# Patient Record
Sex: FEMALE | Race: WHITE | NOT HISPANIC OR LATINO | Employment: OTHER | URBAN - METROPOLITAN AREA
[De-identification: names, ages, dates, MRNs, and addresses within clinical notes are randomized per-mention and may not be internally consistent; named-entity substitution may affect disease eponyms.]

---

## 2017-01-13 DIAGNOSIS — R73.9 HYPERGLYCEMIA: ICD-10-CM

## 2017-01-13 RX ORDER — BLOOD SUGAR DIAGNOSTIC
STRIP MISCELLANEOUS
Qty: 300 EACH | Refills: 1 | Status: SHIPPED | OUTPATIENT
Start: 2017-01-13 | End: 2017-06-07 | Stop reason: SDUPTHER

## 2017-01-13 RX ORDER — LANCETS
EACH MISCELLANEOUS
Qty: 300 EACH | Refills: 1 | Status: SHIPPED | OUTPATIENT
Start: 2017-01-13 | End: 2017-06-07 | Stop reason: SDUPTHER

## 2017-01-16 ENCOUNTER — TELEPHONE (OUTPATIENT)
Dept: FAMILY MEDICINE CLINIC | Facility: CLINIC | Age: 72
End: 2017-01-16

## 2017-01-16 NOTE — TELEPHONE ENCOUNTER
Please call.  I reviewed her blood sugar readings.  Has she ever been on metformin.  If not, recommend start metformin 500 mg 1 by mouth twice a day #60 plus one refill.  in addition to the 60 units of Lantus.  Follow-up in February as scheduled.  Call in 2-3 weeks with blood sugar readings.

## 2017-01-16 NOTE — TELEPHONE ENCOUNTER
Patient calling with glucose readings. She is currently taking 60 units of Lantus. Per patient no other DM meds.    Dec 30th   7:30am  258    10 pm  341  Dec 31st 9am  280  Jan 1st  7am  220    2:30am 240    10pm  328  Jan 2nd 7am  245    1pm  321    9pm  304  Jan 3rd 11am  399    10pm  310  Jan 4th  6am  284  Jan 5th  3:30am 246    12pm  266  Jan 6th  5:30am 386    1pm  510    9pm  503  Jan 7th  11am  255    6pm  347    8pm  270  Jan 8th  12pm  350  Jan 9th  4am  199  Jan 10th 8am  335    3:30pm 280    9:30pm 394  Jan 11th 10am  240    11:30pm 411  Jan 12th 11am  253  Jan 13th 8am  279    1pm  347  Jan 14th 3:30am 213    8pm  324  Jan 15th 7am  230  Jan 16th 5:30am 258    1pm  379

## 2017-01-17 DIAGNOSIS — R73.9 HYPERGLYCEMIA: ICD-10-CM

## 2017-01-17 DIAGNOSIS — R73.9 HYPERGLYCEMIA: Primary | ICD-10-CM

## 2017-01-17 NOTE — TELEPHONE ENCOUNTER
Spoke with pt and went over msg. She confirms she doesn't believe she has ever taken Metformin. She is willing to try this and aware Rx will be sent. She states she would like to go ahead and schedule f/u appt for Feb and will just bring readings in with her then. Rx sent

## 2017-01-23 ENCOUNTER — TELEPHONE (OUTPATIENT)
Dept: FAMILY MEDICINE CLINIC | Facility: CLINIC | Age: 72
End: 2017-01-23

## 2017-01-23 NOTE — TELEPHONE ENCOUNTER
----- Message from Deborah Harman sent at 1/23/2017  3:26 PM EST -----  Contact: Lemuel  Patient is calling to let Dr. Hdez know that she cannot take Metformin 500mg. Please call her back at [phone].

## 2017-01-23 NOTE — TELEPHONE ENCOUNTER
Metformin was recently added. Pt cannot tolerate this. She has upcoming appt 2/24/17. Please advise.

## 2017-01-25 NOTE — TELEPHONE ENCOUNTER
SPOKE WITH PT AND SHE STATES THAT SHE WAS HAVING TROUBLE BREATHING AND WAS GASPING FOR AIR AND SHE WAS VERY WEAK AND HAD DIARRHEA. PT STATES DIARRHEA WAS OK AND KIND OF SUBSIDED BUT AFTER SHE WAS HAVING TROUBLE BREATHING SHE READ THE PAPERWORK AND REALIZED THE MEDICATION WAS CAUSING THESE ISSUES SO SHE STOP. IS THERE ANOTHER MED THAT CAN BE PRESCRIBED.

## 2017-01-26 NOTE — TELEPHONE ENCOUNTER
SPOKE WITH PT AND INFORMED HER OF MESSAGE AND PT VERBALIZED UNDERSTANDING AND WILL CALL OR BRING IN READING.

## 2017-01-26 NOTE — TELEPHONE ENCOUNTER
Please call, have her continue to check her sugars and call in 2 weeks with readings. May just need to increase insulin. There are some other meds but may have side effects as well. I would like to see  how sugars are doing now. May need to discuss further at OV in Feb if sugars are stable. bds

## 2017-01-30 RX ORDER — PRAVASTATIN SODIUM 40 MG
40 TABLET ORAL DAILY
Qty: 90 TABLET | Refills: 3 | Status: SHIPPED | OUTPATIENT
Start: 2017-01-30 | End: 2018-01-27 | Stop reason: SDUPTHER

## 2017-02-17 ENCOUNTER — OFFICE VISIT (OUTPATIENT)
Dept: FAMILY MEDICINE CLINIC | Facility: CLINIC | Age: 72
End: 2017-02-17

## 2017-02-17 VITALS
TEMPERATURE: 98.5 F | BODY MASS INDEX: 40.78 KG/M2 | OXYGEN SATURATION: 93 % | HEART RATE: 72 BPM | WEIGHT: 215.8 LBS | DIASTOLIC BLOOD PRESSURE: 52 MMHG | RESPIRATION RATE: 26 BRPM | SYSTOLIC BLOOD PRESSURE: 142 MMHG

## 2017-02-17 DIAGNOSIS — J43.9 PULMONARY EMPHYSEMA, UNSPECIFIED EMPHYSEMA TYPE (HCC): ICD-10-CM

## 2017-02-17 DIAGNOSIS — G89.29 CHRONIC RIGHT SHOULDER PAIN: ICD-10-CM

## 2017-02-17 DIAGNOSIS — M25.511 CHRONIC RIGHT SHOULDER PAIN: ICD-10-CM

## 2017-02-17 DIAGNOSIS — E03.9 ACQUIRED HYPOTHYROIDISM: ICD-10-CM

## 2017-02-17 DIAGNOSIS — M79.604 LOWER EXTREMITY PAIN, RIGHT: Primary | ICD-10-CM

## 2017-02-17 DIAGNOSIS — I10 BENIGN ESSENTIAL HYPERTENSION: ICD-10-CM

## 2017-02-17 DIAGNOSIS — E78.2 MIXED HYPERLIPIDEMIA: Primary | ICD-10-CM

## 2017-02-17 DIAGNOSIS — Z79.4 UNCONTROLLED TYPE 2 DIABETES MELLITUS WITH HYPERGLYCEMIA, WITH LONG-TERM CURRENT USE OF INSULIN (HCC): ICD-10-CM

## 2017-02-17 DIAGNOSIS — E11.65 UNCONTROLLED TYPE 2 DIABETES MELLITUS WITH HYPERGLYCEMIA, WITH LONG-TERM CURRENT USE OF INSULIN (HCC): ICD-10-CM

## 2017-02-17 LAB
ALBUMIN SERPL-MCNC: 4 G/DL (ref 3.2–4.8)
ALBUMIN/GLOB SERPL: 1.7 G/DL (ref 1.5–2.5)
ALP SERPL-CCNC: 50 U/L (ref 25–100)
ALT SERPL-CCNC: 22 U/L (ref 7–40)
AST SERPL-CCNC: 18 U/L (ref 0–33)
BASOPHILS # BLD AUTO: 0.02 10*3/MM3 (ref 0–0.2)
BASOPHILS NFR BLD AUTO: 0.3 % (ref 0–1)
BILIRUB SERPL-MCNC: 0.9 MG/DL (ref 0.3–1.2)
BUN SERPL-MCNC: 20 MG/DL (ref 9–23)
BUN/CREAT SERPL: 22.2 (ref 7–25)
CALCIUM SERPL-MCNC: 10 MG/DL (ref 8.7–10.4)
CHLORIDE SERPL-SCNC: 92 MMOL/L (ref 99–109)
CHOLEST SERPL-MCNC: 154 MG/DL (ref 0–200)
CHOLEST/HDLC SERPL: 2.44 {RATIO}
CO2 SERPL-SCNC: 34 MMOL/L (ref 20–31)
CREAT SERPL-MCNC: 0.9 MG/DL (ref 0.6–1.3)
EOSINOPHIL # BLD AUTO: 0.19 10*3/MM3 (ref 0.1–0.3)
EOSINOPHIL NFR BLD AUTO: 2.5 % (ref 0–3)
ERYTHROCYTE [DISTWIDTH] IN BLOOD BY AUTOMATED COUNT: 12.9 % (ref 11.3–14.5)
GLOBULIN SER CALC-MCNC: 2.4 GM/DL
GLUCOSE SERPL-MCNC: 266 MG/DL (ref 70–100)
HBA1C MFR BLD: 12 % (ref 4.8–5.6)
HCT VFR BLD AUTO: 42.9 % (ref 34.5–44)
HDLC SERPL-MCNC: 63 MG/DL (ref 40–60)
HGB BLD-MCNC: 13.4 G/DL (ref 11.5–15.5)
IMM GRANULOCYTES # BLD: 0.02 10*3/MM3 (ref 0–0.03)
IMM GRANULOCYTES NFR BLD: 0.3 % (ref 0–0.6)
LDLC SERPL CALC-MCNC: 68 MG/DL (ref 0–100)
LYMPHOCYTES # BLD AUTO: 1.7 10*3/MM3 (ref 0.6–4.8)
LYMPHOCYTES NFR BLD AUTO: 22.2 % (ref 24–44)
MCH RBC QN AUTO: 31.2 PG (ref 27–31)
MCHC RBC AUTO-ENTMCNC: 31.2 G/DL (ref 32–36)
MCV RBC AUTO: 99.8 FL (ref 80–99)
MONOCYTES # BLD AUTO: 0.56 10*3/MM3 (ref 0–1)
MONOCYTES NFR BLD AUTO: 7.3 % (ref 0–12)
NEUTROPHILS # BLD AUTO: 5.17 10*3/MM3 (ref 1.5–8.3)
NEUTROPHILS NFR BLD AUTO: 67.4 % (ref 41–71)
PLATELET # BLD AUTO: 201 10*3/MM3 (ref 150–450)
POTASSIUM SERPL-SCNC: 4.5 MMOL/L (ref 3.5–5.5)
PROT SERPL-MCNC: 6.4 G/DL (ref 5.7–8.2)
RBC # BLD AUTO: 4.3 10*6/MM3 (ref 3.89–5.14)
SODIUM SERPL-SCNC: 134 MMOL/L (ref 132–146)
TRIGL SERPL-MCNC: 117 MG/DL (ref 0–150)
TSH SERPL DL<=0.005 MIU/L-ACNC: 12.45 MIU/ML (ref 0.35–5.35)
VLDLC SERPL CALC-MCNC: 23.4 MG/DL
WBC # BLD AUTO: 7.66 10*3/MM3 (ref 3.5–10.8)

## 2017-02-17 PROCEDURE — 99213 OFFICE O/P EST LOW 20 MIN: CPT | Performed by: FAMILY MEDICINE

## 2017-02-17 RX ORDER — HYDROCODONE BITARTRATE AND ACETAMINOPHEN 5; 325 MG/1; MG/1
1 TABLET ORAL EVERY 6 HOURS PRN
COMMUNITY
End: 2017-02-17

## 2017-02-17 RX ORDER — TRAMADOL HYDROCHLORIDE 50 MG/1
50 TABLET ORAL EVERY 6 HOURS PRN
Qty: 30 TABLET | Refills: 0 | Status: SHIPPED | OUTPATIENT
Start: 2017-02-17 | End: 2017-02-24 | Stop reason: SDUPTHER

## 2017-02-17 NOTE — PROGRESS NOTES
"Subjective   Janet Pisano is a 71 y.o. female.     History of Present Illness   Here for ER follow up   She was seen at [facility] ER 2/14/17 for right lower extremity, present in the thigh region that radiates up to her hip  Right hip was xray was completed  Norco 5mg prescribed by ER, states that it barely relieves the pain.  Pain in her right leg has been present x 1 week, getting progressively worse  Walking for long periods makes leg pain worse.  The right leg does have edema, ultrasound was completed in the ER- no DVT  She was referred to Dr. Morro Walls orthopedic for further evaluation of shoulder and leg pain.  She states that she is already seeing a hand surgeon, she would like to see a different orthopedic within the same group.    Right shoulder pain is chronic  States that \"it catches\" and the pain is \"horrible\"  Years ago, she fell down some steps and caught herself with her arms, that injured her shoulder.     She does have a history of rheumatic fever and thinks that her joint pain is related to that     States that aleve, ibuprofen doesn't improve any pain symptoms.  She is requesting something different for pain than the Norco that was given to her per the ER.    The following portions of the patient's history were reviewed and updated as appropriate: allergies, current medications, past family history, past medical history, past social history, past surgical history and problem list.    Review of Systems   Constitutional: Negative for chills and fever.   Respiratory: Negative.    Cardiovascular: Negative.    Musculoskeletal: Positive for arthralgias. Gait problem: ambulates with a walker.   Neurological: Negative for dizziness, weakness and numbness.   Hematological: Negative for adenopathy. Does not bruise/bleed easily.       Objective   Physical Exam   Constitutional: She is oriented to person, place, and time. She appears well-developed and well-nourished.   HENT:   Head: Normocephalic and " atraumatic.   Nose: Nose normal.   Eyes: Conjunctivae and EOM are normal.   Cardiovascular: Normal rate, regular rhythm and normal heart sounds.    Pulmonary/Chest: Effort normal and breath sounds normal. No respiratory distress. She has no wheezes.   Musculoskeletal: She exhibits no deformity.        Right shoulder: She exhibits crepitus. She exhibits normal range of motion.        Right hip: She exhibits tenderness.        Right knee: She exhibits normal range of motion.   Ambulates with walker   Neurological: She is alert and oriented to person, place, and time. No cranial nerve deficit.   Skin: Skin is warm and dry.   Psychiatric: She has a normal mood and affect. Her behavior is normal.   Nursing note and vitals reviewed.      Assessment/Plan   Janet was seen today for leg pain.    Diagnoses and all orders for this visit:    Lower extremity pain, right  -     traMADol (ULTRAM) 50 MG tablet; Take 1 tablet by mouth Every 6 (Six) Hours As Needed for moderate pain (4-6).  -     diclofenac (VOLTAREN) 50 MG EC tablet; Take 1 tablet by mouth 2 (Two) Times a Day.  -     Ambulatory Referral to Orthopedic Surgery    Chronic right shoulder pain  -     traMADol (ULTRAM) 50 MG tablet; Take 1 tablet by mouth Every 6 (Six) Hours As Needed for moderate pain (4-6).  -     diclofenac (VOLTAREN) 50 MG EC tablet; Take 1 tablet by mouth 2 (Two) Times a Day.  -     Ambulatory Referral to Orthopedic Surgery     referred to orthopedic per her request  Pain medication changed to tramadol, stopped Norco.  Do not combine these 2 medications. KENDALL query complete. Treatment plan to include limited course of prescribed controlled substance. Risks including addiction, benefits, and alternatives presented to patient.   Started daily diclofenac for NSAID therapy.  She was advised to stop this medication if stomach pain occurs.    Mala Alvarez DO

## 2017-02-17 NOTE — PATIENT INSTRUCTIONS
Go to the nearest ER or return to clinic if symptoms worsen, fever/chill develop      Knee Pain  Knee pain is a common problem. It can have many causes. The pain often goes away by following your doctor's home care instructions. Treatment for ongoing pain will depend on the cause of your pain. If your knee pain continues, more tests may be needed to diagnose your condition. Tests may include X-rays or other imaging studies of your knee.  HOME CARE  · Take medicines only as told by your doctor.  · Rest your knee and keep it raised (elevated) while you are resting.  · Do not do things that cause pain or make your pain worse.  · Avoid activities where both feet leave the ground at the same time, such as running, jumping rope, or doing jumping jacks.  · Apply ice to the knee area:    Put ice in a plastic bag.    Place a towel between your skin and the bag.    Leave the ice on for 20 minutes, 2-3 times a day.  · Ask your doctor if you should wear an elastic knee support.  · Sleep with a pillow under your knee.  · Lose weight if you are overweight. Being overweight can make your knee hurt more.  · Do not use any tobacco products, including cigarettes, chewing tobacco, or electronic cigarettes. If you need help quitting, ask your doctor. Smoking may slow the healing of any bone and joint problems that you may have.  GET HELP IF:  · Your knee pain does not stop, it changes, or it gets worse.  · You have a fever along with knee pain.  · Your knee gives out or locks up.  · Your knee becomes more swollen.  GET HELP RIGHT AWAY IF:   · Your knee feels hot to the touch.  · You have chest pain or trouble breathing.     This information is not intended to replace advice given to you by your health care provider. Make sure you discuss any questions you have with your health care provider.     Document Released: 03/16/2010 Document Revised: 01/08/2016 Document Reviewed: 02/18/2015  Elsevier Interactive Patient Education ©2016  [employer]

## 2017-02-20 RX ORDER — INSULIN GLARGINE 100 [IU]/ML
INJECTION, SOLUTION SUBCUTANEOUS
Qty: 20 ML | Refills: 0 | Status: SHIPPED | OUTPATIENT
Start: 2017-02-20 | End: 2017-02-24 | Stop reason: DRUGHIGH

## 2017-02-20 RX ORDER — LEVOTHYROXINE SODIUM 137 MCG
137 TABLET ORAL DAILY
Qty: 90 TABLET | Refills: 1 | Status: SHIPPED | OUTPATIENT
Start: 2017-02-20 | End: 2017-04-18

## 2017-02-24 ENCOUNTER — TELEPHONE (OUTPATIENT)
Dept: FAMILY MEDICINE CLINIC | Facility: CLINIC | Age: 72
End: 2017-02-24

## 2017-02-24 ENCOUNTER — OFFICE VISIT (OUTPATIENT)
Dept: FAMILY MEDICINE CLINIC | Facility: CLINIC | Age: 72
End: 2017-02-24

## 2017-02-24 VITALS
SYSTOLIC BLOOD PRESSURE: 116 MMHG | HEIGHT: 61 IN | BODY MASS INDEX: 40.59 KG/M2 | OXYGEN SATURATION: 94 % | HEART RATE: 54 BPM | WEIGHT: 215 LBS | RESPIRATION RATE: 24 BRPM | TEMPERATURE: 98 F | DIASTOLIC BLOOD PRESSURE: 50 MMHG

## 2017-02-24 DIAGNOSIS — I25.10 CORONARY ARTERY DISEASE INVOLVING NATIVE CORONARY ARTERY OF NATIVE HEART WITHOUT ANGINA PECTORIS: ICD-10-CM

## 2017-02-24 DIAGNOSIS — E78.2 MIXED HYPERLIPIDEMIA: ICD-10-CM

## 2017-02-24 DIAGNOSIS — G56.03 BILATERAL CARPAL TUNNEL SYNDROME: ICD-10-CM

## 2017-02-24 DIAGNOSIS — M25.511 CHRONIC RIGHT SHOULDER PAIN: ICD-10-CM

## 2017-02-24 DIAGNOSIS — M79.604 LOWER EXTREMITY PAIN, RIGHT: ICD-10-CM

## 2017-02-24 DIAGNOSIS — G89.29 CHRONIC RIGHT SHOULDER PAIN: ICD-10-CM

## 2017-02-24 DIAGNOSIS — Z79.4 UNCONTROLLED TYPE 2 DIABETES MELLITUS WITH HYPERGLYCEMIA, WITH LONG-TERM CURRENT USE OF INSULIN (HCC): Primary | ICD-10-CM

## 2017-02-24 DIAGNOSIS — E11.65 UNCONTROLLED TYPE 2 DIABETES MELLITUS WITH HYPERGLYCEMIA, WITH LONG-TERM CURRENT USE OF INSULIN (HCC): Primary | ICD-10-CM

## 2017-02-24 DIAGNOSIS — F41.9 ANXIETY: ICD-10-CM

## 2017-02-24 DIAGNOSIS — I10 BENIGN ESSENTIAL HYPERTENSION: ICD-10-CM

## 2017-02-24 PROCEDURE — 99214 OFFICE O/P EST MOD 30 MIN: CPT | Performed by: FAMILY MEDICINE

## 2017-02-24 RX ORDER — TRAMADOL HYDROCHLORIDE 50 MG/1
50 TABLET ORAL EVERY 6 HOURS PRN
Qty: 30 TABLET | Refills: 1 | Status: SHIPPED | OUTPATIENT
Start: 2017-02-24 | End: 2017-04-17

## 2017-02-24 RX ORDER — HYDROXYZINE PAMOATE 25 MG/1
25 CAPSULE ORAL 3 TIMES DAILY PRN
Qty: 30 CAPSULE | Refills: 3 | Status: SHIPPED | OUTPATIENT
Start: 2017-02-24 | End: 2017-04-17

## 2017-02-24 NOTE — PROGRESS NOTES
Chief Complaint   Patient presents with   • Hypertension   • Hyperlipidemia   • Diabetes   • Hypothyroidism   • Depression   • Med Refill       Subjective     Hypertension   This is a chronic problem. The current episode started more than 1 year ago. The problem is unchanged. The problem is controlled. Associated symptoms include anxiety, malaise/fatigue and shortness of breath (stable off and on). Pertinent negatives include no chest pain or peripheral edema (better this week). There are no associated agents to hypertension. Risk factors for coronary artery disease include diabetes mellitus. Past treatments include beta blockers. The current treatment provides moderate improvement. Hypertensive end-organ damage includes CAD/MI. There is no history of angina or kidney disease. There is no history of chronic renal disease.   Hyperlipidemia   This is a chronic problem. The current episode started more than 1 year ago. The problem is controlled. Recent lipid tests were reviewed and are normal. Exacerbating diseases include hypothyroidism. She has no history of chronic renal disease. Associated symptoms include shortness of breath (stable off and on). Pertinent negatives include no chest pain.   Diabetes   Hypoglycemia symptoms include nervousness/anxiousness. Pertinent negatives for diabetes include no chest pain.   Hypothyroidism   Associated symptoms include arthralgias. Pertinent negatives include no chest pain.   Depression Patient presents with the following symptoms: nervousness/anxiety and shortness of breath (stable off and on).        Anxiety  Eats when gets anxious and has been eating more  Decreased sleep  When sleeping better, then sugars get better. Had been >300 and now 200 or less    Had leg pan and took Tramadol and diclofenac and has helped  To see orthopedic  Has had rheumatic fever in the past and thinks this is the same pain and comes and goes    still some pain in the lower leg and upper leg.  Right leg.       Past Medical History,Medications, Allergies, and social history was reviewed.    Review of Systems   Constitutional: Positive for malaise/fatigue.   HENT: Negative.    Respiratory: Positive for shortness of breath (stable off and on).    Cardiovascular: Negative.  Negative for chest pain.   Gastrointestinal: Negative.    Genitourinary: Negative.    Musculoskeletal: Positive for arthralgias, back pain and gait problem.   Psychiatric/Behavioral: Positive for sleep disturbance. The patient is nervous/anxious.        Objective     Physical Exam   Constitutional: She is oriented to person, place, and time. She appears well-developed and well-nourished.   Obese   HENT:   Head: Normocephalic and atraumatic.   Right Ear: Hearing, tympanic membrane, external ear and ear canal normal.   Left Ear: Hearing, tympanic membrane, external ear and ear canal normal.   Mouth/Throat: Oropharynx is clear and moist.   Eyes: Conjunctivae and EOM are normal. Pupils are equal, round, and reactive to light.   Neck: Normal range of motion. Neck supple. No thyromegaly present.   Cardiovascular: Normal rate, regular rhythm and normal heart sounds.  Exam reveals no gallop and no friction rub.    No murmur heard.  Pulmonary/Chest: Effort normal. No respiratory distress. She has decreased breath sounds. She has no wheezes. She has no rales.   Abdominal: Soft. Bowel sounds are normal.    Janet had a diabetic foot exam performed today.   During the foot exam she had a monofilament test performed (decreased sensation bilateral great toes).    Vascular Status -  Her exam exhibits right foot vasculature normal. Her exam exhibits no right foot edema. Her exam exhibits left foot vasculature normal. Her exam exhibits no left foot edema.   Skin Integrity  -  Her right foot skin is intact.     Janet 's left foot skin is intact. .  Neurological: She is alert and oriented to person, place, and time.   Skin: Skin is warm and dry.    Psychiatric: She has a normal mood and affect. Her behavior is normal.   Nursing note and vitals reviewed.   braces on both hands      Assessment/Plan     Problem List Items Addressed This Visit        Cardiovascular and Mediastinum    Benign essential hypertension    Hyperlipidemia    Coronary artery disease       Endocrine    Uncontrolled type 2 diabetes mellitus - Primary       Nervous and Auditory    Bilateral carpal tunnel syndrome       Other    Anxiety    Relevant Medications    hydrOXYzine (VISTARIL) 25 MG capsule      Other Visit Diagnoses     Lower extremity pain, right        Relevant Medications    traMADol (ULTRAM) 50 MG tablet    Chronic right shoulder pain        Relevant Medications    traMADol (ULTRAM) 50 MG tablet          Elie dated on 2/17/2017  was reviewed and appropriate.      DISCUSSION  Review blood work.  Diabetes test is quite elevated.  A1c is 12.0.  Sounds like she is not eating right and overeating at night.  Blood sugars improved once she goes to bed earlier and does not eat late at night.  Encouraged her to continue to do this.  Recommend increasing Lantus to 62 units to 65 units daily.  We will need to follow-up and repeat blood work in 3 months.  Recommend she call in 2-3 weeks with blood sugar readings.    Anxiety.  We will try hydroxyzine.  Side effects explained.    Bilateral carpal tunnel syndrome.  Apparently the surgery that she was to have was canceled by insurance.  We will call insurance to see why this was canceled.  She states that her physician who was supposed to do the surgery retired and then transferred her to another one that the surgery was denied.    Chronic pain in the extremity and shoulder.  Okay for tramadol as needed for pain.  She has an appointment with orthopedics coming up to check her leg pain.    Hypertension is stable.  Coronary artery disease is stable.  Continue current medications.  Recommend weight loss and improved  nutrition.        MEDICATIONS PRESCRIBED  Requested Prescriptions     Signed Prescriptions Disp Refills   • hydrOXYzine (VISTARIL) 25 MG capsule 30 capsule 3     Sig: Take 1 capsule by mouth 3 (Three) Times a Day As Needed for anxiety.   • traMADol (ULTRAM) 50 MG tablet 30 tablet 1     Sig: Take 1 tablet by mouth Every 6 (Six) Hours As Needed for moderate pain (4-6).          Jhoan Hdez MD

## 2017-03-01 NOTE — TELEPHONE ENCOUNTER
SPOKE WITH INSURANCE AND THEY STATE A LETTER WAS SENT TO DR YANCEY TO START APPEAL PROCESS AND THEY HAD TO 02/22/2017 TO RESPOND. PT INSURANCE DOESN'T COVER THIS TYPE OF INSURANCE AND PT NEEDS TO BE TREATED WITH SPLINTS PHYSICAL THERAPY AND STEROID INJECTIONS. THEY COULD COVER IF SX DON'T GET BETTER WITH TREATMENT. WE CAN APPLY FOR APPEAL AND [facility] PHONE NUMBER IS [phone] FAX [phone]

## 2017-03-01 NOTE — TELEPHONE ENCOUNTER
Please call patient and see if had PT for carpal tunnel or shots? She has been wearing splints. Had them on at the spot.

## 2017-03-01 NOTE — TELEPHONE ENCOUNTER
Spoke with pt and she states that she had the shots and wears the splints. Shots never helped. Pt rec a letter today from insurance that something was approved but doesn't state what and the date is 02/22/2017. I inform pt to call Dr Matos office and see if surgery was approved BC that was when their office had to make appeal on surgery. No one and has called pt though. Pt is going to call me and let me know what she finds out.

## 2017-03-02 NOTE — TELEPHONE ENCOUNTER
LOWELL    SPOKE WITH PT AND SHE STATES THAT DR YANCEY OFFICE HAS SENT APPEAL LETTER AND IS WAITING TO HEAR BACK FROM THEM. LOWELL

## 2017-03-16 RX ORDER — LEVOTHYROXINE SODIUM 125 MCG
TABLET ORAL
Qty: 90 TABLET | Refills: 0 | OUTPATIENT
Start: 2017-03-16

## 2017-03-16 NOTE — TELEPHONE ENCOUNTER
Please call pharmacy.  A 90 day prescription for brand name Synthroid 137 µg was sent in on 2/20/2017.  Was is an automatic refill request by the pharmacy or did the patient request.

## 2017-03-27 RX ORDER — INSULIN GLARGINE 100 [IU]/ML
INJECTION, SOLUTION SUBCUTANEOUS
Qty: 20 ML | Refills: 0 | Status: SHIPPED | OUTPATIENT
Start: 2017-03-27 | End: 2017-04-30 | Stop reason: SDUPTHER

## 2017-04-12 ENCOUNTER — TELEPHONE (OUTPATIENT)
Dept: FAMILY MEDICINE CLINIC | Facility: CLINIC | Age: 72
End: 2017-04-12

## 2017-04-12 NOTE — TELEPHONE ENCOUNTER
----- Message from Anastasia Ryder sent at 4/12/2017  9:10 AM EDT -----  Contact: DR DAI PATIENT CALL BACK  PATIENT WANTS YOU TO CALL HER BACK ABOUT HER HAND SURGERY [phone]

## 2017-04-12 NOTE — TELEPHONE ENCOUNTER
SPOKE WITH PT AND SHE INFORMED ME THAT SHE GOES IN FOR HER HAND SURGERY ON Friday AT 1 PM THIS IS JUST LOWELL

## 2017-04-17 ENCOUNTER — OFFICE VISIT (OUTPATIENT)
Dept: FAMILY MEDICINE CLINIC | Facility: CLINIC | Age: 72
End: 2017-04-17

## 2017-04-17 VITALS
HEIGHT: 61 IN | RESPIRATION RATE: 18 BRPM | HEART RATE: 64 BPM | DIASTOLIC BLOOD PRESSURE: 58 MMHG | SYSTOLIC BLOOD PRESSURE: 124 MMHG | WEIGHT: 210.5 LBS | OXYGEN SATURATION: 99 % | TEMPERATURE: 97 F | BODY MASS INDEX: 39.74 KG/M2

## 2017-04-17 DIAGNOSIS — L23.9 ALLERGIC CONTACT DERMATITIS, UNSPECIFIED TRIGGER: ICD-10-CM

## 2017-04-17 DIAGNOSIS — F41.9 ANXIETY: ICD-10-CM

## 2017-04-17 DIAGNOSIS — G56.03 BILATERAL CARPAL TUNNEL SYNDROME: Primary | ICD-10-CM

## 2017-04-17 DIAGNOSIS — E03.9 ACQUIRED HYPOTHYROIDISM: ICD-10-CM

## 2017-04-17 LAB — TSH SERPL DL<=0.005 MIU/L-ACNC: 10.51 MIU/ML (ref 0.35–5.35)

## 2017-04-17 PROCEDURE — 99214 OFFICE O/P EST MOD 30 MIN: CPT | Performed by: FAMILY MEDICINE

## 2017-04-17 RX ORDER — BUSPIRONE HYDROCHLORIDE 10 MG/1
TABLET ORAL
Qty: 60 TABLET | Refills: 1 | Status: SHIPPED | OUTPATIENT
Start: 2017-04-17 | End: 2017-06-08 | Stop reason: DRUGHIGH

## 2017-04-17 RX ORDER — HYDROCODONE BITARTRATE AND ACETAMINOPHEN 5; 325 MG/1; MG/1
1 TABLET ORAL EVERY 6 HOURS PRN
Qty: 30 TABLET | Refills: 0 | Status: SHIPPED | OUTPATIENT
Start: 2017-04-17 | End: 2017-12-07

## 2017-04-17 RX ORDER — TRIAMCINOLONE ACETONIDE 1 MG/G
CREAM TOPICAL 2 TIMES DAILY
Qty: 45 G | Refills: 0 | Status: SHIPPED | OUTPATIENT
Start: 2017-04-17 | End: 2017-08-28

## 2017-04-17 NOTE — PROGRESS NOTES
Chief Complaint   Patient presents with   • Rash     from out dated pain patch.    • Pain     tramadol and hydroxyzine isn't working for her and she is needing something else and would like to discuss   • Med Refill       Subjective     Rash   This is a new problem. The current episode started in the past 7 days. The problem has been gradually improving since onset. The affected locations include the left arm (left arm). The rash is characterized by blistering. She was exposed to a new medication (pain patch). Associated symptoms include fatigue and shortness of breath (chronic). Pertinent negatives include no cough. Past treatments include nothing. The treatment provided no relief.   Pain   This is a chronic problem. The current episode started more than 1 month ago. The problem occurs daily. Associated symptoms include fatigue, neck pain and a rash. Pertinent negatives include no coughing. Associated symptoms comments: pain in the arms and shoulder and neck. has CTS. Severe. Both arms hurt. Tramadol is not helping. . Exacerbated by: using and moving the arms. Treatments tried: tramadol. The treatment provided no relief.   Anxiety   Presents for follow-up visit. Symptoms include irritability, nervous/anxious behavior (increased anxiety) and shortness of breath (chronic). Patient reports no depressed mood. Symptoms occur most days. The severity of symptoms is moderate. The quality of sleep is fair.         Hypothyroidism  She is now on brand name Synthroid 137 g daily.  This is a dosage change.  Was done 8 weeks ago.  Due for recheck TSH.  Still complains of fatigue.  No significant change reported.      Had pulled a  muscle in the left shoulder and pain to the elbow. Used and old pain patch (lidoderm). Started itching and developed and rash and blisters.   Getting better. Rash is some better. Used alcohol, ice and corn starch    Took 3 tylenol and mild help.   No other pain meds now        Past Medical  History,Medications, Allergies, and social history was reviewed.    Review of Systems   Constitutional: Positive for fatigue and irritability.   HENT: Negative.    Respiratory: Positive for shortness of breath (chronic) and wheezing. Negative for cough.    Cardiovascular: Negative.    Gastrointestinal: Negative.    Musculoskeletal: Positive for neck pain.   Skin: Positive for rash.   Psychiatric/Behavioral: The patient is nervous/anxious (increased anxiety).        Objective     Physical Exam   Constitutional: She is oriented to person, place, and time. She appears well-developed and well-nourished.   HENT:   Head: Normocephalic and atraumatic.   Right Ear: Hearing and external ear normal.   Left Ear: Hearing and external ear normal.   Mouth/Throat: Oropharynx is clear and moist.   Eyes: Conjunctivae and EOM are normal. Pupils are equal, round, and reactive to light.   Cardiovascular: Exam reveals no friction rub.    No murmur heard.  Pulmonary/Chest: Effort normal.   Musculoskeletal: She exhibits no edema.   Tenderness of both wrists bilaterally.  Braces for both wrists for chronic carpal tunnel syndrome.  No definite wasting of the muscles at this time.   strength is intact.   Neurological: She is alert and oriented to person, place, and time.   Skin: Skin is warm.   Psychiatric: She has a normal mood and affect. Her behavior is normal.   Erythematous vesicular rash in several locations of the left arm consistent with reported history of placement of Lidoderm patch.  No evidence of secondary infection.  No active drainage or discharge.   Nursing note and vitals reviewed.        Assessment/Plan     Problem List Items Addressed This Visit        Endocrine    Hypothyroidism    Relevant Orders    TSH       Nervous and Auditory    Bilateral carpal tunnel syndrome - Primary    Relevant Medications    Elastic Bandages & Supports (WRIST SPLINT) misc    HYDROcodone-acetaminophen (NORCO) 5-325 MG per tablet       Other     Anxiety    Relevant Medications    busPIRone (BUSPAR) 10 MG tablet      Other Visit Diagnoses     Allergic contact dermatitis, unspecified trigger        Relevant Medications    triamcinolone (KENALOG) 0.1 % cream          Elie dated on 2017  was reviewed and appropriate.      DISCUSSION  Triamcinolone cream to rash.  If not improving, she is to call.    I have given her short supply of hydrocodone to use for pain because of the significant increased pain of the carpal tunnel syndrome.  They are to call orthopedics to inquire about the rescheduling of her surgery.  Side effects and addiction potential previously explained with a medication.    Anxiety.  We will try BuSpar one half twice a 1 week then one twice a day.    She is to follow-up as scheduled at the end of May for repeat blood work.    She is going to have a repeat TSH done today since a dosage change was made it weeks ago.    Follow-up as scheduled in May.        MEDICATIONS PRESCRIBED  Requested Prescriptions     Signed Prescriptions Disp Refills   • Elastic Bandages & Supports (WRIST SPLINT) misc 2 each 0     Sig: Bilateral wrist splints for CARPAL TUNNEL Syn LEFT AND RIGHT   • HYDROcodone-acetaminophen (NORCO) 5-325 MG per tablet 30 tablet 0     Sig: Take 1 tablet by mouth Every 6 (Six) Hours As Needed for Moderate Pain (4-6).   • busPIRone (BUSPAR) 10 MG tablet 60 tablet 1     Si/2 po bid for 1 week then one po bid   • triamcinolone (KENALOG) 0.1 % cream 45 g 0     Sig: Apply  topically 2 (Two) Times a Day.          Jhoan Hdez MD

## 2017-04-18 DIAGNOSIS — E03.9 ACQUIRED HYPOTHYROIDISM: Primary | ICD-10-CM

## 2017-04-18 RX ORDER — LEVOTHYROXINE SODIUM 150 MCG
150 TABLET ORAL DAILY
Qty: 30 TABLET | Refills: 2 | Status: SHIPPED | OUTPATIENT
Start: 2017-04-18 | End: 2017-07-13 | Stop reason: SDUPTHER

## 2017-05-01 RX ORDER — INSULIN GLARGINE 100 [IU]/ML
INJECTION, SOLUTION SUBCUTANEOUS
Qty: 20 ML | Refills: 0 | Status: SHIPPED | OUTPATIENT
Start: 2017-05-01 | End: 2017-06-07 | Stop reason: DRUGHIGH

## 2017-05-17 ENCOUNTER — OFFICE VISIT (OUTPATIENT)
Dept: FAMILY MEDICINE CLINIC | Facility: CLINIC | Age: 72
End: 2017-05-17

## 2017-05-17 VITALS
HEIGHT: 61 IN | SYSTOLIC BLOOD PRESSURE: 166 MMHG | TEMPERATURE: 98.8 F | OXYGEN SATURATION: 78 % | BODY MASS INDEX: 38.93 KG/M2 | WEIGHT: 206.2 LBS | RESPIRATION RATE: 32 BRPM | DIASTOLIC BLOOD PRESSURE: 80 MMHG | HEART RATE: 72 BPM

## 2017-05-17 DIAGNOSIS — R09.02 HYPOXEMIA: ICD-10-CM

## 2017-05-17 DIAGNOSIS — R06.02 SHORTNESS OF BREATH: Primary | ICD-10-CM

## 2017-05-17 PROCEDURE — 99213 OFFICE O/P EST LOW 20 MIN: CPT | Performed by: FAMILY MEDICINE

## 2017-05-17 RX ORDER — PREDNISONE 20 MG/1
40 TABLET ORAL DAILY
Qty: 10 TABLET | Refills: 0 | Status: SHIPPED | OUTPATIENT
Start: 2017-05-17 | End: 2017-08-28

## 2017-05-24 ENCOUNTER — TELEPHONE (OUTPATIENT)
Dept: FAMILY MEDICINE CLINIC | Facility: CLINIC | Age: 72
End: 2017-05-24

## 2017-05-24 DIAGNOSIS — Z79.4 UNCONTROLLED TYPE 2 DIABETES MELLITUS WITH HYPERGLYCEMIA, WITH LONG-TERM CURRENT USE OF INSULIN (HCC): Primary | ICD-10-CM

## 2017-05-24 DIAGNOSIS — E11.65 UNCONTROLLED TYPE 2 DIABETES MELLITUS WITH HYPERGLYCEMIA, WITH LONG-TERM CURRENT USE OF INSULIN (HCC): Primary | ICD-10-CM

## 2017-05-26 ENCOUNTER — OFFICE VISIT (OUTPATIENT)
Dept: FAMILY MEDICINE CLINIC | Facility: CLINIC | Age: 72
End: 2017-05-26

## 2017-05-26 VITALS
TEMPERATURE: 97.9 F | DIASTOLIC BLOOD PRESSURE: 62 MMHG | SYSTOLIC BLOOD PRESSURE: 118 MMHG | OXYGEN SATURATION: 91 % | WEIGHT: 208 LBS | HEART RATE: 66 BPM | BODY MASS INDEX: 38.28 KG/M2 | HEIGHT: 62 IN | RESPIRATION RATE: 16 BRPM

## 2017-05-26 DIAGNOSIS — Z79.4 UNCONTROLLED TYPE 2 DIABETES MELLITUS WITH HYPERGLYCEMIA, WITH LONG-TERM CURRENT USE OF INSULIN (HCC): Primary | ICD-10-CM

## 2017-05-26 DIAGNOSIS — E78.2 MIXED HYPERLIPIDEMIA: ICD-10-CM

## 2017-05-26 DIAGNOSIS — J43.9 PULMONARY EMPHYSEMA, UNSPECIFIED EMPHYSEMA TYPE (HCC): ICD-10-CM

## 2017-05-26 DIAGNOSIS — E03.9 ACQUIRED HYPOTHYROIDISM: ICD-10-CM

## 2017-05-26 DIAGNOSIS — I10 BENIGN ESSENTIAL HYPERTENSION: ICD-10-CM

## 2017-05-26 DIAGNOSIS — E11.65 UNCONTROLLED TYPE 2 DIABETES MELLITUS WITH HYPERGLYCEMIA, WITH LONG-TERM CURRENT USE OF INSULIN (HCC): Primary | ICD-10-CM

## 2017-05-26 PROCEDURE — 99214 OFFICE O/P EST MOD 30 MIN: CPT | Performed by: FAMILY MEDICINE

## 2017-05-26 RX ORDER — SULFAMETHOXAZOLE AND TRIMETHOPRIM 800; 160 MG/1; MG/1
TABLET ORAL
Refills: 1 | COMMUNITY
Start: 2017-05-17 | End: 2017-08-28

## 2017-05-27 LAB
ALBUMIN SERPL-MCNC: 3.8 G/DL (ref 3.2–4.8)
ALBUMIN/GLOB SERPL: 1.5 G/DL (ref 1.5–2.5)
ALP SERPL-CCNC: 66 U/L (ref 25–100)
ALT SERPL-CCNC: 25 U/L (ref 7–40)
AST SERPL-CCNC: 23 U/L (ref 0–33)
BASOPHILS # BLD AUTO: 0.02 10*3/MM3 (ref 0–0.2)
BASOPHILS NFR BLD AUTO: 0.2 % (ref 0–1)
BILIRUB SERPL-MCNC: 1 MG/DL (ref 0.3–1.2)
BUN SERPL-MCNC: 16 MG/DL (ref 9–23)
BUN/CREAT SERPL: 17.8 (ref 7–25)
CALCIUM SERPL-MCNC: 9.5 MG/DL (ref 8.7–10.4)
CHLORIDE SERPL-SCNC: 97 MMOL/L (ref 99–109)
CHOLEST SERPL-MCNC: 171 MG/DL (ref 0–200)
CHOLEST/HDLC SERPL: 2.55 {RATIO}
CO2 SERPL-SCNC: 34 MMOL/L (ref 20–31)
CREAT SERPL-MCNC: 0.9 MG/DL (ref 0.6–1.3)
DIFFERENTIAL COMMENT: NORMAL
EOSINOPHIL # BLD AUTO: 0.28 10*3/MM3 (ref 0.1–0.3)
EOSINOPHIL NFR BLD AUTO: 3 % (ref 0–3)
ERYTHROCYTE [DISTWIDTH] IN BLOOD BY AUTOMATED COUNT: 14.5 % (ref 11.3–14.5)
GLOBULIN SER CALC-MCNC: 2.6 GM/DL
GLUCOSE SERPL-MCNC: 216 MG/DL (ref 70–100)
HBA1C MFR BLD: 11.4 % (ref 4.8–5.6)
HCT VFR BLD AUTO: 42 % (ref 34.5–44)
HDLC SERPL-MCNC: 67 MG/DL (ref 40–60)
HGB BLD-MCNC: 12.4 G/DL (ref 11.5–15.5)
IMM GRANULOCYTES # BLD: 0.05 10*3/MM3 (ref 0–0.03)
IMM GRANULOCYTES NFR BLD: 0.5 % (ref 0–0.6)
LDLC SERPL CALC-MCNC: 78 MG/DL (ref 0–100)
LYMPHOCYTES # BLD AUTO: 1.02 10*3/MM3 (ref 0.6–4.8)
LYMPHOCYTES NFR BLD AUTO: 10.9 % (ref 24–44)
MCH RBC QN AUTO: 29.2 PG (ref 27–31)
MCHC RBC AUTO-ENTMCNC: 29.5 G/DL (ref 32–36)
MCV RBC AUTO: 98.8 FL (ref 80–99)
MONOCYTES # BLD AUTO: 0.75 10*3/MM3 (ref 0–1)
MONOCYTES NFR BLD AUTO: 8 % (ref 0–12)
NEUTROPHILS # BLD AUTO: 7.25 10*3/MM3 (ref 1.5–8.3)
NEUTROPHILS NFR BLD AUTO: 77.4 % (ref 41–71)
PLATELET # BLD AUTO: 278 10*3/MM3 (ref 150–450)
PLATELET BLD QL SMEAR: NORMAL
POTASSIUM SERPL-SCNC: 4.9 MMOL/L (ref 3.5–5.5)
PROT SERPL-MCNC: 6.4 G/DL (ref 5.7–8.2)
RBC # BLD AUTO: 4.25 10*6/MM3 (ref 3.89–5.14)
RBC MORPH BLD: NORMAL
SODIUM SERPL-SCNC: 137 MMOL/L (ref 132–146)
TRIGL SERPL-MCNC: 131 MG/DL (ref 0–150)
TSH SERPL DL<=0.005 MIU/L-ACNC: 3.11 MIU/ML (ref 0.35–5.35)
VLDLC SERPL CALC-MCNC: 26.2 MG/DL
WBC # BLD AUTO: 9.37 10*3/MM3 (ref 3.5–10.8)

## 2017-06-06 ENCOUNTER — TELEPHONE (OUTPATIENT)
Dept: FAMILY MEDICINE CLINIC | Facility: CLINIC | Age: 72
End: 2017-06-06

## 2017-06-06 NOTE — TELEPHONE ENCOUNTER
----- Message from Liv Almeida sent at 6/5/2017 10:09 AM EDT -----  Contact: DR. DAI; MED QUESTION   PT WOULD LIKE FOR JOSE TO GIVE HER A CALL BACK SHE HAS QUESTIONS ABOUT SOME OF HER MEDICATIONS.       CALL BACK   [phone]

## 2017-06-06 NOTE — TELEPHONE ENCOUNTER
Spoke with pt and went over labs and pt verbalized understanding. Pt is asking for a refill of her lantus and update the dosage as well. Pt is also asking if she can increase her buspar as it isn't helping one bit. Pain in her hands and all through her body is very bad and pt is asking if she can get a low dose of some pain med to help her sleep, numbness is going away Dr Matos says it will take up to 6 months before she sees a large improvement. Pt has seen some but needs something to help her until then. Pt is also in need of lancets, and needles refilled also.

## 2017-06-07 ENCOUNTER — TELEPHONE (OUTPATIENT)
Dept: FAMILY MEDICINE CLINIC | Facility: CLINIC | Age: 72
End: 2017-06-07

## 2017-06-07 DIAGNOSIS — R73.9 HYPERGLYCEMIA: ICD-10-CM

## 2017-06-07 RX ORDER — LANCETS
300 EACH MISCELLANEOUS 3 TIMES DAILY
Qty: 300 EACH | Refills: 1 | Status: SHIPPED | OUTPATIENT
Start: 2017-06-07 | End: 2018-02-24 | Stop reason: SDUPTHER

## 2017-06-07 RX ORDER — INSULIN GLARGINE 100 [IU]/ML
65 INJECTION, SOLUTION SUBCUTANEOUS DAILY
Qty: 20 ML | Refills: 2 | Status: SHIPPED | OUTPATIENT
Start: 2017-06-07 | End: 2017-08-03 | Stop reason: SDUPTHER

## 2017-06-07 RX ORDER — CALCIUM CARB/VITAMIN D3/VIT K1 500-100-40
TABLET,CHEWABLE ORAL
Qty: 100 EACH | Refills: 1 | Status: ON HOLD | OUTPATIENT
Start: 2017-06-07 | End: 2019-09-08

## 2017-06-07 NOTE — TELEPHONE ENCOUNTER
RX SENT IN AND Pt is also asking if she can increase her buspar as it isn't helping one bit. Pain in her hands and all through her body is very bad and pt is asking if she can get a low dose of some pain med to help her sleep, numbness is going away Dr Matos says it will take up to 6 months before she sees a large improvement. Pt has seen some but needs something to help her until then. ALL RX SENT IN EXCEPT THE PAIN AND ANXIETY MEDS PLEASE ADVISEL

## 2017-06-08 RX ORDER — BUSPIRONE HYDROCHLORIDE 10 MG/1
TABLET ORAL
Qty: 90 TABLET | Refills: 1 | Status: SHIPPED | OUTPATIENT
Start: 2017-06-08 | End: 2017-11-21 | Stop reason: SDUPTHER

## 2017-06-08 RX ORDER — BUSPIRONE HYDROCHLORIDE 10 MG/1
TABLET ORAL
Qty: 45 TABLET | Refills: 0 | OUTPATIENT
Start: 2017-06-08 | End: 2017-06-08 | Stop reason: SDUPTHER

## 2017-06-08 NOTE — TELEPHONE ENCOUNTER
Please call.  Have her increase the BuSpar to 10 mg 1-1/2 tablet twice a day.  No history of seizure, crit call in some tramadol 50 mg 1 every 8 hours as needed for pain #30.  That would be the only pain medication we would be able to call in.  In April, we gave her hydrocodone.  Did not help?  If did not help, then try the tramadol as noted.    Please run Elie.

## 2017-07-03 ENCOUNTER — TELEPHONE (OUTPATIENT)
Dept: FAMILY MEDICINE CLINIC | Facility: CLINIC | Age: 72
End: 2017-07-03

## 2017-07-03 NOTE — TELEPHONE ENCOUNTER
Please call and confirm if she is currently doing 65 units of insulin daily.  If so, increase to 70 units daily and continue to monitor blood sugars and call in 2 weeks with readings.

## 2017-07-03 NOTE — TELEPHONE ENCOUNTER
----- Message from Marjorie Serrato sent at 7/3/2017  8:45 AM EDT -----  Contact: MALAIKA SOLORZANO  PATIENT CALLED TO ADVISE OF THE FOLLOWING SUGAR READINGS  6 19 17 A 299 P 243  6 20 17 A 347 P 352  6 21 17A 338    P  405  6 23 17 A 219 P 385  6 24  17 A 197    P 314 P 377  6 25 17 A 286 P 294  6 26 17 A 224  P 340  6 28 17 A 335  P 349  6 29 17 A 197 A 195 P  6 30  17 A 220  P 345 P 283  7 1 17   A 298 P 286 P 375  7 2 17  A 380 P 338 P 416  7 3 17 A 372

## 2017-07-05 NOTE — TELEPHONE ENCOUNTER
SPOKE WITH PT AND INFORMED HER OF THIS AND PT VERBALIZED UNDERSTANDING AND ASKING ABOUT ANXIETY MEDS AND INFORMED HER THAT IT WAS SENT IN ON 06/08/2017 WITH NEW DOSE. PT STATES PHARM SAYS THEY HAVE NOTHING FOR HER SO I CALLED [facility] AND THEY HAD ON HOLD AND THEY ARE NOW GETTING IT READY AND SPOKE WITH PT AND INFORMED HER OF THIS.

## 2017-07-13 DIAGNOSIS — E03.9 ACQUIRED HYPOTHYROIDISM: ICD-10-CM

## 2017-07-13 RX ORDER — LEVOTHYROXINE SODIUM 150 MCG
TABLET ORAL
Qty: 30 TABLET | Refills: 0 | Status: SHIPPED | OUTPATIENT
Start: 2017-07-13 | End: 2017-08-13 | Stop reason: SDUPTHER

## 2017-08-02 ENCOUNTER — TELEPHONE (OUTPATIENT)
Dept: FAMILY MEDICINE CLINIC | Facility: CLINIC | Age: 72
End: 2017-08-02

## 2017-08-02 NOTE — TELEPHONE ENCOUNTER
----- Message from Deborah Harman sent at 8/2/2017  2:43 PM EDT -----  Contact: Smith  Patient's sugar levels.    7/13- 182 at 12:30pm  7/14- 236 at 3:30am  7/15- 290 at 9:30am and 303 at 7:30pm  7/16- 260 at 7:30am and 224 at 10:00pm  7/17- 334 at 6:00am   7/18- 259 at 6:30am and 303 at 12:00pm and 267 at 11:30pm  7/19- 282 at 7:00am and 271 at 1:00pm  7/20- 341 at 8:30am and 256 at 6:30pm  7/21- 396 at 8:30am and 262 at 8:30pm  7/22- 230 at 7:30am and 328 at 11:30pm  7/23- 392 at 8:00pm  7/24- 215 at 7:00am and 364 at 11:00pm  7/25- 268 at 11:00am  7/26 277 at 8:30am  7/27- 181 at 6:00am and 352 at 8:30pm  7/28- 190 at 2:30am and 205 at 2:00pm and 253 at 9:00pm  7/29- 141 at 7:30pm  7/30- Didn't take  7/31- 231 at 11:00am and 357 at 7:00pm  8/1- 241 at 4:00pm and 278 at 8:00pm  8/2- 208 at 12:30pm

## 2017-08-03 RX ORDER — INSULIN GLARGINE 100 [IU]/ML
75 INJECTION, SOLUTION SUBCUTANEOUS DAILY
Qty: 20 ML | Refills: 3 | Status: SHIPPED | OUTPATIENT
Start: 2017-08-03 | End: 2017-08-30 | Stop reason: ALTCHOICE

## 2017-08-03 NOTE — TELEPHONE ENCOUNTER
Please call, sugars some better but still too high. Rec increase Lantus to 75 units daily and call in 2 weeks with readings. bds

## 2017-08-03 NOTE — TELEPHONE ENCOUNTER
Spoke with pt. She verbalized understanding and stated she would speak with you at her appt in August. Pt then requested more Lantus because she was on her last vial. I have sent the Rx electronically to the pharmacy. She thanked me and we ended the call.

## 2017-08-13 DIAGNOSIS — E03.9 ACQUIRED HYPOTHYROIDISM: ICD-10-CM

## 2017-08-14 RX ORDER — LEVOTHYROXINE SODIUM 150 MCG
TABLET ORAL
Qty: 30 TABLET | Refills: 0 | Status: SHIPPED | OUTPATIENT
Start: 2017-08-14 | End: 2017-08-30 | Stop reason: DRUGHIGH

## 2017-08-17 RX ORDER — ALBUTEROL SULFATE 90 UG/1
AEROSOL, METERED RESPIRATORY (INHALATION)
Qty: 18 G | Refills: 0 | Status: SHIPPED | OUTPATIENT
Start: 2017-08-17 | End: 2017-11-21 | Stop reason: SDUPTHER

## 2017-08-28 ENCOUNTER — OFFICE VISIT (OUTPATIENT)
Dept: FAMILY MEDICINE CLINIC | Facility: CLINIC | Age: 72
End: 2017-08-28

## 2017-08-28 VITALS
HEART RATE: 60 BPM | SYSTOLIC BLOOD PRESSURE: 130 MMHG | TEMPERATURE: 97.2 F | HEIGHT: 62 IN | OXYGEN SATURATION: 92 % | DIASTOLIC BLOOD PRESSURE: 60 MMHG | BODY MASS INDEX: 38.28 KG/M2 | RESPIRATION RATE: 18 BRPM | WEIGHT: 208 LBS

## 2017-08-28 DIAGNOSIS — E03.9 ACQUIRED HYPOTHYROIDISM: ICD-10-CM

## 2017-08-28 DIAGNOSIS — E11.65 UNCONTROLLED TYPE 2 DIABETES MELLITUS WITH HYPERGLYCEMIA, WITH LONG-TERM CURRENT USE OF INSULIN (HCC): Primary | ICD-10-CM

## 2017-08-28 DIAGNOSIS — Z79.4 UNCONTROLLED TYPE 2 DIABETES MELLITUS WITH HYPERGLYCEMIA, WITH LONG-TERM CURRENT USE OF INSULIN (HCC): Primary | ICD-10-CM

## 2017-08-28 DIAGNOSIS — J43.8 OTHER EMPHYSEMA (HCC): ICD-10-CM

## 2017-08-28 DIAGNOSIS — E78.2 MIXED HYPERLIPIDEMIA: ICD-10-CM

## 2017-08-28 PROCEDURE — 99214 OFFICE O/P EST MOD 30 MIN: CPT | Performed by: FAMILY MEDICINE

## 2017-08-28 RX ORDER — TRAMADOL HYDROCHLORIDE 50 MG/1
TABLET ORAL
Refills: 0 | COMMUNITY
Start: 2017-06-08 | End: 2017-09-19 | Stop reason: SDUPTHER

## 2017-08-28 NOTE — PROGRESS NOTES
Chief Complaint   Patient presents with   • Hypertension     3 month follow up   • Hyperlipidemia   • Diabetes   • Hypothyroidism   • Med Refill   • Labs Only       Subjective     Hypertension   This is a chronic problem. The current episode started more than 1 year ago. The problem is unchanged. Associated symptoms include chest pain (off and on. May be at rest. sharp pain. Not with walking. pain in both arms. Left arm more than right. ) and shortness of breath. There are no associated agents to hypertension. The current treatment provides moderate improvement. There are no compliance problems.  Hypertensive end-organ damage includes CAD/MI. There is no history of angina or kidney disease. There is no history of chronic renal disease.   Hyperlipidemia   This is a chronic problem. The current episode started more than 1 year ago. The problem is uncontrolled. Recent lipid tests were reviewed and are variable. Exacerbating diseases include hypothyroidism. She has no history of chronic renal disease. Associated symptoms include chest pain (off and on. May be at rest. sharp pain. Not with walking. pain in both arms. Left arm more than right. ) and shortness of breath. Pertinent negatives include no myalgias. Current antihyperlipidemic treatment includes statins. The current treatment provides mild improvement of lipids. There are no compliance problems.  Risk factors for coronary artery disease include diabetes mellitus, hypertension, dyslipidemia and obesity.   Diabetes   She presents for her follow-up diabetic visit. She has type 2 diabetes mellitus. Her disease course has been fluctuating. Hypoglycemia symptoms include nervousness/anxiousness. Associated symptoms include chest pain (off and on. May be at rest. sharp pain. Not with walking. pain in both arms. Left arm more than right. ) and fatigue. Pertinent negatives for diabetes include no weight loss. There are no hypoglycemic complications. Symptoms are stable.  Diabetic complications include heart disease. Risk factors for coronary artery disease include diabetes mellitus, dyslipidemia, hypertension and obesity. Current diabetic treatment includes insulin injections. She is compliant with treatment all of the time. Her weight is stable. She is following a generally unhealthy diet. She never participates in exercise. Home blood sugar record trend: 170 to 300s. An ACE inhibitor/angiotensin II receptor blocker is contraindicated. Eye exam is not current.   Hypothyroidism   This is a chronic problem. The current episode started more than 1 year ago. The problem occurs constantly. The problem has been unchanged. Associated symptoms include arthralgias, chest pain (off and on. May be at rest. sharp pain. Not with walking. pain in both arms. Left arm more than right. ), coughing (chronic) and fatigue. Pertinent negatives include no congestion, myalgias, nausea or vomiting. Nothing aggravates the symptoms. Treatments tried: levothyroxine 150(Synthroid)     COPD  worse at times  has to increase to 3 liters at times  + cough all the time.   worse with talking  no tobacco      Hungry all the time/   Sugars increased    Tired. Exhausted    still with pain int he wrists.   Numbness is better  both CTS surg          Past Medical History,Medications, Allergies, and social history was reviewed.    Review of Systems   Constitutional: Positive for fatigue. Negative for weight loss.   HENT: Negative.  Negative for congestion.    Respiratory: Positive for cough (chronic) and shortness of breath.    Cardiovascular: Positive for chest pain (off and on. May be at rest. sharp pain. Not with walking. pain in both arms. Left arm more than right. ).   Gastrointestinal: Negative.  Negative for nausea and vomiting.   Genitourinary: Negative.    Musculoskeletal: Positive for arthralgias, back pain and gait problem. Negative for myalgias.   Psychiatric/Behavioral: Positive for dysphoric mood. Negative  "for suicidal ideas. The patient is nervous/anxious.        Objective     Vitals:    08/28/17 1102   BP: 130/60   Pulse: 60   Resp: 18   Temp: 97.2 °F (36.2 °C)   TempSrc: Temporal Artery    SpO2: 92%   Weight: 208 lb (94.3 kg)   Height: 61.5\" (156.2 cm)        Physical Exam   Constitutional: She is oriented to person, place, and time. She appears well-developed and well-nourished.   Obese   HENT:   Head: Normocephalic and atraumatic.   Right Ear: Hearing and external ear normal.   Left Ear: Hearing and external ear normal.   Mouth/Throat: Oropharynx is clear and moist.   Eyes: Conjunctivae and EOM are normal. Pupils are equal, round, and reactive to light.   Cardiovascular: Normal rate, regular rhythm and normal heart sounds.  Exam reveals no friction rub.    No murmur heard.  Pulmonary/Chest: Effort normal and breath sounds normal. No respiratory distress. She has no wheezes. She has no rales.   Abdominal: Soft. Bowel sounds are normal. She exhibits no distension. There is no tenderness.   Musculoskeletal: She exhibits edema (trace).   Neurological: She is alert and oriented to person, place, and time.   Skin: Skin is warm.   Psychiatric: She has a normal mood and affect. Her behavior is normal.   Nursing note and vitals reviewed.        Assessment/Plan     Problem List Items Addressed This Visit        Cardiovascular and Mediastinum    Hyperlipidemia    Relevant Orders    Comprehensive Metabolic Panel    Lipid Panel With / Chol / HDL Ratio       Respiratory    Chronic obstructive pulmonary disease    Relevant Orders    CBC & Differential       Endocrine    Uncontrolled type 2 diabetes mellitus - Primary    Relevant Orders    Comprehensive Metabolic Panel    CBC & Differential    Hemoglobin A1c    Lipid Panel With / Chol / HDL Ratio    Hypothyroidism    Relevant Orders    TSH           Follow up: Return in about 3 months (around 11/28/2017), or if symptoms worsen or fail to improve, for follow up depends on review " of labs and testing.         DISCUSSION    Change to Toujeo Pending blood work results.  Would be a smaller volume of medication and hopefully work little bit better.  Depends on A1c level.    Hypothyroidism.  Check TSH.    COPD.  Stable.  Continue to stay off tobacco.    Hyperlipidemia.  Check CMP and lipid panel.    The chest discomfort that she described does not seem to be cardiac.  Not happen with exertion and usually is just sitting there when she is eating.  May have some reflux component.  She sees cardiology next month.  Call sooner if worsens.    MEDICATIONS PRESCRIBED  Requested Prescriptions      No prescriptions requested or ordered in this encounter          Jhoan Hdez MD

## 2017-08-29 LAB
ALBUMIN SERPL-MCNC: 4.2 G/DL (ref 3.2–4.8)
ALBUMIN/GLOB SERPL: 1.6 G/DL (ref 1.5–2.5)
ALP SERPL-CCNC: 66 U/L (ref 25–100)
ALT SERPL-CCNC: 25 U/L (ref 7–40)
AST SERPL-CCNC: 20 U/L (ref 0–33)
BASOPHILS # BLD AUTO: 0.03 10*3/MM3 (ref 0–0.2)
BASOPHILS NFR BLD AUTO: 0.3 % (ref 0–1)
BILIRUB SERPL-MCNC: 0.8 MG/DL (ref 0.3–1.2)
BUN SERPL-MCNC: 21 MG/DL (ref 9–23)
BUN/CREAT SERPL: 21 (ref 7–25)
CALCIUM SERPL-MCNC: 10.2 MG/DL (ref 8.7–10.4)
CHLORIDE SERPL-SCNC: 96 MMOL/L (ref 99–109)
CHOLEST SERPL-MCNC: 153 MG/DL (ref 0–200)
CHOLEST/HDLC SERPL: 3 {RATIO}
CO2 SERPL-SCNC: 34 MMOL/L (ref 20–31)
CREAT SERPL-MCNC: 1 MG/DL (ref 0.6–1.3)
EOSINOPHIL # BLD AUTO: 0.17 10*3/MM3 (ref 0–0.3)
EOSINOPHIL NFR BLD AUTO: 2 % (ref 0–3)
ERYTHROCYTE [DISTWIDTH] IN BLOOD BY AUTOMATED COUNT: 13.3 % (ref 11.3–14.5)
GLOBULIN SER CALC-MCNC: 2.6 GM/DL
GLUCOSE SERPL-MCNC: 239 MG/DL (ref 70–100)
HBA1C MFR BLD: 12.6 % (ref 4.8–5.6)
HCT VFR BLD AUTO: 46.6 % (ref 34.5–44)
HDLC SERPL-MCNC: 51 MG/DL (ref 40–60)
HGB BLD-MCNC: 14.6 G/DL (ref 11.5–15.5)
IMM GRANULOCYTES # BLD: 0.02 10*3/MM3 (ref 0–0.03)
IMM GRANULOCYTES NFR BLD: 0.2 % (ref 0–0.6)
LDLC SERPL CALC-MCNC: 78 MG/DL (ref 0–100)
LYMPHOCYTES # BLD AUTO: 1.61 10*3/MM3 (ref 0.6–4.8)
LYMPHOCYTES NFR BLD AUTO: 18.6 % (ref 24–44)
MCH RBC QN AUTO: 29.9 PG (ref 27–31)
MCHC RBC AUTO-ENTMCNC: 31.3 G/DL (ref 32–36)
MCV RBC AUTO: 95.3 FL (ref 80–99)
MONOCYTES # BLD AUTO: 0.82 10*3/MM3 (ref 0–1)
MONOCYTES NFR BLD AUTO: 9.5 % (ref 0–12)
NEUTROPHILS # BLD AUTO: 5.99 10*3/MM3 (ref 1.5–8.3)
NEUTROPHILS NFR BLD AUTO: 69.4 % (ref 41–71)
PLATELET # BLD AUTO: 232 10*3/MM3 (ref 150–450)
POTASSIUM SERPL-SCNC: 4.9 MMOL/L (ref 3.5–5.5)
PROT SERPL-MCNC: 6.8 G/DL (ref 5.7–8.2)
RBC # BLD AUTO: 4.89 10*6/MM3 (ref 3.89–5.14)
SODIUM SERPL-SCNC: 137 MMOL/L (ref 132–146)
TRIGL SERPL-MCNC: 121 MG/DL (ref 0–150)
TSH SERPL DL<=0.005 MIU/L-ACNC: 6.25 MIU/ML (ref 0.35–5.35)
VLDLC SERPL CALC-MCNC: 24.2 MG/DL
WBC # BLD AUTO: 8.64 10*3/MM3 (ref 3.5–10.8)

## 2017-08-30 DIAGNOSIS — R73.9 HYPERGLYCEMIA: ICD-10-CM

## 2017-08-30 DIAGNOSIS — E03.9 HYPOTHYROIDISM, UNSPECIFIED TYPE: Primary | ICD-10-CM

## 2017-08-30 RX ORDER — LEVOTHYROXINE SODIUM 175 MCG
175 TABLET ORAL DAILY
Qty: 30 TABLET | Refills: 3 | Status: SHIPPED | OUTPATIENT
Start: 2017-08-30 | End: 2017-12-07 | Stop reason: SDUPTHER

## 2017-09-19 ENCOUNTER — TELEPHONE (OUTPATIENT)
Dept: FAMILY MEDICINE CLINIC | Facility: CLINIC | Age: 72
End: 2017-09-19

## 2017-09-19 RX ORDER — TRAMADOL HYDROCHLORIDE 50 MG/1
50 TABLET ORAL EVERY 8 HOURS PRN
Qty: 30 TABLET | Refills: 2 | OUTPATIENT
Start: 2017-09-19 | End: 2018-04-20

## 2017-09-19 NOTE — TELEPHONE ENCOUNTER
9/4 214 AT 6 PM   9/6 184 6:30  10 :30 PM  9/7 396 3  11P  9/8 205 5:30  1PM  9/9 184 3:30PM 236 10:30 PM  9/10 315 10PM  9/11 194 4:30  1PM 254 8:30 PM  9/12 281 7AM 273 9PM  9/13 134 2  8PM   9/15 185 9:30  9PM 9/16 253 9  12PM 232 9:30 PM  9/17 169 7  11 PM   9/18 210 1  9PM   9/19 214 5  10 AM    PT STATES THAT ON THE RX IT SAYS 65 UNITS AND INCREASE TO 75 PER PCP AND PT DOESN'T UNDERSTAND THIS PLEASE ADVISE.

## 2017-09-19 NOTE — TELEPHONE ENCOUNTER
----- Message from Maria Victoria Romero sent at 9/19/2017 10:25 AM EDT -----  Contact: Patient  Patient called to get readings of her sugar levels. A good call back number is [phone]. Thank you.

## 2017-09-19 NOTE — TELEPHONE ENCOUNTER
Spoke with pt and informed her of message and pt verbalized understanding. Pt is asking for a refill of her tramadol. Please advise.

## 2017-09-19 NOTE — TELEPHONE ENCOUNTER
Okay to call in tramadol 50 mg 1 every 8 hours as needed for pain #30+2 refills.  Please run Elie.

## 2017-09-20 ENCOUNTER — OFFICE VISIT (OUTPATIENT)
Dept: CARDIOLOGY | Facility: CLINIC | Age: 72
End: 2017-09-20

## 2017-09-20 VITALS
SYSTOLIC BLOOD PRESSURE: 128 MMHG | WEIGHT: 209.1 LBS | DIASTOLIC BLOOD PRESSURE: 68 MMHG | HEART RATE: 58 BPM | BODY MASS INDEX: 39.48 KG/M2 | HEIGHT: 61 IN

## 2017-09-20 DIAGNOSIS — E78.5 DYSLIPIDEMIA: ICD-10-CM

## 2017-09-20 DIAGNOSIS — I10 BENIGN ESSENTIAL HYPERTENSION: ICD-10-CM

## 2017-09-20 DIAGNOSIS — I25.118 CORONARY ARTERY DISEASE INVOLVING NATIVE HEART WITH OTHER FORM OF ANGINA PECTORIS: Primary | ICD-10-CM

## 2017-09-20 PROCEDURE — 99214 OFFICE O/P EST MOD 30 MIN: CPT | Performed by: NURSE PRACTITIONER

## 2017-09-20 NOTE — PROGRESS NOTES
Subjective:     Encounter Date:09/20/2017      Patient ID: Janet Pisano is a 72 y.o. female.    Chief Complaint:Benign essential hypertension; Hyperlipidemia; and Slow Heart Rate    PROBLEM LIST:  1. Questionable history of myocardial infarction:  a. Remote cardiac catheterization at [facility] in [place], incomplete database.   b. Reported stress test 2-3 years ago, negative per patient report, incomplete database.  2. Hypertension.   3. Dyslipidemia.   4. Type 2 diabetes.   5. COPD, on chronic oxygen  6. Remote esophageal ulcers, status post surgical intervention.  7. Ventral hernia.   8. Hypothyroidism.   9. Arthritis.   10. Depression.   11. History of Rheumatic fever.   12. Tubal ligation in 1982.          Allergies   Allergen Reactions   • Aliskiren    • Amlodipine    • Crestor [Rosuvastatin Calcium]    • Lisinopril    • Metformin And Related    • Penicillins    • Sitagliptin    • Statins Confusion   • Valsartan          Current Outpatient Prescriptions:   •  ACCU-CHEK SOFTCLIX LANCETS lancets, 300 each by Other route 3 (Three) Times a Day. Use as instructed, Disp: 300 each, Rfl: 1  •  aspirin 325 MG tablet, Take  by mouth daily., Disp: , Rfl:   •  bisoprolol (ZEBeta) 5 MG tablet, TAKE 1/2 TABLET BY MOUTH EVERY DAY, Disp: 45 tablet, Rfl: 3  •  busPIRone (BUSPAR) 10 MG tablet, TAKE 1-1/2 TABLETS TWICE A DAY, Disp: 90 tablet, Rfl: 1  •  cetirizine (ZyrTEC) 10 MG tablet, Take 10 mg by mouth daily., Disp: , Rfl:   •  Cholecalciferol (VITAMIN D-3) 1000 UNITS capsule, Take 2,000 Units by mouth daily., Disp: , Rfl:   •  diclofenac (VOLTAREN) 50 MG EC tablet, Take 1 tablet by mouth 2 (Two) Times a Day., Disp: 60 tablet, Rfl: 1  •  Elastic Bandages & Supports (WRIST SPLINT) misc, Bilateral wrist splints for CARPAL TUNNEL Syn LEFT AND RIGHT, Disp: 2 each, Rfl: 0  •  Garlic 1000 MG capsule, Take 2,000 Units by mouth 2 (two) times a day., Disp: , Rfl:   •  glucose blood (ACCU-CHEK TITUS PLUS) test  "strip, 300 each by Other route 3 (Three) Times a Day. Use as instructed, Disp: 300 each, Rfl: 1  •  HYDROcodone-acetaminophen (NORCO) 5-325 MG per tablet, Take 1 tablet by mouth Every 6 (Six) Hours As Needed for Moderate Pain (4-6)., Disp: 30 tablet, Rfl: 0  •  Insulin Glargine (TOUJEO SOLOSTAR) 300 UNIT/ML solution pen-injector, Inject 65 Units under the skin Daily. AND INCREASE UP TO 75 UNITS QD AS DIRECTED BY PHYSICIAN. D/C LANTUS (Patient taking differently: Inject 70 Units under the skin Daily. AND INCREASE UP TO 75 UNITS QD AS DIRECTED BY PHYSICIAN. D/C LANTUS), Disp: 5 pen, Rfl: 2  •  Insulin Pen Needle 31G X 4 MM misc, 1 pen Daily. WITH TOUJEO DOSE, Disp: 50 each, Rfl: 2  •  Insulin Syringe 31G X 5/16\" 1 ML misc, Use daily to inject insulin, Disp: 100 each, Rfl: 1  •  pravastatin (PRAVACHOL) 40 MG tablet, Take 1 tablet by mouth Daily., Disp: 90 tablet, Rfl: 3  •  SYNTHROID 175 MCG tablet, Take 1 tablet by mouth Daily., Disp: 30 tablet, Rfl: 3  •  traMADol (ULTRAM) 50 MG tablet, Take 1 tablet by mouth Every 8 (Eight) Hours As Needed for Moderate Pain ., Disp: 30 tablet, Rfl: 2  •  VENTOLIN  (90 Base) MCG/ACT inhaler, INHALE 1 TO 2 PUFFS BY MOUTH EVERY 4 TO 6 HOURS AS NEEDED FOR WHEEZING OR COUGH, Disp: 18 g, Rfl: 0        History of Present Illness    Patient returns today for annual follow-up of coronary disease, hypertension and dyslipidemia.  Since last being seen she does note some increasing shortness of breath with activity.  Notes that now whenever she takes shower she will have to increase her oxygen therapy as she becomes more dyspneic.  Does feel that this is changed since her last visit.  Denies any chest pain, pressure, tightness.  Denies any syncope, near-syncope, or edema.  She is limited in physical activity and walks with the assistance of a walker.  Notes that she is currently unable to even vacuuming her home without becoming very short of breath.  Her diabetes has been noted to be " "uncontrolled.  Most recent A1c was noted to be greater than 12.  Patient notes that since she quit smoking she \"has to have\" sweets.  She continues to follow with her primary care for this.    The following portions of the patient's history were reviewed and updated as appropriate: allergies, current medications, past family history, past medical history, past social history, past surgical history and problem list.      Social History   Substance Use Topics   • Smoking status: Former Smoker     Quit date: 12/20/2015   • Smokeless tobacco: Never Used   • Alcohol use No         Review of Systems   Constitution: Positive for weakness and malaise/fatigue.   Cardiovascular: Positive for dyspnea on exertion. Negative for chest pain.   Respiratory: Negative.    Hematologic/Lymphatic: Negative for bleeding problem. Does not bruise/bleed easily.   Skin: Negative for rash.   Musculoskeletal: Negative for muscle weakness and myalgias.   Gastrointestinal: Negative for heartburn, nausea and vomiting.          Objective:    height is 61\" (154.9 cm) and weight is 209 lb 1.6 oz (94.8 kg). Her blood pressure is 128/68 and her pulse is 58.         Physical Exam   Constitutional: She is oriented to person, place, and time. She appears well-developed and well-nourished.   Walks with the assistance of a walker.  On chronic oxygen.   Neck: No JVD present. No tracheal deviation present.   No bruit auscultated bilaterally   Cardiovascular: Normal rate, regular rhythm and normal heart sounds.  Exam reveals no friction rub.    No murmur heard.  Pulmonary/Chest: Effort normal and breath sounds normal. No respiratory distress.   Abdominal: Soft. Bowel sounds are normal. There is no tenderness.   Musculoskeletal: She exhibits no edema or deformity.   Neurological: She is alert and oriented to person, place, and time.   Skin: Skin is warm and dry.       Procedures          Assessment:   Assessment/Plan      Janet was seen today for benign " "essential hypertension, hyperlipidemia and slow heart rate.    Diagnoses and all orders for this visit:    Coronary artery disease involving native heart with other form of angina pectoris    Benign essential hypertension, controlled.    Dyslipidemia, on statin.  Most recent LDL of 78.      Plan:    Discussed with the patient further options for investigating her symptoms.  Discussed stress testing versus cardiac catheterization to assess for cardiac component.  At this time patient wishes to defer both.  We'll give her a trial of Ranexa therapy.  She has been on Imdur in the past which gave her a \"splitting headache\".  Discussed with the patient that if her symptoms are not improved with the Ranexa therapy in one month she may discontinue this medication.  We'll see her back in 6 months time or sooner if symptoms worsen.  Discussed with patient that if her symptoms do worsen and she reconsiders further ischemic evaluation to contact our office.  She verbalized understanding.       Hayley SIMONS     Dictated utilizing Dragon dictation  "

## 2017-10-04 ENCOUNTER — TELEPHONE (OUTPATIENT)
Dept: FAMILY MEDICINE CLINIC | Facility: CLINIC | Age: 72
End: 2017-10-04

## 2017-10-04 NOTE — TELEPHONE ENCOUNTER
----- Message from Deborah Harman sent at 10/4/2017  2:38 PM EDT -----  Contact: Hdez  Patient is calling in sugar readings:    9/21- 228 at 11:30am  9/22- 255 at 3:30am and 317 at 11:00am  9/23- 221 at 7:00am and 197 at 8:00pm  9/24- 179 at 8:00am and 192 at 4:00pm  9/25- 165 at 1:30pm and 241 at 8:30pm  9/26- 276 at 8:30am  9/27- 225 at 8:00am and 336 at 8:30pm  9/28- 273 at 6:00am and 312 at 8:00pm  9/29- 241 at 11:30am and 297 at 6:30pm  9/30- 239 at 8:30am and 286 at 8:00pm  10/1- 279 at 8:30am and 288 at 8:30pm  10/2- 285 at 5:30am and 281 at 11:00am  10/3- 308 at 10:00am  10/4- 245 at 11:30am and 294 at 2:30pm

## 2017-10-05 NOTE — TELEPHONE ENCOUNTER
Please call and confirm that she is doing 70 units of Toujeo and if so, increase to 75 units daily.  If doing 65 units, then increase to 70 units.  Call in 2 weeks with readings.

## 2017-10-19 ENCOUNTER — TELEPHONE (OUTPATIENT)
Dept: FAMILY MEDICINE CLINIC | Facility: CLINIC | Age: 72
End: 2017-10-19

## 2017-10-19 DIAGNOSIS — R73.9 HYPERGLYCEMIA: ICD-10-CM

## 2017-10-23 NOTE — TELEPHONE ENCOUNTER
SPOKE WITH PT AND INFORMED HER OF THIS AND PT VERBALIZED UNDERSTANDING. PT STATES SHE ONLY HAS ONE PEN LEFT AND WILL NEED REFILL WITH CURRENT DOSE OF 80 UNITS ON IT. PLEASE SEND IN.

## 2017-10-26 ENCOUNTER — TELEPHONE (OUTPATIENT)
Dept: CARDIOLOGY | Facility: CLINIC | Age: 72
End: 2017-10-26

## 2017-10-26 RX ORDER — RANOLAZINE 500 MG/1
500 TABLET, EXTENDED RELEASE ORAL 2 TIMES DAILY
Qty: 90 TABLET | Refills: 3 | Status: SHIPPED | OUTPATIENT
Start: 2017-10-26 | End: 2018-04-20

## 2017-10-26 NOTE — TELEPHONE ENCOUNTER
Patient called needing RX for Ranexa, had been given trial/samples.  Will send in to her pharmacy, patient notified.

## 2017-11-10 ENCOUNTER — TELEPHONE (OUTPATIENT)
Dept: FAMILY MEDICINE CLINIC | Facility: CLINIC | Age: 72
End: 2017-11-10

## 2017-11-10 NOTE — TELEPHONE ENCOUNTER
Please call.  Looks like her insulin is starting to work a little bit better.  Staying below 300 now.  Increase to 85 units of Tuojeo daily.  Call in 2 weeks with readings.

## 2017-11-10 NOTE — TELEPHONE ENCOUNTER
----- Message from Zamzam Chaney sent at 11/10/2017  9:25 AM EST -----  Contact: MALAIKA  PATIENT CALLING IN READING FOR GLUCOSE LEVELS    10/28     PM  296  10/29     PM  229  7PM 209  10/30     PM  323  10/31       11/1       PM  295  11/2       PM  285  11/3       PM  254  11/4       PM  220  11/5       PM  297      11/6       PM  11/7         11/8         11/9         204  11/10

## 2017-11-14 RX ORDER — BISOPROLOL FUMARATE 5 MG/1
TABLET, FILM COATED ORAL
Qty: 45 TABLET | Refills: 6 | Status: SHIPPED | OUTPATIENT
Start: 2017-11-14 | End: 2018-08-22 | Stop reason: HOSPADM

## 2017-11-21 RX ORDER — BUSPIRONE HYDROCHLORIDE 10 MG/1
TABLET ORAL
Qty: 90 TABLET | Refills: 0 | Status: SHIPPED | OUTPATIENT
Start: 2017-11-21 | End: 2018-04-20

## 2017-11-21 RX ORDER — ALBUTEROL SULFATE 90 UG/1
AEROSOL, METERED RESPIRATORY (INHALATION)
Qty: 18 G | Refills: 0 | Status: SHIPPED | OUTPATIENT
Start: 2017-11-21 | End: 2018-03-28 | Stop reason: SDUPTHER

## 2017-11-28 ENCOUNTER — TELEPHONE (OUTPATIENT)
Dept: FAMILY MEDICINE CLINIC | Facility: CLINIC | Age: 72
End: 2017-11-28

## 2017-11-28 NOTE — TELEPHONE ENCOUNTER
----- Message from Zamzam Chaney sent at 11/28/2017  9:55 AM EST -----  Contact: MALAIKA SAHNI    11-15-17  10:15 PM   113   11-16-17   1 AM   135  258 @ 7:30AM  286 @ 7 PM  11-17-17  234  7:30 AM,  298 @ 2:30 PM  11-18-17  148 @ 9:30 AM  256 @ 5:30 PM  11-19-17 245 @ 8:30 AM  291 @ 8:30 PM  11-20-17  134 @ 10:30AM  207 # 7 PM  11-21-17 247 @ 7:30 PM  11-22-17  127 @ 8 AM  267 @ 2:30PM  376 @ 5:30 PM  11-23-17 176 @ 9:30 AM  298 @ 7:30 PM  11-24-17  206 @ 8 AM  202 @ 5:30 PM  11-25-17  232 @ 8:30 PM  11-26-17  118 @ 11:30 AM  262 @ 6 PM  11-27-17  288 @ 5:30 AM  239 @ 10 PM  11-28-17  279 @ 8AM

## 2017-11-28 NOTE — TELEPHONE ENCOUNTER
I reviewed her blood sugars.  Some better.  Some in the 100 range now.  Stay on the 85 units daily.  She is due for office visit first week of december for recheck and blood work.

## 2017-12-07 ENCOUNTER — OFFICE VISIT (OUTPATIENT)
Dept: FAMILY MEDICINE CLINIC | Facility: CLINIC | Age: 72
End: 2017-12-07

## 2017-12-07 VITALS
SYSTOLIC BLOOD PRESSURE: 138 MMHG | WEIGHT: 208.5 LBS | RESPIRATION RATE: 16 BRPM | DIASTOLIC BLOOD PRESSURE: 70 MMHG | HEIGHT: 61 IN | HEART RATE: 64 BPM | TEMPERATURE: 97.9 F | BODY MASS INDEX: 39.36 KG/M2 | OXYGEN SATURATION: 90 %

## 2017-12-07 DIAGNOSIS — E03.9 HYPOTHYROIDISM, UNSPECIFIED TYPE: ICD-10-CM

## 2017-12-07 DIAGNOSIS — G56.03 BILATERAL CARPAL TUNNEL SYNDROME: ICD-10-CM

## 2017-12-07 DIAGNOSIS — E78.5 DYSLIPIDEMIA: ICD-10-CM

## 2017-12-07 DIAGNOSIS — E11.65 UNCONTROLLED TYPE 2 DIABETES MELLITUS WITH HYPERGLYCEMIA, WITH LONG-TERM CURRENT USE OF INSULIN (HCC): Primary | ICD-10-CM

## 2017-12-07 DIAGNOSIS — Z79.4 UNCONTROLLED TYPE 2 DIABETES MELLITUS WITH HYPERGLYCEMIA, WITH LONG-TERM CURRENT USE OF INSULIN (HCC): Primary | ICD-10-CM

## 2017-12-07 DIAGNOSIS — I10 BENIGN ESSENTIAL HYPERTENSION: ICD-10-CM

## 2017-12-07 LAB
ALBUMIN SERPL-MCNC: 4.2 G/DL (ref 3.2–4.8)
ALBUMIN/GLOB SERPL: 1.6 G/DL (ref 1.5–2.5)
ALP SERPL-CCNC: 62 U/L (ref 25–100)
ALT SERPL-CCNC: 23 U/L (ref 7–40)
AST SERPL-CCNC: 20 U/L (ref 0–33)
BILIRUB SERPL-MCNC: 0.9 MG/DL (ref 0.3–1.2)
BUN SERPL-MCNC: 19 MG/DL (ref 9–23)
BUN/CREAT SERPL: 21.1 (ref 7–25)
CALCIUM SERPL-MCNC: 9.2 MG/DL (ref 8.7–10.4)
CHLORIDE SERPL-SCNC: 99 MMOL/L (ref 99–109)
CHOLEST SERPL-MCNC: 150 MG/DL (ref 0–200)
CHOLEST/HDLC SERPL: 2.83 {RATIO}
CO2 SERPL-SCNC: 32 MMOL/L (ref 20–31)
CREAT SERPL-MCNC: 0.9 MG/DL (ref 0.6–1.3)
GFR SERPLBLD CREATININE-BSD FMLA CKD-EPI: 61 ML/MIN/1.73
GFR SERPLBLD CREATININE-BSD FMLA CKD-EPI: 74 ML/MIN/1.73
GLOBULIN SER CALC-MCNC: 2.6 GM/DL
GLUCOSE SERPL-MCNC: 182 MG/DL (ref 70–100)
HBA1C MFR BLD: 12.1 % (ref 4.8–5.6)
HDLC SERPL-MCNC: 53 MG/DL (ref 40–60)
LDLC SERPL CALC-MCNC: 78 MG/DL (ref 0–100)
POTASSIUM SERPL-SCNC: 4.1 MMOL/L (ref 3.5–5.5)
PROT SERPL-MCNC: 6.8 G/DL (ref 5.7–8.2)
SODIUM SERPL-SCNC: 136 MMOL/L (ref 132–146)
TRIGL SERPL-MCNC: 94 MG/DL (ref 0–150)
TSH SERPL DL<=0.005 MIU/L-ACNC: 3.15 MIU/ML (ref 0.35–5.35)
VLDLC SERPL CALC-MCNC: 18.8 MG/DL

## 2017-12-07 PROCEDURE — 99214 OFFICE O/P EST MOD 30 MIN: CPT | Performed by: FAMILY MEDICINE

## 2017-12-07 RX ORDER — HYDROCODONE BITARTRATE AND ACETAMINOPHEN 5; 325 MG/1; MG/1
1 TABLET ORAL EVERY 4 HOURS PRN
Qty: 18 TABLET | Refills: 0 | Status: SHIPPED | OUTPATIENT
Start: 2017-12-07 | End: 2018-01-18 | Stop reason: SDUPTHER

## 2017-12-07 RX ORDER — LEVOTHYROXINE SODIUM 175 MCG
175 TABLET ORAL DAILY
Qty: 90 TABLET | Refills: 1 | Status: SHIPPED | OUTPATIENT
Start: 2017-12-07 | End: 2018-04-20 | Stop reason: SDUPTHER

## 2017-12-07 RX ORDER — PEN NEEDLE, DIABETIC 31 GX5/16"
NEEDLE, DISPOSABLE MISCELLANEOUS
Refills: 2 | Status: ON HOLD | COMMUNITY
Start: 2017-11-21 | End: 2018-08-19 | Stop reason: SDUPTHER

## 2017-12-07 NOTE — PROGRESS NOTES
Assessment/Plan     Problem List Items Addressed This Visit        Cardiovascular and Mediastinum    Benign essential hypertension    Relevant Orders    Comprehensive Metabolic Panel    Lipid Panel With / Chol / HDL Ratio       Endocrine    Uncontrolled type 2 diabetes mellitus - Primary    Relevant Orders    Comprehensive Metabolic Panel    Lipid Panel With / Chol / HDL Ratio    Hemoglobin A1c    Hypothyroidism    Relevant Medications    SYNTHROID 175 MCG tablet    Other Relevant Orders    TSH       Nervous and Auditory    Bilateral carpal tunnel syndrome    Relevant Medications    HYDROcodone-acetaminophen (NORCO) 5-325 MG per tablet       Other    Dyslipidemia    Relevant Orders    Comprehensive Metabolic Panel    Lipid Panel With / Chol / HDL Ratio           Follow up: Return in about 3 months (around 3/7/2018) for follow up depends on review of labs and testing.     DISCUSSION  Diabetes mellitus type 2.  Blood sugars are slowly improving with increasing Tuojeo.  Check levels as noted and will make further plan after review of blood work.    Hypertension.  Stable.  Continue medication.    Hyperlipidemia.  Check CMP and lipid panel.    Hypothyroidism.  Continue brand name Synthroid.  Check TSH.    Bilateral carpal tunnel syndrome.  Increased pain at times and decreased sleep.  I given her a small amount of hydrocodone to use if severe pain.  Side effects and addiction potential explained.  She last received this in April for a #30 count.    Further plan once we have labs back.      MEDICATIONS PRESCRIBED  Requested Prescriptions     Signed Prescriptions Disp Refills   • HYDROcodone-acetaminophen (NORCO) 5-325 MG per tablet 18 tablet 0     Sig: Take 1 tablet by mouth Every 4 (Four) Hours As Needed for Moderate Pain .   • SYNTHROID 175 MCG tablet 90 tablet 1     Sig: Take 1 tablet by mouth Daily.            Elie dated on 9/21/2017.  was reviewed and appropriate.      Patient had received hydrocodone from  another physician but this was after surgery.  -------------------------------------------    Subjective     Chief Complaint   Patient presents with   • Diabetes   • Med Refill   • Labs Only       Diabetes   She presents for her follow-up diabetic visit. She has type 2 diabetes mellitus. Her disease course has been fluctuating. Hypoglycemia symptoms include nervousness/anxiousness. Associated symptoms include fatigue. Pertinent negatives for diabetes include no chest pain and no weight loss. There are no hypoglycemic complications. Symptoms are stable. Diabetic complications include heart disease. Risk factors for coronary artery disease include diabetes mellitus, dyslipidemia, hypertension and obesity. Current diabetic treatment includes insulin injections. She is compliant with treatment all of the time. Her weight is stable. She is following a generally healthy (try to eat better) diet. She never participates in exercise. Home blood sugar record trend: 174 and 202. An ACE inhibitor/angiotensin II receptor blocker is contraindicated. Eye exam is not current.   Hypertension   This is a chronic problem. The current episode started more than 1 year ago. The problem is unchanged. Associated symptoms include peripheral edema (legs and feet) and shortness of breath. Pertinent negatives include no chest pain. There are no associated agents to hypertension. The current treatment provides moderate improvement. There are no compliance problems.  Hypertensive end-organ damage includes CAD/MI. There is no history of angina or kidney disease. There is no history of chronic renal disease.   Hyperlipidemia   This is a chronic problem. The current episode started more than 1 year ago. The problem is uncontrolled. Recent lipid tests were reviewed and are variable. Exacerbating diseases include hypothyroidism. She has no history of chronic renal disease. Associated symptoms include shortness of breath. Pertinent negatives include  "no chest pain or myalgias. Current antihyperlipidemic treatment includes statins. The current treatment provides mild improvement of lipids. There are no compliance problems.  Risk factors for coronary artery disease include diabetes mellitus, hypertension, dyslipidemia and obesity.   Hypothyroidism   This is a chronic problem. The current episode started more than 1 year ago. The problem occurs constantly. The problem has been unchanged. Associated symptoms include arthralgias, coughing (chronic) and fatigue. Pertinent negatives include no chest pain, congestion, myalgias, nausea or vomiting. Nothing aggravates the symptoms.       Feet pain   Nerve pain    CTs acting up again    Occ pain meds, Tramadol not always helpful        Past Medical History,Medications, Allergies, and social history was reviewed.    Review of Systems   Constitutional: Positive for fatigue. Negative for weight loss.   HENT: Negative for congestion.    Respiratory: Positive for cough (chronic) and shortness of breath.    Cardiovascular: Negative for chest pain.   Gastrointestinal: Negative for nausea and vomiting.   Musculoskeletal: Positive for arthralgias. Negative for myalgias.   Psychiatric/Behavioral: The patient is nervous/anxious.        Objective     Vitals:    12/07/17 1604   BP: 138/70   Pulse: 64   Resp: 16   Temp: 97.9 °F (36.6 °C)   TempSrc: Temporal Artery    SpO2: 90%   Weight: 94.6 kg (208 lb 8 oz)   Height: 154.9 cm (60.98\")        Physical Exam   Constitutional: She is oriented to person, place, and time. She appears well-developed and well-nourished.   Obese   HENT:   Head: Normocephalic and atraumatic.   Right Ear: Hearing and external ear normal.   Left Ear: Hearing and external ear normal.   Mouth/Throat: Oropharynx is clear and moist.   Eyes: Conjunctivae and EOM are normal. Pupils are equal, round, and reactive to light.   Cardiovascular: Normal rate, regular rhythm and normal heart sounds.  Exam reveals no friction " rub.    No murmur heard.  Pulmonary/Chest: Effort normal and breath sounds normal. No respiratory distress. She has no wheezes. She has no rales.   Abdominal: Soft. Bowel sounds are normal. She exhibits no distension. There is no tenderness.   Musculoskeletal: She exhibits edema (trace).   Neurological: She is alert and oriented to person, place, and time.   Skin: Skin is warm.   Psychiatric: She has a normal mood and affect. Her behavior is normal.   Nursing note and vitals reviewed.              Jhoan Hdez MD

## 2017-12-26 ENCOUNTER — TELEPHONE (OUTPATIENT)
Dept: FAMILY MEDICINE CLINIC | Facility: CLINIC | Age: 72
End: 2017-12-26

## 2017-12-26 RX ORDER — CLOTRIMAZOLE 1 %
CREAM (GRAM) TOPICAL
Qty: 28 G | Refills: 1 | Status: SHIPPED | OUTPATIENT
Start: 2017-12-26 | End: 2018-04-20

## 2017-12-26 NOTE — TELEPHONE ENCOUNTER
Please call.  Does not sound like a UTI but more likely a yeast infection especially since she is diabetic.  I will send in a cream that she can use that is medicated for if getting worse, would recommend office visit to check.

## 2017-12-26 NOTE — TELEPHONE ENCOUNTER
SPOKE WITH PT AND SHE IS ONLY ITCHING ON THE OUTSIDE AND BURNS BUT ONLY WHEN ITCHES. PT RAW AND BURNS WHEN ITCHING AND SHE WASHES REALLY WELL AND THEN SHE IS BETTER FOR AWHILE. PT USES VASELINE AND IT HELPS PT IS WONDERING IF ITS A UTI ASKING FOR A CREAM AND NO OTHER SX

## 2017-12-26 NOTE — TELEPHONE ENCOUNTER
----- Message from Andrew Mtz sent at 12/26/2017 11:35 AM EST -----  Contact: DR DAI / PT CALL  PT HAS UTI AND WOULD LIKE RX CALLED IN    [facility]    PT CALL [phone]

## 2017-12-27 DIAGNOSIS — E03.9 HYPOTHYROIDISM, UNSPECIFIED TYPE: ICD-10-CM

## 2017-12-27 DIAGNOSIS — R73.9 HYPERGLYCEMIA: ICD-10-CM

## 2017-12-27 RX ORDER — LEVOTHYROXINE SODIUM 175 MCG
TABLET ORAL
Qty: 30 TABLET | Refills: 0 | Status: SHIPPED | OUTPATIENT
Start: 2017-12-27 | End: 2018-06-24 | Stop reason: SDUPTHER

## 2017-12-27 NOTE — TELEPHONE ENCOUNTER
----- Message from Andrew Mtz sent at 12/27/2017 10:54 AM EST -----  Contact: DR SMITH / PT CALL  PT CALLED AND IS REQUESTING REFILL ON Insulin Glargine (TOUJEO SOLOSTAR).  PT STATED THAT DR DAI INCREASED HER TO 90 UNITS.    YANICK    PT CALL BACK [phone]

## 2018-01-18 ENCOUNTER — TELEPHONE (OUTPATIENT)
Dept: FAMILY MEDICINE CLINIC | Facility: CLINIC | Age: 73
End: 2018-01-18

## 2018-01-18 DIAGNOSIS — G56.03 BILATERAL CARPAL TUNNEL SYNDROME: ICD-10-CM

## 2018-01-18 RX ORDER — HYDROCODONE BITARTRATE AND ACETAMINOPHEN 5; 325 MG/1; MG/1
1 TABLET ORAL EVERY 4 HOURS PRN
Qty: 18 TABLET | Refills: 0 | Status: SHIPPED | OUTPATIENT
Start: 2018-01-18 | End: 2018-02-13 | Stop reason: SDUPTHER

## 2018-01-18 NOTE — TELEPHONE ENCOUNTER
Please call.  1.  Confirm if she is still doing 90 units of insulin if yes, I have reviewed her blood sugar levels and recommend increasing to 95 units of insulin daily.  Continue to check sugars and call in 2-3 weeks with readings.    2.  Okay to  prescription of hydrocodone.  3.  Please run Elie.    jessicas

## 2018-01-18 NOTE — TELEPHONE ENCOUNTER
SPOKE WITH PT AND INFORMED HER OF RX AND OFFICE HRS GIVEN. PT IS DOING 90 UNITS DAILY AND WILL INCREASE. PT VERBALIZED UNDERSTANDING.

## 2018-01-18 NOTE — TELEPHONE ENCOUNTER
----- Message from Zamzam Chaney sent at 1/18/2018 10:02 AM EST -----  Contact: MALAIKA  PATIENT REQUESTING A REFILL:    HYDROCODONE  5/325MG

## 2018-01-18 NOTE — TELEPHONE ENCOUNTER
----- Message from Zamzam Chaney sent at 1/18/2018  9:28 AM EST -----  Contact: DAI  READING FOR HER SUGAR    ASHER. 3   278 9 AM   JAN 4TH 195 AM 12 PM  242 9:30 PM  JAN 5  206 6 AM  JAN 7 182 9 AM  JAN 8 192  3:30 AM   322 10 AM  272 2:30 PM   JAN 9   311 8 AM  213 3:30 PM  ASHER 10 251 7:30 PM  JAN 11  219 10:30 PM  314 3:30 PM  JAN 12 277 2:30 AM  JAN 13  224 1:30 AM  271 3 PM  JAN 14  224 3 AM  245 1 PM  298 5 PM  JAN 16  291 3 AM  296 8 PM  JAN 17 291 5 PM  JAN 18 191 8 AM

## 2018-01-29 RX ORDER — PRAVASTATIN SODIUM 40 MG
TABLET ORAL
Qty: 90 TABLET | Refills: 3 | Status: SHIPPED | OUTPATIENT
Start: 2018-01-29

## 2018-02-05 ENCOUNTER — TELEPHONE (OUTPATIENT)
Dept: FAMILY MEDICINE CLINIC | Facility: CLINIC | Age: 73
End: 2018-02-05

## 2018-02-05 NOTE — TELEPHONE ENCOUNTER
Please call, sugars still too high. Rec increase to 100 units of Toujeo daily. Call in 2 weeks with readings.

## 2018-02-05 NOTE — TELEPHONE ENCOUNTER
----- Message from Liv Almeida sent at 2/5/2018 10:04 AM EST -----  Contact: MALAIKA; SUGAR READINGS   01/21-   196 @1:00 PM   -----     271 @8:00PM  01/22-   300@7:00PM  01/23-   279@ 2:00 AM ---- 339 @ 9:00AM   --- 308 @5:00 PM  01/24-     330 @ 2:30 PM   01/25 -    279 @ 10:00 AM   01/27  -   217 @ 7:30 AM   01/28   - 183 @ 10:30 AM   01/30 -   207 @4:30 AM   01/31-   279 @ 11:00 AM  -- 276 @ 11:00 PM   02/01 -  369 @ 2:30 PM   02/02-  227 @ 4:00 AM  --279 @12:30 PM ---242 @730PM  02/03-   282 @ 1:30 AM   02/04-   274 @ 7:30AM ---- 258 @ 4:30PM---  349 @ 10:00PM  02/05-    285 @ 8:00 AM

## 2018-02-13 ENCOUNTER — TELEPHONE (OUTPATIENT)
Dept: FAMILY MEDICINE CLINIC | Facility: CLINIC | Age: 73
End: 2018-02-13

## 2018-02-13 ENCOUNTER — OFFICE VISIT (OUTPATIENT)
Dept: FAMILY MEDICINE CLINIC | Facility: CLINIC | Age: 73
End: 2018-02-13

## 2018-02-13 VITALS
OXYGEN SATURATION: 87 % | HEIGHT: 61 IN | DIASTOLIC BLOOD PRESSURE: 58 MMHG | SYSTOLIC BLOOD PRESSURE: 130 MMHG | HEART RATE: 63 BPM

## 2018-02-13 DIAGNOSIS — R60.0 LOCALIZED EDEMA: ICD-10-CM

## 2018-02-13 DIAGNOSIS — J44.1 COPD EXACERBATION (HCC): Primary | ICD-10-CM

## 2018-02-13 DIAGNOSIS — G56.03 BILATERAL CARPAL TUNNEL SYNDROME: ICD-10-CM

## 2018-02-13 PROCEDURE — 99214 OFFICE O/P EST MOD 30 MIN: CPT | Performed by: FAMILY MEDICINE

## 2018-02-13 RX ORDER — PREDNISONE 20 MG/1
40 TABLET ORAL DAILY
Qty: 10 TABLET | Refills: 0 | Status: SHIPPED | OUTPATIENT
Start: 2018-02-13 | End: 2018-02-18

## 2018-02-13 RX ORDER — FUROSEMIDE 20 MG/1
20 TABLET ORAL DAILY
Qty: 10 TABLET | Refills: 0 | Status: SHIPPED | OUTPATIENT
Start: 2018-02-13 | End: 2018-02-13 | Stop reason: SDUPTHER

## 2018-02-13 RX ORDER — AZITHROMYCIN 250 MG/1
TABLET, FILM COATED ORAL
Qty: 6 TABLET | Refills: 0 | Status: SHIPPED | OUTPATIENT
Start: 2018-02-13 | End: 2018-04-20

## 2018-02-13 RX ORDER — HYDROCODONE BITARTRATE AND ACETAMINOPHEN 5; 325 MG/1; MG/1
1 TABLET ORAL EVERY 4 HOURS PRN
Qty: 18 TABLET | Refills: 0 | Status: ON HOLD | OUTPATIENT
Start: 2018-02-13 | End: 2019-09-07

## 2018-02-13 NOTE — PROGRESS NOTES
Assessment/Plan     Problem List Items Addressed This Visit        Nervous and Auditory    Bilateral carpal tunnel syndrome    Relevant Medications    HYDROcodone-acetaminophen (NORCO) 5-325 MG per tablet      Other Visit Diagnoses     COPD exacerbation    -  Primary    Relevant Medications    predniSONE (DELTASONE) 20 MG tablet    azithromycin (ZITHROMAX) 250 MG tablet    Localized edema        Relevant Medications    furosemide (LASIX) 20 MG tablet           Follow up: Return if symptoms worsen or fail to improve.     DISCUSSION  COPD exacerbation.  Oxygen level stayed around 91-92% on 3 L oxygen.  Start prednisone for 5 days.  Tk.  Explained that her sugars would increase with the prednisone.  Call in 2 days with update but if worsening, return to emergency room.  We will check ER records as well.    Edema.  Try Lasix for 2-3 days and see how that works for the swelling and breathing.    Bilateral carpal tunnel syndrome.  Refilled hydrocodone.  Denies misuse and no evidence of diversion.      MEDICATIONS PRESCRIBED  Requested Prescriptions     Signed Prescriptions Disp Refills   • HYDROcodone-acetaminophen (NORCO) 5-325 MG per tablet 18 tablet 0     Sig: Take 1 tablet by mouth Every 4 (Four) Hours As Needed for Moderate Pain .   • predniSONE (DELTASONE) 20 MG tablet 10 tablet 0     Sig: Take 2 tablets by mouth Daily for 5 days.   • azithromycin (ZITHROMAX) 250 MG tablet 6 tablet 0     Sig: Take 2 tablets the first day, then 1 tablet daily for 4 days.   • furosemide (LASIX) 20 MG tablet 10 tablet 0     Sig: Take 1 tablet by mouth Daily. As needed for swelling            Elie dated on 1/18/2018  was reviewed and appropriate.        -------------------------------------------    Subjective     Chief Complaint   Patient presents with   • Shortness of Breath     2+ weeks.    • Edema     BL Leg and feet    • Chest Pain     7+ day has been feels pressure and pain.  Comes and goes.        Shortness of Breath  "  This is a new (increased shortness of breath with activity, ER on Sunday. Slept 9 pm sunday and woke 2 am Tuesday am) problem. The current episode started 1 to 4 weeks ago (x 2 weeks). Associated symptoms include chest pain (center of chest), a fever (chills and gets hot) and leg swelling (feet). Pertinent negatives include no syncope or vomiting ( + nausea). The symptoms are aggravated by any activity. She has tried beta agonist inhalers (unable to use powder inhaler) for the symptoms. Her past medical history is significant for COPD.   Chest Pain    Associated symptoms include back pain, a fever (chills and gets hot) and shortness of breath. Pertinent negatives include no syncope or vomiting ( + nausea).       ER + CXR ER. CT scan with dye. And ok per son, + nodule. Neg flu as well    Hard to breath when laying down    Rec diuretic but did not give her any        Past Medical History,Medications, Allergies, and social history was reviewed.    Review of Systems   Constitutional: Positive for fatigue and fever (chills and gets hot).   HENT: Positive for congestion.    Respiratory: Positive for shortness of breath.    Cardiovascular: Positive for chest pain (center of chest) and leg swelling (feet). Negative for syncope.   Gastrointestinal: Negative.  Negative for vomiting ( + nausea).   Musculoskeletal: Positive for arthralgias and back pain.        Chronic carpal tunnel syndrome pain.  Hydrocodone does help.   Psychiatric/Behavioral: Negative.        Objective     Vitals:    02/13/18 1524   BP: 130/58   BP Location: Left arm   Patient Position: Sitting   Cuff Size: Adult   Pulse: 63   SpO2: (!) 87%   Height: 154.9 cm (60.98\")        Physical Exam   Constitutional: She is oriented to person, place, and time. She appears well-developed and well-nourished.   HENT:   Head: Normocephalic and atraumatic.   Right Ear: Hearing, tympanic membrane, external ear and ear canal normal.   Left Ear: Hearing, tympanic membrane, " external ear and ear canal normal.   Mouth/Throat: Oropharynx is clear and moist.   Eyes: Conjunctivae and EOM are normal. Pupils are equal, round, and reactive to light.   Neck: Normal range of motion. Neck supple. No thyromegaly present.   Cardiovascular: Normal rate, regular rhythm and normal heart sounds.  Exam reveals no gallop and no friction rub.    No murmur heard.  Pulmonary/Chest: Effort normal. No respiratory distress. She has decreased breath sounds. She has wheezes (expiratory). She has rhonchi. She has no rales.   Musculoskeletal: She exhibits edema (trace to 1 + ).   Neurological: She is alert and oriented to person, place, and time.   Skin: Skin is warm and dry.   Psychiatric: She has a normal mood and affect. Her behavior is normal.   Nursing note and vitals reviewed.              Jhoan Hdez MD

## 2018-02-13 NOTE — TELEPHONE ENCOUNTER
FAXED REQUEST OVER TO Located within [facility] PLEASE LET ME KNOW IF YOU DON'T REC.

## 2018-02-14 RX ORDER — FUROSEMIDE 20 MG/1
TABLET ORAL
Qty: 90 TABLET | Refills: 0 | Status: SHIPPED | OUTPATIENT
Start: 2018-02-14 | End: 2018-04-20

## 2018-02-15 ENCOUNTER — TELEPHONE (OUTPATIENT)
Dept: FAMILY MEDICINE CLINIC | Facility: CLINIC | Age: 73
End: 2018-02-15

## 2018-02-15 DIAGNOSIS — R53.81 DEBILITY: ICD-10-CM

## 2018-02-15 DIAGNOSIS — J43.8 OTHER EMPHYSEMA (HCC): Primary | ICD-10-CM

## 2018-02-15 NOTE — TELEPHONE ENCOUNTER
----- Message from Zamzam Sandoval sent at 2/15/2018  9:01 AM EST -----  Contact: DAI  FAMILY REQUESTING HOME HEALTH TO COME IN AND HELP PATIENT WITH  SHOWERING AND PERSONAL HEALTH    LE (SON) [phone]

## 2018-02-15 NOTE — TELEPHONE ENCOUNTER
Please call.  I am glad that she is feeling better.    In terms of the question about home health, is she getting any home health now?  Just need to know if she is using a company but if not, then I will place the new order.  Let me know.

## 2018-02-15 NOTE — TELEPHONE ENCOUNTER
----- Message from Deborah Harman sent at 2/15/2018  9:13 AM EST -----  Contact: Hdez  Patient states she is feeling much better. She said that Dr. Hdez wanted to call and let him know how she was feeling.

## 2018-02-19 ENCOUNTER — TELEPHONE (OUTPATIENT)
Dept: FAMILY MEDICINE CLINIC | Facility: CLINIC | Age: 73
End: 2018-02-19

## 2018-02-19 NOTE — TELEPHONE ENCOUNTER
----- Message from Andrew Mtz sent at 2/19/2018  3:38 PM EST -----  Contact: MALAIKA / JOYCE H  CRYSTAL WITH [facility]  CALLED TO ADVISE DR DAI THAT THEY CALLED TO SET HOME HEALTH AND PATIENT IS REFUSING HOME HEALTH        JOYCE [phone]

## 2018-02-19 NOTE — TELEPHONE ENCOUNTER
SPOKE WITH PT AND SHE ISN'T USING A HH AGENCY RIGHT NOW AT ALL. PT STATES SHE IS DOING OK A LITTLE BETTER SHE WANTED YOU TO KNOW THAT SHE USED WATER PILL FOR 5 DAYS AND STOPPED AND SHE HAS 5 LEFT. PT STATES SHE WAS ABLE TO GET A LOT OF FLUID OFF.

## 2018-02-26 RX ORDER — LANCETS
EACH MISCELLANEOUS
Qty: 300 EACH | Refills: 0 | Status: ON HOLD | OUTPATIENT
Start: 2018-02-26 | End: 2019-09-08

## 2018-03-06 DIAGNOSIS — R91.1 NODULE OF RIGHT LUNG: Primary | ICD-10-CM

## 2018-03-06 DIAGNOSIS — R91.8 ABNORMAL CT SCAN OF LUNG: ICD-10-CM

## 2018-03-27 ENCOUNTER — TELEPHONE (OUTPATIENT)
Dept: FAMILY MEDICINE CLINIC | Facility: CLINIC | Age: 73
End: 2018-03-27

## 2018-03-27 NOTE — TELEPHONE ENCOUNTER
----- Message from Zamzam Chaney sent at 3/27/2018 10:12 AM EDT -----  Contact: SMITH  BLOOD READINGS    03/14  161  7AM   194  9 PM  03/15   285 830 PM  03/16  180  930AM    286   8PM  03/18  225  6AM  03/19  137   4PM  03/20  276  4AM  03/21  265   8AM   219  7 PM  03/22  170   2PM  03/23   230   4AM  3/24  149   7AM   295  11 AM  03/25  193  6 AM  03/26  121  1130AM  157   530PM  03/27  256   6AM

## 2018-03-27 NOTE — TELEPHONE ENCOUNTER
Left detailed message for pt. Advised of provider comment to continue same dose and call if sugars start increasing. Also noted to pt that she is due for OV in 1 months time and to please make that appt. Provided office number for any questions/concerns.

## 2018-03-27 NOTE — TELEPHONE ENCOUNTER
----- Message from Mei Guerrier sent at 3/21/2018  4:05 PM EDT -----  Contact: PERLA;PT CALLED  PT REQUESTING TO HAVE TO EXTENDED DAYS OFF FROM WORK FOR  THREE OR FOUR EPISODES-HER PAPERWORK FOR MO NEEDS TO  BE ADJUSTED FOR QUESTION 6 AND 7    [phone]   Please call, reviewed sugars and some better. Continue same dose and call in 2-3 weeks if sugars increasing. Due for office visit and labs in 1 month. bds

## 2018-03-28 DIAGNOSIS — R73.9 HYPERGLYCEMIA: ICD-10-CM

## 2018-03-29 RX ORDER — ALBUTEROL SULFATE 90 UG/1
AEROSOL, METERED RESPIRATORY (INHALATION)
Qty: 18 G | Refills: 0 | Status: SHIPPED | OUTPATIENT
Start: 2018-03-29

## 2018-03-29 RX ORDER — INSULIN GLARGINE 300 U/ML
INJECTION, SOLUTION SUBCUTANEOUS
Qty: 9 PEN | Refills: 5 | Status: SHIPPED | OUTPATIENT
Start: 2018-03-29 | End: 2018-06-14 | Stop reason: SDUPTHER

## 2018-04-11 ENCOUNTER — TELEPHONE (OUTPATIENT)
Dept: FAMILY MEDICINE CLINIC | Facility: CLINIC | Age: 73
End: 2018-04-11

## 2018-04-11 NOTE — TELEPHONE ENCOUNTER
----- Message from Andrew Mtz sent at 4/11/2018  3:48 PM EDT -----  Contact: MALAIKA CARL WITH [facility]  MESERET WITH [facility] CALLED TO LET DR DAI KNOW THAT PT WAS ADMITTED ON 4/9 FOR COPD AND IS STILL IN HOUSE.    MESERET [facility] [phone]

## 2018-04-16 ENCOUNTER — TRANSITIONAL CARE MANAGEMENT TELEPHONE ENCOUNTER (OUTPATIENT)
Dept: FAMILY MEDICINE CLINIC | Facility: CLINIC | Age: 73
End: 2018-04-16

## 2018-04-20 ENCOUNTER — OFFICE VISIT (OUTPATIENT)
Dept: FAMILY MEDICINE CLINIC | Facility: CLINIC | Age: 73
End: 2018-04-20

## 2018-04-20 VITALS
BODY MASS INDEX: 40.22 KG/M2 | RESPIRATION RATE: 24 BRPM | HEIGHT: 61 IN | DIASTOLIC BLOOD PRESSURE: 80 MMHG | TEMPERATURE: 97.5 F | HEART RATE: 62 BPM | OXYGEN SATURATION: 96 % | WEIGHT: 213 LBS | SYSTOLIC BLOOD PRESSURE: 150 MMHG

## 2018-04-20 DIAGNOSIS — Z79.4 UNCONTROLLED TYPE 2 DIABETES MELLITUS WITH HYPERGLYCEMIA, WITH LONG-TERM CURRENT USE OF INSULIN (HCC): ICD-10-CM

## 2018-04-20 DIAGNOSIS — E11.65 UNCONTROLLED TYPE 2 DIABETES MELLITUS WITH HYPERGLYCEMIA, WITH LONG-TERM CURRENT USE OF INSULIN (HCC): ICD-10-CM

## 2018-04-20 DIAGNOSIS — I50.9 CHRONIC CONGESTIVE HEART FAILURE, UNSPECIFIED CONGESTIVE HEART FAILURE TYPE: ICD-10-CM

## 2018-04-20 DIAGNOSIS — I27.20 PULMONARY HTN (HCC): ICD-10-CM

## 2018-04-20 DIAGNOSIS — J43.8 OTHER EMPHYSEMA (HCC): Primary | ICD-10-CM

## 2018-04-20 DIAGNOSIS — G62.9 NEUROPATHY: ICD-10-CM

## 2018-04-20 PROCEDURE — 99495 TRANSJ CARE MGMT MOD F2F 14D: CPT | Performed by: NURSE PRACTITIONER

## 2018-04-20 RX ORDER — GABAPENTIN 100 MG/1
100 CAPSULE ORAL 3 TIMES DAILY
Qty: 90 CAPSULE | Refills: 0 | Status: SHIPPED | OUTPATIENT
Start: 2018-04-20 | End: 2018-08-09

## 2018-04-20 NOTE — PROGRESS NOTES
Subjective   Janet Pisano is a 72 y.o. female.     History of Present Illness   Hospital follow up  Admitted to [facility] 4/9/18-4/12/18  Moderate LVH and new pulmonary HTN on ECHO  COPD exacerbation with hypoxia  Currently breathing better on 2 liters of oxygen  Had a reaction to Breo inhaler, made her mouth full of sores, has stopped it for the past 2 days and her mouth is healing  Has an appt with Pulmonary this Thursday  CHF w/ LVH lower extremity edema, SOA  DM type 2 uncontrolled on insulin  Having neuropathy pains in hands and feet having trouble sleeping  Son is here to accompany his mother    The following portions of the patient's history were reviewed and updated as appropriate: allergies, current medications, past family history, past medical history, past social history, past surgical history and problem list.    Review of Systems   Constitutional: Positive for fatigue and unexpected weight gain.   HENT: Negative.    Eyes: Negative.    Respiratory: Negative for cough, chest tightness, shortness of breath and wheezing.    Cardiovascular: Positive for leg swelling. Negative for chest pain and palpitations.   Gastrointestinal: Negative.    Endocrine: Negative.    Genitourinary: Negative.    Musculoskeletal: Negative.    Skin: Negative.    Neurological: Positive for numbness.   Hematological: Negative.    Psychiatric/Behavioral: Positive for sleep disturbance and depressed mood.       Objective   Physical Exam   Constitutional: She is oriented to person, place, and time. She appears well-developed and well-nourished.   HENT:   Head: Normocephalic.   Nose: Nose normal.   Neck: Normal range of motion. Neck supple.   Cardiovascular: Normal rate, regular rhythm and normal heart sounds.    Pulmonary/Chest: Effort normal and breath sounds normal.   Abdominal: Soft.   Musculoskeletal: She exhibits edema (bilateral ankle edema).   seated in wheelchair   Neurological: She is alert and oriented to person, place, and time.    Skin: Skin is warm and dry. Capillary refill takes 2 to 3 seconds.   Psychiatric: She has a normal mood and affect. Her behavior is normal. Judgment and thought content normal.   Nursing note and vitals reviewed.        Assessment/Plan   Janet was seen today for bessy / fu from [facility] 4-12-18.    Diagnoses and all orders for this visit:    Other emphysema    Uncontrolled type 2 diabetes mellitus with hyperglycemia, with long-term current use of insulin  -     gabapentin (NEURONTIN) 100 MG capsule; Take 1 capsule by mouth 3 (Three) Times a Day.    Chronic congestive heart failure, unspecified congestive heart failure type  -     Ambulatory Referral to Cardiology    Pulmonary HTN  -     Ambulatory Referral to Cardiology    Neuropathy  -     gabapentin (NEURONTIN) 100 MG capsule; Take 1 capsule by mouth 3 (Three) Times a Day.      Will make appt with Cardiology for follow up from hospital sees Dr Sterling in the past  Will start pt on Gabapentin one TID for pain  Pt advised to keep hospital follow up appt with pulmonary specialist  Discussed pt need to make some dietary changes to help with better blood glucose readings. Eating too much fruit, breads, potatoes, rice, juice which is causing her BS to be too high. Pt is reluctant to make changes but discussed need to prevent worsening health issues.   Counseling was given to patient for the following topics: risk factor reductions and importance of treatment compliance . Total time of the encounter was 25 minutes and 8 minutes was spend counseling.

## 2018-05-04 DIAGNOSIS — R73.9 HYPERGLYCEMIA: ICD-10-CM

## 2018-05-04 RX ORDER — PEN NEEDLE, DIABETIC 31 GX5/16"
NEEDLE, DISPOSABLE MISCELLANEOUS
Qty: 100 EACH | Refills: 0 | Status: SHIPPED | OUTPATIENT
Start: 2018-05-04 | End: 2018-08-11 | Stop reason: SDUPTHER

## 2018-05-04 RX ORDER — BLOOD SUGAR DIAGNOSTIC
STRIP MISCELLANEOUS
Qty: 300 EACH | Refills: 0 | Status: ON HOLD | OUTPATIENT
Start: 2018-05-04 | End: 2019-09-08

## 2018-06-14 ENCOUNTER — TELEPHONE (OUTPATIENT)
Dept: FAMILY MEDICINE CLINIC | Facility: CLINIC | Age: 73
End: 2018-06-14

## 2018-06-14 DIAGNOSIS — R73.9 HYPERGLYCEMIA: ICD-10-CM

## 2018-06-14 NOTE — TELEPHONE ENCOUNTER
----- Message from Zamzam Chaney sent at 6/14/2018  3:47 PM EDT -----  Contact: JOSE  SON JANE RODRIGES CANNOT  PATIENT RX FOR HER INSULIN BECAUSE PHARMACY STILL HAS IN THEIR COMPUTER THAT SHE IS ON 90 UNITS AND SHE SHOULD BE  UNITS. [facility] [place] IN [place]  PLEASE CALL AND HAVE THIS CORRECTED .  SHE NEEDS THIS TODAY.     PLEASE CALL JANE WHEN FIXED SO HE CAN  HER RX  [phone]  THEY ARE GOING OUT OF TOWN

## 2018-06-24 DIAGNOSIS — E03.9 HYPOTHYROIDISM, UNSPECIFIED TYPE: ICD-10-CM

## 2018-06-25 RX ORDER — LEVOTHYROXINE SODIUM 175 MCG
TABLET ORAL
Qty: 90 TABLET | Refills: 0 | Status: ON HOLD | OUTPATIENT
Start: 2018-06-25 | End: 2019-09-07

## 2018-07-31 ENCOUNTER — TELEPHONE (OUTPATIENT)
Dept: FAMILY MEDICINE CLINIC | Facility: CLINIC | Age: 73
End: 2018-07-31

## 2018-07-31 NOTE — TELEPHONE ENCOUNTER
----- Message from Zamzam Chaney sent at 7/31/2018  1:53 PM EDT -----  Contact: MALAIKA CARL FROM [facility] ADMITTED ON July 29, 2018 FOR COPD.     [phone]

## 2018-08-03 ENCOUNTER — TRANSITIONAL CARE MANAGEMENT TELEPHONE ENCOUNTER (OUTPATIENT)
Dept: FAMILY MEDICINE CLINIC | Facility: CLINIC | Age: 73
End: 2018-08-03

## 2018-08-03 NOTE — OUTREACH NOTE
DEBRA call completed.  Please refer to TCM call flowsheet for call documentation.  Patient reports she is tired.  She denies n/v/d/c.  She would like a medication sent in for yeat infection to [facility].  She also reports that she is supposed to see Dr. Hdez on 8/9 sonya Grissom.  Please contact patient about appointment and medication.  Thank you.

## 2018-08-07 ENCOUNTER — TELEPHONE (OUTPATIENT)
Dept: FAMILY MEDICINE CLINIC | Facility: CLINIC | Age: 73
End: 2018-08-07

## 2018-08-07 NOTE — TELEPHONE ENCOUNTER
----- Message from Zamzam Sandoval sent at 8/7/2018 12:46 PM EDT -----  Contact: ZEKE PAYNE [facility]  NEEDS TO GET ORDERS FOR PT TWICE A WEEK FOR 2 WEEKS FOR  STRENGTH AND GAIT TRIANING    [phone]

## 2018-08-07 NOTE — TELEPHONE ENCOUNTER
----- Message from Zamzam Sandoval sent at 8/7/2018  9:58 AM EDT -----  Contact: ZEKE NICHOLS FROM [facility]  PT HAS BEEN ADMITTED FOR HOME HEALTH FOR NURSING  AND PT AND OT    [phone]

## 2018-08-07 NOTE — TELEPHONE ENCOUNTER
Please call.  Okay for requests including physical therapy twice a week for 2 weeks for strength and gait training.

## 2018-08-09 ENCOUNTER — OFFICE VISIT (OUTPATIENT)
Dept: FAMILY MEDICINE CLINIC | Facility: CLINIC | Age: 73
End: 2018-08-09

## 2018-08-09 VITALS
SYSTOLIC BLOOD PRESSURE: 128 MMHG | OXYGEN SATURATION: 94 % | TEMPERATURE: 98.1 F | HEIGHT: 61 IN | WEIGHT: 218 LBS | BODY MASS INDEX: 41.16 KG/M2 | DIASTOLIC BLOOD PRESSURE: 86 MMHG | RESPIRATION RATE: 24 BRPM

## 2018-08-09 DIAGNOSIS — J44.1 COPD EXACERBATION (HCC): Primary | ICD-10-CM

## 2018-08-09 DIAGNOSIS — Z79.4 UNCONTROLLED TYPE 2 DIABETES MELLITUS WITH HYPERGLYCEMIA, WITH LONG-TERM CURRENT USE OF INSULIN (HCC): ICD-10-CM

## 2018-08-09 DIAGNOSIS — D50.9 IRON DEFICIENCY ANEMIA, UNSPECIFIED IRON DEFICIENCY ANEMIA TYPE: ICD-10-CM

## 2018-08-09 DIAGNOSIS — E11.65 UNCONTROLLED TYPE 2 DIABETES MELLITUS WITH HYPERGLYCEMIA, WITH LONG-TERM CURRENT USE OF INSULIN (HCC): ICD-10-CM

## 2018-08-09 DIAGNOSIS — J18.9: ICD-10-CM

## 2018-08-09 PROCEDURE — 99214 OFFICE O/P EST MOD 30 MIN: CPT | Performed by: NURSE PRACTITIONER

## 2018-08-09 RX ORDER — LEVOFLOXACIN 750 MG/1
TABLET ORAL DAILY
Refills: 0 | COMMUNITY
Start: 2018-08-02 | End: 2018-08-09

## 2018-08-09 RX ORDER — IRON POLYSACCHARIDE COMPLEX 150 MG
CAPSULE ORAL
Refills: 0 | Status: ON HOLD | COMMUNITY
Start: 2018-08-02 | End: 2019-09-07

## 2018-08-09 RX ORDER — GABAPENTIN 300 MG/1
300 CAPSULE ORAL 3 TIMES DAILY
Qty: 90 CAPSULE | Refills: 3 | Status: ON HOLD | OUTPATIENT
Start: 2018-08-09 | End: 2022-01-01

## 2018-08-09 NOTE — PROGRESS NOTES
Subjective   Janet Pisano is a 73 y.o. female.     History of Present Illness   Hospital follow up  [facility] admitted 7/29/18 discharged 8/2/18    COPD exacerbation on 3 liters O2   Right basalar pneumonia- completed Levaquin  Mild Hyponatremia-resolved on discharge  Poor DMT2 control with A1c =10% on Teujeo 110 u daily  Anemia- Hgb 10.5 discharged on iron and folic acid  Has appt with Pulmonary 8/26/18 at [facility]; needs a follow up CXR in 2 weeks.   Has Home health and in home PT   Accompanied by her son  Would like an increase in Gabapentin for nerve pain.     The following portions of the patient's history were reviewed and updated as appropriate: allergies, current medications, past family history, past medical history, past social history, past surgical history and problem list.    Review of Systems   Constitutional: Positive for activity change. Negative for chills and fever.   Respiratory: Positive for cough and shortness of breath. Negative for chest tightness and wheezing.    Cardiovascular: Negative.    Gastrointestinal: Positive for constipation.   Genitourinary: Negative.    Musculoskeletal: Positive for arthralgias.   Skin: Positive for pallor.   Neurological: Negative for dizziness and light-headedness.   Hematological: Negative.    Psychiatric/Behavioral: Negative.        Objective   Physical Exam   Constitutional: She is oriented to person, place, and time. She appears well-developed and well-nourished. No distress.   HENT:   Head: Normocephalic.   Nose: Nose normal.   Neck: Neck supple. No JVD present. No thyromegaly present.   Cardiovascular: Normal rate, regular rhythm and normal heart sounds.    Pulmonary/Chest: Effort normal and breath sounds normal. No respiratory distress. She has no wheezes. She has no rales.   Musculoskeletal: Normal range of motion. She exhibits no edema.   Neurological: She is alert and oriented to person, place, and time.   Skin: Skin is warm and dry. Capillary refill takes 2 to  3 seconds. There is pallor.   Psychiatric: She has a normal mood and affect. Her behavior is normal. Judgment and thought content normal.   Nursing note and vitals reviewed.        Assessment/Plan   Janet was seen today for transitional care management.    Diagnoses and all orders for this visit:    COPD exacerbation (CMS/[facility])    Basal pneumonia    Uncontrolled type 2 diabetes mellitus with hyperglycemia, with long-term current use of insulin (CMS/[facility])  -     gabapentin (NEURONTIN) 300 MG capsule; Take 1 capsule by mouth 3 (Three) Times a Day.  -     insulin aspart (NOVOLOG) 100 UNIT/ML injection; Check BS before each meal if BS  take 0 u; 131-180 2 u, 181-240 4u, 241-300 6u, 301-350 8u, 351-400 10u, >401 12 u.    Iron deficiency anemia, unspecified iron deficiency anemia type    BMI 40.0-44.9, adult (CMS/[facility])    Will start with low dose sliding scale Novolog insulin before each meal since A1c=10.      Continue oxygen and nebulizer. Keep appt with Pulmonary on 8/26.  Continue iron supplement with folic acid  Will start meal time short acting insulin tx, needs to check BS before meals and bedtime due to poor DM control. Will see pt back in 6 weeks or sooner if needed.

## 2018-08-10 ENCOUNTER — TELEPHONE (OUTPATIENT)
Dept: FAMILY MEDICINE CLINIC | Facility: CLINIC | Age: 73
End: 2018-08-10

## 2018-08-10 NOTE — TELEPHONE ENCOUNTER
Spoke w/ Deann and she said, the patient is normally on 3 L/pm and at night has been turning it up to 3.5 L/pm and today when she was up and about turned it up to 4.  Then when back to resting she goes back to 3.  Is this okay to continue?

## 2018-08-10 NOTE — TELEPHONE ENCOUNTER
----- Message from Liv Almeida sent at 8/10/2018 10:38 AM EDT -----  Contact: MALAIKA; HOME HEALTH [facility] CALLED STATING THAT PT TURNED HER OXYGEN UP ONE LITER BUT IT STILL GOES DONE IN THE 80'S RAGE. THE NURSE WOULD LIEK A CALL BACK TO SEE IF THIS NEEDS TO BE INCREASE? WHILE RESTING SHE IS AT 92.       CALL BACK   [phone].  STEPHANIE

## 2018-08-10 NOTE — TELEPHONE ENCOUNTER
Please call, as long as Oxygen is in the upper 80s to low 90s, continue current oxygen rate. What did she turn this up to?   Increasing the oxygen level too high may make her worse if she  retains CO2.

## 2018-08-11 DIAGNOSIS — R73.9 HYPERGLYCEMIA: ICD-10-CM

## 2018-08-13 RX ORDER — PEN NEEDLE, DIABETIC 31 GX5/16"
NEEDLE, DISPOSABLE MISCELLANEOUS
Qty: 100 EACH | Refills: 5 | Status: ON HOLD | OUTPATIENT
Start: 2018-08-13 | End: 2019-09-08

## 2018-08-16 ENCOUNTER — HOSPITAL ENCOUNTER (INPATIENT)
Facility: HOSPITAL | Age: 73
LOS: 6 days | Discharge: HOME | End: 2018-08-22
Attending: EMERGENCY MEDICINE | Admitting: INTERNAL MEDICINE

## 2018-08-16 ENCOUNTER — APPOINTMENT (OUTPATIENT)
Dept: CT IMAGING | Facility: HOSPITAL | Age: 73
End: 2018-08-16

## 2018-08-16 ENCOUNTER — APPOINTMENT (OUTPATIENT)
Dept: GENERAL RADIOLOGY | Facility: HOSPITAL | Age: 73
End: 2018-08-16

## 2018-08-16 ENCOUNTER — TELEPHONE (OUTPATIENT)
Dept: FAMILY MEDICINE CLINIC | Facility: CLINIC | Age: 73
End: 2018-08-16

## 2018-08-16 DIAGNOSIS — Z74.09 IMPAIRED FUNCTIONAL MOBILITY, BALANCE, GAIT, AND ENDURANCE: ICD-10-CM

## 2018-08-16 DIAGNOSIS — R79.89 POSITIVE D DIMER: ICD-10-CM

## 2018-08-16 DIAGNOSIS — E11.8 UNCONTROLLED TYPE 2 DIABETES MELLITUS WITH COMPLICATION, UNSPECIFIED LONG TERM INSULIN USE STATUS: ICD-10-CM

## 2018-08-16 DIAGNOSIS — J44.1 COPD WITH ACUTE EXACERBATION (HCC): ICD-10-CM

## 2018-08-16 DIAGNOSIS — J90 PLEURAL EFFUSION: ICD-10-CM

## 2018-08-16 DIAGNOSIS — Z74.09 IMPAIRED MOBILITY AND ADLS: ICD-10-CM

## 2018-08-16 DIAGNOSIS — I50.9 ACUTE ON CHRONIC CONGESTIVE HEART FAILURE, UNSPECIFIED CONGESTIVE HEART FAILURE TYPE: ICD-10-CM

## 2018-08-16 DIAGNOSIS — I27.20 PULMONARY HYPERTENSION (HCC): ICD-10-CM

## 2018-08-16 DIAGNOSIS — J96.22 ACUTE ON CHRONIC RESPIRATORY FAILURE WITH HYPOXIA AND HYPERCAPNIA (HCC): Primary | ICD-10-CM

## 2018-08-16 DIAGNOSIS — E11.65 UNCONTROLLED TYPE 2 DIABETES MELLITUS WITH COMPLICATION, UNSPECIFIED LONG TERM INSULIN USE STATUS: ICD-10-CM

## 2018-08-16 DIAGNOSIS — J96.21 ACUTE ON CHRONIC RESPIRATORY FAILURE WITH HYPOXIA AND HYPERCAPNIA (HCC): Primary | ICD-10-CM

## 2018-08-16 DIAGNOSIS — Z78.9 IMPAIRED MOBILITY AND ADLS: ICD-10-CM

## 2018-08-16 PROBLEM — I50.33 ACUTE ON CHRONIC DIASTOLIC CONGESTIVE HEART FAILURE: Status: ACTIVE | Noted: 2018-08-16

## 2018-08-16 PROBLEM — E66.01 MORBID OBESITY WITH BMI OF 40.0-44.9, ADULT (HCC): Status: ACTIVE | Noted: 2018-08-09

## 2018-08-16 PROBLEM — G56.03 BILATERAL CARPAL TUNNEL SYNDROME: Status: RESOLVED | Noted: 2017-02-24 | Resolved: 2018-08-16

## 2018-08-16 PROBLEM — J18.9 BASAL PNEUMONIA: Status: RESOLVED | Noted: 2018-08-09 | Resolved: 2018-08-16

## 2018-08-16 LAB
ALBUMIN SERPL-MCNC: 4 G/DL (ref 3.2–4.8)
ALBUMIN/GLOB SERPL: 1.5 G/DL (ref 1.5–2.5)
ALP SERPL-CCNC: 63 U/L (ref 25–100)
ALT SERPL W P-5'-P-CCNC: 29 U/L (ref 7–40)
ANION GAP SERPL CALCULATED.3IONS-SCNC: 4 MMOL/L (ref 3–11)
ARTERIAL PATENCY WRIST A: POSITIVE
AST SERPL-CCNC: 26 U/L (ref 0–33)
ATMOSPHERIC PRESS: ABNORMAL MMHG
BASE EXCESS BLDA CALC-SCNC: 7.9 MMOL/L (ref 0–2)
BASOPHILS # BLD AUTO: 0.02 10*3/MM3 (ref 0–0.2)
BASOPHILS NFR BLD AUTO: 0.3 % (ref 0–1)
BDY SITE: ABNORMAL
BILIRUB SERPL-MCNC: 0.8 MG/DL (ref 0.3–1.2)
BNP SERPL-MCNC: 649 PG/ML (ref 0–100)
BUN BLD-MCNC: 21 MG/DL (ref 9–23)
BUN/CREAT SERPL: 23.3 (ref 7–25)
CALCIUM SPEC-SCNC: 8.7 MG/DL (ref 8.7–10.4)
CHLORIDE SERPL-SCNC: 97 MMOL/L (ref 99–109)
CO2 BLDA-SCNC: 38.4 MMOL/L (ref 22–33)
CO2 SERPL-SCNC: 35 MMOL/L (ref 20–31)
COHGB MFR BLD: 1.9 % (ref 0–2)
CREAT BLD-MCNC: 0.9 MG/DL (ref 0.6–1.3)
D DIMER PPP FEU-MCNC: 1.67 MG/L (FEU) (ref 0–0.5)
D-LACTATE SERPL-SCNC: 1.2 MMOL/L (ref 0.5–2)
DEPRECATED RDW RBC AUTO: 54.4 FL (ref 37–54)
EOSINOPHIL # BLD AUTO: 0.12 10*3/MM3 (ref 0–0.3)
EOSINOPHIL NFR BLD AUTO: 1.5 % (ref 0–3)
ERYTHROCYTE [DISTWIDTH] IN BLOOD BY AUTOMATED COUNT: 14.8 % (ref 11.3–14.5)
GFR SERPL CREATININE-BSD FRML MDRD: 61 ML/MIN/1.73
GLOBULIN UR ELPH-MCNC: 2.6 GM/DL
GLUCOSE BLD-MCNC: 222 MG/DL (ref 70–100)
GLUCOSE BLDC GLUCOMTR-MCNC: 172 MG/DL (ref 70–130)
GLUCOSE BLDC GLUCOMTR-MCNC: 221 MG/DL (ref 70–130)
HCO3 BLDA-SCNC: 36.2 MMOL/L (ref 20–26)
HCT VFR BLD AUTO: 40 % (ref 34.5–44)
HCT VFR BLD CALC: 32.3 %
HGB BLD-MCNC: 11.5 G/DL (ref 11.5–15.5)
HGB BLDA-MCNC: 10.5 G/DL (ref 14–18)
HOLD SPECIMEN: NORMAL
HOLD SPECIMEN: NORMAL
HOROWITZ INDEX BLD+IHG-RTO: 80 %
HYPOCHROMIA BLD QL: NORMAL
IMM GRANULOCYTES # BLD: 0.06 10*3/MM3 (ref 0–0.03)
IMM GRANULOCYTES NFR BLD: 0.8 % (ref 0–0.6)
LYMPHOCYTES # BLD AUTO: 1.02 10*3/MM3 (ref 0.6–4.8)
LYMPHOCYTES NFR BLD AUTO: 13.1 % (ref 24–44)
MCH RBC QN AUTO: 29.2 PG (ref 27–31)
MCHC RBC AUTO-ENTMCNC: 28.8 G/DL (ref 32–36)
MCV RBC AUTO: 101.5 FL (ref 80–99)
METHGB BLD QL: 1.3 % (ref 0–1.5)
MODALITY: ABNORMAL
MONOCYTES # BLD AUTO: 0.45 10*3/MM3 (ref 0–1)
MONOCYTES NFR BLD AUTO: 5.8 % (ref 0–12)
NEUTROPHILS # BLD AUTO: 6.16 10*3/MM3 (ref 1.5–8.3)
NEUTROPHILS NFR BLD AUTO: 79.3 % (ref 41–71)
OXYHGB MFR BLDV: 95.5 % (ref 94–99)
PCO2 BLDA: 72.6 MM HG (ref 35–45)
PH BLDA: 7.31 PH UNITS (ref 7.35–7.45)
PLAT MORPH BLD: NORMAL
PLATELET # BLD AUTO: 218 10*3/MM3 (ref 150–450)
PMV BLD AUTO: 11.4 FL (ref 6–12)
PO2 BLDA: 122 MM HG (ref 83–108)
POTASSIUM BLD-SCNC: 4.5 MMOL/L (ref 3.5–5.5)
PROCALCITONIN SERPL-MCNC: <0.05 NG/ML
PROT SERPL-MCNC: 6.6 G/DL (ref 5.7–8.2)
RBC # BLD AUTO: 3.94 10*6/MM3 (ref 3.89–5.14)
SODIUM BLD-SCNC: 136 MMOL/L (ref 132–146)
STOMATOCYTES BLD QL SMEAR: NORMAL
TROPONIN I SERPL-MCNC: 0 NG/ML (ref 0–0.07)
TROPONIN I SERPL-MCNC: 0.01 NG/ML (ref 0–0.07)
WBC MORPH BLD: NORMAL
WBC NRBC COR # BLD: 7.77 10*3/MM3 (ref 3.5–10.8)
WHOLE BLOOD HOLD SPECIMEN: NORMAL
WHOLE BLOOD HOLD SPECIMEN: NORMAL

## 2018-08-16 PROCEDURE — 0 IOPAMIDOL PER 1 ML: Performed by: INTERNAL MEDICINE

## 2018-08-16 PROCEDURE — 71045 X-RAY EXAM CHEST 1 VIEW: CPT

## 2018-08-16 PROCEDURE — 94640 AIRWAY INHALATION TREATMENT: CPT

## 2018-08-16 PROCEDURE — 99291 CRITICAL CARE FIRST HOUR: CPT | Performed by: INTERNAL MEDICINE

## 2018-08-16 PROCEDURE — 94799 UNLISTED PULMONARY SVC/PX: CPT

## 2018-08-16 PROCEDURE — 94660 CPAP INITIATION&MGMT: CPT

## 2018-08-16 PROCEDURE — 85379 FIBRIN DEGRADATION QUANT: CPT | Performed by: EMERGENCY MEDICINE

## 2018-08-16 PROCEDURE — 71275 CT ANGIOGRAPHY CHEST: CPT

## 2018-08-16 PROCEDURE — 84145 PROCALCITONIN (PCT): CPT | Performed by: EMERGENCY MEDICINE

## 2018-08-16 PROCEDURE — 63710000001 INSULIN DETEMIR PER 5 UNITS: Performed by: INTERNAL MEDICINE

## 2018-08-16 PROCEDURE — 25010000002 METHYLPREDNISOLONE PER 40 MG: Performed by: EMERGENCY MEDICINE

## 2018-08-16 PROCEDURE — 25010000002 ENOXAPARIN PER 10 MG: Performed by: EMERGENCY MEDICINE

## 2018-08-16 PROCEDURE — 25010000002 LORAZEPAM PER 2 MG: Performed by: EMERGENCY MEDICINE

## 2018-08-16 PROCEDURE — 25010000002 FUROSEMIDE PER 20 MG: Performed by: EMERGENCY MEDICINE

## 2018-08-16 PROCEDURE — 82962 GLUCOSE BLOOD TEST: CPT

## 2018-08-16 PROCEDURE — 5A09357 ASSISTANCE WITH RESPIRATORY VENTILATION, LESS THAN 24 CONSECUTIVE HOURS, CONTINUOUS POSITIVE AIRWAY PRESSURE: ICD-10-PCS | Performed by: INTERNAL MEDICINE

## 2018-08-16 PROCEDURE — 36600 WITHDRAWAL OF ARTERIAL BLOOD: CPT | Performed by: EMERGENCY MEDICINE

## 2018-08-16 PROCEDURE — 63710000001 INSULIN REGULAR HUMAN PER 5 UNITS: Performed by: INTERNAL MEDICINE

## 2018-08-16 PROCEDURE — 25810000003 SODIUM CHLORIDE 0.9 % WITH KCL 20 MEQ 20-0.9 MEQ/L-% SOLUTION: Performed by: INTERNAL MEDICINE

## 2018-08-16 PROCEDURE — 25010000002 CEFTRIAXONE PER 250 MG: Performed by: INTERNAL MEDICINE

## 2018-08-16 PROCEDURE — 80053 COMPREHEN METABOLIC PANEL: CPT | Performed by: EMERGENCY MEDICINE

## 2018-08-16 PROCEDURE — 84484 ASSAY OF TROPONIN QUANT: CPT

## 2018-08-16 PROCEDURE — 25010000002 AZITHROMYCIN: Performed by: EMERGENCY MEDICINE

## 2018-08-16 PROCEDURE — 83605 ASSAY OF LACTIC ACID: CPT | Performed by: EMERGENCY MEDICINE

## 2018-08-16 PROCEDURE — 83880 ASSAY OF NATRIURETIC PEPTIDE: CPT | Performed by: EMERGENCY MEDICINE

## 2018-08-16 PROCEDURE — 85007 BL SMEAR W/DIFF WBC COUNT: CPT | Performed by: EMERGENCY MEDICINE

## 2018-08-16 PROCEDURE — 82805 BLOOD GASES W/O2 SATURATION: CPT | Performed by: EMERGENCY MEDICINE

## 2018-08-16 PROCEDURE — 93005 ELECTROCARDIOGRAM TRACING: CPT

## 2018-08-16 PROCEDURE — 85025 COMPLETE CBC W/AUTO DIFF WBC: CPT | Performed by: EMERGENCY MEDICINE

## 2018-08-16 PROCEDURE — 93005 ELECTROCARDIOGRAM TRACING: CPT | Performed by: EMERGENCY MEDICINE

## 2018-08-16 PROCEDURE — 99285 EMERGENCY DEPT VISIT HI MDM: CPT

## 2018-08-16 RX ORDER — IPRATROPIUM BROMIDE AND ALBUTEROL SULFATE 2.5; .5 MG/3ML; MG/3ML
3 SOLUTION RESPIRATORY (INHALATION)
Status: DISCONTINUED | OUTPATIENT
Start: 2018-08-17 | End: 2018-08-16 | Stop reason: SDUPTHER

## 2018-08-16 RX ORDER — PANTOPRAZOLE SODIUM 40 MG/1
40 TABLET, DELAYED RELEASE ORAL
Status: DISCONTINUED | OUTPATIENT
Start: 2018-08-17 | End: 2018-08-17

## 2018-08-16 RX ORDER — FUROSEMIDE 10 MG/ML
40 INJECTION INTRAMUSCULAR; INTRAVENOUS ONCE
Status: COMPLETED | OUTPATIENT
Start: 2018-08-16 | End: 2018-08-16

## 2018-08-16 RX ORDER — CEFTRIAXONE SODIUM 1 G/50ML
1 INJECTION, SOLUTION INTRAVENOUS
Status: DISCONTINUED | OUTPATIENT
Start: 2018-08-16 | End: 2018-08-22 | Stop reason: HOSPADM

## 2018-08-16 RX ORDER — SODIUM CHLORIDE AND POTASSIUM CHLORIDE 150; 900 MG/100ML; MG/100ML
10 INJECTION, SOLUTION INTRAVENOUS CONTINUOUS
Status: DISCONTINUED | OUTPATIENT
Start: 2018-08-16 | End: 2018-08-22 | Stop reason: HOSPADM

## 2018-08-16 RX ORDER — MAGNESIUM SULFATE HEPTAHYDRATE 40 MG/ML
4 INJECTION, SOLUTION INTRAVENOUS AS NEEDED
Status: DISCONTINUED | OUTPATIENT
Start: 2018-08-16 | End: 2018-08-22 | Stop reason: HOSPADM

## 2018-08-16 RX ORDER — DEXTROSE MONOHYDRATE 25 G/50ML
25 INJECTION, SOLUTION INTRAVENOUS
Status: DISCONTINUED | OUTPATIENT
Start: 2018-08-16 | End: 2018-08-22 | Stop reason: HOSPADM

## 2018-08-16 RX ORDER — FUROSEMIDE 10 MG/ML
40 INJECTION INTRAMUSCULAR; INTRAVENOUS EVERY 12 HOURS
Status: COMPLETED | OUTPATIENT
Start: 2018-08-17 | End: 2018-08-19

## 2018-08-16 RX ORDER — NICOTINE POLACRILEX 4 MG
15 LOZENGE BUCCAL
Status: DISCONTINUED | OUTPATIENT
Start: 2018-08-16 | End: 2018-08-22 | Stop reason: HOSPADM

## 2018-08-16 RX ORDER — HYDRALAZINE HYDROCHLORIDE 20 MG/ML
10 INJECTION INTRAMUSCULAR; INTRAVENOUS EVERY 6 HOURS PRN
Status: DISCONTINUED | OUTPATIENT
Start: 2018-08-16 | End: 2018-08-22 | Stop reason: HOSPADM

## 2018-08-16 RX ORDER — METHYLPREDNISOLONE SODIUM SUCCINATE 40 MG/ML
80 INJECTION, POWDER, LYOPHILIZED, FOR SOLUTION INTRAMUSCULAR; INTRAVENOUS ONCE
Status: COMPLETED | OUTPATIENT
Start: 2018-08-16 | End: 2018-08-16

## 2018-08-16 RX ORDER — IPRATROPIUM BROMIDE AND ALBUTEROL SULFATE 2.5; .5 MG/3ML; MG/3ML
3 SOLUTION RESPIRATORY (INHALATION)
Status: DISCONTINUED | OUTPATIENT
Start: 2018-08-16 | End: 2018-08-22 | Stop reason: HOSPADM

## 2018-08-16 RX ORDER — ACETAMINOPHEN 325 MG/1
650 TABLET ORAL EVERY 4 HOURS PRN
Status: DISCONTINUED | OUTPATIENT
Start: 2018-08-16 | End: 2018-08-17

## 2018-08-16 RX ORDER — POTASSIUM CHLORIDE 750 MG/1
40 CAPSULE, EXTENDED RELEASE ORAL AS NEEDED
Status: DISCONTINUED | OUTPATIENT
Start: 2018-08-16 | End: 2018-08-22 | Stop reason: HOSPADM

## 2018-08-16 RX ORDER — PREDNISONE 20 MG/1
40 TABLET ORAL
Status: DISCONTINUED | OUTPATIENT
Start: 2018-08-17 | End: 2018-08-18

## 2018-08-16 RX ORDER — SODIUM CHLORIDE 0.9 % (FLUSH) 0.9 %
10 SYRINGE (ML) INJECTION AS NEEDED
Status: DISCONTINUED | OUTPATIENT
Start: 2018-08-16 | End: 2018-08-22 | Stop reason: HOSPADM

## 2018-08-16 RX ORDER — LORAZEPAM 2 MG/ML
1 INJECTION INTRAMUSCULAR ONCE
Status: COMPLETED | OUTPATIENT
Start: 2018-08-16 | End: 2018-08-16

## 2018-08-16 RX ORDER — ONDANSETRON 2 MG/ML
4 INJECTION INTRAMUSCULAR; INTRAVENOUS EVERY 6 HOURS PRN
Status: DISCONTINUED | OUTPATIENT
Start: 2018-08-16 | End: 2018-08-22 | Stop reason: HOSPADM

## 2018-08-16 RX ORDER — ACETAMINOPHEN 650 MG/1
650 SUPPOSITORY RECTAL EVERY 4 HOURS PRN
Status: DISCONTINUED | OUTPATIENT
Start: 2018-08-16 | End: 2018-08-22 | Stop reason: HOSPADM

## 2018-08-16 RX ORDER — ACETAMINOPHEN 325 MG/1
650 TABLET ORAL EVERY 4 HOURS PRN
Status: DISCONTINUED | OUTPATIENT
Start: 2018-08-16 | End: 2018-08-22 | Stop reason: HOSPADM

## 2018-08-16 RX ORDER — MAGNESIUM SULFATE HEPTAHYDRATE 40 MG/ML
2 INJECTION, SOLUTION INTRAVENOUS AS NEEDED
Status: DISCONTINUED | OUTPATIENT
Start: 2018-08-16 | End: 2018-08-22 | Stop reason: HOSPADM

## 2018-08-16 RX ORDER — SENNA AND DOCUSATE SODIUM 50; 8.6 MG/1; MG/1
2 TABLET, FILM COATED ORAL NIGHTLY
Status: DISCONTINUED | OUTPATIENT
Start: 2018-08-16 | End: 2018-08-22 | Stop reason: HOSPADM

## 2018-08-16 RX ORDER — SODIUM CHLORIDE 0.9 % (FLUSH) 0.9 %
1-10 SYRINGE (ML) INJECTION AS NEEDED
Status: DISCONTINUED | OUTPATIENT
Start: 2018-08-16 | End: 2018-08-22 | Stop reason: HOSPADM

## 2018-08-16 RX ORDER — POTASSIUM CHLORIDE 1.5 G/1.77G
40 POWDER, FOR SOLUTION ORAL AS NEEDED
Status: DISCONTINUED | OUTPATIENT
Start: 2018-08-16 | End: 2018-08-17

## 2018-08-16 RX ADMIN — CEFTRIAXONE SODIUM 1 G: 1 INJECTION, SOLUTION INTRAVENOUS at 20:35

## 2018-08-16 RX ADMIN — IPRATROPIUM BROMIDE AND ALBUTEROL SULFATE 3 ML: 2.5; .5 SOLUTION RESPIRATORY (INHALATION) at 21:32

## 2018-08-16 RX ADMIN — AZITHROMYCIN MONOHYDRATE 500 MG: 500 INJECTION, POWDER, LYOPHILIZED, FOR SOLUTION INTRAVENOUS at 18:26

## 2018-08-16 RX ADMIN — ENOXAPARIN SODIUM 100 MG: 100 INJECTION SUBCUTANEOUS at 18:24

## 2018-08-16 RX ADMIN — POTASSIUM CHLORIDE AND SODIUM CHLORIDE 100 ML/HR: 900; 150 INJECTION, SOLUTION INTRAVENOUS at 20:08

## 2018-08-16 RX ADMIN — IPRATROPIUM BROMIDE AND ALBUTEROL SULFATE 3 ML: 2.5; .5 SOLUTION RESPIRATORY (INHALATION) at 16:09

## 2018-08-16 RX ADMIN — INSULIN DETEMIR 15 UNITS: 100 INJECTION, SOLUTION SUBCUTANEOUS at 21:06

## 2018-08-16 RX ADMIN — METHYLPREDNISOLONE SODIUM SUCCINATE 80 MG: 40 INJECTION, POWDER, FOR SOLUTION INTRAMUSCULAR; INTRAVENOUS at 18:22

## 2018-08-16 RX ADMIN — FUROSEMIDE 40 MG: 10 INJECTION, SOLUTION INTRAMUSCULAR; INTRAVENOUS at 17:18

## 2018-08-16 RX ADMIN — LORAZEPAM 1 MG: 2 INJECTION INTRAMUSCULAR; INTRAVENOUS at 18:36

## 2018-08-16 RX ADMIN — INSULIN HUMAN 4 UNITS: 100 INJECTION, SOLUTION PARENTERAL at 23:58

## 2018-08-16 RX ADMIN — IOPAMIDOL 95 ML: 755 INJECTION, SOLUTION INTRAVENOUS at 18:46

## 2018-08-16 RX ADMIN — DORNASE ALFA 2.5 MG: 1 SOLUTION RESPIRATORY (INHALATION) at 21:33

## 2018-08-16 NOTE — TELEPHONE ENCOUNTER
Please call, if she is going that low, rec ER eval to see if needs to go back in hospital. Especially if increased shortness of breath. bds

## 2018-08-16 NOTE — TELEPHONE ENCOUNTER
----- Message from Zamzam Chaney sent at 8/16/2018 11:16 AM EDT -----  Contact: [facility]-GAURAV HER PHYSICAL THERAPIST    CALLING TO INFORM ABOUT HER OXYGEN STATS    TODAY  68 %, SHE TURNED HER UP TO 4 LITERS OF OXYGEN AND NOW IT IS  84 % AND IT DROPS TO THE LOW  SEVENTIES  WHEN SHE TALKS    [phone]

## 2018-08-16 NOTE — ED PROVIDER NOTES
Subjective   Janet Pisano is a 73 y.o.female with a history of CAD and COPD who presents to the emergency department with complaints of shortness of breath. The patient was recently discharged from [facility] on 7/29 for COPD with acute exacerbation. Her son tells us that [place] apparently wanted to admit her for a GI bleed in July but she refused at the time. Today her home health nurse noted that after walking to the bathroom her oxygen saturation had dropped to 69% despite being on 4L of oxygen per nasal cannula. She tried to sit down and rest but the highest saturation recorded prior to arrival was only 82%. Her son tells us that she has been on continuous oxygen since January of 2016 although she initially started at 2L before increasing flow first to 3.5L and then to 4L. She has a walker which she uses to ambulate but this has been a struggle the last few months according to her son. She is now mobile using a wheelchair and requires assistance with transfer. She is still able to lie flat at night and does not have to prop herself up to sleep. The patient complains of rhinorrhea and a frequent cough. There are no other acute complaints at this time.        History provided by:  Patient and relative  Shortness of Breath   Severity:  Severe  Onset quality:  Unable to specify  Duration:  1 day  Timing:  Constant  Progression:  Unchanged  Chronicity:  New  Relieved by:  Nothing  Worsened by:  Nothing  Ineffective treatments:  Oxygen  Associated symptoms: cough        Review of Systems   HENT: Positive for rhinorrhea.    Respiratory: Positive for cough and shortness of breath.    All other systems reviewed and are negative.      Past Medical History:   Diagnosis Date   • Arthritis    • Bilateral carpal tunnel syndrome 2/24/2017   • Bradycardia 8/22/2016   • COPD exacerbation (CMS/[facility])    • Coronary artery disease 9/7/2016   • Depression    • Diabetes mellitus (CMS/[facility])    • Diverticulosis  8/22/2016   • Dyslipidemia 9/7/2016   • H/O esophageal ulcer    • History of myocardial infarction 9/7/2016    Questionable history of myocardial infarction: Remote cardiac catheterization at [facility] in [place], incomplete database.  Reported stress test 2-3 years ago, negative per patient report, incomplete database.    • History of rheumatic fever 9/7/2016   • Hypertension    • Hypothyroidism    • Migraine 8/22/2016   • Rheumatic fever    • Ventral hernia 9/7/2016   • Vitamin D deficiency 6/20/2016       Allergies   Allergen Reactions   • Aliskiren    • Amlodipine    • Crestor [Rosuvastatin Calcium]    • Lisinopril    • Metformin And Related    • Penicillins    • Sitagliptin    • Statins Confusion   • Valsartan        Past Surgical History:   Procedure Laterality Date   • CARDIAC CATHETERIZATION     • CARPAL TUNNEL RELEASE     • CATARACT EXTRACTION, BILATERAL     • COLONOSCOPY     • ESOPHAGUS SURGERY      hole repair   • HERNIA REPAIR      ventral   • TUBAL ABDOMINAL LIGATION  1982       Family History   Problem Relation Age of Onset   • Diabetes Mother    • Liver disease Mother    • Cancer Mother    • Obesity Mother    • Coronary artery disease Father    • Alcohol abuse Father        Social History     Social History   • Marital status:      Social History Main Topics   • Smoking status: Former Smoker     Quit date: 12/20/2015   • Smokeless tobacco: Never Used   • Alcohol use No   • Drug use: No   • Sexual activity: Defer     Other Topics Concern   • Not on file         Objective   Physical Exam   Constitutional: She is oriented to person, place, and time. She appears well-developed and well-nourished. No distress.   Alert and oriented. Moderately increased work of breathing. She was able to speak in full sentences on 100% closed face mask.   HENT:   Head: Normocephalic and atraumatic.   Eyes: Conjunctivae are normal. No scleral icterus.   Neck: Normal range of motion. Neck supple.    Cardiovascular: Normal rate and regular rhythm.  Exam reveals distant heart sounds.    No murmur heard.  Regular but distant. No murmurs, rubs, gallops, or heaves.   Pulmonary/Chest: Accessory muscle usage present. She has decreased breath sounds. She has rales in the right lower field and the left lower field.   Decreased breath sounds. Crackles in the bases.   Abdominal: Soft. Bowel sounds are normal. There is no tenderness. There is no rebound and no guarding.   Abdomen is obese.   Musculoskeletal: She exhibits edema.   Upper extremities are normal. Lower extremities have mild to moderate edema from the toes to the knees bilaterally. Mo venous cords or cellulitis.   Neurological: She is alert and oriented to person, place, and time.   Modest generalized weakness.   Skin: Skin is warm and dry. No erythema.   Psychiatric: She has a normal mood and affect. Her behavior is normal.   Nursing note and vitals reviewed.      Critical Care  Performed by: SEAMUS DURHAM  Authorized by: SEAMUS DURHAM     Critical care provider statement:     Critical care time (minutes):  60    Critical care was necessary to treat or prevent imminent or life-threatening deterioration of the following conditions:  Respiratory failure    Critical care was time spent personally by me on the following activities:  Obtaining history from patient or surrogate, evaluation of patient's response to treatment, discussions with consultants, development of treatment plan with patient or surrogate, ordering and performing treatments and interventions, ordering and review of laboratory studies, ordering and review of radiographic studies, pulse oximetry, re-evaluation of patient's condition and review of old charts             ED Course  ED Course as of Aug 16 2105   Thu Aug 16, 2018   1529 Medical records reviewed by Dr. Durham. She was seen at [facility]. Discharged on Levaquin. BMP was over 1000. She was diagnosed with COPD  exacerbation.   [AS]   1752 Dr. Durham paged on call for ICU admission.   [AS]   1805 Dr. Durham discussed the case in detail with Dr. Barreto who will admit the patient to the ICU.    [AS]      ED Course User Index  [AS] Flora Mccullough     Recent Results (from the past 24 hour(s))   Comprehensive Metabolic Panel    Collection Time: 08/16/18  3:22 PM   Result Value Ref Range    Glucose 222 (H) 70 - 100 mg/dL    BUN 21 9 - 23 mg/dL    Creatinine 0.90 0.60 - 1.30 mg/dL    Sodium 136 132 - 146 mmol/L    Potassium 4.5 3.5 - 5.5 mmol/L    Chloride 97 (L) 99 - 109 mmol/L    CO2 35.0 (H) 20.0 - 31.0 mmol/L    Calcium 8.7 8.7 - 10.4 mg/dL    Total Protein 6.6 5.7 - 8.2 g/dL    Albumin 4.00 3.20 - 4.80 g/dL    ALT (SGPT) 29 7 - 40 U/L    AST (SGOT) 26 0 - 33 U/L    Alkaline Phosphatase 63 25 - 100 U/L    Total Bilirubin 0.8 0.3 - 1.2 mg/dL    eGFR Non African Amer 61 >60 mL/min/1.73    Globulin 2.6 gm/dL    A/G Ratio 1.5 1.5 - 2.5 g/dL    BUN/Creatinine Ratio 23.3 7.0 - 25.0    Anion Gap 4.0 3.0 - 11.0 mmol/L   BNP    Collection Time: 08/16/18  3:22 PM   Result Value Ref Range    .0 (H) 0.0 - 100.0 pg/mL   Light Blue Top    Collection Time: 08/16/18  3:22 PM   Result Value Ref Range    Extra Tube hold for add-on    Green Top (Gel)    Collection Time: 08/16/18  3:22 PM   Result Value Ref Range    Extra Tube Hold for add-ons.    Lavender Top    Collection Time: 08/16/18  3:22 PM   Result Value Ref Range    Extra Tube hold for add-on    Gold Top - SST    Collection Time: 08/16/18  3:22 PM   Result Value Ref Range    Extra Tube Hold for add-ons.    CBC Auto Differential    Collection Time: 08/16/18  3:22 PM   Result Value Ref Range    WBC 7.77 3.50 - 10.80 10*3/mm3    RBC 3.94 3.89 - 5.14 10*6/mm3    Hemoglobin 11.5 11.5 - 15.5 g/dL    Hematocrit 40.0 34.5 - 44.0 %    .5 (H) 80.0 - 99.0 fL    MCH 29.2 27.0 - 31.0 pg    MCHC 28.8 (L) 32.0 - 36.0 g/dL    RDW 14.8 (H) 11.3 - 14.5 %    RDW-SD 54.4 (H) 37.0  - 54.0 fl    MPV 11.4 6.0 - 12.0 fL    Platelets 218 150 - 450 10*3/mm3    Neutrophil % 79.3 (H) 41.0 - 71.0 %    Lymphocyte % 13.1 (L) 24.0 - 44.0 %    Monocyte % 5.8 0.0 - 12.0 %    Eosinophil % 1.5 0.0 - 3.0 %    Basophil % 0.3 0.0 - 1.0 %    Immature Grans % 0.8 (H) 0.0 - 0.6 %    Neutrophils, Absolute 6.16 1.50 - 8.30 10*3/mm3    Lymphocytes, Absolute 1.02 0.60 - 4.80 10*3/mm3    Monocytes, Absolute 0.45 0.00 - 1.00 10*3/mm3    Eosinophils, Absolute 0.12 0.00 - 0.30 10*3/mm3    Basophils, Absolute 0.02 0.00 - 0.20 10*3/mm3    Immature Grans, Absolute 0.06 (H) 0.00 - 0.03 10*3/mm3   Lactic Acid, Plasma    Collection Time: 08/16/18  3:22 PM   Result Value Ref Range    Lactate 1.2 0.5 - 2.0 mmol/L   D-dimer, Quantitative    Collection Time: 08/16/18  3:22 PM   Result Value Ref Range    D-Dimer, Quantitative 1.67 (H) 0.00 - 0.50 mg/L (FEU)   Procalcitonin    Collection Time: 08/16/18  3:22 PM   Result Value Ref Range    Procalcitonin <0.05 <=0.25 ng/mL   Scan Slide    Collection Time: 08/16/18  3:22 PM   Result Value Ref Range    Hypochromia Slight/1+ None Seen    Stomatocytes Slight/1+ None Seen    WBC Morphology Normal Normal    Platelet Morphology Normal Normal   POC Troponin, Rapid    Collection Time: 08/16/18  3:28 PM   Result Value Ref Range    Troponin I 0.01 0.00 - 0.07 ng/mL   Blood Gas, Arterial    Collection Time: 08/16/18  4:03 PM   Result Value Ref Range    Site Arterial: right radial     Marquise's Test Positive     pH, Arterial 7.306 (L) 7.350 - 7.450 pH units    pCO2, Arterial 72.6 (C) 35.0 - 45.0 mm Hg    pO2, Arterial 122.0 (H) 83.0 - 108.0 mm Hg    HCO3, Arterial 36.2 (H) 20.0 - 26.0 mmol/L    Base Excess, Arterial 7.9 (H) 0.0 - 2.0 mmol/L    Hemoglobin, Blood Gas 10.5 (L) 14 - 18 g/dL    Hematocrit, Blood Gas 32.3 %    Oxyhemoglobin 95.5 94 - 99 %    Methemoglobin 1.30 0.00 - 1.50 %    Carboxyhemoglobin 1.9 0 - 2 %    CO2 Content 38.4 (H) 22 - 33    Barometric Pressure for Blood Gas  mmHg     Modality Mask - Nonbreather     FIO2 80 %   POC Troponin, Rapid    Collection Time: 08/16/18  5:26 PM   Result Value Ref Range    Troponin I 0.00 0.00 - 0.07 ng/mL   POC Glucose Once    Collection Time: 08/16/18  8:34 PM   Result Value Ref Range    Glucose 172 (H) 70 - 130 mg/dL     Note: In addition to lab results from this visit, the labs listed above may include labs taken at another facility or during a different encounter within the last 24 hours. Please correlate lab times with ED admission and discharge times for further clarification of the services performed during this visit.    CT Angiogram Chest With Contrast   Final Result   1. No evidence for pulmonary embolism.   2. Small right, trace left pleural effusion with adjacent atelectasis.   Interstitial and alveolar edema.       DICTATED:   8/16/2018   EDITED/ls :   8/16/2018        This report was finalized on 8/16/2018 6:56 PM by Albino Chua.          XR Chest 1 View   Final Result   Pulmonary edema and effusions.       D:  08/16/2018   E:  08/16/2018       This report was finalized on 8/16/2018 5:41 PM by Albino Chua.            Vitals:    08/16/18 1730 08/16/18 1819 08/16/18 1957 08/16/18 2000   BP: 149/68 133/63 140/78 144/81   BP Location:   Left arm Left arm   Patient Position:   Lying Lying   Pulse: 69 64 65 63   Resp: 22 22 20 17   Temp:   97.8 °F (36.6 °C)    TempSrc:   Axillary    SpO2: 95% 97% 97% 98%   Weight:       Height:         Medications   sodium chloride 0.9 % flush 10 mL (not administered)   ipratropium-albuterol (DUO-NEB) nebulizer solution 3 mL (3 mL Nebulization Given 8/16/18 1609)   dextrose (GLUTOSE) oral gel 15 g (not administered)   dextrose (D50W) solution 25 g (not administered)   glucagon (human recombinant) (GLUCAGEN DIAGNOSTIC) injection 1 mg (not administered)   sodium chloride 0.9 % flush 1-10 mL (not administered)   sodium chloride 0.9 % with KCl 20 mEq/L infusion (100 mL/hr Intravenous New Bag 8/16/18 2008)    AZITHROMYCIN 500 MG/250 ML 0.9% NS IVPB (MBP) (not administered)   acetaminophen (TYLENOL) tablet 650 mg (not administered)     Or   acetaminophen (TYLENOL) suppository 650 mg (not administered)   acetaminophen (TYLENOL) tablet 650 mg (not administered)   insulin detemir (LEVEMIR) injection 15 Units (not administered)   insulin regular (humuLIN R,novoLIN R) injection 0-9 Units (not administered)   predniSONE (DELTASONE) tablet 40 mg (not administered)   sennosides-docusate sodium (SENOKOT-S) 8.6-50 MG tablet 2 tablet (not administered)   ondansetron (ZOFRAN) injection 4 mg (not administered)   dornase alpha (PULMOZYME) nebulizer solution 2.5 mg (not administered)   furosemide (LASIX) injection 40 mg (not administered)   pantoprazole (PROTONIX) EC tablet 40 mg (not administered)   cefTRIAXone (ROCEPHIN) IVPB 1 g (1 g Intravenous New Bag 8/16/18 2035)   enoxaparin (LOVENOX) syringe 40 mg (not administered)   potassium chloride (MICRO-K) CR capsule 40 mEq (not administered)   potassium chloride (KLOR-CON) packet 40 mEq (not administered)   Magnesium Sulfate 2 gram Bolus, followed by 8 gram infusion (total Mg dose 10 grams)- Mg less than or equal to 1mg/dL (not administered)     Or   Magnesium Sulfate 2 gram / 50mL Infusion (GIVE X 3 BAGS TO EQUAL 6GM TOTAL DOSE) - Mg 1.1 - 1.5 mg/dl (not administered)     Or   Magnesium Sulfate 4 gram infusion- Mg 1.6-1.9 mg/dL (not administered)   pneumococcal polysaccharide 23-valent (PNEUMOVAX-23) vaccine 0.5 mL (not administered)   furosemide (LASIX) injection 40 mg (40 mg Intravenous Given 8/16/18 1718)   methylPREDNISolone sodium succinate (SOLU-Medrol) injection 80 mg (80 mg Intravenous Given 8/16/18 1822)   AZITHROMYCIN 500 MG/250 ML 0.9% NS IVPB (MBP) (500 mg Intravenous Restarted 8/16/18 1901)   enoxaparin (LOVENOX) syringe 100 mg (100 mg Subcutaneous Given 8/16/18 1824)   LORazepam (ATIVAN) injection 1 mg (1 mg Intravenous Given 8/16/18 1836)   iopamidol (ISOVUE-370) 76 %  injection 100 mL (95 mL Intravenous Given 8/16/18 3459)     ECG/EMG Results (last 24 hours)     Procedure Component Value Units Date/Time    ECG 12 Lead [[id number]] Collected:  08/16/18 1511     Updated:  08/16/18 1542    Narrative:       Test Reason : soa  Blood Pressure : **/** mmHG  Vent. Rate : 062 BPM     Atrial Rate : 062 BPM     P-R Int : 152 ms          QRS Dur : 082 ms      QT Int : 414 ms       P-R-T Axes : -01 035 -71 degrees     QTc Int : 420 ms    Sinus rhythm with premature supraventricular complexes  T wave abnormality, consider inferior ischemia  Abnormal ECG  No previous ECGs available  Confirmed by SEAMUS ROBLES MD ([id number]) on 8/16/2018 3:42:26 PM    Referred By:  florian may           Confirmed By:SEAMUS ROBLES MD                       MDM  Number of Diagnoses or Management Options  Acute on chronic congestive heart failure, unspecified congestive heart failure type (CMS/HCC):   Acute on chronic respiratory failure with hypoxia and hypercapnia (CMS/HCC):   COPD with acute exacerbation (CMS/HCC):   Pleural effusion:   Positive D dimer:   Pulmonary hypertension:   Diagnosis management comments:     I reviewed all available studies the bedside with the patient.  Also her son.  This is a lady who has a complex history with what sounds like COPD as well as pulmonary artery hypertension and obesity.  She presents with respiratory failure that appears acute on chronic in both hypercapnic and hyper hypoxic.    She's been intolerant of CPAP and so we'll try some Ativan to see if that makes her better.  She is currently on her percent closed face mask.    Given an elevated d-dimer elected proceed with CTA.  I've just reviewed those results have the patient admitted she has what looks like pulmonary edema right pleural effusion but no pulmonary embolus.    I suspect her respiratory failure is multifactorial.  At this point I've started patient on IV steroids IV antibiotics dose of Lovenox is pending the final CTA  result and IV Lasix.    Recheck she is breathing a bit easier.  She did have to get Ativan as well for her CTA she has quite a bit of claustrophobia.    I spoke Dr. Reyes, on-call critical care medicine, and she'll admit the patient.    All are agreeable with the plan       Amount and/or Complexity of Data Reviewed  Clinical lab tests: reviewed  Tests in the radiology section of CPT®: reviewed  Tests in the medicine section of CPT®: reviewed  Decide to obtain previous medical records or to obtain history from someone other than the patient: yes        Final diagnoses:   Acute on chronic respiratory failure with hypoxia and hypercapnia (CMS/HCC)   Pleural effusion   Acute on chronic congestive heart failure, unspecified congestive heart failure type (CMS/HCC)   Positive D dimer   COPD with acute exacerbation (CMS/HCC)   Pulmonary hypertension       Documentation assistance provided by star Mccullough.  Information recorded by the scribe was done at my direction and has been verified and validated by me.     Flora Mccullough  08/16/18 [id number]       Foreign Durham MD  08/16/18 3289

## 2018-08-16 NOTE — TELEPHONE ENCOUNTER
SPOKE WITH GAURAV AND INFORMED HER OF THIS AND GAURAV STATES OT IS ON THE WAY OUT TO VISIT PT AND SHE WILL LET THEM KNOW ALSO AND LET PT TO

## 2018-08-16 NOTE — H&P
"    ICU ADMISSION NOTE    Chief complaint acute on chronic respiratory failure, COPD, diastolic heart failure, poorly controlled diabetes    Subjective     Patient is a 73 y.o. female with chronic obstructive airways disease, chronic respiratory failure, hypertension, diabetes mellitus, dyslipidemia, coronary artery disease, iron deficiency anemia hospitalized with worsening hypoxia and dyspnea.  Is normally hospitalized in [place]. She was there in April and again July 29 through August 2 with a COPD exacerbation, right lower lobe pneumonia, hypoxic respiratory failure. Records from [place] indicate her blood gas revealed pH 7.40, CO2 51, O2 58 she was discharged on Levaquin, steroid taper. She wears oxygen 3 L nasal cannula at home. She uses Breo Elliptca 100-25 1 puff daily, Incruse 1 puff daily and albuterol rescue inhaler or nebulizer as needed. She previously smoked 1 pack per day for 57 years quitting in December 2015. Today her home health nurse noted that her oxygen saturation was 69% when she got up to the bathroom. They increased her to 4 L nasal cannula and she remained in the mid 80s and therefore was brought to the emergency room. She has a productive cough of whitish sputum. She denies fever but has had subjective chills. She has noted increasing edema. Approximately 5 or 6 years ago in [place] she had a \"silent heart attack\". Heart catheterization revealed some disease in her right coronary artery but no intervention was done. Records are not available this is all verbal history from her son. He currently denies chest pain but admits that she will occasionally get chest pain. It is not necessarily exertional. In our emergency room blood gas revealed a pH of 7.30, CO2 of 72, O2 of 122 on a nonrebreather mask. BiPAP was initially attempted but she was restless and Pulling off the mask. She received 1 mg of Ativan. White blood cell count was normal but BNP was elevated and she received 1 dose " of furosemide. She also received azithromycin. CTA of the chest was done and negative for pulmonary embolus but does reveal small effusions and alveolar edema.    Review of Systems  Review of Systems   Constitutional: Positive for activity change, chills and fatigue. Negative for fever.   HENT: Negative for congestion and sore throat.    Eyes: Negative for visual disturbance.   Respiratory: Positive for cough, shortness of breath and wheezing.    Cardiovascular: Positive for leg swelling. Negative for chest pain and palpitations.   Gastrointestinal: Negative for abdominal pain.   Endocrine: Negative.    Genitourinary: Negative.    Musculoskeletal: Positive for arthralgias.   Neurological: Positive for numbness.   Hematological: Negative.    Psychiatric/Behavioral: Positive for dysphoric mood. The patient is nervous/anxious.         Home Medications    (Not in a hospital admission)  Breo 100-25, 1 puff daily  Incruse 1 puff daily  Albuterol inhaler, nebulizer as needed  Gabapentin 100 mg 3 times daily  Synthroid 175 µg daily  Pravachol 40 mg daily  trujeo  Vitamin D3 daily  Niferex 150 mg twice daily  Folic acid B6 daily  Bisoprolol 2.5 mg daily    History  Past Medical History:   Diagnosis Date   • Arthritis    • Bilateral carpal tunnel syndrome 2/24/2017   • Bradycardia 8/22/2016   • COPD exacerbation (CMS/HCC)    • Coronary artery disease 9/7/2016   • Depression    • Diabetes mellitus (CMS/HCC)    • Diverticulosis 8/22/2016   • Dyslipidemia 9/7/2016   • H/O esophageal ulcer    • History of myocardial infarction 9/7/2016    Questionable history of myocardial infarction: Remote cardiac catheterization at [facility] in [place], incomplete database.  Reported stress test 2-3 years ago, negative per patient report, incomplete database.    • History of rheumatic fever 9/7/2016   • Hypertension    • Hypothyroidism    • Migraine 8/22/2016   • Rheumatic fever    • Ventral hernia 9/7/2016   • Vitamin D  "deficiency 6/20/2016     Past Surgical History:   Procedure Laterality Date   • CARDIAC CATHETERIZATION     • CARPAL TUNNEL RELEASE     • CATARACT EXTRACTION, BILATERAL     • COLONOSCOPY     • ESOPHAGUS SURGERY      hole repair   • HERNIA REPAIR      ventral   • TUBAL ABDOMINAL LIGATION  1982     Family History   Problem Relation Age of Onset   • Diabetes Mother    • Liver disease Mother    • Cancer Mother    • Obesity Mother    • Coronary artery disease Father    • Alcohol abuse Father      Social History   Substance Use Topics   • Smoking status: Former Smoker     Quit date: 12/20/2015   • Smokeless tobacco: Never Used   • Alcohol use No       (Not in a hospital admission)  Allergies:  Aliskiren; Amlodipine; Crestor [rosuvastatin calcium]; Lisinopril; Metformin and related; Penicillins; Sitagliptin; Statins; and Valsartan      Objective     Vital Signs  Blood pressure 133/63, pulse 64, temperature 97.6 °F (36.4 °C), temperature source Axillary, resp. rate 22, height 154.9 cm (61\"), weight 97.1 kg (214 lb), SpO2 97 %.    Physical Exam:  General Appearance:  Morbidly obese older woman wearing fullface BiPAP, no accessory muscle use   Head:  Normocephalic, atraumatic   Eyes:          No jaundice, conjunctiva pink   Ears:     Throat: Unable to assess   Neck: Trachea midline, upright in bed without JVD or palpable thyroid   Back:      Lungs:   Breath sounds are generally decreased with prolonged expiration, no wheeze anteriorly, diminished at the right base slightly    Heart:  Distant heart sounds, regular, no appreciable murmur   Abdomen:   Protuberant, bowel sounds present, nontender   Rectal:     Deferred   Extremities:    Nailbeds pink, no clubbing, no calf tenderness, wearing bilateral wrist splints   Pulses:   Radial pulses present   Skin: Warm and dry   Lymph nodes:    Neurologic:   Drowsy post Ativan, will arouse and answer questions then drifts back to sleep       Results Review:   Lab Results (last 24 hours)  "    Procedure Component Value Units Date/Time    POC Troponin, Rapid [[id number]]  (Normal) Collected:  08/16/18 1726    Specimen:  Blood Updated:  08/16/18 1745     Troponin I 0.00 ng/mL      Comment: Serial Number: [id number]:  [id number]       Procalcitonin [[id number]]  (Normal) Collected:  08/16/18 1522    Specimen:  Blood Updated:  08/16/18 1656     Procalcitonin <0.05 ng/mL     Narrative:       As a Marker for Sepsis (Non-Neonates):   1. <0.5 ng/mL represents a low risk of severe sepsis and/or septic shock.  2. >2 ng/mL represents a high risk of severe sepsis and/or septic shock.    As a Marker for Lower Respiratory Tract Infections that require antibiotic therapy:    PCT on Admission     Antibiotic Therapy       6-12 Hrs later  > 0.5                Strongly Recommended             >0.25 - <0.5         Recommended  0.1 - 0.25           Discouraged              Remeasure/reassess PCT  <0.1                 Strongly Discouraged     Remeasure/reassess PCT                     PCT values of < 0.5 ng/mL do not exclude an infection, because localized infections (without systemic signs) may be associated with such low concentrations, or a systemic infection in its initial stages (< 6 hours). Furthermore, increased PCT can occur without infection. PCT concentrations between 0.5 and 2.0 ng/mL should be interpreted taking into account the patient's history. It is recommended to retest PCT within 6-24 hours if any concentrations < 2 ng/mL are obtained.    [facility] Draw [[id number]] Collected:  08/16/18 1522    Specimen:  Blood Updated:  08/16/18 1630    Narrative:       The following orders were created for panel order [facility] Draw.  Procedure                               Abnormality         Status                     ---------                               -----------         ------                     Light Blue Top[[id number]]                                   Final result               Green Top (Gel)[[id number]]                                   Final result               Lavender Top[[id number]]                                     Final result               Gold Top - SST[[id number]]                                   Final result               Green Top (No Gel)[[id number]]                                                            Please view results for these tests on the individual orders.    Light Blue Top [[id number]] Collected:  08/16/18 1522    Specimen:  Blood Updated:  08/16/18 1630     Extra Tube hold for add-on     Comment: Auto resulted       Green Top (Gel) [[id number]] Collected:  08/16/18 1522    Specimen:  Blood Updated:  08/16/18 1630     Extra Tube Hold for add-ons.     Comment: Auto resulted.       Lavender Top [[id number]] Collected:  08/16/18 1522    Specimen:  Blood Updated:  08/16/18 1630     Extra Tube hold for add-on     Comment: Auto resulted       Gold Top - SST [[id number]] Collected:  08/16/18 1522    Specimen:  Blood Updated:  08/16/18 1630     Extra Tube Hold for add-ons.     Comment: Auto resulted.       CBC & Differential [[id number]] Collected:  08/16/18 1522    Specimen:  Blood Updated:  08/16/18 1613    Narrative:       The following orders were created for panel order CBC & Differential.  Procedure                               Abnormality         Status                     ---------                               -----------         ------                     Scan Slide[[id number]]                                       Final result               CBC Auto Differential[[id number]]        Abnormal            Final result                 Please view results for these tests on the individual orders.    CBC Auto Differential [[id number]]  (Abnormal) Collected:  08/16/18 1522    Specimen:  Blood Updated:  08/16/18 1613     WBC 7.77 10*3/mm3      RBC 3.94 10*6/mm3      Hemoglobin 11.5 g/dL      Hematocrit 40.0 %      .5 (H) fL      MCH 29.2 pg      MCHC 28.8 (L) g/dL      RDW 14.8 (H) %      RDW-SD 54.4 (H)  fl      MPV 11.4 fL      Platelets 218 10*3/mm3      Neutrophil % 79.3 (H) %      Lymphocyte % 13.1 (L) %      Monocyte % 5.8 %      Eosinophil % 1.5 %      Basophil % 0.3 %      Immature Grans % 0.8 (H) %      Neutrophils, Absolute 6.16 10*3/mm3      Lymphocytes, Absolute 1.02 10*3/mm3      Monocytes, Absolute 0.45 10*3/mm3      Eosinophils, Absolute 0.12 10*3/mm3      Basophils, Absolute 0.02 10*3/mm3      Immature Grans, Absolute 0.06 (H) 10*3/mm3     Scan Slide [[id number]] Collected:  08/16/18 1522    Specimen:  Blood Updated:  08/16/18 1613     Hypochromia Slight/1+     Stomatocytes Slight/1+     WBC Morphology Normal     Platelet Morphology Normal    Blood Gas, Arterial [[id number]]  (Abnormal) Collected:  08/16/18 1603    Specimen:  Arterial Blood Updated:  08/16/18 1607     Site Arterial: right radial     Marquise's Test Positive     pH, Arterial 7.306 (L) pH units      pCO2, Arterial 72.6 (C) mm Hg      pO2, Arterial 122.0 (H) mm Hg      HCO3, Arterial 36.2 (H) mmol/L      Base Excess, Arterial 7.9 (H) mmol/L      Hemoglobin, Blood Gas 10.5 (L) g/dL      Hematocrit, Blood Gas 32.3 %      Oxyhemoglobin 95.5 %      Methemoglobin 1.30 %      Carboxyhemoglobin 1.9 %      CO2 Content 38.4 (H)     Barometric Pressure for Blood Gas -- mmHg      Comment: N/A        Modality Mask - Nonbreather     FIO2 80 %     BNP [[id number]]  (Abnormal) Collected:  08/16/18 1522    Specimen:  Blood Updated:  08/16/18 1605     .0 (H) pg/mL      Comment: Results may be falsely decreased if patient taking Biotin.       D-dimer, Quantitative [[id number]]  (Abnormal) Collected:  08/16/18 1522    Specimen:  Blood Updated:  08/16/18 1604     D-Dimer, Quantitative 1.67 (H) mg/L (FEU)     Narrative:       Negative predictive value for exclusion of venous thromboembolism: < or = 0.5 mg/L (FEU)    Comprehensive Metabolic Panel [[id number]]  (Abnormal) Collected:  08/16/18 1522    Specimen:  Blood Updated:  08/16/18 1554     Glucose 222 (H)  mg/dL      BUN 21 mg/dL      Creatinine 0.90 mg/dL      Sodium 136 mmol/L      Potassium 4.5 mmol/L      Chloride 97 (L) mmol/L      CO2 35.0 (H) mmol/L      Calcium 8.7 mg/dL      Total Protein 6.6 g/dL      Albumin 4.00 g/dL      ALT (SGPT) 29 U/L      AST (SGOT) 26 U/L      Alkaline Phosphatase 63 U/L      Total Bilirubin 0.8 mg/dL      eGFR Non African Amer 61 mL/min/1.73      Globulin 2.6 gm/dL      A/G Ratio 1.5 g/dL      BUN/Creatinine Ratio 23.3     Anion Gap 4.0 mmol/L     Narrative:       [employer] Guidelines    Stage     Description        GFR  1         Normal or High     90+  2         Mild decrease      60-89  3         Moderate decrease  30-59  4         Severe decrease    15-29  5         Kidney failure     <15    Lactic Acid, Plasma [[id number]]  (Normal) Collected:  08/16/18 1522    Specimen:  Blood Updated:  08/16/18 1547     Lactate 1.2 mmol/L      Comment: Falsely depressed results may occur on samples drawn from patients receiving N-Acetylcysteine (NAC) or Metamizole.       POC Troponin, Rapid [[id number]]  (Normal) Collected:  08/16/18 1528    Specimen:  Blood Updated:  08/16/18 1545     Troponin I 0.01 ng/mL      Comment: Serial Number: [id number]:  [id number]           Imaging Results (last 24 hours)     Procedure Component Value Units Date/Time    CT Angiogram Chest With Contrast [[id number]] Collected:  08/16/18 1855     Updated:  08/16/18 1900    Narrative:       EXAMINATION: CT ANGIOGRAM CHEST W/CONTRAST - 8/16/2018      INDICATION: J96.21-Acute and chronic respiratory failure with hypoxia;  J96.22-Acute and chronic respiratory failure with hypercapnia;  L21-Iefohpe effusion, not elsewhere classified; I50.9-Heart failure,  unspecified; R79.89-Other specified abnormal findings of blood  chemistry; J44.1-Chronic obstructive pulmonary disease with (acute)  exacerbation; I27.20-Pulmonary hypertension, unspecified.     TECHNIQUE:  Axial CT data of the chest were obtained  helically per PE  protocol following IV contrast administration.  Multiplanar reformatted  images and 2D reconstructions (MIPs) were generated and reviewed.   Computer-aided detection was utilized in the interpretation. The  radiation dose reduction device was turned on for each scan per the  ALARA (As Low as Reasonably Achievable) protocol.     COMPARISONS:  None.     FINDINGS:  No acute pulmonary embolism is seen. Central airways are  patent without endobronchial lesion or debris.  Heart size is within  normal limits. Coronary artery disease noted. No suspicious lymph node.  Small bilateral pleural effusions and adjacent atelectasis. There is  interstitial septal thickening and groundglass opacity compatible with  edema. No pneumothorax. Chest wall soft tissues are normal.  Incidental  imaging of the upper abdomen is remarkable for a small left adrenal  myelolipoma. No aggressive bone lesion.       Impression:       1. No evidence for pulmonary embolism.  2. Small right, trace left pleural effusion with adjacent atelectasis.  Interstitial and alveolar edema.     DICTATED:   8/16/2018  EDITED/ls :   8/16/2018      This report was finalized on 8/16/2018 6:56 PM by Albino Chua.       XR Chest 1 View [[id number]] Collected:  08/16/18 1525     Updated:  08/16/18 1744    Narrative:       EXAMINATION: XR CHEST 1 VW- 08/16/2018     INDICATION: SOA triage protocol     TECHNIQUE:  Single view frontal chest.     COMPARISONS: 04/12/2016     FINDINGS:  Cardiomegaly, pulmonary edema and bilateral effusions. No  pneumothorax.       Impression:       Pulmonary edema and effusions.     D:  08/16/2018  E:  08/16/2018     This report was finalized on 8/16/2018 5:41 PM by Albino Chua.              PROBLEM LIST  Patient Active Problem List   Diagnosis   • Anxiety   • Benign essential hypertension   • Chronic obstructive pulmonary disease (CMS/HCC)   • Uncontrolled type 2 diabetes mellitus (CMS/HCC)   • Gastroesophageal reflux disease  "  • Hypothyroidism   • Insomnia   • Left carotid artery stenosis   • History of myocardial infarction   • Dyslipidemia   • History of rheumatic fever   • Coronary artery disease   • Iron deficiency anemia   • Morbid obesity with BMI of 40.0-44.9, adult (CMS/[facility])   • Acute on chronic respiratory failure with hypoxia and hypercapnia (CMS/[facility])   • Acute on chronic diastolic congestive heart failure (CMS/[facility])       Assessment/Plan     #1 acute on chronic mixed respiratory failure, she has a 57-pack-year history of tobacco abuse with obstructive lung disease. She sees a pulmonary physician in [place]. His office notes are available but there is no pulmonary function test to review. Is her third hospitalization for respiratory insufficiency this year. She does not have evidence of pneumonia currently. She reports white sputum and her white blood cell count is normal. CTA of the chest is negative for pulmonary embolus but said this is for some congestive heart failure. Echocardiogram done in [place] in April revealed some pulmonary hypertension but no diastolic dysfunction or systolic dysfunction. At that time her PCO2 was 51 and her PO2 58. Now she presents with increasing respiratory distress and a PCO2 of 72 only partially compensated. Clinically, I am not sure that this is not infection. I wonder if she does have some diastolic dysfunction, chronic hypoxemia or sleep apnea with increasing edema resulting in worsening oxygenation. I must say that her lower extremity edema is present but it is not severe. Blood pressure in the emergency room was 130/60. I think she also is restricted from her morbid obesity. Her BMI is around 40. A component of her risk for insufficiency could be obesity hypoventilation syndrome. He has never had a sleep study. She reports a \"silent heart attack\". She had a heart catheterization in [place] 5 or 6 years ago that revealed some disease in her right coronary artery according to " her son. No intervention was done. She carries a diagnosis of GERD and a history of esophageal ulcer according to her chart. However her son does not recall her having problems with reflux and cannot recall her ever having any esophageal surgery. Certainly reflux with microaspiration can cause recurrent respiratory infections and bronchospasm and a former smoker.She did require intubation or mechanical ventilation in 2015    #2  Possible diastolic heart failure. Although her echocardiogram from [place] did not reveal diastolic dysfunction I am concerned that she is developing recurrent heart failure and that this is mimicking her COPD exacerbations. Currently she really does not have purulent sputum or fever and her white blood cell count is normal. She does not have pneumonia on x-ray but does have evidence of pulmonary edema and small effusions. She also has a history of rheumatic fever. I do not appreciate a murmur but her chest wall is thick and her heart sounds are diminished. Her BNP is mildly elevated at 650. Renal function is normal. Her EKG does not show any ST elevation and some nonspecific T-wave changes laterally.    #3  Hypothyroidism on replacement therapy    #4  Diabetes mellitus, poorly controlled, complicated by neuropathy. A1c at her [place] admission July 29 was 10.5.     Rocephin, azithromycin  Oral steroids  Diuresis  Electrolyte replacement as needed  BiPAP  Echocardiogram  Long-acting and sliding scale insulin   nebulized bronchodilators and mucolytic's  Protonix  Consider upper GI    I discussed the patients findings and my recommendations with patient and son    Marianna Barreto MD  08/16/18  7:32 PM    Time: 65min    This note was produced with a voice recognition program and may have uncorrected errors.

## 2018-08-17 ENCOUNTER — APPOINTMENT (OUTPATIENT)
Dept: CARDIOLOGY | Facility: HOSPITAL | Age: 73
End: 2018-08-17
Attending: INTERNAL MEDICINE

## 2018-08-17 PROBLEM — E66.2 OBESITY HYPOVENTILATION SYNDROME (HCC): Status: ACTIVE | Noted: 2018-08-17

## 2018-08-17 LAB
ANION GAP SERPL CALCULATED.3IONS-SCNC: 7 MMOL/L (ref 3–11)
ARTERIAL PATENCY WRIST A: ABNORMAL
ATMOSPHERIC PRESS: ABNORMAL MMHG
BASE EXCESS BLDA CALC-SCNC: 7.6 MMOL/L (ref 0–2)
BDY SITE: ABNORMAL
BH CV ECHO MEAS - AO MAX PG (FULL): 14.8 MMHG
BH CV ECHO MEAS - AO MAX PG: 20 MMHG
BH CV ECHO MEAS - AO MEAN PG (FULL): 7.3 MMHG
BH CV ECHO MEAS - AO MEAN PG: 10.1 MMHG
BH CV ECHO MEAS - AO ROOT AREA (BSA CORRECTED): 1.5
BH CV ECHO MEAS - AO ROOT AREA: 6.8 CM^2
BH CV ECHO MEAS - AO ROOT DIAM: 2.9 CM
BH CV ECHO MEAS - AO V2 MAX: 222.7 CM/SEC
BH CV ECHO MEAS - AO V2 MEAN: 146.8 CM/SEC
BH CV ECHO MEAS - AO V2 VTI: 50.2 CM
BH CV ECHO MEAS - AVA(I,A): 1.3 CM^2
BH CV ECHO MEAS - AVA(I,D): 1.3 CM^2
BH CV ECHO MEAS - AVA(V,A): 1.3 CM^2
BH CV ECHO MEAS - AVA(V,D): 1.3 CM^2
BH CV ECHO MEAS - BSA(HAYCOCK): 2.1 M^2
BH CV ECHO MEAS - BSA: 1.9 M^2
BH CV ECHO MEAS - BZI_BMI: 40.6 KILOGRAMS/M^2
BH CV ECHO MEAS - BZI_METRIC_HEIGHT: 154.9 CM
BH CV ECHO MEAS - BZI_METRIC_WEIGHT: 97.5 KG
BH CV ECHO MEAS - EDV(CUBED): 159.9 ML
BH CV ECHO MEAS - EDV(MOD-SP2): 62 ML
BH CV ECHO MEAS - EDV(MOD-SP4): 80 ML
BH CV ECHO MEAS - EDV(TEICH): 143 ML
BH CV ECHO MEAS - EF(CUBED): 80.1 %
BH CV ECHO MEAS - EF(MOD-SP2): 83.9 %
BH CV ECHO MEAS - EF(MOD-SP4): 81.3 %
BH CV ECHO MEAS - EF(TEICH): 72 %
BH CV ECHO MEAS - ESV(CUBED): 31.9 ML
BH CV ECHO MEAS - ESV(MOD-SP2): 10 ML
BH CV ECHO MEAS - ESV(MOD-SP4): 15 ML
BH CV ECHO MEAS - ESV(TEICH): 40 ML
BH CV ECHO MEAS - FS: 41.6 %
BH CV ECHO MEAS - IVS/LVPW: 0.96
BH CV ECHO MEAS - IVSD: 1 CM
BH CV ECHO MEAS - LA DIMENSION: 4 CM
BH CV ECHO MEAS - LA/AO: 1.4
BH CV ECHO MEAS - LAT PEAK E' VEL: 10.4 CM/SEC
BH CV ECHO MEAS - LV DIASTOLIC VOL/BSA (35-75): 41.1 ML/M^2
BH CV ECHO MEAS - LV MASS(C)D: 221.5 GRAMS
BH CV ECHO MEAS - LV MASS(C)DI: 113.7 GRAMS/M^2
BH CV ECHO MEAS - LV MAX PG: 5.2 MMHG
BH CV ECHO MEAS - LV MEAN PG: 2.8 MMHG
BH CV ECHO MEAS - LV SYSTOLIC VOL/BSA (12-30): 7.7 ML/M^2
BH CV ECHO MEAS - LV V1 MAX: 114 CM/SEC
BH CV ECHO MEAS - LV V1 MEAN: 77.5 CM/SEC
BH CV ECHO MEAS - LV V1 VTI: 26.6 CM
BH CV ECHO MEAS - LVIDD: 5.4 CM
BH CV ECHO MEAS - LVIDS: 3.2 CM
BH CV ECHO MEAS - LVLD AP2: 6.4 CM
BH CV ECHO MEAS - LVLD AP4: 6.5 CM
BH CV ECHO MEAS - LVLS AP2: 5.1 CM
BH CV ECHO MEAS - LVLS AP4: 6.1 CM
BH CV ECHO MEAS - LVOT AREA (M): 2.5 CM^2
BH CV ECHO MEAS - LVOT AREA: 2.5 CM^2
BH CV ECHO MEAS - LVOT DIAM: 1.8 CM
BH CV ECHO MEAS - LVPWD: 1.1 CM
BH CV ECHO MEAS - MED PEAK E' VEL: 3.82 CM/SEC
BH CV ECHO MEAS - MV A MAX VEL: 61.2 CM/SEC
BH CV ECHO MEAS - MV DEC TIME: 0.17 SEC
BH CV ECHO MEAS - MV E MAX VEL: 125.4 CM/SEC
BH CV ECHO MEAS - MV E/A: 2
BH CV ECHO MEAS - PA ACC SLOPE: 787.8 CM/SEC^2
BH CV ECHO MEAS - PA ACC TIME: 0.11 SEC
BH CV ECHO MEAS - PA PR(ACCEL): 30.3 MMHG
BH CV ECHO MEAS - PULM DIAS VEL: 72.3 CM/SEC
BH CV ECHO MEAS - PULM S/D: 0.5
BH CV ECHO MEAS - PULM SYS VEL: 36.3 CM/SEC
BH CV ECHO MEAS - RAP SYSTOLE: 15 MMHG
BH CV ECHO MEAS - RVDD: 3.8 CM
BH CV ECHO MEAS - RVSP: 64 MMHG
BH CV ECHO MEAS - SI(AO): 174 ML/M^2
BH CV ECHO MEAS - SI(CUBED): 65.7 ML/M^2
BH CV ECHO MEAS - SI(LVOT): 34.3 ML/M^2
BH CV ECHO MEAS - SI(MOD-SP2): 26.7 ML/M^2
BH CV ECHO MEAS - SI(MOD-SP4): 33.4 ML/M^2
BH CV ECHO MEAS - SI(TEICH): 52.8 ML/M^2
BH CV ECHO MEAS - SV(AO): 339 ML
BH CV ECHO MEAS - SV(CUBED): 128 ML
BH CV ECHO MEAS - SV(LVOT): 66.8 ML
BH CV ECHO MEAS - SV(MOD-SP2): 52 ML
BH CV ECHO MEAS - SV(MOD-SP4): 65 ML
BH CV ECHO MEAS - SV(TEICH): 102.9 ML
BH CV ECHO MEAS - TAPSE (>1.6): 2 CM2
BH CV ECHO MEAS - TR MAX PG: 49
BH CV ECHO MEAS - TR MAX VEL: 344.5 CM/SEC
BH CV ECHO MEAS - TV MAX PG: 45.7 MMHG
BH CV ECHO MEAS - TV V2 MAX: 338 CM/SEC
BH CV ECHO MEASUREMENTS AVERAGE E/E' RATIO: 17.64
BH CV VAS BP LEFT ARM: NORMAL MMHG
BH CV XLRA - RV BASE: 3.8 CM
BH CV XLRA - RV LENGTH: 7.2 CM
BH CV XLRA - RV MID: 2.9 CM
BH CV XLRA - TDI S': 13.5 CM/SEC
BUN BLD-MCNC: 21 MG/DL (ref 9–23)
BUN/CREAT SERPL: 24.4 (ref 7–25)
CALCIUM SPEC-SCNC: 8.2 MG/DL (ref 8.7–10.4)
CHLORIDE SERPL-SCNC: 98 MMOL/L (ref 99–109)
CK SERPL-CCNC: 48 U/L (ref 26–174)
CO2 BLDA-SCNC: 37.9 MMOL/L (ref 22–33)
CO2 SERPL-SCNC: 35 MMOL/L (ref 20–31)
COHGB MFR BLD: 1.1 % (ref 0–2)
CREAT BLD-MCNC: 0.86 MG/DL (ref 0.6–1.3)
DEPRECATED RDW RBC AUTO: 53.3 FL (ref 37–54)
ERYTHROCYTE [DISTWIDTH] IN BLOOD BY AUTOMATED COUNT: 14.7 % (ref 11.3–14.5)
GFR SERPL CREATININE-BSD FRML MDRD: 65 ML/MIN/1.73
GLUCOSE BLD-MCNC: 223 MG/DL (ref 70–100)
GLUCOSE BLDC GLUCOMTR-MCNC: 207 MG/DL (ref 70–130)
GLUCOSE BLDC GLUCOMTR-MCNC: 259 MG/DL (ref 70–130)
GLUCOSE BLDC GLUCOMTR-MCNC: 340 MG/DL (ref 70–130)
GLUCOSE BLDC GLUCOMTR-MCNC: 343 MG/DL (ref 70–130)
HBA1C MFR BLD: 9.4 % (ref 4.8–5.6)
HCO3 BLDA-SCNC: 35.7 MMOL/L (ref 20–26)
HCT VFR BLD AUTO: 37.9 % (ref 34.5–44)
HCT VFR BLD CALC: 35.2 %
HGB BLD-MCNC: 11.4 G/DL (ref 11.5–15.5)
HGB BLDA-MCNC: 11.5 G/DL (ref 14–18)
HOROWITZ INDEX BLD+IHG-RTO: 35 %
IRON 24H UR-MRATE: 29 MCG/DL (ref 50–175)
IRON SATN MFR SERPL: 8 % (ref 15–50)
LEFT ATRIUM VOLUME INDEX: 29.7 ML/M2
MAGNESIUM SERPL-MCNC: 1.6 MG/DL (ref 1.3–2.7)
MCH RBC QN AUTO: 30.3 PG (ref 27–31)
MCHC RBC AUTO-ENTMCNC: 30.1 G/DL (ref 32–36)
MCV RBC AUTO: 100.8 FL (ref 80–99)
METHGB BLD QL: 1 % (ref 0–1.5)
MODALITY: ABNORMAL
OXYHGB MFR BLDV: 94.8 % (ref 94–99)
PCO2 BLDA: 69.1 MM HG (ref 35–45)
PH BLDA: 7.32 PH UNITS (ref 7.35–7.45)
PLATELET # BLD AUTO: 193 10*3/MM3 (ref 150–450)
PMV BLD AUTO: 11.6 FL (ref 6–12)
PO2 BLDA: 103 MM HG (ref 83–108)
POTASSIUM BLD-SCNC: 4.4 MMOL/L (ref 3.5–5.5)
RBC # BLD AUTO: 3.76 10*6/MM3 (ref 3.89–5.14)
SAO2 % BLDCOA: 94.8 %
SODIUM BLD-SCNC: 140 MMOL/L (ref 132–146)
T4 FREE SERPL-MCNC: 1.35 NG/DL (ref 0.89–1.76)
TIBC SERPL-MCNC: 361 MCG/DL (ref 250–450)
WBC NRBC COR # BLD: 7.3 10*3/MM3 (ref 3.5–10.8)

## 2018-08-17 PROCEDURE — 82550 ASSAY OF CK (CPK): CPT | Performed by: INTERNAL MEDICINE

## 2018-08-17 PROCEDURE — 85027 COMPLETE CBC AUTOMATED: CPT | Performed by: INTERNAL MEDICINE

## 2018-08-17 PROCEDURE — 84439 ASSAY OF FREE THYROXINE: CPT | Performed by: INTERNAL MEDICINE

## 2018-08-17 PROCEDURE — 25010000002 FUROSEMIDE PER 20 MG: Performed by: INTERNAL MEDICINE

## 2018-08-17 PROCEDURE — 94799 UNLISTED PULMONARY SVC/PX: CPT

## 2018-08-17 PROCEDURE — 93306 TTE W/DOPPLER COMPLETE: CPT

## 2018-08-17 PROCEDURE — 25010000002 CEFTRIAXONE PER 250 MG: Performed by: INTERNAL MEDICINE

## 2018-08-17 PROCEDURE — 83735 ASSAY OF MAGNESIUM: CPT | Performed by: INTERNAL MEDICINE

## 2018-08-17 PROCEDURE — 94760 N-INVAS EAR/PLS OXIMETRY 1: CPT

## 2018-08-17 PROCEDURE — 82962 GLUCOSE BLOOD TEST: CPT

## 2018-08-17 PROCEDURE — 83036 HEMOGLOBIN GLYCOSYLATED A1C: CPT | Performed by: INTERNAL MEDICINE

## 2018-08-17 PROCEDURE — 92610 EVALUATE SWALLOWING FUNCTION: CPT

## 2018-08-17 PROCEDURE — 83540 ASSAY OF IRON: CPT | Performed by: INTERNAL MEDICINE

## 2018-08-17 PROCEDURE — 82805 BLOOD GASES W/O2 SATURATION: CPT | Performed by: INTERNAL MEDICINE

## 2018-08-17 PROCEDURE — 25010000002 ENOXAPARIN PER 10 MG: Performed by: INTERNAL MEDICINE

## 2018-08-17 PROCEDURE — 63710000001 PREDNISONE PER 1 MG: Performed by: INTERNAL MEDICINE

## 2018-08-17 PROCEDURE — 25010000002 AZITHROMYCIN: Performed by: INTERNAL MEDICINE

## 2018-08-17 PROCEDURE — 25810000003 SODIUM CHLORIDE 0.9 % WITH KCL 20 MEQ 20-0.9 MEQ/L-% SOLUTION: Performed by: INTERNAL MEDICINE

## 2018-08-17 PROCEDURE — 94640 AIRWAY INHALATION TREATMENT: CPT

## 2018-08-17 PROCEDURE — 36600 WITHDRAWAL OF ARTERIAL BLOOD: CPT | Performed by: INTERNAL MEDICINE

## 2018-08-17 PROCEDURE — 94660 CPAP INITIATION&MGMT: CPT

## 2018-08-17 PROCEDURE — G0108 DIAB MANAGE TRN  PER INDIV: HCPCS | Performed by: REGISTERED NURSE

## 2018-08-17 PROCEDURE — 25010000002 LORAZEPAM PER 2 MG: Performed by: NURSE PRACTITIONER

## 2018-08-17 PROCEDURE — 83550 IRON BINDING TEST: CPT | Performed by: INTERNAL MEDICINE

## 2018-08-17 PROCEDURE — 99233 SBSQ HOSP IP/OBS HIGH 50: CPT | Performed by: INTERNAL MEDICINE

## 2018-08-17 PROCEDURE — 80048 BASIC METABOLIC PNL TOTAL CA: CPT | Performed by: INTERNAL MEDICINE

## 2018-08-17 PROCEDURE — 93306 TTE W/DOPPLER COMPLETE: CPT | Performed by: INTERNAL MEDICINE

## 2018-08-17 PROCEDURE — 63710000001 INSULIN DETEMIR PER 5 UNITS: Performed by: INTERNAL MEDICINE

## 2018-08-17 RX ORDER — PANTOPRAZOLE SODIUM 40 MG/10ML
40 INJECTION, POWDER, LYOPHILIZED, FOR SOLUTION INTRAVENOUS ONCE
Status: COMPLETED | OUTPATIENT
Start: 2018-08-17 | End: 2018-08-17

## 2018-08-17 RX ORDER — LORAZEPAM 2 MG/ML
0.5 INJECTION INTRAMUSCULAR ONCE
Status: COMPLETED | OUTPATIENT
Start: 2018-08-17 | End: 2018-08-17

## 2018-08-17 RX ORDER — PANTOPRAZOLE SODIUM 40 MG/1
40 TABLET, DELAYED RELEASE ORAL
Status: DISCONTINUED | OUTPATIENT
Start: 2018-08-18 | End: 2018-08-22 | Stop reason: HOSPADM

## 2018-08-17 RX ORDER — HYDROXYZINE HYDROCHLORIDE 25 MG/1
25 TABLET, FILM COATED ORAL 3 TIMES DAILY PRN
Status: DISCONTINUED | OUTPATIENT
Start: 2018-08-17 | End: 2018-08-22 | Stop reason: HOSPADM

## 2018-08-17 RX ADMIN — IPRATROPIUM BROMIDE AND ALBUTEROL SULFATE 3 ML: 2.5; .5 SOLUTION RESPIRATORY (INHALATION) at 21:02

## 2018-08-17 RX ADMIN — FUROSEMIDE 40 MG: 10 INJECTION, SOLUTION INTRAMUSCULAR; INTRAVENOUS at 05:58

## 2018-08-17 RX ADMIN — IPRATROPIUM BROMIDE AND ALBUTEROL SULFATE 3 ML: 2.5; .5 SOLUTION RESPIRATORY (INHALATION) at 07:24

## 2018-08-17 RX ADMIN — PANTOPRAZOLE SODIUM 40 MG: 40 INJECTION, POWDER, FOR SOLUTION INTRAVENOUS at 05:58

## 2018-08-17 RX ADMIN — CEFTRIAXONE SODIUM 1 G: 1 INJECTION, SOLUTION INTRAVENOUS at 18:46

## 2018-08-17 RX ADMIN — HYDROXYZINE HYDROCHLORIDE 25 MG: 25 TABLET, FILM COATED ORAL at 21:52

## 2018-08-17 RX ADMIN — INSULIN HUMAN 7 UNITS: 100 INJECTION, SOLUTION PARENTERAL at 12:12

## 2018-08-17 RX ADMIN — HYDROXYZINE HYDROCHLORIDE 25 MG: 25 TABLET, FILM COATED ORAL at 18:53

## 2018-08-17 RX ADMIN — ENOXAPARIN SODIUM 40 MG: 40 INJECTION SUBCUTANEOUS at 18:52

## 2018-08-17 RX ADMIN — MAGNESIUM SULFATE HEPTAHYDRATE 4 G: 40 INJECTION, SOLUTION INTRAVENOUS at 12:16

## 2018-08-17 RX ADMIN — INSULIN HUMAN 6 UNITS: 100 INJECTION, SOLUTION PARENTERAL at 17:27

## 2018-08-17 RX ADMIN — INSULIN DETEMIR 15 UNITS: 100 INJECTION, SOLUTION SUBCUTANEOUS at 20:55

## 2018-08-17 RX ADMIN — HYDROXYZINE HYDROCHLORIDE 25 MG: 25 TABLET, FILM COATED ORAL at 16:12

## 2018-08-17 RX ADMIN — PREDNISONE 40 MG: 20 TABLET ORAL at 08:21

## 2018-08-17 RX ADMIN — AZITHROMYCIN MONOHYDRATE 500 MG: 500 INJECTION, POWDER, LYOPHILIZED, FOR SOLUTION INTRAVENOUS at 12:13

## 2018-08-17 RX ADMIN — POTASSIUM CHLORIDE AND SODIUM CHLORIDE 100 ML/HR: 900; 150 INJECTION, SOLUTION INTRAVENOUS at 06:32

## 2018-08-17 RX ADMIN — DORNASE ALFA 2.5 MG: 1 SOLUTION RESPIRATORY (INHALATION) at 07:24

## 2018-08-17 RX ADMIN — INSULIN HUMAN 4 UNITS: 100 INJECTION, SOLUTION PARENTERAL at 05:59

## 2018-08-17 RX ADMIN — FUROSEMIDE 40 MG: 10 INJECTION, SOLUTION INTRAMUSCULAR; INTRAVENOUS at 18:53

## 2018-08-17 RX ADMIN — IPRATROPIUM BROMIDE AND ALBUTEROL SULFATE 3 ML: 2.5; .5 SOLUTION RESPIRATORY (INHALATION) at 16:16

## 2018-08-17 RX ADMIN — LORAZEPAM 0.5 MG: 2 INJECTION INTRAMUSCULAR; INTRAVENOUS at 02:08

## 2018-08-17 NOTE — PLAN OF CARE
Problem: Patient Care Overview  Goal: Plan of Care Review  Outcome: Ongoing (interventions implemented as appropriate)   08/17/18 0584   Coping/Psychosocial   Plan of Care Reviewed With patient   SLP evaluation completed. Dysphagia Eval: Consulted 2' pt c/o difficulty swallowing. Showing no s/s aspiration. Symptoms - sticking of solids, coughing if takes large sip when trying to clear sticking - more consistent with esophageal dysphagia. Not a new problem for patient, nor a significant issue for pt. Does not appear acute intervention needed. Recommend: Regular diet and thin liquids, reflux precautions. Encouraged pt to f/u with PCP to discuss reflux/esophageal dysphagia work-up/intervention. No further SLP needs. Will sign-off. Please see note for further details and recommendations.

## 2018-08-17 NOTE — PLAN OF CARE
Problem: NPPV/CPAP (Adult)  Goal: Signs and Symptoms of Listed Potential Problems Will be Absent, Minimized or Managed (NPPV/CPAP)  Outcome: Ongoing (interventions implemented as appropriate)  No breakdown seenat mask   08/17/18 0518   Goal/Outcome Evaluation   Problems Assessed (NPPV/CPAP) skin breakdown   Problems Present (NPPV/CPAP) none

## 2018-08-17 NOTE — THERAPY EVALUATION
Acute Care - Speech Language Pathology   Swallow Initial Evaluation [facility]   Clinical Swallow Evaluation     Patient Name: Janet Pisano  : 1945  MRN: [id number]  Today's Date: 2018               Admit Date: 2018    Visit Dx:     ICD-10-CM ICD-9-CM   1. Acute on chronic respiratory failure with hypoxia and hypercapnia (CMS/HCC) J96.21 518.84    J96.22 786.09     799.02   2. Pleural effusion J90 511.9   3. Acute on chronic congestive heart failure, unspecified congestive heart failure type (CMS/HCC) I50.9 428.0   4. Positive D dimer R79.89 790.92   5. COPD with acute exacerbation (CMS/[facility]) J44.1 491.21   6. Pulmonary hypertension I27.20 416.8     Patient Active Problem List   Diagnosis   • Anxiety   • Benign essential hypertension   • Chronic obstructive pulmonary disease (CMS/[facility])   • Uncontrolled type 2 diabetes mellitus (CMS/[facility])   • Gastroesophageal reflux disease   • Hypothyroidism   • Insomnia   • Left carotid artery stenosis   • History of myocardial infarction   • Dyslipidemia   • History of rheumatic fever   • Coronary artery disease   • Iron deficiency anemia   • Morbid obesity with BMI of 40.0-44.9, adult (CMS/[facility])   • Acute on chronic respiratory failure with hypoxia and hypercapnia (CMS/[facility])   • Acute on chronic diastolic congestive heart failure (CMS/[facility])   • Obesity hypoventilation syndrome (CMS/[facility])     Past Medical History:   Diagnosis Date   • Arthritis    • Bilateral carpal tunnel syndrome 2017   • Bradycardia 2016   • COPD exacerbation (CMS/[facility])    • Coronary artery disease 2016   • Depression    • Diabetes mellitus (CMS/[facility])    • Diverticulosis 2016   • Dyslipidemia 2016   • H/O esophageal ulcer    • History of myocardial infarction 2016    Questionable history of myocardial infarction: Remote cardiac catheterization at [facility] in [place], incomplete database.  Reported stress test 2-3 years ago, negative per patient report,  incomplete database.    • History of rheumatic fever 9/7/2016   • Hypertension    • Hypothyroidism    • Migraine 8/22/2016   • Rheumatic fever    • Ventral hernia 9/7/2016   • Vitamin D deficiency 6/20/2016     Past Surgical History:   Procedure Laterality Date   • CARDIAC CATHETERIZATION     • CARPAL TUNNEL RELEASE     • CATARACT EXTRACTION, BILATERAL     • COLONOSCOPY     • ESOPHAGUS SURGERY      hole repair   • HERNIA REPAIR      ventral   • TUBAL ABDOMINAL LIGATION  1982          SWALLOW EVALUATION (last 72 hours)      SLP Adult Swallow Evaluation     Row Name 08/17/18 0845                   Rehab Evaluation    Document Type evaluation  -SM        Subjective Information no complaints  -SM        Patient Observations alert;cooperative  -           General Information    Patient Profile Reviewed yes  -SM        Pertinent History Of Current Problem acute on chronic respiratory failure. Consulted 2' pt c/o difficulty/coughing with liquids.   -SM        Current Method of Nutrition regular textures;thin liquids  -SM        Precautions/Limitations, Vision WFL  -SM        Precautions/Limitations, Hearing WFL  -SM        Prior Level of Function-Communication WFL  -SM        Prior Level of Function-Swallowing safe, efficient swallowing in all situations  -        Plans/Goals Discussed with patient;agreed upon  -        Barriers to Rehab none identified  -        Patient's Goals for Discharge patient did not state  -           Pain Assessment    Additional Documentation Pain Scale: Numbers Pre/Post-Treatment (Group)  -SM           Pain Scale: Numbers Pre/Post-Treatment    Pain Scale: Numbers, Pretreatment 0/10 - no pain  -SM        Pain Scale: Numbers, Post-Treatment 0/10 - no pain  -SM           Oral Musculature and Cranial Nerve Assessment    Oral Motor General Assessment WFL  -SM           Clinical Swallow Eval    Oral Prep Phase WFL  -SM        Pharyngeal Phase no overt signs/symptoms of pharyngeal impairment   -SM        Esophageal Phase suspected esophageal impairment  -           Esophageal Phase Concerns    Esophageal Phase Concerns sensation of material sticking  -        Sensation of Material Sticking regular consistencies  -        Esophageal Phase Concerns, Comment Pt c/o sticking and subsequent coughing if takes sip of liquid to assist in clearing  -SM           Clinical Impression    SLP Swallowing Diagnosis other (see comments);esophageal dysfunction   Swallow WFL  -        Swallow Criteria for Skilled Therapeutic Interventions Met no problems identified which require skilled intervention  -           Recommendations    Therapy Frequency (Swallow) evaluation only  -        SLP Diet Recommendation regular textures;thin liquids  -        Recommended Precautions and Strategies upright posture during/after eating;other (see comments)   reflux precautions  -        SLP Rec. for Method of Medication Administration meds whole;with thin liquids;with pudding or applesauce;as tolerated  -          User Key  (r) = Recorded By, (t) = Taken By, (c) = Cosigned By    Initials Name Effective Dates    Zehra Rondon MS CCC-SLP 06/22/15 -         EDUCATION  The patient has been educated in the following areas:   Dysphagia (Swallowing Impairment) Modified Diet Instruction.    SLP Recommendation and Plan  SLP Swallowing Diagnosis: other (see comments), esophageal dysfunction (Swallow WFL)  SLP Diet Recommendation: regular textures, thin liquids  Recommended Precautions and Strategies: upright posture during/after eating, other (see comments) (reflux precautions)           Swallow Criteria for Skilled Therapeutic Interventions Met: no problems identified which require skilled intervention        Therapy Frequency (Swallow): evaluation only          Plan of Care Reviewed With: patient  Plan of Care Review  Plan of Care Reviewed With: patient             Time Calculation:         Time Calculation- SLP      Row Name 08/17/18 0952             Time Calculation- SLP    SLP Start Time 0845  -      SLP Received On 08/17/18  -        User Key  (r) = Recorded By, (t) = Taken By, (c) = Cosigned By    Initials Name Provider Type    Zehra Rondon MS CCC-SLP Speech and Language Pathologist          Therapy Charges for Today     Code Description Service Date Service Provider Modifiers Qty    [id number] HC ST EVAL ORAL PHARYNG SWALLOW 3 8/17/2018 Zehra Casanova MS CCC-SLP GN 1               Zehra Casanova MS CCC-SLP  8/17/2018

## 2018-08-17 NOTE — PROGRESS NOTES
Discharge Planning Assessment  [facility]     Patient Name: Janet Pisano  MRN: [id number]  Today's Date: 8/17/2018    Admit Date: 8/16/2018          Discharge Needs Assessment     Row Name 08/17/18 1136       Living Environment    Lives With alone    Current Living Arrangements home/apartment/condo   Lives in a 1 level apt .in [place] [facility] alone.      Primary Care Provided by self    Provides Primary Care For no one, unable/limited ability to care for self    Family Caregiver if Needed child(federica), adult    Quality of Family Relationships supportive    Able to Return to Prior Arrangements yes       Resource/Environmental Concerns    Resource/Environmental Concerns none    Transportation Concerns car, none       Transition Planning    Patient/Family Anticipates Transition to home with help/services    Transportation Anticipated family or friend will provide       Discharge Needs Assessment    Readmission Within the Last 30 Days no previous admission in last 30 days    Concerns to be Addressed basic needs;adjustment to diagnosis/illness;discharge planning    Equipment Currently Used at Home bath bench;cane, straight;commode;grab bar;oxygen;wheelchair;walker, rolling;glucometer    Anticipated Changes Related to Illness inability to care for self            Discharge Plan     Row Name 08/17/18 113       Plan    Plan Home c     Patient/Family in Agreement with Plan yes    Plan Comments Talked to Ms. Pisano @ .  She lives in a 1 level apt. in [place] alone.  Her son assists her as needed.  She uses a RW and w/c only prn.  She also has grab bars, shower chair, cane and home oxygen.  She states [employer] provide the O2.  She is current pt. c [facility]  agency for skilled nursing and P.T.  They also send an aide out to asssist her with her bath.  She uses [facility] for her Rx.  Her insurance covers most of cost of her medications.  Her PCP is Dr. Jhoan Hdez.  Her goal is to return to her home c  assist from her son and [employer].  GERARD will follow.      Final Discharge Disposition Code 06 - home with home health care        Destination     No service coordination in this encounter.      Durable Medical Equipment     No service coordination in this encounter.      Dialysis/Infusion     No service coordination in this encounter.      Home Medical Care     No service coordination in this encounter.      Social Care     No service coordination in this encounter.                Demographic Summary    No documentation.           Functional Status     Row Name 08/17/18 1135       Functional Status    Usual Activity Tolerance moderate    Current Activity Tolerance fair       Functional Status, IADL    Medications assistive person    Meal Preparation independent    Housekeeping independent    Laundry assistive person    Shopping assistive person       Mental Status Summary    Recent Changes in Mental Status/Cognitive Functioning no changes       Employment/    Employment Status disabled            Psychosocial    No documentation.           Abuse/Neglect    No documentation.           Legal    No documentation.           Substance Abuse    No documentation.           Patient Forms    No documentation.         Aldo Cruz RN

## 2018-08-17 NOTE — PLAN OF CARE
Problem: Patient Care Overview  Goal: Plan of Care Review  Outcome: Ongoing (interventions implemented as appropriate)   08/17/18 8781   Coping/Psychosocial   Plan of Care Reviewed With patient   Plan of Care Review   Progress no change   OTHER   Outcome Summary Pt arrived in 214 from ED at 1957 for a/c resp failure. Pt placed on BiPAP on arrival to room. Pt was drowsy and is disoriented to place and situation at times. Pt began to get agitated, restless, and anxious at 0200. 0.5 mg ativan given. Pt placed back on BiPAP once calm. HiFlow NC in room for PRN use. Awaiting am labs.       Problem: Fall Risk (Adult)  Goal: Identify Related Risk Factors and Signs and Symptoms  Outcome: Outcome(s) achieved Date Met: 08/17/18    Goal: Absence of Fall  Outcome: Ongoing (interventions implemented as appropriate)      Problem: Breathing Pattern Ineffective (Adult)  Goal: Identify Related Risk Factors and Signs and Symptoms  Outcome: Outcome(s) achieved Date Met: 08/17/18    Goal: Effective Oxygenation/Ventilation  Outcome: Ongoing (interventions implemented as appropriate)    Goal: Anxiety/Fear Reduction  Outcome: Ongoing (interventions implemented as appropriate)      Problem: Skin Injury Risk (Adult)  Goal: Identify Related Risk Factors and Signs and Symptoms  Outcome: Outcome(s) achieved Date Met: 08/17/18    Goal: Skin Health and Integrity  Outcome: Ongoing (interventions implemented as appropriate)

## 2018-08-17 NOTE — CONSULTS
Attempted to see patient for diabetes education, however patient was out of room for a procedure.  Will attempt to see patient at a later time.

## 2018-08-17 NOTE — PROGRESS NOTES
Intensive Care Follow-up      LOS: 1 day     Ms. Janet Pisano, 73 y.o. female is followed for: Acute on chronic respiratory failure with hypoxia and hypercapnia (CMS/HCC)     Subjective - Interval History     Awake and alert  Up in a chair  Currently on high flow nasal cannula 60%  Complaining of anxiety    The patient's relevant past medical, surgical and social history were reviewed and updated in Epic as appropriate.     Objective     Infusions:    sodium chloride 0.9 % with KCl 20 mEq 50 mL/hr Last Rate: 100 mL/hr (08/17/18 0632)     Medications:    azithromycin 500 mg Intravenous Q24H   ceftriaxone 1 g Intravenous Q24H   enoxaparin 40 mg Subcutaneous Q24H   furosemide 40 mg Intravenous Q12H   insulin detemir 15 Units Subcutaneous Nightly   insulin regular 0-9 Units Subcutaneous Q6H   ipratropium-albuterol 3 mL Nebulization 4x Daily - RT   [START ON 8/18/2018] pantoprazole 40 mg Oral Q AM   predniSONE 40 mg Oral Daily With Breakfast   sennosides-docusate sodium 2 tablet Oral Nightly     Intake/Output       08/16/18 0700 - 08/17/18 0659    Intake (ml) 1053.5    Output (ml) 650    Net (ml) 403.5    Last Weight  97.6 kg (215 lb 2.7 oz)        Vital Sign Min/Max for last 24 hours  Temp  Min: 97.5 °F (36.4 °C)  Max: 97.8 °F (36.6 °C)   BP  Min: 115/47  Max: 185/71   Pulse  Min: 54  Max: 71   Resp  Min: 14  Max: 24   SpO2  Min: 69 %  Max: 98 %   Flow (L/min)  Min: 15  Max: 45        Physical Exam:   GENERAL: Awake, no distress   HEENT: No adenopathy or thyromegaly   LUNGS: Decreased breath sounds bilaterally with a few basilar crackles   HEART: Regular rate and rhythm with distant heart sounds   ABDOMEN: Obese, soft, nontender   EXTREMITIES: Chronic venous stasis changes and trace pitting lower extremity edema   NEURO/PSYCH: Awake and alert.  Follows commands.  No overt deficit      Results from last 7 days  Lab Units 08/17/18  0422 08/16/18  1522   WBC 10*3/mm3 7.30 7.77   HEMOGLOBIN g/dL 11.4* 11.5   PLATELETS  10*3/mm3 193 218       Results from last 7 days  Lab Units 08/17/18  0422 08/16/18  1522   SODIUM mmol/L 140 136   POTASSIUM mmol/L 4.4 4.5   CO2 mmol/L 35.0* 35.0*   BUN mg/dL 21 21   CREATININE mg/dL 0.86 0.90   GLUCOSE mg/dL 223* 222*     Estimated Creatinine Clearance: 61.3 mL/min (by C-G formula based on SCr of 0.86 mg/dL).      Results from last 7 days  Lab Units 08/17/18  0451   HEMOGLOBIN A1C % 9.40*         Results from last 7 days  Lab Units 08/17/18  0431   PH, ARTERIAL pH units 7.322*   PCO2, ARTERIAL mm Hg 69.1*   PO2 ART mm Hg 103.0     Lab Results   Component Value Date    LACTATE 1.2 08/16/2018          Images: Admission chest x-ray reveals cardiomegaly and changes suggestive of pulmonary edema.  CT scan of the chest revealed no pulmonary emboli or consolidation    I reviewed the patient's results and images.     Impression      Hospital Problem List     * (Principal)Acute on chronic respiratory failure with hypoxia and hypercapnia (CMS/HCC)    Chronic obstructive pulmonary disease (CMS/HCC)    Morbid obesity with BMI of 40.0-44.9, adult (CMS/HCC)    Obesity hypoventilation syndrome (CMS/HCC)    Acute on chronic diastolic congestive heart failure (CMS/HCC)    Benign essential hypertension    Uncontrolled type 2 diabetes mellitus (CMS/HCC)    Gastroesophageal reflux disease    Hypothyroidism    Iron deficiency anemia               Plan        Continue high flow nasal cannula  Continue diuresis  Continue inhaled bronchodilators and empiric antibiotics  At risk for respiratory failure requiring intubation and vent support      Plan of care and goals reviewed with mulitdisciplinary team at daily rounds   I discussed the patient's findings and my recommendations with patient and nursing staff       TANNER Acosta MD  Pulmonary and Critical Care Medicine

## 2018-08-17 NOTE — PROGRESS NOTES
Clinical Nutrition   Reason For Visit: Identified at risk by screening criteria, MST score 2+, Need for education    Patient Name: Janet Pisano  YOB: 1945  MRN: [id number]  Date of Encounter: 08/17/18 10:03 AM  Admission date: 8/16/2018      Nutrition Assessment     Hospital Problem List  Principal Problem:    Acute on chronic respiratory failure with hypoxia and hypercapnia (CMS/HCC)  Active Problems:    Benign essential hypertension    Chronic obstructive pulmonary disease (CMS/HCC)    Uncontrolled type 2 diabetes mellitus (CMS/HCC)    Gastroesophageal reflux disease    Hypothyroidism    Iron deficiency anemia    Morbid obesity with BMI of 40.0-44.9, adult (CMS/HCC)    Acute on chronic diastolic congestive heart failure (CMS/HCC)    Obesity hypoventilation syndrome (CMS/HCC)      PMH: She  has a past medical history of Arthritis; Bilateral carpal tunnel syndrome (2/24/2017); Bradycardia (8/22/2016); COPD exacerbation (CMS/[facility]); Coronary artery disease (9/7/2016); Depression; Diabetes mellitus (CMS/[facility]); Diverticulosis (8/22/2016); Dyslipidemia (9/7/2016); H/O esophageal ulcer; History of myocardial infarction (9/7/2016); History of rheumatic fever (9/7/2016); Hypertension; Hypothyroidism; Migraine (8/22/2016); Rheumatic fever; Ventral hernia (9/7/2016); and Vitamin D deficiency (6/20/2016).   PSxH: She  has a past surgical history that includes Hernia repair; Esophagus surgery; Colonoscopy; Tubal ligation (1982); Cardiac catheterization; Cataract extraction, bilateral; and Carpal tunnel release.       Applicable medical tests/procedures since admission:  SLP bedside (8/17)- rec regular texture with thin liquids       Reported/Observed/Food/Nutrition Related History     Pt reports good appetite, RN reports that pt ate all of breakfast this AM. Pt reports that she is familiar with carbohydrate counting and DM diet. Pt also with questions in regards to carbohydrate counting.       Anthropometrics    Height: 61 in  Weight: 215 lb per bed scale (8/17)  BMI: 40.7  BMI classification: Obese Class III extreme obesity: > or equal to 40kg/m2     Weight change: Pt reports 60-70 lb wt gain over the past 1 year. Pt reports that she is not sure why she has gained wt, however also states that she has been eating more. Noted pt also with increasing edema per MD notes. EMR wt hx reviewed. ? accuracy of wt gain.     Date Weight (kg) Weight (lbs) Weight Method   8/17/2018 97.6 kg 215 lb 2.7 oz Bed scale   8/16/2018 97.07 kg 214 lb -   8/9/2018 98.884 kg 218 lb -   4/20/2018 96.616 kg 213 lb -   12/7/2017 94.575 kg 208 lb 8 oz -   9/20/2017 94.847 kg 209 lb 1.6 oz -   8/28/2017 94.348 kg 208 lb -   5/26/2017 94.348 kg 208 lb -   5/17/2017 93.532 kg 206 lb 3.2 oz -   4/17/2017 95.482 kg 210 lb 8 oz -   2/24/2017 97.523 kg 215 lb -   2/17/2017 97.886 kg 215 lb 12.8 oz -   11/21/2016 95.029 kg 209 lb 8 oz -   9/14/2016 95.845 kg 211 lb 4.8 oz -        Labs reviewed   Labs reviewed: Yes    Lab Results  Lab Value Date/Time   HGBA1C 9.40 (H) 08/17/2018 0451   HGBA1C 12.10 (H) 12/07/2017 1637   HGBA1C 12.60 (H) 08/28/2017 0000     Medications reviewed   Medications reviewed: Yes    Current Nutrition Prescription   PO: Diet Regular; Consistent Carbohydrate    Evaluation of Received Nutrient/Fluid Intake:  100%/1 meal    Nutrition Diagnosis     Problem Altered nutrition related laboratory values   Etiology Lifestyle/diet    Signs/Symptoms Per pt report of eating more over the past year; Hgb A1C= 9.4%; noted A1C was 12.6% last year       Intervention   Intervention: Follow treatment progress, Care plan reviewed, Education provided   -DM nutrition edu provided to pt, [employer] edu materials provided to pt.       Goal:   General: Nutrition support treatment  PO: Establish PO    Monitoring/Evaluation:       Monitoring/Evaluation: Per protocol, Pertinent labs, Weight, Symptoms  Will Continue to follow per  protocol  Jael Martinez MS RD/LD CNSC  Time Spent: 30 minutes

## 2018-08-17 NOTE — PROGRESS NOTES
Continued Stay Note  [facility]     Patient Name: Janet Pisano  MRN: [id number]  Today's Date: 8/17/2018    Admit Date: 8/16/2018          Discharge Plan     Row Name 08/17/18 1137       Plan    Plan Home c     Patient/Family in Agreement with Plan yes    Plan Comments Talked to Ms. Pisano @ .  She lives in a 1 level apt. in [facility]. alone.  Her son assists her as needed.  She uses a RW and w/c only prn.  She also has grab bars, shower chair, cane and home oxygen.  She states [employer] provide the O2.  She is current pt. c [facility] agency for skilled nursing and P.T. and O.T.  Diagnosis for  is DM, COPD and HTN.    They also send an aide out to asssist her with her bath.  She uses [facility] for her Rx.  Her insurance covers most of cost of her medications.  Her PCP is Dr. Jhoan Hdez.  Her goal is to return to her home c assist from her son and [facility].  CM will follow.      Final Discharge Disposition Code 06 - home with home health care              Discharge Codes    No documentation.           Aldo Cruz RN

## 2018-08-17 NOTE — CONSULTS
"Diabetes Education  Assessment/Teaching    Patient Name:  Janet Pisano  YOB: 1945  MRN: [id number]  Admit Date:  8/16/2018      Assessment Date:  8/17/2018    Most Recent Value   General Information    Referral From:  A1c, Blood glucose, MD order [9.4%]   Height  162.6 cm (64\")   Weight  97.5 kg (215 lb)   Weight Method  Bed scale   Pregnancy Assessment   Diabetes History   What type of diabetes do you have?  Type 2   Length of Diabetes Diagnosis  10 + years   Current DM knowledge  poor   Have you had diabetes education/teaching in the past?  no [stated her PCP offered but she does not have transportation. The education is offered at PCPs office ([facility]) twice a month]   Do you test your blood sugar at home?  yes   Frequency of checks  used to once a day but recently 3 x day since Novolog added to insulin regimen a few days ago she stated   Who performs the test?  self   Have you had low blood sugar? (<70mg/dl)  no   Have you had high blood sugar? (>140mg/dl)  yes   How often do you have high blood sugar?  -- [had to reveiw ADA recommended glucose goals before she knew what was too high. Discussed everyone has the same goals]   Education Preferences   Nutrition Information   When was the last time you saw a dietician?  never   Are you currently following a special meal plan?  never   Assessment Topics   Healthy Eating - Assessment  Needs education   Being Active - Assessment  Needs education   Taking Medication - Assessment  Needs education   Problem Solving - Assessment  Needs education   Reducing Risk - Assessment  Needs education   Healthy Coping - Assessment  Needs education   Monitoring - Assessment  Needs education   DM Goals   Problem Solving - Goal  0-30 days from discharge [f/u with PCP]            Most Recent Value   DM Education Needs   Meter  Has own   Frequency of Testing  3 times a day   Blood Glucose Target  -- [handout provided and discussed ADA recommended glucose " and A1C goals]   Blood Glucose Target Range  -- [reviewed how her correction orders work with Novolog. Fast and long acting insulins and difference is new to her]   Medication  Insulin, Actions, Administration [she stated she has been taking Tuojeo 110 units (55 units in 2 syringes) at the same time in the evening. Has darin had Novolog long enough to do once she said]   Problem Solving  Hypoglycemia, Hyperglycemia, Sick days, Signs, Symptoms, Treatment   Reducing Risks  A1C testing   Healthy Eating  RD consult   Healthy Coping  Other (comment) [very uncomfortable and SOB]   Discharge Plan  Home   Motivation  Moderate   Teaching Method  Explanation, Discussion, Handouts, Teach back   Patient Response  Needs reinforcement            Other Comments: Janet Pisano was seen for review of type 2 diabetes home management. Discussed and taught patient about type 2 diabetes self-management, risk factors, and importance of blood glucose control to reduce complications. Target blood glucose readings and A1c goals per ADA were reviewed. Reviewed with patient current A1c and discussed its significance. Signs, symptoms and treatment of hyperglycemia and hypoglycemia were discussed. Lifestyle changes such as physical activity with MD approval and healthy eating were encouraged. She said she recently had Novolog insulin ordered with a scale and she has only done it once.  She has been taking Toujeo (glargine 300 units/1 ml) for a long while and takes 110 units in the evening in 2 syringes, 55 units each. Urged her to talk to PCP about doing 1 in the am and 1 in the pm for better results. Explained to patient about absorption. . Patient was encouraged to keep record of blood glucose readings to take to follow up appointment with PCP.  OP education was also encouraged for additional education once discharged. Her PCP is at the [facility] in [place] and she said he has asked her but she depends on son for transportation and  doesn't want to bother him to get there she stated.  Note RD consult ordered.          Electronically signed by:  Yamile Delcid RN  08/17/18 2:35 PM

## 2018-08-18 ENCOUNTER — APPOINTMENT (OUTPATIENT)
Dept: GENERAL RADIOLOGY | Facility: HOSPITAL | Age: 73
End: 2018-08-18

## 2018-08-18 PROBLEM — I27.20 PULMONARY HYPERTENSION (HCC): Status: ACTIVE | Noted: 2018-08-18

## 2018-08-18 LAB
ALBUMIN SERPL-MCNC: 3.79 G/DL (ref 3.2–4.8)
ALBUMIN/GLOB SERPL: 1.5 G/DL (ref 1.5–2.5)
ALP SERPL-CCNC: 55 U/L (ref 25–100)
ALT SERPL W P-5'-P-CCNC: 23 U/L (ref 7–40)
ANION GAP SERPL CALCULATED.3IONS-SCNC: 8 MMOL/L (ref 3–11)
ARTERIAL PATENCY WRIST A: POSITIVE
AST SERPL-CCNC: 16 U/L (ref 0–33)
ATMOSPHERIC PRESS: ABNORMAL MMHG
BASE EXCESS BLDA CALC-SCNC: 16.3 MMOL/L (ref 0–2)
BDY SITE: ABNORMAL
BILIRUB SERPL-MCNC: 0.6 MG/DL (ref 0.3–1.2)
BUN BLD-MCNC: 21 MG/DL (ref 9–23)
BUN/CREAT SERPL: 26.3 (ref 7–25)
CA-I SERPL ISE-MCNC: 1.14 MMOL/L (ref 1.12–1.32)
CALCIUM SPEC-SCNC: 8.7 MG/DL (ref 8.7–10.4)
CHLORIDE SERPL-SCNC: 98 MMOL/L (ref 99–109)
CO2 BLDA-SCNC: 45.4 MMOL/L (ref 22–33)
CO2 SERPL-SCNC: 37 MMOL/L (ref 20–31)
COHGB MFR BLD: 1.1 % (ref 0–2)
CREAT BLD-MCNC: 0.8 MG/DL (ref 0.6–1.3)
DEPRECATED RDW RBC AUTO: 54.5 FL (ref 37–54)
ERYTHROCYTE [DISTWIDTH] IN BLOOD BY AUTOMATED COUNT: 15.3 % (ref 11.3–14.5)
GFR SERPL CREATININE-BSD FRML MDRD: 70 ML/MIN/1.73
GLOBULIN UR ELPH-MCNC: 2.5 GM/DL
GLUCOSE BLD-MCNC: 78 MG/DL (ref 70–100)
GLUCOSE BLDC GLUCOMTR-MCNC: 247 MG/DL (ref 70–130)
GLUCOSE BLDC GLUCOMTR-MCNC: 363 MG/DL (ref 70–130)
GLUCOSE BLDC GLUCOMTR-MCNC: 481 MG/DL (ref 70–130)
GLUCOSE BLDC GLUCOMTR-MCNC: 65 MG/DL (ref 70–130)
GLUCOSE BLDC GLUCOMTR-MCNC: 75 MG/DL (ref 70–130)
HCO3 BLDA-SCNC: 43.4 MMOL/L (ref 20–26)
HCT VFR BLD AUTO: 38.5 % (ref 34.5–44)
HCT VFR BLD CALC: 36 %
HGB BLD-MCNC: 11.4 G/DL (ref 11.5–15.5)
HGB BLDA-MCNC: 11.7 G/DL (ref 14–18)
HOROWITZ INDEX BLD+IHG-RTO: 45 %
MAGNESIUM SERPL-MCNC: 2.2 MG/DL (ref 1.3–2.7)
MCH RBC QN AUTO: 29.4 PG (ref 27–31)
MCHC RBC AUTO-ENTMCNC: 29.6 G/DL (ref 32–36)
MCV RBC AUTO: 99.2 FL (ref 80–99)
METHGB BLD QL: 0.5 % (ref 0–1.5)
MODALITY: ABNORMAL
OXYHGB MFR BLDV: 88.8 % (ref 94–99)
PCO2 BLDA: 64.7 MM HG (ref 35–45)
PH BLDA: 7.43 PH UNITS (ref 7.35–7.45)
PHOSPHATE SERPL-MCNC: 4.2 MG/DL (ref 2.4–5.1)
PLATELET # BLD AUTO: 240 10*3/MM3 (ref 150–450)
PMV BLD AUTO: 11.2 FL (ref 6–12)
PO2 BLDA: 60.2 MM HG (ref 83–108)
POTASSIUM BLD-SCNC: 3.7 MMOL/L (ref 3.5–5.5)
PROT SERPL-MCNC: 6.3 G/DL (ref 5.7–8.2)
RBC # BLD AUTO: 3.88 10*6/MM3 (ref 3.89–5.14)
SODIUM BLD-SCNC: 143 MMOL/L (ref 132–146)
WBC NRBC COR # BLD: 10.25 10*3/MM3 (ref 3.5–10.8)

## 2018-08-18 PROCEDURE — 63710000001 PREDNISONE PER 1 MG: Performed by: INTERNAL MEDICINE

## 2018-08-18 PROCEDURE — 25010000002 AZITHROMYCIN: Performed by: INTERNAL MEDICINE

## 2018-08-18 PROCEDURE — 25010000002 ENOXAPARIN PER 10 MG: Performed by: INTERNAL MEDICINE

## 2018-08-18 PROCEDURE — 36600 WITHDRAWAL OF ARTERIAL BLOOD: CPT | Performed by: INTERNAL MEDICINE

## 2018-08-18 PROCEDURE — 80053 COMPREHEN METABOLIC PANEL: CPT | Performed by: INTERNAL MEDICINE

## 2018-08-18 PROCEDURE — 82805 BLOOD GASES W/O2 SATURATION: CPT | Performed by: INTERNAL MEDICINE

## 2018-08-18 PROCEDURE — 94760 N-INVAS EAR/PLS OXIMETRY 1: CPT

## 2018-08-18 PROCEDURE — 94640 AIRWAY INHALATION TREATMENT: CPT

## 2018-08-18 PROCEDURE — 83735 ASSAY OF MAGNESIUM: CPT | Performed by: INTERNAL MEDICINE

## 2018-08-18 PROCEDURE — 82330 ASSAY OF CALCIUM: CPT | Performed by: INTERNAL MEDICINE

## 2018-08-18 PROCEDURE — 25010000002 FUROSEMIDE PER 20 MG: Performed by: INTERNAL MEDICINE

## 2018-08-18 PROCEDURE — 94799 UNLISTED PULMONARY SVC/PX: CPT

## 2018-08-18 PROCEDURE — 63710000001 INSULIN REGULAR HUMAN PER 5 UNITS: Performed by: NURSE PRACTITIONER

## 2018-08-18 PROCEDURE — 85027 COMPLETE CBC AUTOMATED: CPT | Performed by: INTERNAL MEDICINE

## 2018-08-18 PROCEDURE — 84100 ASSAY OF PHOSPHORUS: CPT | Performed by: INTERNAL MEDICINE

## 2018-08-18 PROCEDURE — 71045 X-RAY EXAM CHEST 1 VIEW: CPT

## 2018-08-18 PROCEDURE — 25010000002 CEFTRIAXONE PER 250 MG: Performed by: INTERNAL MEDICINE

## 2018-08-18 PROCEDURE — 82962 GLUCOSE BLOOD TEST: CPT

## 2018-08-18 PROCEDURE — 99291 CRITICAL CARE FIRST HOUR: CPT | Performed by: INTERNAL MEDICINE

## 2018-08-18 PROCEDURE — 84132 ASSAY OF SERUM POTASSIUM: CPT | Performed by: NURSE PRACTITIONER

## 2018-08-18 RX ORDER — FUROSEMIDE 40 MG/1
40 TABLET ORAL DAILY
Status: DISCONTINUED | OUTPATIENT
Start: 2018-08-20 | End: 2018-08-22 | Stop reason: HOSPADM

## 2018-08-18 RX ORDER — POTASSIUM CHLORIDE 750 MG/1
30 CAPSULE, EXTENDED RELEASE ORAL ONCE
Status: COMPLETED | OUTPATIENT
Start: 2018-08-19 | End: 2018-08-18

## 2018-08-18 RX ORDER — FUROSEMIDE 40 MG/1
40 TABLET ORAL DAILY
Status: DISCONTINUED | OUTPATIENT
Start: 2018-08-19 | End: 2018-08-18

## 2018-08-18 RX ORDER — PREDNISONE 10 MG/1
10 TABLET ORAL 2 TIMES DAILY WITH MEALS
Status: DISCONTINUED | OUTPATIENT
Start: 2018-08-19 | End: 2018-08-19

## 2018-08-18 RX ORDER — POTASSIUM CHLORIDE 750 MG/1
30 CAPSULE, EXTENDED RELEASE ORAL 2 TIMES DAILY WITH MEALS
Status: DISCONTINUED | OUTPATIENT
Start: 2018-08-19 | End: 2018-08-22 | Stop reason: HOSPADM

## 2018-08-18 RX ORDER — AZITHROMYCIN 250 MG/1
500 TABLET, FILM COATED ORAL
Status: COMPLETED | OUTPATIENT
Start: 2018-08-18 | End: 2018-08-20

## 2018-08-18 RX ADMIN — IPRATROPIUM BROMIDE AND ALBUTEROL SULFATE 3 ML: 2.5; .5 SOLUTION RESPIRATORY (INHALATION) at 09:17

## 2018-08-18 RX ADMIN — IPRATROPIUM BROMIDE AND ALBUTEROL SULFATE 3 ML: 2.5; .5 SOLUTION RESPIRATORY (INHALATION) at 12:55

## 2018-08-18 RX ADMIN — IPRATROPIUM BROMIDE AND ALBUTEROL SULFATE 3 ML: 2.5; .5 SOLUTION RESPIRATORY (INHALATION) at 21:40

## 2018-08-18 RX ADMIN — PREDNISONE 40 MG: 20 TABLET ORAL at 08:15

## 2018-08-18 RX ADMIN — FUROSEMIDE 40 MG: 10 INJECTION, SOLUTION INTRAMUSCULAR; INTRAVENOUS at 06:20

## 2018-08-18 RX ADMIN — AZITHROMYCIN MONOHYDRATE 500 MG: 500 INJECTION, POWDER, LYOPHILIZED, FOR SOLUTION INTRAVENOUS at 11:37

## 2018-08-18 RX ADMIN — ENOXAPARIN SODIUM 40 MG: 40 INJECTION SUBCUTANEOUS at 17:10

## 2018-08-18 RX ADMIN — AZITHROMYCIN 500 MG: 250 TABLET, FILM COATED ORAL at 14:29

## 2018-08-18 RX ADMIN — HYDROXYZINE HYDROCHLORIDE 25 MG: 25 TABLET, FILM COATED ORAL at 10:12

## 2018-08-18 RX ADMIN — INSULIN HUMAN 16 UNITS: 100 INJECTION, SOLUTION PARENTERAL at 21:10

## 2018-08-18 RX ADMIN — POTASSIUM CHLORIDE 30 MEQ: 750 CAPSULE, EXTENDED RELEASE ORAL at 23:53

## 2018-08-18 RX ADMIN — CEFTRIAXONE SODIUM 1 G: 1 INJECTION, SOLUTION INTRAVENOUS at 17:10

## 2018-08-18 RX ADMIN — PANTOPRAZOLE SODIUM 40 MG: 40 TABLET, DELAYED RELEASE ORAL at 05:19

## 2018-08-18 RX ADMIN — HYDROXYZINE HYDROCHLORIDE 25 MG: 25 TABLET, FILM COATED ORAL at 21:57

## 2018-08-18 RX ADMIN — FUROSEMIDE 40 MG: 10 INJECTION, SOLUTION INTRAMUSCULAR; INTRAVENOUS at 17:10

## 2018-08-18 NOTE — PLAN OF CARE
Problem: Patient Care Overview  Goal: Plan of Care Review  Outcome: Ongoing (interventions implemented as appropriate)   08/18/18 0440   Coping/Psychosocial   Plan of Care Reviewed With patient   Plan of Care Review   Progress improving   OTHER   Outcome Summary Pt remained on HiFlow nasal cannula throughout the night. Blood sugars still continuing to rise. PRN atarax given. No complaints of pain. VSS.       Problem: Fall Risk (Adult)  Goal: Absence of Fall  Outcome: Ongoing (interventions implemented as appropriate)      Problem: Breathing Pattern Ineffective (Adult)  Goal: Effective Oxygenation/Ventilation  Outcome: Ongoing (interventions implemented as appropriate)    Goal: Anxiety/Fear Reduction  Outcome: Ongoing (interventions implemented as appropriate)      Problem: Skin Injury Risk (Adult)  Goal: Skin Health and Integrity  Outcome: Ongoing (interventions implemented as appropriate)      Problem: NPPV/CPAP (Adult)  Goal: Signs and Symptoms of Listed Potential Problems Will be Absent, Minimized or Managed (NPPV/CPAP)  Outcome: Ongoing (interventions implemented as appropriate)

## 2018-08-19 ENCOUNTER — APPOINTMENT (OUTPATIENT)
Dept: GENERAL RADIOLOGY | Facility: HOSPITAL | Age: 73
End: 2018-08-19

## 2018-08-19 LAB
ALBUMIN SERPL-MCNC: 3.75 G/DL (ref 3.2–4.8)
ALBUMIN/GLOB SERPL: 1.6 G/DL (ref 1.5–2.5)
ALP SERPL-CCNC: 61 U/L (ref 25–100)
ALT SERPL W P-5'-P-CCNC: 21 U/L (ref 7–40)
ANION GAP SERPL CALCULATED.3IONS-SCNC: 9 MMOL/L (ref 3–11)
AST SERPL-CCNC: 17 U/L (ref 0–33)
BASOPHILS # BLD AUTO: 0.02 10*3/MM3 (ref 0–0.2)
BASOPHILS NFR BLD AUTO: 0.2 % (ref 0–1)
BILIRUB SERPL-MCNC: 0.7 MG/DL (ref 0.3–1.2)
BNP SERPL-MCNC: 239 PG/ML (ref 0–100)
BUN BLD-MCNC: 22 MG/DL (ref 9–23)
BUN/CREAT SERPL: 25 (ref 7–25)
CALCIUM SPEC-SCNC: 8.8 MG/DL (ref 8.7–10.4)
CHLORIDE SERPL-SCNC: 93 MMOL/L (ref 99–109)
CO2 SERPL-SCNC: 39 MMOL/L (ref 20–31)
CREAT BLD-MCNC: 0.88 MG/DL (ref 0.6–1.3)
DEPRECATED RDW RBC AUTO: 53.5 FL (ref 37–54)
EOSINOPHIL # BLD AUTO: 0.13 10*3/MM3 (ref 0–0.3)
EOSINOPHIL NFR BLD AUTO: 1.6 % (ref 0–3)
ERYTHROCYTE [DISTWIDTH] IN BLOOD BY AUTOMATED COUNT: 15.3 % (ref 11.3–14.5)
ERYTHROCYTE [SEDIMENTATION RATE] IN BLOOD: 8 MM/HR (ref 0–30)
GFR SERPL CREATININE-BSD FRML MDRD: 63 ML/MIN/1.73
GLOBULIN UR ELPH-MCNC: 2.4 GM/DL
GLUCOSE BLD-MCNC: 116 MG/DL (ref 70–100)
GLUCOSE BLDC GLUCOMTR-MCNC: 133 MG/DL (ref 70–130)
GLUCOSE BLDC GLUCOMTR-MCNC: 240 MG/DL (ref 70–130)
GLUCOSE BLDC GLUCOMTR-MCNC: 281 MG/DL (ref 70–130)
GLUCOSE BLDC GLUCOMTR-MCNC: 403 MG/DL (ref 70–130)
GLUCOSE BLDC GLUCOMTR-MCNC: 405 MG/DL (ref 70–130)
GLUCOSE BLDC GLUCOMTR-MCNC: 427 MG/DL (ref 70–130)
GLUCOSE BLDC GLUCOMTR-MCNC: 489 MG/DL (ref 70–130)
HCT VFR BLD AUTO: 37.1 % (ref 34.5–44)
HGB BLD-MCNC: 11.8 G/DL (ref 11.5–15.5)
IMM GRANULOCYTES # BLD: 0.02 10*3/MM3 (ref 0–0.03)
IMM GRANULOCYTES NFR BLD: 0.2 % (ref 0–0.6)
LYMPHOCYTES # BLD AUTO: 1.16 10*3/MM3 (ref 0.6–4.8)
LYMPHOCYTES NFR BLD AUTO: 13.9 % (ref 24–44)
MAGNESIUM SERPL-MCNC: 1.9 MG/DL (ref 1.3–2.7)
MCH RBC QN AUTO: 30.8 PG (ref 27–31)
MCHC RBC AUTO-ENTMCNC: 31.8 G/DL (ref 32–36)
MCV RBC AUTO: 96.9 FL (ref 80–99)
MONOCYTES # BLD AUTO: 0.71 10*3/MM3 (ref 0–1)
MONOCYTES NFR BLD AUTO: 8.5 % (ref 0–12)
NEUTROPHILS # BLD AUTO: 6.34 10*3/MM3 (ref 1.5–8.3)
NEUTROPHILS NFR BLD AUTO: 75.8 % (ref 41–71)
PHOSPHATE SERPL-MCNC: 4.4 MG/DL (ref 2.4–5.1)
PLATELET # BLD AUTO: 219 10*3/MM3 (ref 150–450)
PMV BLD AUTO: 11.8 FL (ref 6–12)
POTASSIUM BLD-SCNC: 3.8 MMOL/L (ref 3.5–5.5)
POTASSIUM BLD-SCNC: 4.1 MMOL/L (ref 3.5–5.5)
PROT SERPL-MCNC: 6.1 G/DL (ref 5.7–8.2)
RBC # BLD AUTO: 3.83 10*6/MM3 (ref 3.89–5.14)
SODIUM BLD-SCNC: 141 MMOL/L (ref 132–146)
WBC NRBC COR # BLD: 8.36 10*3/MM3 (ref 3.5–10.8)

## 2018-08-19 PROCEDURE — 83880 ASSAY OF NATRIURETIC PEPTIDE: CPT | Performed by: INTERNAL MEDICINE

## 2018-08-19 PROCEDURE — 94760 N-INVAS EAR/PLS OXIMETRY 1: CPT

## 2018-08-19 PROCEDURE — 63710000001 INSULIN DETEMIR PER 5 UNITS: Performed by: INTERNAL MEDICINE

## 2018-08-19 PROCEDURE — 84100 ASSAY OF PHOSPHORUS: CPT | Performed by: INTERNAL MEDICINE

## 2018-08-19 PROCEDURE — 82962 GLUCOSE BLOOD TEST: CPT

## 2018-08-19 PROCEDURE — 86480 TB TEST CELL IMMUN MEASURE: CPT | Performed by: INTERNAL MEDICINE

## 2018-08-19 PROCEDURE — 97530 THERAPEUTIC ACTIVITIES: CPT

## 2018-08-19 PROCEDURE — 63710000001 PREDNISONE PER 5 MG: Performed by: INTERNAL MEDICINE

## 2018-08-19 PROCEDURE — 63710000001 INSULIN LISPRO (HUMAN) PER 5 UNITS: Performed by: NURSE PRACTITIONER

## 2018-08-19 PROCEDURE — 94799 UNLISTED PULMONARY SVC/PX: CPT

## 2018-08-19 PROCEDURE — 25010000002 FUROSEMIDE PER 20 MG: Performed by: INTERNAL MEDICINE

## 2018-08-19 PROCEDURE — 97165 OT EVAL LOW COMPLEX 30 MIN: CPT

## 2018-08-19 PROCEDURE — 25010000002 ENOXAPARIN PER 10 MG: Performed by: INTERNAL MEDICINE

## 2018-08-19 PROCEDURE — 94640 AIRWAY INHALATION TREATMENT: CPT

## 2018-08-19 PROCEDURE — 80053 COMPREHEN METABOLIC PANEL: CPT | Performed by: INTERNAL MEDICINE

## 2018-08-19 PROCEDURE — 25010000002 CEFTRIAXONE PER 250 MG: Performed by: INTERNAL MEDICINE

## 2018-08-19 PROCEDURE — 85025 COMPLETE CBC W/AUTO DIFF WBC: CPT | Performed by: INTERNAL MEDICINE

## 2018-08-19 PROCEDURE — 99291 CRITICAL CARE FIRST HOUR: CPT | Performed by: INTERNAL MEDICINE

## 2018-08-19 PROCEDURE — 83735 ASSAY OF MAGNESIUM: CPT | Performed by: INTERNAL MEDICINE

## 2018-08-19 PROCEDURE — 63710000001 INSULIN DETEMIR PER 5 UNITS: Performed by: NURSE PRACTITIONER

## 2018-08-19 PROCEDURE — 97162 PT EVAL MOD COMPLEX 30 MIN: CPT

## 2018-08-19 PROCEDURE — 71045 X-RAY EXAM CHEST 1 VIEW: CPT

## 2018-08-19 RX ORDER — PREDNISONE 10 MG/1
10 TABLET ORAL
Status: DISCONTINUED | OUTPATIENT
Start: 2018-08-20 | End: 2018-08-22 | Stop reason: HOSPADM

## 2018-08-19 RX ADMIN — Medication 2 TABLET: at 20:30

## 2018-08-19 RX ADMIN — AZITHROMYCIN 500 MG: 250 TABLET, FILM COATED ORAL at 08:36

## 2018-08-19 RX ADMIN — INSULIN LISPRO 10 UNITS: 100 INJECTION, SOLUTION INTRAVENOUS; SUBCUTANEOUS at 17:04

## 2018-08-19 RX ADMIN — ENOXAPARIN SODIUM 40 MG: 40 INJECTION SUBCUTANEOUS at 17:03

## 2018-08-19 RX ADMIN — IPRATROPIUM BROMIDE AND ALBUTEROL SULFATE 3 ML: 2.5; .5 SOLUTION RESPIRATORY (INHALATION) at 16:16

## 2018-08-19 RX ADMIN — INSULIN LISPRO 8 UNITS: 100 INJECTION, SOLUTION INTRAVENOUS; SUBCUTANEOUS at 20:40

## 2018-08-19 RX ADMIN — POTASSIUM CHLORIDE 30 MEQ: 750 CAPSULE, EXTENDED RELEASE ORAL at 08:36

## 2018-08-19 RX ADMIN — MAGNESIUM SULFATE HEPTAHYDRATE 4 G: 40 INJECTION, SOLUTION INTRAVENOUS at 08:35

## 2018-08-19 RX ADMIN — INSULIN DETEMIR 15 UNITS: 100 INJECTION, SOLUTION SUBCUTANEOUS at 08:36

## 2018-08-19 RX ADMIN — INSULIN LISPRO 5 UNITS: 100 INJECTION, SOLUTION INTRAVENOUS; SUBCUTANEOUS at 11:39

## 2018-08-19 RX ADMIN — INSULIN DETEMIR 20 UNITS: 100 INJECTION, SOLUTION SUBCUTANEOUS at 22:55

## 2018-08-19 RX ADMIN — IPRATROPIUM BROMIDE AND ALBUTEROL SULFATE 3 ML: 2.5; .5 SOLUTION RESPIRATORY (INHALATION) at 13:24

## 2018-08-19 RX ADMIN — PREDNISONE 10 MG: 10 TABLET ORAL at 08:36

## 2018-08-19 RX ADMIN — HYDROXYZINE HYDROCHLORIDE 25 MG: 25 TABLET, FILM COATED ORAL at 20:30

## 2018-08-19 RX ADMIN — IPRATROPIUM BROMIDE AND ALBUTEROL SULFATE 3 ML: 2.5; .5 SOLUTION RESPIRATORY (INHALATION) at 20:02

## 2018-08-19 RX ADMIN — PREDNISONE 10 MG: 10 TABLET ORAL at 17:03

## 2018-08-19 RX ADMIN — CEFTRIAXONE SODIUM 1 G: 1 INJECTION, SOLUTION INTRAVENOUS at 17:04

## 2018-08-19 RX ADMIN — HYDROXYZINE HYDROCHLORIDE 25 MG: 25 TABLET, FILM COATED ORAL at 04:11

## 2018-08-19 RX ADMIN — FUROSEMIDE 40 MG: 10 INJECTION, SOLUTION INTRAMUSCULAR; INTRAVENOUS at 06:29

## 2018-08-19 RX ADMIN — PANTOPRAZOLE SODIUM 40 MG: 40 TABLET, DELAYED RELEASE ORAL at 06:29

## 2018-08-19 RX ADMIN — INSULIN LISPRO 14 UNITS: 100 INJECTION, SOLUTION INTRAVENOUS; SUBCUTANEOUS at 17:03

## 2018-08-19 NOTE — PROGRESS NOTES
Intensivist Note     8/18/2018  Hospital Day: 2  * No surgery found *      Ms. Janet Pisano, 73 y.o. female is followed for:  Principal Problem:    Acute on chronic mixed hypoxemic/hypercapnic respiratory failure (CMS/HCC)  Active Problems:    Chronic obstructive pulmonary disease in former smoker (CMS/HCC)    Morbid obesity with BMI of 40.0-44.9, adult (CMS/HCC)    Obesity hypoventilation syndrome (CMS/HCC)    Pulmonary hypertension. Calculated RVSP 64 mmHg by echocardiogram 8/17/18    Acute on chronic diastolic congestive heart failure (CMS/HCC)    Benign essential hypertension    Uncontrolled type 2 diabetes mellitus. Hemoglobin A1c 9.4 (CMS/HCC)    Gastroesophageal reflux disease    Hypothyroidism    Iron deficiency anemia       SUBJECTIVE     73-year-old really obese white female with a long history of cigarette abuse (57 pack years). She carries a history of chronic mixed hypoxic/hypercarbic respiratory failure which is multifactorial and is followed by pulmonary medicine in [place]. Her chronic respiratory failure is multifactorial and related to morbid obesity and obesity hypoventilation syndrome, obstructive sleep apnea, severe COPD. These problems are severe enough to have caused severe pulmonary hypertension (RVSP by echocardiogram calculated at 64 mmHg this admission). She has been hospitalized on 2 prior occasions this year in [place] once in April, and more recently was discharged 8/2/18 for exacerbations of COPD. She is on chronic home oxygen at 3 L and is quite limited with respect to her activity level. Additional problems include her morbid obesity, poorly controlled diabetes mellitus, coronary artery disease with a history of disease in the right coronary but no records to suggest previous intervention. She also has GERD, hypothyroidism on replacement, and iron deficiency anemia.     On 8/16/18 her home health nurse noted a marked decrease in her oxygen saturations down to 69%. She had had  "no fever or chills, but did have a non-purulent dry cough. She denied however any hemoptysis and was not having exertional left chest pain, or pleuritic pain. She did however have some lower extremity edema. Chest x-ray suggested some congestive changes and groundglass changes and interstitial thickening suggested some degree of volume overload. In addition BNP was elevated at 649 but troponins were negative. EKG just suggested some T-wave inversion inferiorly.     Patient has required fairly high FiO2 via high flow nasal oxygen and today is on a Fio2 of 45% with an excellent saturations of 95%. ABGs show some improvement since admission with PCO2 dropping from 72, to 64. In addition FiO2 has been able to be decreased from 80% to the above-mentioned 45% high flow nasal. Chest x-ray and CT scan as noted suggested volume overload and she has been diuresed successfully with some improvement in her chest x-ray. She has been continued on empiric antibiotics with Rocephin and Zithromax although her there has been no documentation of a consolidative infiltrate, fever, or leukocytosis.    Blood sugars increased dramatically today up to approximately 481 and is suspected this is due to her steroids.       The patient's relevant past medical, surgical and social history were reviewed and updated in Epic as appropriate.    OBJECTIVE     /64   Pulse 74   Temp 98.2 °F (36.8 °C) (Oral)   Resp 16   Ht 162.6 cm (64\")   Wt 97.5 kg (215 lb)   SpO2 93%   BMI 36.90 kg/m²   Oxygen Concentration (%): 44  Flow (L/min): 4    Flowsheet Rows      First Filed Value   Admission Height  154.9 cm (61\") Documented at 08/16/2018 1503   Admission Weight  97.1 kg (214 lb) Documented at 08/16/2018 1503        Intake & Output (last day)       08/18 0701 - 08/19 0700    P.O. 420    I.V. (mL/kg) 30 (0.3)    IV Piggyback 350    Total Intake(mL/kg) 800 (8.2)    Urine (mL/kg/hr) 1475 (1)    Total Output 1475    Net -675         Unmeasured " Urine Occurrence 4 x    Unmeasured Stool Occurrence 4 x          Exam:  General Exam:  Obese white female on high flow nasal oxygen. NAD  HEENT: Pupils equal and reactive. Nose and throat clear.  Neck:                          Supple, no JVD, thyromegaly, or adenopathy  Lungs: Clear anteriorly and laterally. No rales or wheezes.  Cardiovascular: Regular rate and rhythm without murmurs or gallops.  Abdomen: Soft nontender without organomegaly or masses.   and rectal: Deferred.  Extremities: No cyanosis clubbing edema.  Neurologic:                 Symmetric strength. No focal deficits.    Chest X-Ray 8/18/18: Cardiomegaly. Improvement in congestive changes and bibasilar atelectasis. Resolution of small effusions    Echocardiogram: Moderate TR and RVSP 64 mmHg        Results from last 7 days  Lab Units 08/18/18  0359 08/17/18  0422 08/16/18  1522   WBC 10*3/mm3 10.25 7.30 7.77   HEMOGLOBIN g/dL 11.4* 11.4* 11.5   HEMATOCRIT % 38.5 37.9 40.0   PLATELETS 10*3/mm3 240 193 218       Results from last 7 days  Lab Units 08/18/18  0359 08/17/18  0422   SODIUM mmol/L 143 140   POTASSIUM mmol/L 3.7 4.4   CHLORIDE mmol/L 98* 98*   CO2 mmol/L 37.0* 35.0*   BUN mg/dL 21 21   CREATININE mg/dL 0.80 0.86   GLUCOSE mg/dL 78 223*   CALCIUM mg/dL 8.7 8.2*       Results from last 7 days  Lab Units 08/18/18  0359 08/17/18  0422   MAGNESIUM mg/dL 2.2 1.6   PHOSPHORUS mg/dL 4.2  --        Results from last 7 days  Lab Units 08/18/18  0359 08/16/18  1522   ALK PHOS U/L 55 63   BILIRUBIN mg/dL 0.6 0.8   ALT (SGPT) U/L 23 29   AST (SGOT) U/L 16 26       No results found for: SEDRATE  Lab Results   Component Value Date    .0 (H) 08/16/2018     Lab Results   Component Value Date    CKTOTAL 48 08/17/2018    TROPONINI 0.05 12/25/2015     Lab Results   Component Value Date    TSH 3.153 12/07/2017     Lab Results   Component Value Date    LACTATE 1.2 08/16/2018     Lab Results   Component Value Date    CORTISOL 39.3 12/25/2015          Results from last 7 days  Lab Units 08/18/18  0355 08/17/18  0431 08/16/18  1603   PH, ARTERIAL pH units 7.435 7.322* 7.306*   PCO2, ARTERIAL mm Hg 64.7* 69.1* 72.6*   PO2 ART mm Hg 60.2* 103.0 122.0*   HCO3 ART mmol/L 43.4* 35.7* 36.2*   FIO2 % 45 35 80          Settings: Observed:   Mode: standby (Pt does not want to wear tonight) (08/18/18 2142)           I reviewed the patient's results, images and medication.    Assessment/Plan   ASSESSMENT      Principal Problem:    Acute on chronic mixed hypoxemic/hypercapnic respiratory failure (CMS/HCC)  Active Problems:    Chronic obstructive pulmonary disease in former smoker (CMS/HCC)    Morbid obesity with BMI of 40.0-44.9, adult (CMS/HCC)    Obesity hypoventilation syndrome (CMS/HCC)    Pulmonary hypertension. Calculated RVSP 64 mmHg by echocardiogram 8/17/18    Acute on chronic diastolic congestive heart failure (CMS/HCC)    Benign essential hypertension    Uncontrolled type 2 diabetes mellitus. Hemoglobin A1c 9.4 (CMS/HCC)    Gastroesophageal reflux disease    Hypothyroidism    Iron deficiency anemia      DISCUSSION: Her gas exchange has improved with diuresis and I think this was because diastolic congestive heart failure triggered her underlying obstructive lung disease. She is not actively wheezing at the present time so I want to decrease her steroids significantly as it is throwing her diabetes severely out of control (Glucose 481). She obviously has advanced disease but we have no PFTs to go on for her actual physiologic impairment. I suspect her worsening is a combination of her obesity hypoventilation syndrome, obstructive sleep apnea, COPD with worsening due to diastolic congestive heart failure.    PLAN     1. Decrease steroids  2. Adjust insulin  3. Continue on high flow nasal oxygen but converted to standard nasal oxygen if we can get her down to 35 or 40%.  4. Continue empiric antimicrobial therapy  5. I would like to review her CT scan with  radiologist as I see some small cystic changes of unclear significance in the posterior segment of the right upper lobe  6. Continue ulcer and DVT prophylaxis  7. Continue diuretics as long as BUN does not increase significantly  8. Keep potassium greater than or equal to 4.5    Plan of care and goals reviewed with mulitdisciplinary team at daily rounds.    I discussed the patient's findings and my recommendations with patient and nursing staff    Time spent Critical care 35 min (It does not include procedure time).    Walker Ghosh MD  Intensive Care Medicine  08/18/18 10:33 PM

## 2018-08-19 NOTE — PLAN OF CARE
Problem: Patient Care Overview  Goal: Plan of Care Review  Outcome: Ongoing (interventions implemented as appropriate)   08/19/18 4621   Coping/Psychosocial   Plan of Care Reviewed With patient   Plan of Care Review   Progress no change   OTHER   Outcome Summary OT evaluation completed. Pt. demonstrating some decrease in strength, balance and activity tolerance with ADL and IADL task. Pt. appropriate for skilled OT services fo assist with return to PLOF. Pt. may be able to return with PLOF with assist from family and HH, but would benefit from NELIDA, but pt. does not appear open yet at this time. Pending length of stay to progress with therapy may need rehab prior to home.

## 2018-08-19 NOTE — THERAPY EVALUATION
Acute Care - Occupational Therapy Initial Evaluation  [facility]     Patient Name: Janet Pisano  : 1945  MRN: [id number]  Today's Date: 2018  Onset of Illness/Injury or Date of Surgery: 18  Date of Referral to OT: 18  Referring Physician: MD Augie    Admit Date: 2018       ICD-10-CM ICD-9-CM   1. Acute on chronic respiratory failure with hypoxia and hypercapnia (CMS/[facility]) J96.21 518.84    J96.22 786.09     799.02   2. Pleural effusion J90 511.9   3. Acute on chronic congestive heart failure, unspecified congestive heart failure type (CMS/[facility]) I50.9 428.0   4. Positive D dimer R79.89 790.92   5. COPD with acute exacerbation (CMS/[facility]) J44.1 491.21   6. Pulmonary hypertension I27.20 416.8   7. Impaired mobility and ADLs Z74.09 799.89     Patient Active Problem List   Diagnosis   • Anxiety   • Benign essential hypertension   • Chronic obstructive pulmonary disease in former smoker (CMS/[facility])   • Uncontrolled type 2 diabetes mellitus. Hemoglobin A1c 9.4 (CMS/[facility])   • Gastroesophageal reflux disease   • Hypothyroidism   • Insomnia   • Left carotid artery stenosis   • History of myocardial infarction   • Dyslipidemia   • History of rheumatic fever   • Coronary artery disease   • Iron deficiency anemia   • Morbid obesity with BMI of 40.0-44.9, adult (CMS/[facility])   • Acute on chronic mixed hypoxemic/hypercapnic respiratory failure (CMS/[facility])   • Acute on chronic diastolic congestive heart failure (CMS/[facility])   • Obesity hypoventilation syndrome (CMS/[facility])   • Pulmonary hypertension. Calculated RVSP 64 mmHg by echocardiogram 18     Past Medical History:   Diagnosis Date   • Arthritis    • Bilateral carpal tunnel syndrome 2017   • Bradycardia 2016   • COPD exacerbation (CMS/[facility])    • Coronary artery disease 2016   • Depression    • Diabetes mellitus (CMS/[facility])    • Diverticulosis 2016   • Dyslipidemia 2016   • H/O esophageal ulcer    • History of myocardial infarction  9/7/2016    Questionable history of myocardial infarction: Remote cardiac catheterization at [facility] in [place], incomplete database.  Reported stress test 2-3 years ago, negative per patient report, incomplete database.    • History of rheumatic fever 9/7/2016   • Hypertension    • Hypothyroidism    • Migraine 8/22/2016   • Rheumatic fever    • Ventral hernia 9/7/2016   • Vitamin D deficiency 6/20/2016     Past Surgical History:   Procedure Laterality Date   • CARDIAC CATHETERIZATION     • CARPAL TUNNEL RELEASE     • CATARACT EXTRACTION, BILATERAL     • COLONOSCOPY     • ESOPHAGUS SURGERY      hole repair   • HERNIA REPAIR      ventral   • TUBAL ABDOMINAL LIGATION  1982          OT ASSESSMENT FLOWSHEET (last 72 hours)      Occupational Therapy Evaluation     Row Name 08/19/18 1306                   OT Evaluation Time/Intention    Subjective Information complains of;weakness;fatigue  -NATALIIA        Document Type evaluation  -NATALIIA        Patient Effort good  -NATALIIA        Symptoms Noted During/After Treatment shortness of breath   coughing  -NATALIIA        Comment mild  -NATALIIA           General Information    Patient Profile Reviewed? yes  -NATALIIA        Onset of Illness/Injury or Date of Surgery 08/16/18  -NATALIIA        Referring Physician MD Augie  -NATALIIA        Patient Observations alert;cooperative;agree to therapy  -NATALIIA        Patient/Family Observations No family present.  -NATALIIA        General Observations of Patient Pt. up in recliner on 3L of 02 NC, IV, telemetry  -NATALIIA        Prior Level of Function independent:;all household mobility;feeding;grooming;dressing;min assist:;bathing;mod assist:;cleaning;dependent:;driving;shopping   I cooking, son shopped and did heavy cleaning, HH aide bath  -NATALIIA        Equipment Currently Used at Home bath bench;cane, straight;grab bar;oxygen;wheelchair;sock aid;glucometer   standard toilet per pt, but good for her height  -NATALIIA        Pertinent History of Current Functional Problem  Pt. found by  nurse in bathroom with 02 sats 69%.  Pt. to hospital and found with A on C hypoximia/hypercapnia/respitory failure.  -NATALIIA        Existing Precautions/Restrictions oxygen therapy device and L/min  -NATALIIA        Risks Reviewed patient:;LOB;increased discomfort;change in vital signs;lines disloged  -NATALIIA        Benefits Reviewed patient:;improve function;increase independence;increase strength;increase balance;increase knowledge  -NATALIIA        Barriers to Rehab previous functional deficit  -NATALIIA           Relationship/Environment    Lives With alone  -NATALIIA        Family Caregiver if Needed grandchild(federica), adult   son and grandson assist cleaning and shopping  -NATALIIA           Resource/Environmental Concerns    Current Living Arrangements home/apartment/condo  -NATALIIA           Cognitive Assessment/Intervention- PT/OT    Orientation Status (Cognition) oriented x 4  -NATALIIA        Follows Commands (Cognition) WFL  -NATALIIA        Safety Deficit (Cognitive) impulsivity   stood before OT could get belt on despite OT standing to   -NATALIIA           Safety Issues, Functional Mobility    Impairments Affecting Function (Mobility) endurance/activity tolerance;strength  -NATALIIA           Bed Mobility Assessment/Treatment    Comment (Bed Mobility) UIC on arrival  -NATALIIA           Functional Mobility    Functional Mobility- Ind. Level contact guard assist  -NATALIIA        Functional Mobility- Device other (see comments)   gait belt  -NATALIIA        Functional Mobility-Distance (Feet) 4  -NATALIIA        Functional Mobility- Safety Issues step length decreased;supplemental O2  -NATALIIA           Transfer Assessment/Treatment    Transfer Assessment/Treatment sit-stand transfer;stand-sit transfer  -NATALIIA           Sit-Stand Transfer    Sit-Stand [place] (Transfers) supervision  -NATALIIA           Stand-Sit Transfer    Stand-Sit [place] (Transfers) supervision  -NATALIIA           ADL Assessment/Intervention    72814 - OT Self Care/Mgmt Minutes 2  -NATALIIA        BADL  Assessment/Intervention lower body dressing  -NATALIIA           Lower Body Dressing Assessment/Training    Lower Body Dressing [place] Level doff;pants/bottoms;socks;independent  -NATALIIA        Lower Body Dressing Position supported sitting  -NATALIIA        Comment (Lower Body Dressing) 02 to 89%  -NATALIIA           BADL Safety/Performance    Impairments, BADL Safety/Performance endurance/activity tolerance;strength;balance  -NATALIIA           General ROM    GENERAL ROM COMMENTS WFL AROM BUE  -NATALIIA           General Assessment (Manual Muscle Testing)    Comment, General Manual Muscle Testing (MMT) Assessment 4 to 4+/5.  Limited R shoulder  -NATALIIA           Motor Assessment/Interventions    Additional Documentation Balance (Group);Balance Interventions (Group);Therapeutic Exercise (Group);Therapeutic Exercise Interventions (Group)  -NATALIIA           Therapeutic Exercise    22327 - OT Therapeutic Activity Minutes 3  -NATALIIA           Balance    Balance dynamic standing balance  -NATALIIA           Dynamic Standing Balance    Level of [place], Reaches Outside Midline (Standing, Dynamic Balance) contact guard assist   up on toes, mini squat, high march, RUE support  -NATALIIA           Sensory Assessment/Intervention    Sensory General Assessment light touch sensation deficits identified   carpal tunnel and diabetic neuropathy hands  -NATALIIA           Light Touch Sensation Assessment    Left Upper Extremity: Light Touch Sensation Assessment --   per pt. like glove effect hands  -NATALIIA        Right Upper Extremity: Light Touch Sensation Assessment --   per pt. like glove effect hands  -NATALIIA           Positioning and Restraints    Pre-Treatment Position sitting in chair/recliner  -NATALIIA        Post Treatment Position chair  -NATALIIA        In Chair sitting;call light within reach;encouraged to call for assist;with other staff  -NATALIIA           Pain Scale: Numbers Pre/Post-Treatment    Pain Scale: Numbers, Pretreatment 0/10 - no pain  -NATALIIA        Pain Scale: Numbers, Post-Treatment  0/10 - no pain  -NATALIIA           Plan of Care Review    Plan of Care Reviewed With patient  -NATALIIA           Clinical Impression (OT)    Date of Referral to OT 08/18/18  -NATALIIA        OT Diagnosis Impaired ADL and IADL independence due to weakness and limited activity tolerance due to respitory failure.  -NATALIIA        Patient/Family Goals Statement (OT Eval) Pt. wants to return to home with prior assist.  -NATALIIA        Criteria for Skilled Therapeutic Interventions Met (OT Eval) yes;treatment indicated  -NATALIIA        Rehab Potential (OT Eval) good, to achieve stated therapy goals  -NATALIIA        Therapy Frequency (OT Eval) daily  -NATALIIA        Care Plan Review (OT) care plan/treatment goals reviewed;risks/benefits reviewed;evaluation/treatment results reviewed;current/potential barriers reviewed;patient/other agree to care plan  -NATALIIA        Anticipated Discharge Disposition (OT) home with home health;home with assist;skilled nursing facility;assisted living facility (longterm)   pending progress and ability to do IADL task  -NATALIIA           Vital Signs    Pre Systolic BP Rehab 148  -NATALIIA        Pre Treatment Diastolic BP 73  -NATALIIA        Pretreatment Heart Rate (beats/min) 73  -NATALIIA        Posttreatment Heart Rate (beats/min) 77  -NATALIIA        Intra SpO2 (%) 89  -NATALIIA        O2 Delivery Intra Treatment supplemental O2  -NATALIIA        Post SpO2 (%) 90  -NATALIIA        O2 Delivery Post Treatment supplemental O2  -NATALIIA        Pre Patient Position Sitting  -NATALIIA        Intra Patient Position Standing  -NATALIIA        Post Patient Position Sitting  -NATALIIA           Planned OT Interventions    Planned Therapy Interventions (OT Eval) activity tolerance training;BADL retraining;functional balance retraining;strengthening exercise;transfer/mobility retraining;ROM/therapeutic exercise  -NATALIIA           OT Goals    Bed Mobility Goal Selection (OT) bed mobility, OT goal 1  -NATALIIA        Transfer Goal Selection (OT) transfer, OT goal 1  -NATALIIA        Toileting Goal Selection (OT) toileting, OT goal 1   -NATALIIA        Balance Goal Selection (OT) balance, OT goal 1  -NATALIIA        Activity Tolerance Goal Selection (OT) activity tolerance, OT goal 1  -NATALIIA        Functional Mobility Goal Selection (OT) functional mobility, OT goal 1  -NATALIIA        Additional Documentation Activity Tolerance Goal Selection (OT) (Row);Balance Goal Selection (OT) (Row);Functional Mobility Selection (OT) (Row)  -NATALIIA           Bed Mobility Goal 1 (OT)    Activity/Assistive Device (Bed Mobility Goal 1, OT) bed mobility activities, all  -NATALIIA        [place] Level/Cues Needed (Bed Mobility Goal 1, OT) independent  -NATALIIA        Time Frame (Bed Mobility Goal 1, OT) long term goal (LTG);1 week  -NATALIIA        Progress/Outcomes (Bed Mobility Goal 1, OT) goal ongoing  -NATALIIA           Transfer Goal 1 (OT)    Activity/Assistive Device (Transfer Goal 1, OT) toilet  -NATALIIA        [place] Level/Cues Needed (Transfer Goal 1, OT) independent  -NATALIIA        Time Frame (Transfer Goal 1, OT) long term goal (LTG);1 week  -NATALIIA        Progress/Outcome (Transfer Goal 1, OT) goal ongoing  -NATALIIA           Toileting Goal 1 (OT)    Activity/Device (Toileting Goal 1, OT) toileting skills, all;commode  -NATALIIA        [place] Level/Cues Needed (Toileting Goal 1, OT) supervision required  -NATALIIA        Time Frame (Toileting Goal 1, OT) long term goal (LTG);1 week  -NATALIIA        Progress/Outcome (Toileting Goal 1, OT) goal ongoing  -NATALIIA           Balance Goal 1 (OT)    Activity/Assistive Device (Balance Goal 1, OT) standing, dynamic;walker, rolling   item retrieval, ADL task, simple light HM task  -NATALIIA        [place] Level/Cues Needed (Balance Goal 1, OT) supervision required  -NATALIIA        Time Frame (Balance Goal 1, OT) long term goal (LTG);1 week  -NATALIIA        Progress/Outcomes (Balance Goal 1, OT) goal ongoing  -NATALIIA            Activity Tolerance Goal 1 (OT)    Activity Tolerance Goal 1 (OT) --   ADL/simple IADL task using EC tech post education  -NATALIIA        Activity Level (Endurance Goal 1, OT)  15 min activity;O2 sat >/ equal to 88%   1 rest  -NATALIIA        Time Frame (Activity Tolerance Goal 1, OT) long term goal (LTG);1 week  -NATALIIA        Progress/Outcome (Activity Tolerance Goal 1, OT) goal ongoing  -NATALIIA           Functional Mobility Goal 1 (OT)    Activity/Assistive Device (Functional Mobility Goal 1, OT) walker, rolling  -NATALIIA        [place] Level/Cues Needed (Functional Mobility Goal 1, OT) supervision required  -NATALIIA        Distance Goal 1 (Functional Mobility, OT) to bathroom and back to bed or recliner  -NATALIIA        Time Frame (Functional Mobility Goal 1, OT) long term goal (LTG);1 week  -NATALIIA        Progress/Outcome (Functional Mobility Goal 1, OT) goal ongoing  -NATALIIA           Living Environment    Home Accessibility wheelchair accessible;tub/shower is not walk in  -NATALIIA          User Key  (r) = Recorded By, (t) = Taken By, (c) = Cosigned By    Initials Name Effective Dates    Yadira Allred, OT 06/08/18 -            Occupational Therapy Education     Title: PT OT SLP Therapies (Active)     Topic: Occupational Therapy (Active)     Point: ADL training (Done)     Description: Instruct learner(s) on proper safety adaptation and remediation techniques during self care or transfers.   Instruct in proper use of assistive devices.   Learning Progress Summary     Learner Status Readiness Method Response Comment Documented by    Patient Done Acceptance E VU role OT, reason for consult, about NELIDA  08/19/18 1351                      User Key     Initials Effective Dates Name Provider Type Discipline     06/08/18 -  Yadira Lundberg, OT Occupational Therapist OT                  OT Recommendation and Plan  Outcome Summary/Treatment Plan (OT)  Anticipated Discharge Disposition (OT): home with home health, home with assist, skilled nursing facility, assisted living facility (nursing home) (pending progress and ability to do IADL task)  Planned Therapy Interventions (OT Eval): activity tolerance training, BADL retraining,  functional balance retraining, strengthening exercise, transfer/mobility retraining, ROM/therapeutic exercise  Therapy Frequency (OT Eval): daily  Plan of Care Review  Plan of Care Reviewed With: patient  Plan of Care Reviewed With: patient  Outcome Summary: OT evaluation completed.  Pt. demonstrating some decrease in strength, balance and activity tolerance with ADL and IADL task.  Pt. appropriate for skilled OT services fo assist with return to PLOF.  Pt. may be able to return with PLOF with assist from family and HH, but would benefit from long-term, but pt. does not appear open yet at this time.  Pending length of stay to progress with therapy may need rehab prior to home.          Outcome Measures     Row Name 08/19/18 1306             How much help from another is currently needed...    Putting on and taking off regular lower body clothing? 3   mult. rest periods needed with all task  -NATALIIA      Bathing (including washing, rinsing, and drying) 3  -NATALIIA      Toileting (which includes using toilet bed pan or urinal) 3  -NATALIIA      Putting on and taking off regular upper body clothing 3  -NATALIIA      Taking care of personal grooming (such as brushing teeth) 4   sitting  -NATALIIA      Eating meals 4  -NATALIIA      Score 20  -NATALIIA         Functional Assessment    Outcome Measure Options AM-PAC 6 Clicks Daily Activity (OT)  -NATALIIA        User Key  (r) = Recorded By, (t) = Taken By, (c) = Cosigned By    Initials Name Provider Type    Yadira Allred, OT Occupational Therapist          Time Calculation:   OT Start Time: 1306  Therapy Suggested Charges     Code   Minutes Charges    88968 (CPT®) Hc Ot Neuromusc Re Education Ea 15 Min      88970 (CPT®) Hc Ot Ther Proc Ea 15 Min      59996 (CPT®) Hc Ot Therapeutic Act Ea 15 Min 3     55277 (CPT®) Hc Ot Manual Therapy Ea 15 Min      79949 (CPT®) Hc Ot Iontophoresis Ea 15 Min      37811 (CPT®) Hc Ot Elec Stim Ea-Per 15 Min      96864 (CPT®) Hc Ot Ultrasound Ea 15 Min      50040 (CPT®) Hc Ot Self  Care/Mgmt/Train Ea 15 Min 2     Total  5         Therapy Charges for Today     Code Description Service Date Service Provider Modifiers Qty    [id number] HC OT EVAL LOW COMPLEXITY 4 8/19/2018 Yadira Lundberg, OT GO 1               Yadira Lundberg OT  8/19/2018

## 2018-08-19 NOTE — THERAPY EVALUATION
Acute Care - Physical Therapy Initial Evaluation  [facility]     Patient Name: Janet Pisano  : 1945  MRN: [id number]  Today's Date: 2018   Onset of Illness/Injury or Date of Surgery: 18  Date of Referral to PT: 18  Referring Physician: MD Augie      Admit Date: 2018    Visit Dx:     ICD-10-CM ICD-9-CM   1. Acute on chronic respiratory failure with hypoxia and hypercapnia (CMS/[facility]) J96.21 518.84    J96.22 786.09     799.02   2. Pleural effusion J90 511.9   3. Acute on chronic congestive heart failure, unspecified congestive heart failure type (CMS/[facility]) I50.9 428.0   4. Positive D dimer R79.89 790.92   5. COPD with acute exacerbation (CMS/[facility]) J44.1 491.21   6. Pulmonary hypertension I27.20 416.8   7. Impaired mobility and ADLs Z74.09 799.89   8. Impaired functional mobility, balance, gait, and endurance Z74.09 V49.89     Patient Active Problem List   Diagnosis   • Anxiety   • Benign essential hypertension   • Chronic obstructive pulmonary disease in former smoker (CMS/[facility])   • Uncontrolled type 2 diabetes mellitus. Hemoglobin A1c 9.4 (CMS/[facility])   • Gastroesophageal reflux disease   • Hypothyroidism   • Insomnia   • Left carotid artery stenosis   • History of myocardial infarction   • Dyslipidemia   • History of rheumatic fever   • Coronary artery disease   • Iron deficiency anemia   • Morbid obesity with BMI of 40.0-44.9, adult (CMS/[facility])   • Acute on chronic mixed hypoxemic/hypercapnic respiratory failure (CMS/[facility])   • Acute on chronic diastolic congestive heart failure (CMS/[facility])   • Obesity hypoventilation syndrome (CMS/[facility])   • Pulmonary hypertension. Calculated RVSP 64 mmHg by echocardiogram 18     Past Medical History:   Diagnosis Date   • Arthritis    • Bilateral carpal tunnel syndrome 2017   • Bradycardia 2016   • COPD exacerbation (CMS/[facility])    • Coronary artery disease 2016   • Depression    • Diabetes mellitus (CMS/[facility])    • Diverticulosis 2016   •  Dyslipidemia 9/7/2016   • H/O esophageal ulcer    • History of myocardial infarction 9/7/2016    Questionable history of myocardial infarction: Remote cardiac catheterization at [facility] in [place], incomplete database.  Reported stress test 2-3 years ago, negative per patient report, incomplete database.    • History of rheumatic fever 9/7/2016   • Hypertension    • Hypothyroidism    • Migraine 8/22/2016   • Rheumatic fever    • Ventral hernia 9/7/2016   • Vitamin D deficiency 6/20/2016     Past Surgical History:   Procedure Laterality Date   • CARDIAC CATHETERIZATION     • CARPAL TUNNEL RELEASE     • CATARACT EXTRACTION, BILATERAL     • COLONOSCOPY     • ESOPHAGUS SURGERY      hole repair   • HERNIA REPAIR      ventral   • TUBAL ABDOMINAL LIGATION  1982        PT ASSESSMENT (last 12 hours)      Physical Therapy Evaluation     Row Name 08/19/18 6965          PT Evaluation Time/Intention    Subjective Information complains of;fatigue  -LS     Document Type evaluation  -LS     Patient Effort good  -LS     Symptoms Noted During/After Treatment shortness of breath  -LS     Comment Noted O2 desat to 85% on 3L O2 with gait.   -     Row Name 08/19/18 5451          General Information    Patient Profile Reviewed? yes  -LS     Onset of Illness/Injury or Date of Surgery 08/16/18  -     Referring Physician MD Augie  -     Patient Observations alert;cooperative;agree to therapy  -     Prior Level of Function independent:;gait;transfer;ADL's;bathing;dressing;mod assist:;home management  -     Equipment Currently Used at Home rollator;grab bar;oxygen;bath bench;commode, bedside;cane, straight;wheelchair  -     Row Name 08/19/18 3855          Relationship/Environment    Lives With alone  -     Row Name 08/19/18 9924          Resource/Environmental Concerns    Current Living Arrangements home/apartment/condo  -     Resource/Environmental Concerns none  -     Row Name 08/19/18 0022           Cognitive Assessment/Interventions    Additional Documentation Cognitive Assessment/Intervention (Group)  -LS     Row Name 08/19/18 1359          Cognitive Assessment/Intervention- PT/OT    Affect/Mental Status (Cognitive) WFL  -LS     Orientation Status (Cognition) oriented x 4  -LS     Follows Commands (Cognition) WFL  -LS     Safety Deficit (Cognitive) mild deficit;insight into deficits/self awareness  -LS     Row Name 08/19/18 1359          Safety Issues, Functional Mobility    Impairments Affecting Function (Mobility) endurance/activity tolerance;shortness of breath;strength  -LS     Row Name 08/19/18 1359          Bed Mobility Assessment/Treatment    Comment (Bed Mobility) UIC  -     Row Name 08/19/18 1359          Transfer Assessment/Treatment    Transfer Assessment/Treatment sit-stand transfer;stand-sit transfer  -     Sit-Stand [place] (Transfers) supervision;verbal cues  -     Stand-Sit [place] (Transfers) supervision;verbal cues  -     Row Name 08/19/18 1359          Sit-Stand Transfer    Assistive Device (Sit-Stand Transfers) walker, front-wheeled  -LS     Row Name 08/19/18 1359          Stand-Sit Transfer    Assistive Device (Stand-Sit Transfers) walker, front-wheeled  -LS     Row Name 08/19/18 1359          Gait/Stairs Assessment/Training    Gait/Stairs Assessment/Training gait/ambulation assistive device  -     [place] Level (Gait) contact guard  -     Assistive Device (Gait) walker, front-wheeled  -LS     Distance in Feet (Gait) 230  -LS     Deviations/Abnormal Patterns (Gait) stride length decreased;jesus decreased  -LS     Bilateral Gait Deviations forward flexed posture  -LS     Comment (Gait/Stairs) 2 brief standing rest breaks due to noted O2 desat. VC's for PLB and posture.   -LS     Row Name 08/19/18 1359          General ROM    GENERAL ROM COMMENTS WFL BLE  -LS     Row Name 08/19/18 1359          General Assessment (Manual Muscle Testing)    Comment, General  Manual Muscle Testing (MMT) Assessment BLEs grossly 4-/5  -     Row Name 08/19/18 1359          Motor Assessment/Intervention    Additional Documentation Balance (Group);Therapeutic Exercise (Group)  -     Row Name 08/19/18 1359          Therapeutic Exercise    Therapeutic Exercise seated, lower extremities  -     Additional Documentation Therapeutic Exercise (Row)  -     62150 - PT Therapeutic Exercise Minutes 3  -     07881 - PT Therapeutic Activity Minutes 6  -     Row Name 08/19/18 1359          Lower Extremity Seated Therapeutic Exercise    Performed, Seated Lower Extremity (Therapeutic Exercise) hip flexion/extension;LAQ (long arc quad), knee extension;ankle dorsiflexion/plantarflexion  -     Exercise Type, Seated Lower Extremity (Therapeutic Exercise) AROM (active range of motion)  -     Sets/Reps Detail, Seated Lower Extremity (Therapeutic Exercise) 1/10  -[facility] Name 08/19/18 1359          Balance    Balance static sitting balance;static standing balance  -[facility] Name 08/19/18 1359          Static Sitting Balance    Level of [place] (Unsupported Sitting, Static Balance) supervision  -     Sitting Position (Unsupported Sitting, Static Balance) sitting in chair  -[facility] Name 08/19/18 1359          Static Standing Balance    Level of [place] (Supported Standing, Static Balance) supervision  -     Assistive Device Utilized (Supported Standing, Static Balance) rolling walker  -[facility] Name 08/19/18 1359          Sensory Assessment/Intervention    Sensory General Assessment no sensation deficits identified   BLEs  -[facility] Name 08/19/18 1359          Pain Scale: Numbers Pre/Post-Treatment    Pain Scale: Numbers, Pretreatment 0/10 - no pain  -     Row Name 08/19/18 1359          Plan of Care Review    Plan of Care Reviewed With patient;son  -[facility] Name 08/19/18 1359          Physical Therapy Clinical Impression    Date of Referral to PT 08/18/18  -     PT  Diagnosis (PT Clinical Impression) impaired functional mobility, gait  -LS     Patient/Family Goals Statement (PT Clinical Impression) return to PLOF; go home  -LS     Criteria for Skilled Interventions Met (PT Clinical Impression) yes;treatment indicated  -LS     Rehab Potential (PT Clinical Summary) good, to achieve stated therapy goals  -LS     Care Plan Review (PT) evaluation/treatment results reviewed  -LS     Care Plan Review, Other Participant (PT Clinical Impression) son  -     Row Name 08/19/18 1359          Vital Signs    Pre Systolic BP Rehab 157  -LS     Pre Treatment Diastolic BP 61  -LS     Pretreatment Heart Rate (beats/min) 70  -LS     Posttreatment Heart Rate (beats/min) 72  -LS     Pre SpO2 (%) 94  -LS     O2 Delivery Pre Treatment supplemental O2  -LS     Intra SpO2 (%) 85  -LS     O2 Delivery Intra Treatment supplemental O2  -LS     Post SpO2 (%) 90  -LS     O2 Delivery Post Treatment supplemental O2  -LS     Pre Patient Position Sitting  -LS     Intra Patient Position Standing  -LS     Post Patient Position Sitting  -LS     Row Name 08/19/18 3490          Physical Therapy Goals    Bed Mobility Goal Selection (PT) bed mobility, PT goal 1  -LS     Transfer Goal Selection (PT) transfer, PT goal 1  -LS     Gait Training Goal Selection (PT) gait training, PT goal 1  -     Row Name 08/19/18 3646          Bed Mobility Goal 1 (PT)    Activity/Assistive Device (Bed Mobility Goal 1, PT) sit to supine/supine to sit  -LS     [place] Level/Cues Needed (Bed Mobility Goal 1, PT) conditional independence  -LS     Time Frame (Bed Mobility Goal 1, PT) 2 weeks  -LS     Progress/Outcomes (Bed Mobility Goal 1, PT) goal ongoing  -     Row Name 08/19/18 9789          Transfer Goal 1 (PT)    Activity/Assistive Device (Transfer Goal 1, PT) sit-to-stand/stand-to-sit;walker, rolling  -LS     [place] Level/Cues Needed (Transfer Goal 1, PT) conditional independence  -LS     Time Frame (Transfer Goal 1, PT) 2  weeks  -LS     Progress/Outcome (Transfer Goal 1, PT) goal ongoing  -LS     Row Name 08/19/18 1359          Gait Training Goal 1 (PT)    Activity/Assistive Device (Gait Training Goal 1, PT) gait (walking locomotion);walker, rolling  -LS     [place] Level (Gait Training Goal 1, PT) conditional independence  -LS     Distance (Gait Goal 1, PT) 300  -LS     Time Frame (Gait Training Goal 1, PT) 2 weeks  -LS     Progress/Outcome (Gait Training Goal 1, PT) goal ongoing  -LS     Row Name 08/19/18 1352          Positioning and Restraints    Pre-Treatment Position sitting in chair/recliner  -LS     Post Treatment Position chair  -LS     In Chair notified nsg;reclined;call light within reach;encouraged to call for assist;exit alarm on;with family/caregiver;RUE elevated;LUE elevated;waffle cushion;legs elevated;heels elevated  -LS       User Key  (r) = Recorded By, (t) = Taken By, (c) = Cosigned By    Initials Name Provider Type    Ashley Wiley, PT Physical Therapist          Physical Therapy Education     Title: PT OT SLP Therapies (Active)     Topic: Physical Therapy (Active)     Point: Mobility training (Active)    Learning Progress Summary     Learner Status Readiness Method Response Comment Documented by    Patient Active Acceptance E,D NR  LS 08/19/18 1536    Family Active Acceptance E,D NR  LS 08/19/18 1536          Point: Home exercise program (Active)    Learning Progress Summary     Learner Status Readiness Method Response Comment Documented by    Patient Active Acceptance E,D NR  LS 08/19/18 1536    Family Active Acceptance E,D NR  LS 08/19/18 1536          Point: Body mechanics (Active)    Learning Progress Summary     Learner Status Readiness Method Response Comment Documented by    Patient Active Acceptance E,D NR  LS 08/19/18 1536    Family Active Acceptance E,D NR  LS 08/19/18 1536          Point: Precautions (Active)    Learning Progress Summary     Learner Status Readiness Method Response Comment  Documented by    Patient Active Acceptance E,D NR   08/19/18 1536    Family Active Acceptance E,D NR   08/19/18 1536                      User Key     Initials Effective Dates Name Provider Type Discipline     06/19/15 -  Ashley Harrell, PT Physical Therapist PT                PT Recommendation and Plan  Anticipated Discharge Disposition (PT): home with home health, home with assist (vs transition to assisted living per family decisions)  Planned Therapy Interventions (PT Eval): balance training, bed mobility training, gait training, home exercise program, patient/family education, strengthening, transfer training  Therapy Frequency (PT Clinical Impression): daily  Outcome Summary/Treatment Plan (PT)  Anticipated Discharge Disposition (PT): home with home health, home with assist (vs transition to assisted living per family decisions)  Plan of Care Reviewed With: patient, son  Outcome Summary: PT evaluation completed. Pt demonstrates generalized weakness and decreased  indep re: functional mobility, warranting further skilled PT services to promote PLOF and safe d/c. Able to ambulate 230 ft with RWx and CGA today but noted desat to 85%. Recommend d/c home with assist and HHPT; may benefit from assisted living pending social support and family decisions.           Outcome Measures     Row Name 08/19/18 1359 08/19/18 1306          How much help from another person do you currently need...    Turning from your back to your side while in flat bed without using bedrails? 3  -LS  --     Moving from lying on back to sitting on the side of a flat bed without bedrails? 3  -LS  --     Moving to and from a bed to a chair (including a wheelchair)? 3  -LS  --     Standing up from a chair using your arms (e.g., wheelchair, bedside chair)? 3  -LS  --     Climbing 3-5 steps with a railing? 2  -LS  --     To walk in hospital room? 3  -LS  --     AM-PAC 6 Clicks Score 17  -LS  --        How much help from another is currently  needed...    Putting on and taking off regular lower body clothing?  -- 3   mult. rest periods needed with all task  -NATALIIA     Bathing (including washing, rinsing, and drying)  -- 3  -NATALIIA     Toileting (which includes using toilet bed pan or urinal)  -- 3  -NATALIIA     Putting on and taking off regular upper body clothing  -- 3  -NATALIIA     Taking care of personal grooming (such as brushing teeth)  -- 4   sitting  -NATALIIA     Eating meals  -- 4  -NATALIIA     Score  -- 20  -NATALIIA        Functional Assessment    Outcome Measure Options AM-PAC 6 Clicks Basic Mobility (PT)  -LS AM-PAC 6 Clicks Daily Activity (OT)  -NATALIIA       User Key  (r) = Recorded By, (t) = Taken By, (c) = Cosigned By    Initials Name Provider Type    Yadira Allred, OT Occupational Therapist    LS Ashley Harrell, PT Physical Therapist           Time Calculation:         PT Charges     Row Name 08/19/18 1359             Time Calculation    Start Time 1359  -      PT Received On 08/19/18  -      PT Goal Re-Cert Due Date 08/29/18  -         Time Calculation- PT    Total Timed Code Minutes- PT 9 minute(s)  -         Timed Charges    15469 - PT Therapeutic Exercise Minutes 3  -LS      37039 - PT Therapeutic Activity Minutes 6  -LS        User Key  (r) = Recorded By, (t) = Taken By, (c) = Cosigned By    Initials Name Provider Type    Ashley Wiley, PT Physical Therapist        Therapy Suggested Charges     Code   Minutes Charges    15081 (CPT®)  Pt Neuromusc Re Education Ea 15 Min      24503 (CPT®) Hc Pt Ther Proc Ea 15 Min 3     97465 (CPT®) Hc Gait Training Ea 15 Min      20966 (CPT®) Hc Pt Therapeutic Act Ea 15 Min 6 1    67299 (CPT®) Hc Pt Manual Therapy Ea 15 Min      97098 (CPT®) Hc Pt Iontophoresis Ea 15 Min      60328 (CPT®) Hc Pt Elec Stim Ea-Per 15 Min      44100 (CPT®) Hc Pt Ultrasound Ea 15 Min      43088 (CPT®) Hc Pt Self Care/Mgmt/Train Ea 15 Min      08921 (CPT®) Hc Pt Prosthetic (S) Train Initial Encounter, Each 15 Min      85450 (CPT®) Hc Pt  Orthotic(S)/Prosthetic(S) Encounter, Each 15 Min      33687 (CPT®) Hc Orthotic(S) Mgmt/Train Initial Encounter, Each 15min      Total  9 1        Therapy Charges for Today     Code Description Service Date Service Provider Modifiers Qty    91005654565 HC PT EVAL MOD COMPLEXITY 4 8/19/2018 Ashley Hrarell, PT GP 1    14517610558 HC PT THERAPEUTIC ACT EA 15 MIN 8/19/2018 Ashley Harrell, PT GP 1          PT G-Codes  Outcome Measure Options: AM-PAC 6 Clicks Basic Mobility (PT)      Ashley Harrell, PT  8/19/2018

## 2018-08-19 NOTE — PLAN OF CARE
Problem: Patient Care Overview  Goal: Plan of Care Review  Outcome: Ongoing (interventions implemented as appropriate)   08/19/18 0073   Coping/Psychosocial   Plan of Care Reviewed With patient   Plan of Care Review   Progress improving   OTHER   Outcome Summary Ambulated in [place] with PT. Hyperglycemia continues, 14 units plus added 10 units given before dinner d/t FSBG in 400s, but pt had recently been eating saltines. VSS. Mag replaced. Potentially moving to floor soon.        Problem: Fall Risk (Adult)  Goal: Absence of Fall  Outcome: Ongoing (interventions implemented as appropriate)      Problem: Breathing Pattern Ineffective (Adult)  Goal: Effective Oxygenation/Ventilation  Outcome: Ongoing (interventions implemented as appropriate)    Goal: Anxiety/Fear Reduction  Outcome: Ongoing (interventions implemented as appropriate)      Problem: Skin Injury Risk (Adult)  Goal: Skin Health and Integrity  Outcome: Ongoing (interventions implemented as appropriate)      Problem: NPPV/CPAP (Adult)  Goal: Signs and Symptoms of Listed Potential Problems Will be Absent, Minimized or Managed (NPPV/CPAP)  Outcome: Ongoing (interventions implemented as appropriate)

## 2018-08-19 NOTE — PLAN OF CARE
Problem: Patient Care Overview  Goal: Plan of Care Review  Outcome: Ongoing (interventions implemented as appropriate)   08/19/18 0350   Coping/Psychosocial   Plan of Care Reviewed With patient   Plan of Care Review   Progress improving   OTHER   Outcome Summary Weaned to 3L NC, Pt remains very anxious at times. PRN atarax given. Complains of intermittent abdominal and hand cramping. K level 3.8. Pt's SBP occasionally up to 180 when anxious, but improves to 120-150s at rest. Good uop after lasix (1L + 2 XL occurrences unable to measure due to mixed with stool). 3 loose mod-large BMs. Pt's hs FSBS 481. Insulin orders adjusted and decreasing steroids. Pt refused ABG this am.        Problem: Fall Risk (Adult)  Goal: Absence of Fall  Outcome: Ongoing (interventions implemented as appropriate)      Problem: Breathing Pattern Ineffective (Adult)  Goal: Effective Oxygenation/Ventilation  Outcome: Ongoing (interventions implemented as appropriate)    Goal: Anxiety/Fear Reduction  Outcome: Ongoing (interventions implemented as appropriate)      Problem: Skin Injury Risk (Adult)  Goal: Skin Health and Integrity  Outcome: Ongoing (interventions implemented as appropriate)

## 2018-08-19 NOTE — PLAN OF CARE
Problem: Patient Care Overview  Goal: Plan of Care Review  Outcome: Ongoing (interventions implemented as appropriate)   08/19/18 0130   Coping/Psychosocial   Plan of Care Reviewed With patient;son   OTHER   Outcome Summary PT evaluation completed. Pt demonstrates generalized weakness and decreased indep re: functional mobility, warranting further skilled PT services to promote PLOF and safe d/c. Able to ambulate 230 ft with RWx and CGA today but noted desat to 85%. Recommend d/c home with assist and HHPT; may benefit from assisted living pending social support and family decisions.

## 2018-08-19 NOTE — PROGRESS NOTES
Continued Stay Note  [facility]     Patient Name: Janet Pisano  MRN: [id number]  Today's Date: 8/19/2018    Admit Date: 8/16/2018          Discharge Plan     Row Name 08/19/18 0140       Plan    Plan Comments Spoke to pt's son Aníbal via phone. He had questions regarding assisted living and long term care. He is concerned that his mom is having difficulty taking care of herself and may require help. He is interested in a life alert necklace for his mom at d/c and HH with [facility]. Per previous CM note, pt is already current with [facility]. Pt will need resume HH order upon d/c. Will cont to follow.               Discharge Codes    No documentation.       Expected Discharge Date and Time     Expected Discharge Date Expected Discharge Time    Aug 20, 2018             Hailee Savage

## 2018-08-20 ENCOUNTER — APPOINTMENT (OUTPATIENT)
Dept: GENERAL RADIOLOGY | Facility: HOSPITAL | Age: 73
End: 2018-08-20

## 2018-08-20 LAB
ANION GAP SERPL CALCULATED.3IONS-SCNC: 7 MMOL/L (ref 3–11)
BASOPHILS # BLD AUTO: 0.01 10*3/MM3 (ref 0–0.2)
BASOPHILS NFR BLD AUTO: 0.1 % (ref 0–1)
BUN BLD-MCNC: 22 MG/DL (ref 9–23)
BUN/CREAT SERPL: 22.2 (ref 7–25)
CALCIUM SPEC-SCNC: 9.2 MG/DL (ref 8.7–10.4)
CHLORIDE SERPL-SCNC: 92 MMOL/L (ref 99–109)
CO2 SERPL-SCNC: 36 MMOL/L (ref 20–31)
CREAT BLD-MCNC: 0.99 MG/DL (ref 0.6–1.3)
DEPRECATED RDW RBC AUTO: 52.2 FL (ref 37–54)
EOSINOPHIL # BLD AUTO: 0.07 10*3/MM3 (ref 0–0.3)
EOSINOPHIL NFR BLD AUTO: 0.9 % (ref 0–3)
ERYTHROCYTE [DISTWIDTH] IN BLOOD BY AUTOMATED COUNT: 14.8 % (ref 11.3–14.5)
GFR SERPL CREATININE-BSD FRML MDRD: 55 ML/MIN/1.73
GLUCOSE BLD-MCNC: 261 MG/DL (ref 70–100)
GLUCOSE BLDC GLUCOMTR-MCNC: 191 MG/DL (ref 70–130)
GLUCOSE BLDC GLUCOMTR-MCNC: 212 MG/DL (ref 70–130)
GLUCOSE BLDC GLUCOMTR-MCNC: 270 MG/DL (ref 70–130)
GLUCOSE BLDC GLUCOMTR-MCNC: 325 MG/DL (ref 70–130)
HCT VFR BLD AUTO: 42.1 % (ref 34.5–44)
HGB BLD-MCNC: 13 G/DL (ref 11.5–15.5)
IMM GRANULOCYTES # BLD: 0.01 10*3/MM3 (ref 0–0.03)
IMM GRANULOCYTES NFR BLD: 0.1 % (ref 0–0.6)
LYMPHOCYTES # BLD AUTO: 0.86 10*3/MM3 (ref 0.6–4.8)
LYMPHOCYTES NFR BLD AUTO: 10.5 % (ref 24–44)
MAGNESIUM SERPL-MCNC: 2.2 MG/DL (ref 1.3–2.7)
MCH RBC QN AUTO: 29.7 PG (ref 27–31)
MCHC RBC AUTO-ENTMCNC: 30.9 G/DL (ref 32–36)
MCV RBC AUTO: 96.1 FL (ref 80–99)
MONOCYTES # BLD AUTO: 0.57 10*3/MM3 (ref 0–1)
MONOCYTES NFR BLD AUTO: 7 % (ref 0–12)
NEUTROPHILS # BLD AUTO: 6.66 10*3/MM3 (ref 1.5–8.3)
NEUTROPHILS NFR BLD AUTO: 81.5 % (ref 41–71)
PHOSPHATE SERPL-MCNC: 4.9 MG/DL (ref 2.4–5.1)
PLATELET # BLD AUTO: 258 10*3/MM3 (ref 150–450)
PMV BLD AUTO: 11.6 FL (ref 6–12)
POTASSIUM BLD-SCNC: 4.5 MMOL/L (ref 3.5–5.5)
RBC # BLD AUTO: 4.38 10*6/MM3 (ref 3.89–5.14)
SODIUM BLD-SCNC: 135 MMOL/L (ref 132–146)
WBC NRBC COR # BLD: 8.17 10*3/MM3 (ref 3.5–10.8)

## 2018-08-20 PROCEDURE — 82962 GLUCOSE BLOOD TEST: CPT

## 2018-08-20 PROCEDURE — 63710000001 INSULIN DETEMIR PER 5 UNITS: Performed by: INTERNAL MEDICINE

## 2018-08-20 PROCEDURE — 83735 ASSAY OF MAGNESIUM: CPT | Performed by: INTERNAL MEDICINE

## 2018-08-20 PROCEDURE — 71046 X-RAY EXAM CHEST 2 VIEWS: CPT

## 2018-08-20 PROCEDURE — 94760 N-INVAS EAR/PLS OXIMETRY 1: CPT

## 2018-08-20 PROCEDURE — 84100 ASSAY OF PHOSPHORUS: CPT | Performed by: INTERNAL MEDICINE

## 2018-08-20 PROCEDURE — 94799 UNLISTED PULMONARY SVC/PX: CPT

## 2018-08-20 PROCEDURE — 25010000002 ENOXAPARIN PER 10 MG: Performed by: INTERNAL MEDICINE

## 2018-08-20 PROCEDURE — 80048 BASIC METABOLIC PNL TOTAL CA: CPT | Performed by: INTERNAL MEDICINE

## 2018-08-20 PROCEDURE — 99233 SBSQ HOSP IP/OBS HIGH 50: CPT | Performed by: INTERNAL MEDICINE

## 2018-08-20 PROCEDURE — 63710000001 PREDNISONE PER 5 MG: Performed by: INTERNAL MEDICINE

## 2018-08-20 PROCEDURE — 85025 COMPLETE CBC W/AUTO DIFF WBC: CPT | Performed by: INTERNAL MEDICINE

## 2018-08-20 PROCEDURE — 25010000002 CEFTRIAXONE PER 250 MG: Performed by: INTERNAL MEDICINE

## 2018-08-20 RX ORDER — LOSARTAN POTASSIUM 25 MG/1
25 TABLET ORAL
Status: DISCONTINUED | OUTPATIENT
Start: 2018-08-20 | End: 2018-08-22 | Stop reason: HOSPADM

## 2018-08-20 RX ADMIN — CEFTRIAXONE SODIUM 1 G: 1 INJECTION, SOLUTION INTRAVENOUS at 21:19

## 2018-08-20 RX ADMIN — HYDROXYZINE HYDROCHLORIDE 25 MG: 25 TABLET, FILM COATED ORAL at 09:45

## 2018-08-20 RX ADMIN — INSULIN DETEMIR 20 UNITS: 100 INJECTION, SOLUTION SUBCUTANEOUS at 08:00

## 2018-08-20 RX ADMIN — HYDROXYZINE HYDROCHLORIDE 25 MG: 25 TABLET, FILM COATED ORAL at 01:01

## 2018-08-20 RX ADMIN — IPRATROPIUM BROMIDE AND ALBUTEROL SULFATE 3 ML: 2.5; .5 SOLUTION RESPIRATORY (INHALATION) at 16:06

## 2018-08-20 RX ADMIN — LOSARTAN POTASSIUM 25 MG: 25 TABLET, FILM COATED ORAL at 11:42

## 2018-08-20 RX ADMIN — IPRATROPIUM BROMIDE AND ALBUTEROL SULFATE 3 ML: 2.5; .5 SOLUTION RESPIRATORY (INHALATION) at 20:29

## 2018-08-20 RX ADMIN — IPRATROPIUM BROMIDE AND ALBUTEROL SULFATE 3 ML: 2.5; .5 SOLUTION RESPIRATORY (INHALATION) at 07:38

## 2018-08-20 RX ADMIN — INSULIN LISPRO 5 UNITS: 100 INJECTION, SOLUTION INTRAVENOUS; SUBCUTANEOUS at 11:43

## 2018-08-20 RX ADMIN — POTASSIUM CHLORIDE 30 MEQ: 750 CAPSULE, EXTENDED RELEASE ORAL at 07:51

## 2018-08-20 RX ADMIN — FUROSEMIDE 40 MG: 40 TABLET ORAL at 08:00

## 2018-08-20 RX ADMIN — ENOXAPARIN SODIUM 40 MG: 40 INJECTION SUBCUTANEOUS at 17:27

## 2018-08-20 RX ADMIN — INSULIN DETEMIR 20 UNITS: 100 INJECTION, SOLUTION SUBCUTANEOUS at 21:25

## 2018-08-20 RX ADMIN — INSULIN LISPRO 8 UNITS: 100 INJECTION, SOLUTION INTRAVENOUS; SUBCUTANEOUS at 17:24

## 2018-08-20 RX ADMIN — AZITHROMYCIN 500 MG: 250 TABLET, FILM COATED ORAL at 08:00

## 2018-08-20 RX ADMIN — INSULIN LISPRO 3 UNITS: 100 INJECTION, SOLUTION INTRAVENOUS; SUBCUTANEOUS at 07:51

## 2018-08-20 RX ADMIN — PANTOPRAZOLE SODIUM 40 MG: 40 TABLET, DELAYED RELEASE ORAL at 06:13

## 2018-08-20 RX ADMIN — INSULIN LISPRO 10 UNITS: 100 INJECTION, SOLUTION INTRAVENOUS; SUBCUTANEOUS at 21:25

## 2018-08-20 RX ADMIN — PREDNISONE 10 MG: 10 TABLET ORAL at 07:51

## 2018-08-20 NOTE — PROGRESS NOTES
Intensivist Note     8/19/2018  Hospital Day: 3  * No surgery found *      Ms. Janet Pisano, 73 y.o. female is followed for:  Principal Problem:    Acute on chronic mixed hypoxemic/hypercapnic respiratory failure (CMS/HCC)  Active Problems:    Chronic obstructive pulmonary disease in former smoker (CMS/HCC)    Morbid obesity with BMI of 40.0-44.9, adult (CMS/HCC)    Obesity hypoventilation syndrome (CMS/HCC)    Pulmonary hypertension. Calculated RVSP 64 mmHg by echocardiogram 8/17/18    Acute on chronic diastolic congestive heart failure (CMS/HCC)    Benign essential hypertension    Uncontrolled type 2 diabetes mellitus. Hemoglobin A1c 9.4 (CMS/HCC)    Gastroesophageal reflux disease    Hypothyroidism    Iron deficiency anemia       SUBJECTIVE     73-year-old really obese white female with a long history of cigarette abuse (57 pack years). She carries a history of chronic mixed hypoxic/hypercarbic respiratory failure which is multifactorial and is followed by pulmonary medicine in [place]. Her chronic respiratory failure is multifactorial and related to morbid obesity and obesity hypoventilation syndrome, suspected obstructive sleep apnea, and severe COPD. These problems are severe enough to have caused severe pulmonary hypertension (RVSP by echocardiogram calculated at 64 mmHg this admission). She has been hospitalized on 2 prior occasions this year in [place] (once in April, and more recently was discharged 8/2/18 for exacerbations of COPD). She is on chronic home oxygen at 3 L and is quite limited with respect to her activity level. Additional problems include her morbid obesity, poorly controlled diabetes mellitus, coronary artery disease with a history of disease in the right coronary but no records to suggest previous intervention. She also has GERD, hypothyroidism on replacement, iron deficiency anemia, and suspected RLS although she has never had a sleep study.      On 8/16/18 her home health nurse  noted a marked decrease in her oxygen saturations down to 69%. She had had no fever or chills, but did have a non-purulent dry cough. She denied however any hemoptysis and was not having exertional left chest pain, or pleuritic pain. She did however have some lower extremity edema. Chest x-ray suggested some congestive changes and groundglass changes and interstitial thickening suggesting some degree of volume overload. In addition BNP was elevated at 649 but troponins were negative. EKG just suggested some T-wave inversion inferiorly.      Patient initially required high FiO2 via high flow nasal oxygen. This improved and ABGs subsequently revealed some improvement since admission with PCO2 dropping from 72, to 64 as of 8/18/18. Over the last 24 hours gas exchange has continued to improve such that she is now on 3-4 L of standard nasal oxygen. Chest x-ray and CT scan as noted initially suggested volume overload and she continues to diurese nicely (now net negative approximately 4 L. Today (8/19/18) she feels much improved and is sitting up in a chair with much less dyspnea. In addition her chest x-ray continues to clear with almost complete resolution of bibasilar congestive changes and pleural effusions. She continues on empiric antibiotics with Rocephin and Zithromax although her there has been no documentation of a consolidative infiltrate, fever, or leukocytosis.     Blood sugars increased dramatically shortly after admission due to steroids. I decreased this to a minimal amount of Solu-Medrol but she still has blood glucoses in the 400s today. It appears we are dramatically under dosing her insulin.     is here today and mentions that she has a great deal of problems with RLS and has been told that she has an iron deficiency anemia in the past. He also think she has sleep apnea but she is never had a study.       The patient's relevant past medical, surgical and social history were reviewed and updated  "in Epic as appropriate.    OBJECTIVE     /70 (BP Location: Right arm, Patient Position: Lying)   Pulse 71   Temp 97.5 °F (36.4 °C) (Axillary)   Resp 18   Ht 162.6 cm (64\")   Wt 95.5 kg (210 lb 8.6 oz)   SpO2 95%   BMI 36.14 kg/m²   Flow (L/min): 3    Flowsheet Rows      First Filed Value   Admission Height  154.9 cm (61\") Documented at 08/16/2018 1503   Admission Weight  97.1 kg (214 lb) Documented at 08/16/2018 1503        Intake & Output (last day)     Intake: 1220 mL                   Output: 2525 mL      Exam:  General Exam:  Morbidly obese short white female sitting up in a chair in NAD  HEENT: Pupils equal and reactive. Nose and throat clear.  Neck:                          Supple, no JVD, thyromegaly, or adenopathy  Lungs: Clear without wheezes rales or rhonchi. Diminished breath sounds  Cardiovascular: RRR without murmurs or gallops. Diminished S1 and S2  Abdomen: Soft nontender without organomegaly or masses. Morbidly obese   and rectal: Deferred.  Extremities: No cyanosis or clubbing and only trace lower extremity edema.  Neurologic:                 Symmetric strength. No focal deficits.    Chest X-Ray: Dramatic improvement in congestive changes and bibasilar infiltrates/effusions      Results from last 7 days  Lab Units 08/19/18  0521 08/18/18  0359 08/17/18  0422   WBC 10*3/mm3 8.36 10.25 7.30   HEMOGLOBIN g/dL 11.8 11.4* 11.4*   HEMATOCRIT % 37.1 38.5 37.9   PLATELETS 10*3/mm3 219 240 193       Results from last 7 days  Lab Units 08/19/18  0521 08/18/18  2353 08/18/18  0359   SODIUM mmol/L 141  --  143   POTASSIUM mmol/L 4.1 3.8 3.7   CHLORIDE mmol/L 93*  --  98*   CO2 mmol/L 39.0*  --  37.0*   BUN mg/dL 22  --  21   CREATININE mg/dL 0.88  --  0.80   GLUCOSE mg/dL 116*  --  78   CALCIUM mg/dL 8.8  --  8.7       Results from last 7 days  Lab Units 08/19/18  0521 08/18/18  0359 08/17/18  0422   MAGNESIUM mg/dL 1.9 2.2 1.6   PHOSPHORUS mg/dL 4.4 4.2  --        Results from last 7 days  Lab " Units 08/19/18  0521 08/18/18  0359 08/16/18  1522   ALK PHOS U/L 61 55 63   BILIRUBIN mg/dL 0.7 0.6 0.8   ALT (SGPT) U/L 21 23 29   AST (SGOT) U/L 17 16 26       Lab Results   Component Value Date    SEDRATE 8 08/19/2018     Lab Results   Component Value Date    .0 (H) 08/19/2018     Lab Results   Component Value Date    CKTOTAL 48 08/17/2018    TROPONINI 0.05 12/25/2015     Lab Results   Component Value Date    TSH 3.153 12/07/2017     Lab Results   Component Value Date    LACTATE 1.2 08/16/2018     Lab Results   Component Value Date    CORTISOL 39.3 12/25/2015         Results from last 7 days  Lab Units 08/18/18  0355 08/17/18  0431 08/16/18  1603   PH, ARTERIAL pH units 7.435 7.322* 7.306*   PCO2, ARTERIAL mm Hg 64.7* 69.1* 72.6*   PO2 ART mm Hg 60.2* 103.0 122.0*   HCO3 ART mmol/L 43.4* 35.7* 36.2*   FIO2 % 45 35 80         I reviewed the patient's results, images and medication.    Assessment/Plan   ASSESSMENT      Principal Problem:    Acute on chronic mixed hypoxemic/hypercapnic respiratory failure (CMS/HCC)  Active Problems:    Chronic obstructive pulmonary disease in former smoker (CMS/HCC)    Morbid obesity with BMI of 40.0-44.9, adult (CMS/HCC)    Obesity hypoventilation syndrome (CMS/HCC)    Pulmonary hypertension. Calculated RVSP 64 mmHg by echocardiogram 8/17/18    Acute on chronic diastolic congestive heart failure (CMS/HCC)    Benign essential hypertension    Uncontrolled type 2 diabetes mellitus. Hemoglobin A1c 9.4 (CMS/HCC)    Gastroesophageal reflux disease    Hypothyroidism    Iron deficiency anemia      DISCUSSION: In reviewing the entire case the thing that has improved her status the most is a significant diuresis with resolution of what looks like congestive heart failure. Echocardiogram however shows normal LV systolic function and she did however have a BNP of 649 which with diuresis has dropped to 239 so I think were dealing with diastolic congestive heart failure.    PLAN     1.  Tight control of her hypertension and regular diuresis to control her diastolic congestive heart failure  2. This will go a long way in improving her underlying pulmonary status and I think we will gradually see a drop in her PCO2  3. Of course none of this will have any lasting effect if she resumes smoking  4. I think there is also component of morbid obesity causing obesity hypoventilation syndrome.(Will have dietitian see for advice on weight loss)  5. She would be a candidate for a pulmonary rehabilitation program and can ask her about this tomorrow.  6. She needs a sleep study scheduled upon discharge. I think she would benefit from BiPAP for her chronic hypercarbic respiratory failure and even more so if she truly has obstructive sleep apnea.  7. I think she is improved enough to be transferred to telemetry and will last the hospitalist to assume her care      Plan of care and goals reviewed with mulitdisciplinary team at daily rounds.    I discussed the patient's findings and my recommendations with patient, family and nursing staff    Time spent Critical care 35 min (It does not include procedure time).    Walker Ghosh MD  Intensive Care Medicine  08/19/18 9:30 PM

## 2018-08-20 NOTE — PLAN OF CARE
Problem: Patient Care Overview  Goal: Plan of Care Review  Outcome: Ongoing (interventions implemented as appropriate)   08/20/18 2254   Coping/Psychosocial   Plan of Care Reviewed With patient   Plan of Care Review   Progress improving   OTHER   Outcome Summary htn at times, 3lnc, ambulating well with stanby assist up to chair/bsc, eating well, good uop, anxiety @ x's, ordered to tele waiting for bed

## 2018-08-20 NOTE — PROGRESS NOTES
Multidisciplinary Rounds    Time: 20min  Patient Name: Janet Pisano  Date of Encounter: 08/20/18 9:18 AM  MRN: [id number]  Admission date: 8/16/2018      Reason for visit: MDR. RD to continue to follow per protocol.     Additional information obtained during MDR: 95% PO intake of past 5 documented meals. Insulin being adjusted. Consult received for weight loss education. DM education provided on Friday (8/17), which includes weight loss component. Will follow per protocol.     Current diet: Diet Regular; Consistent Carbohydrate      Intervention:  Follow treatment plan  Care plan reviewed    Follow up:   Per protocol      Jael Martinez MS RD/LD CNSC  9:18 AM

## 2018-08-20 NOTE — PROGRESS NOTES
INTENSIVIST   PROGRESS NOTE     Hospital:  LOS: 4 days     Ms. Janet Pisano, 73 y.o. female is followed for a Chief Complaint of: Respiratory Failure, Diastolic Heart Failure, COPD, OHS      Subjective   S   Ms. Pisano is a 73-year-old obese white female with a long history of cigarette abuse (57 pack years). She carries a history of chronic mixed hypoxic/hypercarbic respiratory failure which is multifactorial and is followed by pulmonary medicine in [place]. Her chronic respiratory failure is multifactorial and related to morbid obesity and obesity hypoventilation syndrome, suspected obstructive sleep apnea, and severe COPD. These problems are severe enough to have caused severe pulmonary hypertension (RVSP by echocardiogram calculated at 64 mmHg this admission). She has been hospitalized on 2 prior occasions this year in [place] (once in April, and more recently was discharged 8/2/18 for exacerbations of COPD). She is on chronic home oxygen at 3 L and is quite limited with respect to her activity level.       On 8/16/18 her home health nurse noted a marked decrease in her oxygen saturations down to 69%. She had had no fever or chills, but did have a non-purulent dry cough. She denied however any hemoptysis and was not having exertional left chest pain, or pleuritic pain. She did however have some lower extremity edema. Chest x-ray suggested some congestive changes and groundglass changes and interstitial thickening suggesting some degree of volume overload. In addition BNP was elevated at 649 but troponins were negative. EKG just suggested some T-wave inversion inferiorly.      Patient initially required high FiO2 via high flow nasal oxygen. This improved and ABGs subsequently revealed some improvement since admission with PCO2 dropping from 72, to 64 as of 8/18/18. Over the last 24 hours gas exchange has continued to improve such that she is now on 3-4 L of standard nasal oxygen.      Interval  History:  No events overnight. She did not wear Bipap last night as she did not think that she needed it.        The patient's relevant past medical, surgical and social history were reviewed and updated in Epic as appropriate.      ROS:   Constitutional: Negative for fever.   Respiratory: Negative for dyspnea.   Cardiovascular: Negative for chest pain.   Gastrointestinal: Negative for  nausea, vomiting and diarrhea.     Objective   O     Vitals:  Temp  Min: 97.5 °F (36.4 °C)  Max: 98.4 °F (36.9 °C)  BP  Min: 125/78  Max: 171/78  Pulse  Min: 58  Max: 91  Resp  Min: 16  Max: 22  SpO2  Min: 83 %  Max: 98 % Flow (L/min)  Min: 2  Max: 3    Intake/Ouptut 24 hrs (7:00AM - 6:59 AM)  Intake & Output (last 3 days)       08/17 0701 - 08/18 0700 08/18 0701 - 08/19 0700 08/19 0701 - 08/20 0700 08/20 0701 - 08/21 0700    P.O. 1200 840 840     I.V. (mL/kg) 533 (5.5) 30 (0.3)      IV Piggyback 350 350 50     Total Intake(mL/kg) 2083 (21.4) 1220 (12.8) 890 (9.6)     Urine (mL/kg/hr) 4425 (1.9) 2525 (1.1) 3025 (1.4)     Total Output 4425 2525 3025      Net -2342 -1305 -2135              Unmeasured Urine Occurrence 5 x 6 x 2 x 2 x    Unmeasured Stool Occurrence 2 x 7 x 4 x 2 x          Medications (drips):    sodium chloride 0.9 % with KCl 20 mEq Last Rate: 10 mL/hr (08/17/18 1000)       Physical Examination  Telemetry:  Normal sinus rhythm.    Constitutional:  No acute distress.  Sitting up in a chair.    Cardiovascular: Normal rate, regular and rhythm. Normal heart sounds.  No murmurs, gallop or rub.   Respiratory: No respiratory distress. Normal respiratory effort.  Diminished bilaterally.   No wheezing.     Abdominal:  Soft. No masses. Non-tender. No distension. No HSM.   Extremities: No digital cyanosis. No clubbing.  Trace peripheral edema.   Neurological:   Alert and Oriented to person, place, and time.                Results from last 7 days  Lab Units 08/20/18  0426 08/19/18  0521 08/18/18  0359   WBC 10*3/mm3 8.17 8.36  10.25   HEMOGLOBIN g/dL 13.0 11.8 11.4*   MCV fL 96.1 96.9 99.2*   PLATELETS 10*3/mm3 258 219 240       Results from last 7 days  Lab Units 08/20/18  0505 08/19/18  0521 08/18/18  2353 08/18/18  0359   SODIUM mmol/L 135 141  --  143   POTASSIUM mmol/L 4.5 4.1 3.8 3.7   CO2 mmol/L 36.0* 39.0*  --  37.0*   CREATININE mg/dL 0.99 0.88  --  0.80   GLUCOSE mg/dL 261* 116*  --  78   MAGNESIUM mg/dL 2.2 1.9  --  2.2   PHOSPHORUS mg/dL 4.9 4.4  --  4.2     Estimated Creatinine Clearance: 54.9 mL/min (by C-G formula based on SCr of 0.99 mg/dL).    Results from last 7 days  Lab Units 08/19/18  0521 08/18/18  0359 08/16/18  1522   ALK PHOS U/L 61 55 63   BILIRUBIN mg/dL 0.7 0.6 0.8   ALT (SGPT) U/L 21 23 29   AST (SGOT) U/L 17 16 26         Results from last 7 days  Lab Units 08/18/18  0355 08/17/18  0431 08/16/18  1603   PH, ARTERIAL pH units 7.435 7.322* 7.306*   PCO2, ARTERIAL mm Hg 64.7* 69.1* 72.6*   PO2 ART mm Hg 60.2* 103.0 122.0*   FIO2 % 45 35 80       Images:  Imaging Results (last 24 hours)     Procedure Component Value Units Date/Time    XR Chest PA & Lateral [[id number]] Collected:  08/20/18 0918     Updated:  08/20/18 1004    Narrative:       EXAMINATION: XR CHEST PA AND LATERAL-      INDICATION: J96.21-Acute and chronic respiratory failure with hypoxia;  J96.22-Acute and chronic respiratory failure with hypercapnia;  G95-Waotvxe effusion, not elsewhere classified; I50.9-Heart failure,  unspecified; R79.89-Other specified abnormal findings of blood  chemistry; J44.1-Chronic obstructive pulmonary disease with (acute)  exacerbation.     COMPARISON: 08/19/2018.     FINDINGS: PA and lateral views of the chest reveal heart to be  borderline enlarged. Underlying chronic and emphysematous changes are  seen within the lung fields bilaterally. Prominence of the pulmonary  vascularity. No new focal parenchymal opacification is present.  Degenerative change is seen within the spine.           Impression:       Prominence of  the pulmonary vascularity with mild increased  markings seen at the left lung base with no definite pleural effusion or  pneumothorax. Heart is borderline enlarged.     D:  08/20/2018  E:  08/20/2018     This report was finalized on 8/20/2018 10:02 AM by Dr. Ave Fallon MD.       XR Chest 1 View [[id number]] Collected:  08/19/18 1301     Updated:  08/19/18 2341    Narrative:          EXAMINATION: XR CHEST 1 VW - 8/19/2018     INDICATION: J96.21-Acute and chronic respiratory failure with hypoxia;  J96.22-Acute and chronic respiratory failure with hypercapnia;  V46-Glfshxx effusion, not elsewhere classified; I50.9-Heart failure,  unspecified; R79.89-Other specified abnormal findings of blood  chemistry; J44.1-Chronic obstructive pulmonary disease with (acute)  exacerbation; I27.20-Pulmonary hypertension, unspecified.      COMPARISON: 8/18/2018.     FINDINGS: Heart and vasculature remain borderline enlarged. No overt  pulmonary edema is seen. There is minimal left basilar discoid  atelectasis or scarring. No new pulmonary parenchymal disease, effusion,  or pneumothorax is seen.           Impression:       Cardiomegaly and pulmonary venous hypertension. No new chest  disease seen.     DICTATED:   8/19/2018  EDITED/ls :   8/19/2018      This report was finalized on 8/19/2018 11:38 PM by DR. Jamil Vaughan MD.               Results: Reviewed.  I reviewed the patient's new laboratory and imaging results.  I independently reviewed the patient's new images.    Medications: Reviewed.    Assessment/Plan   A / P     Ms. Pisano is a 74yo F who was admitted with acute on chronic respiratory failure. She likely has undiagnosed sleep apnea with obesity hypoventilation syndrome in the setting of diastolic heart failure and severe COPD. She remains hypertensive. She is not on an ACE or ARB and she is unsure what her allergy was to these medications. She has not been using Bipap at night while here.     Nutrition:   Diet Regular;  Consistent Carbohydrate  Advance Directives:   Code Status and Medical Interventions:   Ordered at: 08/16/18 2150     Limited Support to NOT Include:    Blood Products     Level Of Support Discussed With:    Next of Kin (If No Surrogate)     Code Status:    No CPR     Medical Interventions (Level of Support Prior to Arrest):    Limited     Comments:    Agree with mechanical ventilation for the time being, however would like to discuss prior to intubation if situation allows.  The son has a copy of her living will.       Hospital Problem List     * (Principal)Acute on chronic mixed hypoxemic/hypercapnic respiratory failure (CMS/HCC)    Hypertension and diastolic dysfunction    Chronic obstructive pulmonary disease in former smoker (CMS/[facility])    Uncontrolled type 2 diabetes mellitus. Hemoglobin A1c 9.4 (CMS/[facility])    Gastroesophageal reflux disease    Hypothyroidism    Iron deficiency anemia    Morbid obesity with BMI of 40.0-44.9, adult (CMS/[facility])    Acute on chronic diastolic congestive heart failure (CMS/[facility])    Obesity hypoventilation syndrome (CMS/[facility])    Pulmonary hypertension. Calculated RVSP 64 mmHg by echocardiogram 8/17/18          Assessment / Plan:    1. Continue Bipap at night. I have counseled her that she needs to wear this every night. She needs to be discharged with Bipap for chronic respiratory failure and needs a sleep study as an outpatient.   2. Start a low-dose ARB in the setting of diabetes and hypertension. Monitor for side effects.   3. Continue Lasix 40mg IV daily  4. Adjust insulin  5. Finish Azithromycin today. 2 days left of Rocephin  6. Complete Prednisone x 5 days.   7. AM labs  8. Okay to transfer to telemetry when a bed is available.     Plan of care and goals reviewed during interdisciplinary rounds.  I discussed the patient's findings and my recommendations with patient and nursing staff    Time: was greater than 35 minutes.      Jessica Alvarez, DO    Intensive Care Medicine and Pulmonary  Medicine

## 2018-08-20 NOTE — PLAN OF CARE
Problem: Patient Care Overview  Goal: Plan of Care Review  Outcome: Ongoing (interventions implemented as appropriate)   08/20/18 1613   Coping/Psychosocial   Plan of Care Reviewed With patient   Plan of Care Review   Progress improving   OTHER   Outcome Summary patient remains on 3l nc, fall precaution in place, patients o2 dropping while asleep encouraged patient to use bipap naps and at bed time        Problem: Fall Risk (Adult)  Goal: Identify Related Risk Factors and Signs and Symptoms  Outcome: Ongoing (interventions implemented as appropriate)   08/20/18 1613   Fall Risk (Adult)   Related Risk Factors (Fall Risk) gait/mobility problems;sleep pattern alteration;environment unfamiliar   Signs and Symptoms (Fall Risk) presence of risk factors     Goal: Absence of Fall  Outcome: Ongoing (interventions implemented as appropriate)   08/20/18 1613   Fall Risk (Adult)   Absence of Fall making progress toward outcome       Problem: Skin Injury Risk (Adult)  Goal: Skin Health and Integrity  Outcome: Ongoing (interventions implemented as appropriate)   08/20/18 1613   Skin Injury Risk (Adult)   Skin Health and Integrity making progress toward outcome

## 2018-08-21 LAB
GLUCOSE BLDC GLUCOMTR-MCNC: 173 MG/DL (ref 70–130)
GLUCOSE BLDC GLUCOMTR-MCNC: 219 MG/DL (ref 70–130)
GLUCOSE BLDC GLUCOMTR-MCNC: 338 MG/DL (ref 70–130)
GLUCOSE BLDC GLUCOMTR-MCNC: 362 MG/DL (ref 70–130)

## 2018-08-21 PROCEDURE — 63710000001 INSULIN LISPRO (HUMAN) PER 5 UNITS: Performed by: NURSE PRACTITIONER

## 2018-08-21 PROCEDURE — 94799 UNLISTED PULMONARY SVC/PX: CPT

## 2018-08-21 PROCEDURE — 99233 SBSQ HOSP IP/OBS HIGH 50: CPT | Performed by: INTERNAL MEDICINE

## 2018-08-21 PROCEDURE — 97535 SELF CARE MNGMENT TRAINING: CPT | Performed by: OCCUPATIONAL THERAPIST

## 2018-08-21 PROCEDURE — 63710000001 PREDNISONE PER 5 MG: Performed by: INTERNAL MEDICINE

## 2018-08-21 PROCEDURE — 97530 THERAPEUTIC ACTIVITIES: CPT

## 2018-08-21 PROCEDURE — 63710000001 INSULIN DETEMIR PER 5 UNITS: Performed by: INTERNAL MEDICINE

## 2018-08-21 PROCEDURE — 94640 AIRWAY INHALATION TREATMENT: CPT

## 2018-08-21 PROCEDURE — 25010000002 CEFTRIAXONE PER 250 MG: Performed by: INTERNAL MEDICINE

## 2018-08-21 PROCEDURE — 82962 GLUCOSE BLOOD TEST: CPT

## 2018-08-21 PROCEDURE — 25010000002 ENOXAPARIN PER 10 MG: Performed by: INTERNAL MEDICINE

## 2018-08-21 PROCEDURE — 63710000001 INSULIN LISPRO (HUMAN) PER 5 UNITS: Performed by: INTERNAL MEDICINE

## 2018-08-21 PROCEDURE — 97116 GAIT TRAINING THERAPY: CPT

## 2018-08-21 RX ADMIN — HYDROXYZINE HYDROCHLORIDE 25 MG: 25 TABLET, FILM COATED ORAL at 22:39

## 2018-08-21 RX ADMIN — CEFTRIAXONE SODIUM 1 G: 1 INJECTION, SOLUTION INTRAVENOUS at 17:42

## 2018-08-21 RX ADMIN — LOSARTAN POTASSIUM 25 MG: 25 TABLET, FILM COATED ORAL at 08:06

## 2018-08-21 RX ADMIN — INSULIN DETEMIR 25 UNITS: 100 INJECTION, SOLUTION SUBCUTANEOUS at 22:09

## 2018-08-21 RX ADMIN — PANTOPRAZOLE SODIUM 40 MG: 40 TABLET, DELAYED RELEASE ORAL at 06:29

## 2018-08-21 RX ADMIN — FUROSEMIDE 40 MG: 40 TABLET ORAL at 08:06

## 2018-08-21 RX ADMIN — HYDROXYZINE HYDROCHLORIDE 25 MG: 25 TABLET, FILM COATED ORAL at 01:10

## 2018-08-21 RX ADMIN — ENOXAPARIN SODIUM 40 MG: 40 INJECTION SUBCUTANEOUS at 17:42

## 2018-08-21 RX ADMIN — INSULIN LISPRO 5 UNITS: 100 INJECTION, SOLUTION INTRAVENOUS; SUBCUTANEOUS at 22:09

## 2018-08-21 RX ADMIN — INSULIN LISPRO 3 UNITS: 100 INJECTION, SOLUTION INTRAVENOUS; SUBCUTANEOUS at 08:07

## 2018-08-21 RX ADMIN — IPRATROPIUM BROMIDE AND ALBUTEROL SULFATE 3 ML: 2.5; .5 SOLUTION RESPIRATORY (INHALATION) at 12:50

## 2018-08-21 RX ADMIN — POTASSIUM CHLORIDE 30 MEQ: 750 CAPSULE, EXTENDED RELEASE ORAL at 08:06

## 2018-08-21 RX ADMIN — POTASSIUM CHLORIDE 30 MEQ: 750 CAPSULE, EXTENDED RELEASE ORAL at 17:42

## 2018-08-21 RX ADMIN — INSULIN DETEMIR 20 UNITS: 100 INJECTION, SOLUTION SUBCUTANEOUS at 08:07

## 2018-08-21 RX ADMIN — INSULIN LISPRO 10 UNITS: 100 INJECTION, SOLUTION INTRAVENOUS; SUBCUTANEOUS at 11:30

## 2018-08-21 RX ADMIN — ACETAMINOPHEN 650 MG: 325 TABLET ORAL at 22:39

## 2018-08-21 RX ADMIN — INSULIN LISPRO 10 UNITS: 100 INJECTION, SOLUTION INTRAVENOUS; SUBCUTANEOUS at 16:46

## 2018-08-21 RX ADMIN — INSULIN LISPRO 12 UNITS: 100 INJECTION, SOLUTION INTRAVENOUS; SUBCUTANEOUS at 16:47

## 2018-08-21 RX ADMIN — IPRATROPIUM BROMIDE AND ALBUTEROL SULFATE 3 ML: 2.5; .5 SOLUTION RESPIRATORY (INHALATION) at 07:42

## 2018-08-21 RX ADMIN — IPRATROPIUM BROMIDE AND ALBUTEROL SULFATE 3 ML: 2.5; .5 SOLUTION RESPIRATORY (INHALATION) at 20:16

## 2018-08-21 RX ADMIN — PREDNISONE 10 MG: 10 TABLET ORAL at 08:06

## 2018-08-21 NOTE — THERAPY TREATMENT NOTE
Acute Care - Physical Therapy Treatment Note  [facility]     Patient Name: Janet Pisano  : 1945  MRN: [id number]  Today's Date: 2018  Onset of Illness/Injury or Date of Surgery: 18  Date of Referral to PT: 18  Referring Physician: MD Augie    Admit Date: 2018    Visit Dx:    ICD-10-CM ICD-9-CM   1. Acute on chronic respiratory failure with hypoxia and hypercapnia (CMS/[facility]) J96.21 518.84    J96.22 786.09     799.02   2. Pleural effusion J90 511.9   3. Acute on chronic congestive heart failure, unspecified congestive heart failure type (CMS/[facility]) I50.9 428.0   4. Positive D dimer R79.89 790.92   5. COPD with acute exacerbation (CMS/[facility]) J44.1 491.21   6. Pulmonary hypertension I27.20 416.8   7. Impaired mobility and ADLs Z74.09 799.89   8. Impaired functional mobility, balance, gait, and endurance Z74.09 V49.89     Patient Active Problem List   Diagnosis   • Anxiety   • Hypertension and diastolic dysfunction   • Chronic obstructive pulmonary disease in former smoker (CMS/[facility])   • Uncontrolled type 2 diabetes mellitus. Hemoglobin A1c 9.4 (CMS/[facility])   • Gastroesophageal reflux disease   • Hypothyroidism   • Insomnia   • Left carotid artery stenosis   • History of myocardial infarction   • Dyslipidemia   • History of rheumatic fever   • Coronary artery disease   • Iron deficiency anemia   • Morbid obesity with BMI of 40.0-44.9, adult (CMS/[facility])   • Acute on chronic mixed hypoxemic/hypercapnic respiratory failure (CMS/[facility])   • Acute on chronic diastolic congestive heart failure (CMS/[facility])   • Obesity hypoventilation syndrome (CMS/[facility])   • Pulmonary hypertension. Calculated RVSP 64 mmHg by echocardiogram 18       Therapy Treatment          Rehabilitation Treatment Summary     Row Name 18 1430             Treatment Time/Intention    Discipline physical therapist  -NATALIIA      Document Type therapy note (daily note)  -NATALIIA      Subjective Information no complaints;weakness;fatigue  -NATALIIA       Care Plan Review care plan/treatment goals reviewed;risks/benefits reviewed;patient/other agree to care plan  -NATALIIA      Therapy Frequency (PT Clinical Impression) daily  -NATALIIA      Patient Effort good  -NATALIIA      Comment drop in O2 with ambulation  -NATALIIA      Existing Precautions/Restrictions oxygen therapy device and L/min  -NATALIIA      Treatment Considerations/Comments watch sats with activity  -NATALIIA      Recorded by [NATALIIA] Tanya Sahni, PT 08/21/18 1615      Row Name 08/21/18 1430             Vital Signs    Pre Systolic BP Rehab 134  -NATALIIA      Pre Treatment Diastolic BP 90  -NATALIIA      Pretreatment Heart Rate (beats/min) 80  -NATALIIA      Posttreatment Heart Rate (beats/min) 88  -NATALIIA      Pre SpO2 (%) 93  -NATALIIA      O2 Delivery Pre Treatment nasal cannula  -NATALIIA      Intra SpO2 (%) 85  -NATALIIA      O2 Delivery Intra Treatment supplemental O2  -NATALIIA      Post SpO2 (%) 90  -NATALIIA      O2 Delivery Post Treatment supplemental O2  -NATALIIA      Pre Patient Position Sitting  -NATALIIA      Intra Patient Position Standing  -NATALIIA      Post Patient Position Sitting  -NATALIIA      Recorded by [NATALIIA] Tanya Sahni, PT 08/21/18 1615      Row Name 08/21/18 1430             Cognitive Assessment/Intervention- PT/OT    Affect/Mental Status (Cognitive) WFL  -NATALIIA      Orientation Status (Cognition) oriented x 4  -NATALIIA      Follows Commands (Cognition) WFL  -NATALIIA      Safety Deficit (Cognitive) mild deficit;safety precautions awareness;safety precautions follow-through/compliance  -NATALIIA      Recorded by [NATALIIA] Tanya Sahni, PT 08/21/18 1615      Row Name 08/21/18 1430             Safety Issues, Functional Mobility    Impairments Affecting Function (Mobility) endurance/activity tolerance;shortness of breath  -NATALIIA      Recorded by [NATALIIA] Tanya Sahni, PT 08/21/18 1615      Row Name 08/21/18 1430             Bed Mobility Assessment/Treatment    Comment (Bed Mobility) patient is OOB and returns to the chair  -NATALIIA      Recorded by [NATALIIA] Tanya Sahni, PT 08/21/18 1615      Row  Name 08/21/18 1430             Transfer Assessment/Treatment    Transfer Assessment/Treatment toilet transfer  -NATALIIA      Recorded by [NATALIIA] Tanya Sahni, PT 08/21/18 1615      Row Name 08/21/18 1430             Sit-Stand Transfer    Sit-Stand [place] (Transfers) supervision  -NATALIIA      Assistive Device (Sit-Stand Transfers) walker, front-wheeled  -NATALIIA      Recorded by [NATALIIA] Tanya Sahni, PT 08/21/18 1615      Row Name 08/21/18 1430             Stand-Sit Transfer    Stand-Sit [place] (Transfers) supervision  -NATALIIA      Assistive Device (Stand-Sit Transfers) walker, front-wheeled  -NATALIIA      Recorded by [NATALIIA] Tanya Sahni, PT 08/21/18 1615      Row Name 08/21/18 1430             Toilet Transfer    Type (Toilet Transfer) sit-stand;stand-sit  -NATALIIA      [place] Level (Toilet Transfer) supervision  -NATALIIA      Assistive Device (Toilet Transfer) walker, 4-wheeled  -NATALIIA      Recorded by [NATALIIA] Tanya Sahni, PT 08/21/18 1615      Row Name 08/21/18 1430             Gait/Stairs Assessment/Training    15142 - Gait Training Minutes  15  -NATALIIA      Gait/Stairs Assessment/Training gait/ambulation independence  -NATALIIA      [place] Level (Gait) contact guard;1 person to manage equipment  -NATALIIA      Assistive Device (Gait) walker, front-wheeled  -NATALIIA      Distance in Feet (Gait) 320  -NATALIIA      Deviations/Abnormal Patterns (Gait) stride length decreased  -NATALIIA      Bilateral Gait Deviations forward flexed posture  -NATALIIA      Comment (Gait/Stairs) patient required one sitting rest and 2 standing rest due to inc SOA O2 sats dropped to 85% patient required 1 min to recover  -NATALIIA      Recorded by [NATALIIA] Tanya Sahni, PT 08/21/18 1615      Row Name 08/21/18 1430             Therapeutic Exercise    95136 - PT Therapeutic Activity Minutes 10  -NATALIIA      Recorded by [NATALIIA] Tanya Sahni, PT 08/21/18 1615      Row Name 08/21/18 1430             Static Sitting Balance    Level of [place] (Unsupported Sitting, Static  Balance) independent  -NATALIIA      Sitting Position (Unsupported Sitting, Static Balance) sitting on edge of bed  -NATALIIA      Recorded by [NATALIIA] Tanya Sahni, PT 08/21/18 1615      Row Name 08/21/18 1430             Static Standing Balance    Level of [place] (Supported Standing, Static Balance) supervision  -NATALIIA      Assistive Device Utilized (Supported Standing, Static Balance) rolling walker  -NATALIIA      Recorded by [NATALIIA] Tanya Sahni, PT 08/21/18 1615      Row Name 08/21/18 1430             Positioning and Restraints    Pre-Treatment Position sitting in chair/recliner  -NATALIIA      Post Treatment Position chair  -NATALIIA      In Chair notified nsg;reclined;sitting;call light within reach  -NATALIIA      Recorded by [NATALIIA] Tanya Sahni, PT 08/21/18 1615      Row Name 08/21/18 1430             Pain Scale: Numbers Pre/Post-Treatment    Pain Scale: Numbers, Pretreatment 0/10 - no pain  -NATALIIA      Pain Scale: Numbers, Post-Treatment 0/10 - no pain  -NATALIIA      Recorded by [NATALIIA] Tanya Sahni, PT 08/21/18 1615      Row Name 08/21/18 1430             Coping    Observed Emotional State calm;cooperative  -NATALIIA      Verbalized Emotional State acceptance  -NATALIIA      Recorded by [NATALIIA] Tanya Sahni, PT 08/21/18 1615      Row Name 08/21/18 1430             Outcome Summary/Treatment Plan (PT)    Daily Summary of Progress (PT) progress toward functional goals is good  -NATALIIA      Barriers to Overall Progress (PT) resp status patient has drop in sats with activity  -NATALIIA      Anticipated Discharge Disposition (PT) home with home health  -NATALIIA      Recorded by [NATALIIA] Tanya Sahni, PT 08/21/18 1615        User Key  (r) = Recorded By, (t) = Taken By, (c) = Cosigned By    Initials Name Effective Dates Discipline    NATALIIA Tanya Sahni, PT 06/19/15 -  PT                     Physical Therapy Education     Title: PT OT SLP Therapies (Active)     Topic: Physical Therapy (Active)     Point: Mobility training (Active)    Learning Progress  Summary     Learner Status Readiness Method Response Comment Documented by    Patient Active Acceptance E NR  NATALIIA 08/21/18 1430     Active Acceptance E,D NR  LS 08/19/18 1536    Family Active Acceptance E,D NR  LS 08/19/18 1536          Point: Home exercise program (Active)    Learning Progress Summary     Learner Status Readiness Method Response Comment Documented by    Patient Active Acceptance E NR  NATALIIA 08/21/18 1430     Active Acceptance E,D NR  LS 08/19/18 1536    Family Active Acceptance E,D NR  LS 08/19/18 1536          Point: Body mechanics (Active)    Learning Progress Summary     Learner Status Readiness Method Response Comment Documented by    Patient Active Acceptance E NR  NATALIIA 08/21/18 1430     Active Acceptance E,D NR  LS 08/19/18 1536    Family Active Acceptance E,D NR  LS 08/19/18 1536          Point: Precautions (Active)    Learning Progress Summary     Learner Status Readiness Method Response Comment Documented by    Patient Active Acceptance E NR  NATALIIA 08/21/18 1430     Active Acceptance E,D NR   08/19/18 1536    Family Active Acceptance E,D NR   08/19/18 1536                      User Key     Initials Effective Dates Name Provider Type Discipline     06/19/15 -  Tanya Sahni, PT Physical Therapist PT     06/19/15 -  Ashley Harrell, PT Physical Therapist PT                    PT Recommendation and Plan  Anticipated Discharge Disposition (PT): home with home health  Therapy Frequency (PT Clinical Impression): daily  Outcome Summary/Treatment Plan (PT)  Daily Summary of Progress (PT): progress toward functional goals is good  Barriers to Overall Progress (PT): resp status patient has drop in sats with activity  Anticipated Discharge Disposition (PT): home with home health  Progress: improving  Outcome Summary: patient is able to ambulate 320 ft with walker on 3L O2. patient has drop in sats to 85% and requires 1 min to recover sats back to 90+%          Outcome Measures     Row Name  08/21/18 1430 08/19/18 1359 08/19/18 1306       How much help from another person do you currently need...    Turning from your back to your side while in flat bed without using bedrails? 4  -NATALIIA 3  -LS  --    Moving from lying on back to sitting on the side of a flat bed without bedrails? 3  -NATALIIA 3  -LS  --    Moving to and from a bed to a chair (including a wheelchair)? 3  -NATALIIA 3  -LS  --    Standing up from a chair using your arms (e.g., wheelchair, bedside chair)? 3  -NATALIIA 3  -LS  --    Climbing 3-5 steps with a railing? 2  -NATALIIA 2  -LS  --    To walk in hospital room? 3  -NATALIIA 3  -LS  --    AM-PAC 6 Clicks Score 18  -NATALIIA 17  -LS  --       How much help from another is currently needed...    Putting on and taking off regular lower body clothing?  --  -- 3   mult. rest periods needed with all task  -JBA    Bathing (including washing, rinsing, and drying)  --  -- 3  -JBA    Toileting (which includes using toilet bed pan or urinal)  --  -- 3  -JBA    Putting on and taking off regular upper body clothing  --  -- 3  -JBA    Taking care of personal grooming (such as brushing teeth)  --  -- 4   sitting  -JBA    Eating meals  --  -- 4  -JBA    Score  --  -- 20  -JBA       Functional Assessment    Outcome Measure Options  -- AM-PAC 6 Clicks Basic Mobility (PT)  -LS AM-PAC 6 Clicks Daily Activity (OT)  -JBA      User Key  (r) = Recorded By, (t) = Taken By, (c) = Cosigned By    Initials Name Provider Type    NATALIIA Tayna Sahni, PT Physical Therapist    Yadira Thao, OT Occupational Therapist    Ashley Wiley, PT Physical Therapist           Time Calculation:         PT Charges     Row Name 08/21/18 1430             Time Calculation    Start Time 1430  -NATALIIA      PT Received On 08/21/18  -      PT Goal Re-Cert Due Date 08/29/18  -         Time Calculation- PT    Total Timed Code Minutes- PT 25 minute(s)  -NATALIIA         Timed Charges    74419 - Gait Training Minutes  15  -NATALIIA      29116 - PT Therapeutic Activity Minutes 10   -NATALIIA        User Key  (r) = Recorded By, (t) = Taken By, (c) = Cosigned By    Initials Name Provider Type    Tanya Jenkins, PT Physical Therapist        Therapy Suggested Charges     Code   Minutes Charges    79789 (CPT®) Hc Pt Neuromusc Re Education Ea 15 Min      07705 (CPT®) Hc Pt Ther Proc Ea 15 Min      53668 (CPT®) Hc Gait Training Ea 15 Min 15 1    23701 (CPT®) Hc Pt Therapeutic Act Ea 15 Min 10 1    07344 (CPT®) Hc Pt Manual Therapy Ea 15 Min      67459 (CPT®) Hc Pt Iontophoresis Ea 15 Min      24579 (CPT®) Hc Pt Elec Stim Ea-Per 15 Min      34529 (CPT®) Hc Pt Ultrasound Ea 15 Min      01279 (CPT®) Hc Pt Self Care/Mgmt/Train Ea 15 Min      57915 (CPT®) Hc Pt Prosthetic (S) Train Initial Encounter, Each 15 Min      70596 (CPT®) Hc Pt Orthotic(S)/Prosthetic(S) Encounter, Each 15 Min      91470 (CPT®) Hc Orthotic(S) Mgmt/Train Initial Encounter, Each 15min      Total  25 2        Therapy Charges for Today     Code Description Service Date Service Provider Modifiers Qty    74239315068 HC GAIT TRAINING EA 15 MIN 8/21/2018 Tanya Sahni, PT GP 1    73229688343 HC PT THERAPEUTIC ACT EA 15 MIN 8/21/2018 Tanya Sahni, PT GP 1    93207777826 HC PT THER SUPP EA 15 MIN 8/21/2018 Tanya Sahni, PT GP 2          PT G-Codes  Outcome Measure Options: AM-PAC 6 Clicks Basic Mobility (PT)    Tanya Sahni, PT  8/21/2018

## 2018-08-21 NOTE — PLAN OF CARE
Problem: Patient Care Overview  Goal: Plan of Care Review  Outcome: Ongoing (interventions implemented as appropriate)   08/21/18 1280   Plan of Care Review   Progress improving   OTHER   Outcome Summary patient is able to ambulate 320 ft with walker on 3L O2. patient has drop in sats to 85% and requires 1 min to recover sats back to 90+%

## 2018-08-21 NOTE — PLAN OF CARE
Problem: Patient Care Overview  Goal: Plan of Care Review  Outcome: Ongoing (interventions implemented as appropriate)   08/21/18 6234   Coping/Psychosocial   Plan of Care Reviewed With patient   Plan of Care Review   Progress no change   OTHER   Outcome Summary Pt alewrt, OX4 and declined transfer training. Issed AE to assist with ADLS and reviewed EC/WS.

## 2018-08-21 NOTE — PROGRESS NOTES
Continued Stay Note  [facility]     Patient Name: Janet Pisano  MRN: [id number]  Today's Date: 8/21/2018    Admit Date: 8/16/2018          Discharge Plan     Row Name 08/21/18 1521       Plan    Plan home with home health    Patient/Family in Agreement with Plan yes    Plan Comments LISA met with pt. at bedside; no family was present.  Pt. has home O2 through [employer].  Pt. stated she has a portable tank at home that family will be able to bring at time of discharge.  Per hospitalist, pt. will need to get outpatient sleep study completed.  Pt. has requested assistance with her hospital bill.  With pt's permission, LISA called the  Financial Counseling office to request a financial assistance packet be mailed to pt.  Pt. also has requested home health services be resumed at discharge; she is active with [facility].  LISA has called Brendon with  HH to notify of mid-week discharge.    Final Discharge Disposition Code 06 - home with home health care              Discharge Codes    No documentation.       Expected Discharge Date and Time     Expected Discharge Date Expected Discharge Time    Aug 25, 2018             JEANINE Roque

## 2018-08-21 NOTE — PLAN OF CARE
Problem: Patient Care Overview  Goal: Plan of Care Review  Outcome: Ongoing (interventions implemented as appropriate)   08/21/18 0202   Coping/Psychosocial   Plan of Care Reviewed With patient   Plan of Care Review   Progress improving       Problem: Fall Risk (Adult)  Goal: Absence of Fall  Outcome: Ongoing (interventions implemented as appropriate)   08/21/18 0202   Fall Risk (Adult)   Absence of Fall making progress toward outcome       Problem: Breathing Pattern Ineffective (Adult)  Goal: Effective Oxygenation/Ventilation  Outcome: Ongoing (interventions implemented as appropriate)   08/21/18 0202   Breathing Pattern Ineffective (Adult)   Effective Oxygenation/Ventilation making progress toward outcome     Goal: Anxiety/Fear Reduction  Outcome: Ongoing (interventions implemented as appropriate)   08/21/18 0202   Breathing Pattern Ineffective (Adult)   Anxiety/Fear Reduction making progress toward outcome

## 2018-08-21 NOTE — PLAN OF CARE
Problem: Patient Care Overview  Goal: Plan of Care Review  Outcome: Ongoing (interventions implemented as appropriate)   08/21/18 1622   Coping/Psychosocial   Plan of Care Reviewed With patient   Plan of Care Review   Progress improving   OTHER   Outcome Summary patient states she is not feeling the best today and is sleepy, patient remains on 3 l nc , fall precautions in place        Problem: Fall Risk (Adult)  Goal: Identify Related Risk Factors and Signs and Symptoms  Outcome: Ongoing (interventions implemented as appropriate)   08/21/18 1622   Fall Risk (Adult)   Related Risk Factors (Fall Risk) gait/mobility problems;environment unfamiliar   Signs and Symptoms (Fall Risk) presence of risk factors     Goal: Absence of Fall  Outcome: Ongoing (interventions implemented as appropriate)   08/21/18 1622   Fall Risk (Adult)   Absence of Fall making progress toward outcome

## 2018-08-21 NOTE — PROGRESS NOTES
[facility] Medicine Services  PROGRESS NOTE    Patient Name: Janet Pisano  : 1945  MRN: [id number]    Date of Admission: 2018  Length of Stay: 5  Primary Care Physician: Jhoan Hdez MD    Subjective   Subjective     CC: f/u respiratory failure    HPI: Patient lying in bed without family at bedside. Says she's sleepy. Still not feeling great. Has not been OOB. No new complaints.     Review of Systems  Gen- No fevers, chills  CV- No chest pain, palpitations  Resp- No cough, dyspnea  GI- No N/V/D, abd pain    Otherwise ROS is negative except as mentioned in the HPI.    Objective   Objective     Vital Signs:   Temp:  [97.4 °F (36.3 °C)-98.2 °F (36.8 °C)] 97.4 °F (36.3 °C)  Heart Rate:  [71-94] 71  Resp:  [18-20] 18  BP: (103-141)/(46-96) 134/96        Physical Exam:  Constitutional: No acute distress, awake, alert, obese, chronically ill appearing  HENT: NCAT, mucous membranes moist  Respiratory: Clear to auscultation bilaterally, respiratory effort normal   Cardiovascular: RRR, no murmurs, rubs, or gallops, palpable pedal pulses bilaterally  Gastrointestinal: Positive bowel sounds, soft, nontender, nondistended  Musculoskeletal: No bilateral ankle edema  Psychiatric: Appropriate affect, cooperative  Neurologic: Oriented x 3, strength symmetric in all extremities, Cranial Nerves grossly intact to confrontation, speech clear  Skin: No rashes    Results Reviewed:  I have personally reviewed current lab, radiology, and data and agree.      Results from last 7 days  Lab Units 18  0426 18  0521 18  0359   WBC 10*3/mm3 8.17 8.36 10.25   HEMOGLOBIN g/dL 13.0 11.8 11.4*   HEMATOCRIT % 42.1 37.1 38.5   PLATELETS 10*3/mm3 258 219 240       Results from last 7 days  Lab Units 18  0505 18  0521 18  2353 18  0359  18  1522   SODIUM mmol/L 135 141  --  143  < > 136   POTASSIUM mmol/L 4.5 4.1 3.8 3.7  < > 4.5   CHLORIDE mmol/L 92* 93*  --  98*   < > 97*   CO2 mmol/L 36.0* 39.0*  --  37.0*  < > 35.0*   BUN mg/dL 22 22  --  21  < > 21   CREATININE mg/dL 0.99 0.88  --  0.80  < > 0.90   GLUCOSE mg/dL 261* 116*  --  78  < > 222*   CALCIUM mg/dL 9.2 8.8  --  8.7  < > 8.7   ALT (SGPT) U/L  --  21  --  23  --  29   AST (SGOT) U/L  --  17  --  16  --  26   < > = values in this interval not displayed.  Estimated Creatinine Clearance: 55.8 mL/min (by C-G formula based on SCr of 0.99 mg/dL).  BNP   Date Value Ref Range Status   08/19/2018 239.0 (H) 0.0 - 100.0 pg/mL Final     Comment:     Results may be falsely decreased if patient taking Biotin.       Microbiology Results Abnormal     None        Personally reviewed CXR without acute airspace disease but with cardiomegaly. Agree with interpretation.     Results for orders placed during the hospital encounter of 08/16/18   Adult Transthoracic Echo Complete W/ Cont if Necessary Per Protocol    Narrative · Left ventricular systolic function is hyperdynamic (EF > 70).  · Left ventricular wall thickness is consistent with borderline concentric   hypertrophy.  · Right atrial cavity size is borderline dilated.  · Mild mitral valve regurgitation is present  · Mild to moderate tricuspid valve regurgitation is present.  · Estimated right ventricular systolic pressure from tricuspid   regurgitation is markedly elevated (>55 mmHg).          I have reviewed the medications.      ceftriaxone 1 g Intravenous Q24H   enoxaparin 40 mg Subcutaneous Q24H   furosemide 40 mg Oral Daily   insulin detemir 25 Units Subcutaneous Q12H   insulin lispro 0-14 Units Subcutaneous 4x Daily With Meals & Nightly   insulin lispro 10 Units Subcutaneous TID With Meals   ipratropium-albuterol 3 mL Nebulization 4x Daily - RT   losartan 25 mg Oral Q24H   pantoprazole 40 mg Oral Q AM   pharmacy consult - MTM  Does not apply Daily   potassium chloride 30 mEq Oral BID With Meals   predniSONE 10 mg Oral Daily With Breakfast   sennosides-docusate sodium 2 tablet  Oral Nightly         Assessment/Plan   Assessment / Plan     Hospital Problem List     * (Principal)Acute on chronic mixed hypoxemic/hypercapnic respiratory failure (CMS/HCC)    Hypertension and diastolic dysfunction    Chronic obstructive pulmonary disease in former smoker (CMS/[facility])    Uncontrolled type 2 diabetes mellitus. Hemoglobin A1c 9.4 (CMS/[facility])    Gastroesophageal reflux disease    Hypothyroidism    Iron deficiency anemia    Morbid obesity with BMI of 40.0-44.9, adult (CMS/[facility])    Acute on chronic diastolic congestive heart failure (CMS/HCC)    Obesity hypoventilation syndrome (CMS/[facility])    Pulmonary hypertension. Calculated RVSP 64 mmHg by echocardiogram 8/17/18          Brief Hospital Course to date:  Janet Pisano is a 73 y.o. female initially admitted to ICU with respiratory failure due to most likely a/c diastolic HF. She was diuresed and transferred to floor 8/21.    Assessment & Plan:  --Continue medical therapy for heart failure with bp control and PO lasix. Continue O2 as needed.   --BP with fair control and tolerating ARB well.  --Increase insulin.   --Ambulate. PT/OT. CM, following. Planning home with h/h.  --Labs in am.    **Dr. Ghosh has recommended outpatient sleep study. Will need to be ordered by PCP.    DVT Prophylaxis:  Lovenox    CODE STATUS:   Code Status and Medical Interventions:   Ordered at: 08/16/18 0530     Limited Support to NOT Include:    Blood Products     Level Of Support Discussed With:    Next of Kin (If No Surrogate)     Code Status:    No CPR     Medical Interventions (Level of Support Prior to Arrest):    Limited     Comments:    Agree with mechanical ventilation for the time being, however would like to discuss prior to intubation if situation allows.  The son has a copy of her living will.       Disposition: I expect the patient to be discharged home with home health in 1-2 days.    Electronically signed by Deann Irving II, DO, 08/21/18, 1:50 PM.

## 2018-08-21 NOTE — THERAPY TREATMENT NOTE
Acute Care - Occupational Therapy Treatment Note  [facility]     Patient Name: Janet Pisano  : 1945  MRN: [id number]  Today's Date: 2018  Onset of Illness/Injury or Date of Surgery: 18  Date of Referral to OT: 18  Referring Physician: MD Augie    Admit Date: 2018       ICD-10-CM ICD-9-CM   1. Acute on chronic respiratory failure with hypoxia and hypercapnia (CMS/[facility]) J96.21 518.84    J96.22 786.09     799.02   2. Pleural effusion J90 511.9   3. Acute on chronic congestive heart failure, unspecified congestive heart failure type (CMS/[facility]) I50.9 428.0   4. Positive D dimer R79.89 790.92   5. COPD with acute exacerbation (CMS/[facility]) J44.1 491.21   6. Pulmonary hypertension I27.20 416.8   7. Impaired mobility and ADLs Z74.09 799.89   8. Impaired functional mobility, balance, gait, and endurance Z74.09 V49.89     Patient Active Problem List   Diagnosis   • Anxiety   • Hypertension and diastolic dysfunction   • Chronic obstructive pulmonary disease in former smoker (CMS/[facility])   • Uncontrolled type 2 diabetes mellitus. Hemoglobin A1c 9.4 (CMS/[facility])   • Gastroesophageal reflux disease   • Hypothyroidism   • Insomnia   • Left carotid artery stenosis   • History of myocardial infarction   • Dyslipidemia   • History of rheumatic fever   • Coronary artery disease   • Iron deficiency anemia   • Morbid obesity with BMI of 40.0-44.9, adult (CMS/[facility])   • Acute on chronic mixed hypoxemic/hypercapnic respiratory failure (CMS/[facility])   • Acute on chronic diastolic congestive heart failure (CMS/[facility])   • Obesity hypoventilation syndrome (CMS/[facility])   • Pulmonary hypertension. Calculated RVSP 64 mmHg by echocardiogram 18     Past Medical History:   Diagnosis Date   • Arthritis    • Bilateral carpal tunnel syndrome 2017   • Bradycardia 2016   • COPD exacerbation (CMS/[facility])    • Coronary artery disease 2016   • Depression    • Diabetes mellitus (CMS/[facility])    • Diverticulosis 2016   •  Dyslipidemia 9/7/2016   • H/O esophageal ulcer    • History of myocardial infarction 9/7/2016    Questionable history of myocardial infarction: Remote cardiac catheterization at [facility] in [place], incomplete database.  Reported stress test 2-3 years ago, negative per patient report, incomplete database.    • History of rheumatic fever 9/7/2016   • Hypertension    • Hypothyroidism    • Migraine 8/22/2016   • Rheumatic fever    • Ventral hernia 9/7/2016   • Vitamin D deficiency 6/20/2016     Past Surgical History:   Procedure Laterality Date   • CARDIAC CATHETERIZATION     • CARPAL TUNNEL RELEASE     • CATARACT EXTRACTION, BILATERAL     • COLONOSCOPY     • ESOPHAGUS SURGERY      hole repair   • HERNIA REPAIR      ventral   • TUBAL ABDOMINAL LIGATION  1982       Therapy Treatment          Rehabilitation Treatment Summary     Row Name 08/21/18 1645 08/21/18 1430          Treatment Time/Intention    Discipline occupational therapist  -AR physical therapist  -NATALIIA     Document Type therapy note (daily note)  -AR therapy note (daily note)  -NATALIIA     Subjective Information  -- no complaints;weakness;fatigue  -NATALIIA     Care Plan Review  -- care plan/treatment goals reviewed;risks/benefits reviewed;patient/other agree to care plan  -NATALIIA     Therapy Frequency (PT Clinical Impression)  -- daily  -NATALIIA     Patient Effort good  -AR good  -NATALIIA     Comment  -- drop in O2 with ambulation  -NATALIIA     Existing Precautions/Restrictions oxygen therapy device and L/min;fall  -AR oxygen therapy device and L/min  -NATALIIA     Treatment Considerations/Comments  -- watch sats with activity  -NATALIIA     Recorded by [AR] Aurora Hicks, OT 08/21/18 1732 [NATALIIA] Tanya Sahni, PT 08/21/18 1615     Row Name 08/21/18 1430             Vital Signs    Pre Systolic BP Rehab 134  -NATALIIA      Pre Treatment Diastolic BP 90  -NATALIIA      Pretreatment Heart Rate (beats/min) 80  -NATALIIA      Posttreatment Heart Rate (beats/min) 88  -NATALIIA      Pre SpO2 (%) 93   -NATALIIA      O2 Delivery Pre Treatment nasal cannula  -NATALIIA      Intra SpO2 (%) 85  -NATALIIA      O2 Delivery Intra Treatment supplemental O2  -NATALIIA      Post SpO2 (%) 90  -NATALIIA      O2 Delivery Post Treatment supplemental O2  -NATALIIA      Pre Patient Position Sitting  -NATALIIA      Intra Patient Position Standing  -NATALIIA      Post Patient Position Sitting  -NATALIIA      Recorded by [NATALIIA] Tanya Sahni, PT 08/21/18 1615      Row Name 08/21/18 1645             Cognitive Assessment/Intervention    Additional Documentation Cognitive Assessment/Intervention (Group)  -AR      Recorded by [AR] Aurora Hicks, OT 08/21/18 1732      Row Name 08/21/18 1645 08/21/18 1430          Cognitive Assessment/Intervention- PT/OT    Affect/Mental Status (Cognitive) WNL  -AR WFL  -NATALIIA     Orientation Status (Cognition) oriented x 4  -AR oriented x 4  -NATALIIA     Follows Commands (Cognition) WNL  -AR WFL  -NATALIIA     Safety Deficit (Cognitive) mild deficit  -AR mild deficit;safety precautions awareness;safety precautions follow-through/compliance  -NATALIIA     Recorded by [AR] Aurora Hicks, OT 08/21/18 1732 [NATALIIA] Tanya Sahni, PT 08/21/18 1615     Row Name 08/21/18 1430             Safety Issues, Functional Mobility    Impairments Affecting Function (Mobility) endurance/activity tolerance;shortness of breath  -NATALIIA      Recorded by [NATALIIA] Tanya Sahni, PT 08/21/18 1615      Row Name 08/21/18 1430             Bed Mobility Assessment/Treatment    Comment (Bed Mobility) patient is OOB and returns to the chair  -NATALIIA      Recorded by [NATALIIA] Tanya Sahni, PT 08/21/18 1615      Row Name 08/21/18 1645 08/21/18 1430          Transfer Assessment/Treatment    Transfer Assessment/Treatment  -- toilet transfer  -NATALIIA     Comment (Transfers) pt declined  -AR  --     Recorded by [AR] Aurora Hicks, OT 08/21/18 1732 [NATALIIA] Tanya Sahni, PT 08/21/18 1615     Row Name 08/21/18 1430             Sit-Stand Transfer    Sit-Stand [place] (Transfers) supervision  -NATALIIA       Assistive Device (Sit-Stand Transfers) walker, front-wheeled  -NATALIIA      Recorded by [NATALIIA] Tanya Sahni, PT 08/21/18 1615      Row Name 08/21/18 1430             Stand-Sit Transfer    Stand-Sit [place] (Transfers) supervision  -NATALIIA      Assistive Device (Stand-Sit Transfers) walker, front-wheeled  -NATALIIA      Recorded by [NATALIIA] Tanya Sahni, PT 08/21/18 1615      Row Name 08/21/18 1430             Toilet Transfer    Type (Toilet Transfer) sit-stand;stand-sit  -NATALIIA      [place] Level (Toilet Transfer) supervision  -NATALIIA      Assistive Device (Toilet Transfer) walker, 4-wheeled  -NATALIIA      Recorded by [NATALIIA] Tanya Sahni, PT 08/21/18 1615      Row Name 08/21/18 1430             Gait/Stairs Assessment/Training    04737 - Gait Training Minutes  15  -NATALIIA      Gait/Stairs Assessment/Training gait/ambulation independence  -NATALIIA      [place] Level (Gait) contact guard;1 person to manage equipment  -NATALIIA      Assistive Device (Gait) walker, front-wheeled  -NATALIIA      Distance in Feet (Gait) 320  -NATALIIA      Deviations/Abnormal Patterns (Gait) stride length decreased  -NATALIIA      Bilateral Gait Deviations forward flexed posture  -NATALIIA      Comment (Gait/Stairs) patient required one sitting rest and 2 standing rest due to inc SOA O2 sats dropped to 85% patient required 1 min to recover  -NATALIIA      Recorded by [NATALIIA] Tanya Sahni, PT 08/21/18 1615      Row Name 08/21/18 1645             ADL Assessment/Intervention    09666 - OT Self Care/Mgmt Minutes 14  -AR      BADL Assessment/Intervention bathing;lower body dressing  -AR      Recorded by [AR] Aurora Hicks, OT 08/21/18 1732      Row Name 08/21/18 1645             Bathing Assessment/Intervention    Bathing [place] Level bathing skills;lower body;supervision   simulate  -AR      Assistive Devices (Bathing) long-handled sponge  -AR      Bathing Position supported sitting  -AR      Recorded by [AR] Aurora Hicks, OT 08/21/18 1732      Row Name 08/21/18  1645             Lower Body Dressing Assessment/Training    Comment (Lower Body Dressing) Pt reports being limited with LB ADLS at home d/t mild dyspnea with forward bending. Issued AE including elastic laces, shoe horn and reacher and educated pt on use. She has sock aide at home.   -AR      Recorded by [AR] Aurora Hicks, OT 08/21/18 1732      Row Name 08/21/18 1645             BADL Safety/Performance    Impairments, BADL Safety/Performance endurance/activity tolerance  -AR      Skilled BADL Treatment/Intervention adaptive equipment training;energy conservation  -AR      Recorded by [AR] Aurora Hicks, OT 08/21/18 1732      Row Name 08/21/18 1430             Therapeutic Exercise    71276 - PT Therapeutic Activity Minutes 10  -NATALIIA      Recorded by [NATALIIA] Tanya Sahni, PT 08/21/18 1615      Row Name 08/21/18 1645 08/21/18 1430          Static Sitting Balance    Level of [place] (Unsupported Sitting, Static Balance) independent  -AR independent  -NATALIIA     Sitting Position (Unsupported Sitting, Static Balance) sitting in chair  -AR sitting on edge of bed  -NATALIIA     Recorded by [AR] Aurora Hicks, OT 08/21/18 1732 [NATALIIA] Tanya Sahni, PT 08/21/18 1615     Row Name 08/21/18 1430             Static Standing Balance    Level of [place] (Supported Standing, Static Balance) supervision  -NATALIIA      Assistive Device Utilized (Supported Standing, Static Balance) rolling walker  -NATALIIA      Recorded by [NATALIIA] Tanya Sahni, PT 08/21/18 1615      Row Name 08/21/18 1645 08/21/18 1430          Positioning and Restraints    Pre-Treatment Position sitting in chair/recliner  -AR sitting in chair/recliner  -NATALIIA     Post Treatment Position chair  -AR chair  -NATALIIA     In Chair sitting;call light within reach;encouraged to call for assist  -AR notified nsg;reclined;sitting;call light within reach  -NATALIIA     Recorded by [AR] Aurora Hicks, OT 08/21/18 1732 [NATALIIA] Tanya Sahni, PT 08/21/18 1615     Row  Name 08/21/18 1645 08/21/18 1430          Pain Scale: Numbers Pre/Post-Treatment    Pain Scale: Numbers, Pretreatment 0/10 - no pain  -AR 0/10 - no pain  -NATALIIA     Pain Scale: Numbers, Post-Treatment 0/10 - no pain  -AR 0/10 - no pain  -NATALIIA     Recorded by [AR] Aurora Hicks, OT 08/21/18 1732 [NATALIIA] Tanya Sahni, PT 08/21/18 1615     Row Name 08/21/18 1430             Coping    Observed Emotional State calm;cooperative  -NATALIIA      Verbalized Emotional State acceptance  -NATALIIA      Recorded by [NATALIIA] Tanya Sahni, PT 08/21/18 1615      Row Name 08/21/18 1645             Outcome Summary/Treatment Plan (OT)    Daily Summary of Progress (OT) progress towards functional goals is fair  -AR      Recorded by [AR] Aurora Hicks, OT 08/21/18 1732      Row Name 08/21/18 1430             Outcome Summary/Treatment Plan (PT)    Daily Summary of Progress (PT) progress toward functional goals is good  -NATALIIA      Barriers to Overall Progress (PT) resp status patient has drop in sats with activity  -NATALIIA      Anticipated Discharge Disposition (PT) home with home health  -NATALIIA      Recorded by [NATALIIA] Tanya Sahni, PT 08/21/18 1615        User Key  (r) = Recorded By, (t) = Taken By, (c) = Cosigned By    Initials Name Effective Dates Discipline    NATALIIA Tanya Sahni, PT 06/19/15 -  PT    AR Aurora Hicks, OT 06/22/15 -  OT                   OT Rehab Goals     Row Name 08/21/18 1645             Bed Mobility Goal 1 (OT)    Progress/Outcomes (Bed Mobility Goal 1, OT) goal ongoing  -AR         Transfer Goal 1 (OT)    Progress/Outcome (Transfer Goal 1, OT) goal ongoing  -AR         Toileting Goal 1 (OT)    Progress/Outcome (Toileting Goal 1, OT) goal ongoing  -AR        User Key  (r) = Recorded By, (t) = Taken By, (c) = Cosigned By    Initials Name Provider Type Discipline    AR Aurora Hicks, OT Occupational Therapist OT        Occupational Therapy Education     Title: PT OT SLP Therapies (Active)     Topic:  Occupational Therapy (Active)     Point: ADL training (Done)     Description: Instruct learner(s) on proper safety adaptation and remediation techniques during self care or transfers.   Instruct in proper use of assistive devices.   Learning Progress Summary     Learner Status Readiness Method Response Comment Documented by    Patient Done Acceptance E VU  AR 08/21/18 1733     Done Acceptance E VU role OT, reason for consult, about [facility]  08/19/18 1351                      User Key     Initials Effective Dates Name Provider Type Discipline     06/08/18 -  Yadira Lundberg, OT Occupational Therapist OT    AR 06/22/15 -  Aurora Hicks, OT Occupational Therapist OT                OT Recommendation and Plan  Outcome Summary/Treatment Plan (OT)  Daily Summary of Progress (OT): progress towards functional goals is fair  Daily Summary of Progress (OT): progress towards functional goals is fair  Plan of Care Review  Plan of Care Reviewed With: patient  Plan of Care Reviewed With: patient  Outcome Summary: Pt alewrt, OX4 and declined transfer training. Issed AE to assist with ADLS and reviewed EC/WS.         Outcome Measures     Row Name 08/21/18 1645 08/21/18 1430 08/19/18 1359       How much help from another person do you currently need...    Turning from your back to your side while in flat bed without using bedrails?  -- 4  -NATALIIA 3  -LS    Moving from lying on back to sitting on the side of a flat bed without bedrails?  -- 3  -NATALIIA 3  -LS    Moving to and from a bed to a chair (including a wheelchair)?  -- 3  -NATALIIA 3  -LS    Standing up from a chair using your arms (e.g., wheelchair, bedside chair)?  -- 3  -NATALIIA 3  -LS    Climbing 3-5 steps with a railing?  -- 2  -NATALIIA 2  -LS    To walk in hospital room?  -- 3  -NATALIIA 3  -LS    AM-PAC 6 Clicks Score  -- 18  -NATALIIA 17  -LS       How much help from another is currently needed...    Putting on and taking off regular lower body clothing? 3  -AR  --  --    Bathing (including washing,  rinsing, and drying) 3  -AR  --  --    Toileting (which includes using toilet bed pan or urinal) 3  -AR  --  --    Putting on and taking off regular upper body clothing 3  -AR  --  --    Taking care of personal grooming (such as brushing teeth) 4  -AR  --  --    Eating meals 4  -AR  --  --    Score 20  -AR  --  --       Functional Assessment    Outcome Measure Options AM-PAC 6 Clicks Daily Activity (OT)  -AR  -- AM-PAC 6 Clicks Basic Mobility (PT)  -    Row Name 08/19/18 1306             How much help from another is currently needed...    Putting on and taking off regular lower body clothing? 3   mult. rest periods needed with all task  -JBA      Bathing (including washing, rinsing, and drying) 3  -JBA      Toileting (which includes using toilet bed pan or urinal) 3  -JBA      Putting on and taking off regular upper body clothing 3  -JBA      Taking care of personal grooming (such as brushing teeth) 4   sitting  -JBA      Eating meals 4  -JBA      Score 20  -JBA         Functional Assessment    Outcome Measure Options AM-PAC 6 Clicks Daily Activity (OT)  -JBA        User Key  (r) = Recorded By, (t) = Taken By, (c) = Cosigned By    Initials Name Provider Type    Tanya Jenkins, PT Physical Therapist    Yadira Thao, OT Occupational Therapist    Aurora Hines, OT Occupational Therapist    Ashley Wiley, PT Physical Therapist           Time Calculation:         Time Calculation- OT     Row Name 08/21/18 1645 08/21/18 1430          Time Calculation- OT    OT Start Time 1645  -AR  --     Total Timed Code Minutes- OT 14 minute(s)  -AR  --     OT Received On 08/21/18  -AR  --     OT Goal Re-Cert Due Date 08/29/18  -AR  --        Timed Charges    27984 - Gait Training Minutes   -- 15  -NATALIIA     63659 - OT Self Care/Mgmt Minutes 14  -AR  --       User Key  (r) = Recorded By, (t) = Taken By, (c) = Cosigned By    Initials Name Provider Type    Tanya Jenkins, PT Physical Therapist    AR  Aurora Hicks, OT Occupational Therapist           Therapy Suggested Charges     Code   Minutes Charges    53430 (CPT®) Hc Ot Neuromusc Re Education Ea 15 Min      15190 (CPT®) Hc Ot Ther Proc Ea 15 Min      22258 (CPT®) Hc Ot Therapeutic Act Ea 15 Min      80351 (CPT®) Hc Ot Manual Therapy Ea 15 Min      54557 (CPT®) Hc Ot Iontophoresis Ea 15 Min      72822 (CPT®) Hc Ot Elec Stim Ea-Per 15 Min      65471 (CPT®) Hc Ot Ultrasound Ea 15 Min      82381 (CPT®) Hc Ot Self Care/Mgmt/Train Ea 15 Min 14 1    Total  14 1        Therapy Charges for Today     Code Description Service Date Service Provider Modifiers Qty    [id number] HC OT SELF CARE/MGMT/TRAIN EA 15 MIN 8/21/2018 Aurora Hicks, OT GO 1               Auroar Hicks OT  8/21/2018

## 2018-08-22 VITALS
HEIGHT: 64 IN | HEART RATE: 88 BPM | TEMPERATURE: 97.4 F | SYSTOLIC BLOOD PRESSURE: 150 MMHG | RESPIRATION RATE: 18 BRPM | DIASTOLIC BLOOD PRESSURE: 81 MMHG | WEIGHT: 210.2 LBS | BODY MASS INDEX: 35.89 KG/M2 | OXYGEN SATURATION: 90 %

## 2018-08-22 PROBLEM — J96.21 ACUTE ON CHRONIC RESPIRATORY FAILURE WITH HYPOXIA AND HYPERCAPNIA (HCC): Status: RESOLVED | Noted: 2018-08-16 | Resolved: 2018-08-22

## 2018-08-22 PROBLEM — J96.22 ACUTE ON CHRONIC RESPIRATORY FAILURE WITH HYPOXIA AND HYPERCAPNIA: Status: RESOLVED | Noted: 2018-08-16 | Resolved: 2018-08-22

## 2018-08-22 LAB
ANION GAP SERPL CALCULATED.3IONS-SCNC: 9 MMOL/L (ref 3–11)
BUN BLD-MCNC: 24 MG/DL (ref 9–23)
BUN/CREAT SERPL: 22.4 (ref 7–25)
CALCIUM SPEC-SCNC: 9 MG/DL (ref 8.7–10.4)
CHLORIDE SERPL-SCNC: 95 MMOL/L (ref 99–109)
CO2 SERPL-SCNC: 31 MMOL/L (ref 20–31)
CREAT BLD-MCNC: 1.07 MG/DL (ref 0.6–1.3)
GFR SERPL CREATININE-BSD FRML MDRD: 50 ML/MIN/1.73
GLUCOSE BLD-MCNC: 197 MG/DL (ref 70–100)
GLUCOSE BLDC GLUCOMTR-MCNC: 180 MG/DL (ref 70–130)
GLUCOSE BLDC GLUCOMTR-MCNC: 235 MG/DL (ref 70–130)
GLUCOSE BLDC GLUCOMTR-MCNC: 319 MG/DL (ref 70–130)
POTASSIUM BLD-SCNC: 5 MMOL/L (ref 3.5–5.5)
SODIUM BLD-SCNC: 135 MMOL/L (ref 132–146)

## 2018-08-22 PROCEDURE — 82962 GLUCOSE BLOOD TEST: CPT

## 2018-08-22 PROCEDURE — 99239 HOSP IP/OBS DSCHRG MGMT >30: CPT | Performed by: NURSE PRACTITIONER

## 2018-08-22 PROCEDURE — 94760 N-INVAS EAR/PLS OXIMETRY 1: CPT

## 2018-08-22 PROCEDURE — 63710000001 PREDNISONE PER 5 MG: Performed by: INTERNAL MEDICINE

## 2018-08-22 PROCEDURE — 25010000002 ONDANSETRON PER 1 MG: Performed by: INTERNAL MEDICINE

## 2018-08-22 PROCEDURE — 97110 THERAPEUTIC EXERCISES: CPT

## 2018-08-22 PROCEDURE — 94799 UNLISTED PULMONARY SVC/PX: CPT

## 2018-08-22 PROCEDURE — 63710000001 INSULIN DETEMIR PER 5 UNITS: Performed by: INTERNAL MEDICINE

## 2018-08-22 PROCEDURE — 94640 AIRWAY INHALATION TREATMENT: CPT

## 2018-08-22 PROCEDURE — 63710000001 INSULIN LISPRO (HUMAN) PER 5 UNITS: Performed by: NURSE PRACTITIONER

## 2018-08-22 PROCEDURE — 80048 BASIC METABOLIC PNL TOTAL CA: CPT | Performed by: INTERNAL MEDICINE

## 2018-08-22 PROCEDURE — 97116 GAIT TRAINING THERAPY: CPT

## 2018-08-22 RX ORDER — PREDNISONE 10 MG/1
10 TABLET ORAL
Qty: 5 TABLET | Refills: 0 | Status: SHIPPED | OUTPATIENT
Start: 2018-08-23 | End: 2018-08-25

## 2018-08-22 RX ORDER — POTASSIUM CHLORIDE 750 MG/1
20 CAPSULE, EXTENDED RELEASE ORAL DAILY
Qty: 60 CAPSULE | Refills: 0 | Status: ON HOLD | OUTPATIENT
Start: 2018-08-22 | End: 2019-09-07

## 2018-08-22 RX ORDER — HYDROXYZINE HYDROCHLORIDE 25 MG/1
25 TABLET, FILM COATED ORAL 3 TIMES DAILY PRN
Qty: 24 TABLET | Refills: 0 | Status: ON HOLD | OUTPATIENT
Start: 2018-08-22 | End: 2019-09-07

## 2018-08-22 RX ORDER — FUROSEMIDE 40 MG/1
40 TABLET ORAL DAILY
Qty: 30 TABLET | Refills: 0 | Status: ON HOLD | OUTPATIENT
Start: 2018-08-23 | End: 2019-09-07

## 2018-08-22 RX ORDER — LOSARTAN POTASSIUM 25 MG/1
25 TABLET ORAL
Qty: 30 TABLET | Refills: 0 | Status: ON HOLD | OUTPATIENT
Start: 2018-08-23 | End: 2019-09-07

## 2018-08-22 RX ORDER — POTASSIUM CHLORIDE 750 MG/1
30 CAPSULE, EXTENDED RELEASE ORAL 2 TIMES DAILY WITH MEALS
Qty: 60 CAPSULE | Refills: 0 | Status: SHIPPED | OUTPATIENT
Start: 2018-08-22 | End: 2018-08-22

## 2018-08-22 RX ADMIN — ONDANSETRON 4 MG: 2 INJECTION INTRAMUSCULAR; INTRAVENOUS at 01:50

## 2018-08-22 RX ADMIN — ACETAMINOPHEN 650 MG: 325 TABLET ORAL at 08:17

## 2018-08-22 RX ADMIN — PREDNISONE 10 MG: 10 TABLET ORAL at 08:17

## 2018-08-22 RX ADMIN — PANTOPRAZOLE SODIUM 40 MG: 40 TABLET, DELAYED RELEASE ORAL at 05:13

## 2018-08-22 RX ADMIN — IPRATROPIUM BROMIDE AND ALBUTEROL SULFATE 3 ML: 2.5; .5 SOLUTION RESPIRATORY (INHALATION) at 12:41

## 2018-08-22 RX ADMIN — INSULIN LISPRO 10 UNITS: 100 INJECTION, SOLUTION INTRAVENOUS; SUBCUTANEOUS at 12:54

## 2018-08-22 RX ADMIN — INSULIN LISPRO 10 UNITS: 100 INJECTION, SOLUTION INTRAVENOUS; SUBCUTANEOUS at 08:18

## 2018-08-22 RX ADMIN — INSULIN LISPRO 3 UNITS: 100 INJECTION, SOLUTION INTRAVENOUS; SUBCUTANEOUS at 08:16

## 2018-08-22 RX ADMIN — FUROSEMIDE 40 MG: 40 TABLET ORAL at 08:17

## 2018-08-22 RX ADMIN — INSULIN DETEMIR 25 UNITS: 100 INJECTION, SOLUTION SUBCUTANEOUS at 08:18

## 2018-08-22 RX ADMIN — POTASSIUM CHLORIDE 30 MEQ: 750 CAPSULE, EXTENDED RELEASE ORAL at 08:17

## 2018-08-22 RX ADMIN — IPRATROPIUM BROMIDE AND ALBUTEROL SULFATE 3 ML: 2.5; .5 SOLUTION RESPIRATORY (INHALATION) at 06:39

## 2018-08-22 RX ADMIN — INSULIN LISPRO 10 UNITS: 100 INJECTION, SOLUTION INTRAVENOUS; SUBCUTANEOUS at 17:57

## 2018-08-22 RX ADMIN — INSULIN LISPRO 5 UNITS: 100 INJECTION, SOLUTION INTRAVENOUS; SUBCUTANEOUS at 12:54

## 2018-08-22 RX ADMIN — LOSARTAN POTASSIUM 25 MG: 25 TABLET, FILM COATED ORAL at 08:17

## 2018-08-22 RX ADMIN — HYDROXYZINE HYDROCHLORIDE 25 MG: 25 TABLET, FILM COATED ORAL at 08:46

## 2018-08-22 RX ADMIN — INSULIN LISPRO 10 UNITS: 100 INJECTION, SOLUTION INTRAVENOUS; SUBCUTANEOUS at 17:56

## 2018-08-22 NOTE — PROGRESS NOTES
"                  Clinical Nutrition     Nutrition Assessment  Reason for Visit:   [facility]      Patient Name: Janet Pisano  YOB: 1945  MRN: [id number]  Date of Encounter: 08/22/18 11:47 AM  Admission date: 8/16/2018    Nutrition Assessment   Assessment       Hospital Problem List  Principal Problem:    Acute on chronic mixed hypoxemic/hypercapnic respiratory failure (CMS/HCC)  Active Problems:    Hypertension and diastolic dysfunction    Chronic obstructive pulmonary disease in former smoker (CMS/[facility])    Uncontrolled type 2 diabetes mellitus. Hemoglobin A1c 9.4 (CMS/[facility])    Gastroesophageal reflux disease    Hypothyroidism    Iron deficiency anemia    Morbid obesity with BMI of 40.0-44.9, adult (CMS/[facility])    Acute on chronic diastolic congestive heart failure (CMS/HCC)    Obesity hypoventilation syndrome (CMS/[facility])    Pulmonary hypertension. Calculated RVSP 64 mmHg by echocardiogram 8/17/18      PMH: She  has a past medical history of Arthritis; Bilateral carpal tunnel syndrome (2/24/2017); Bradycardia (8/22/2016); COPD exacerbation (CMS/[facility]); Coronary artery disease (9/7/2016); Depression; Diabetes mellitus (CMS/[facility]); Diverticulosis (8/22/2016); Dyslipidemia (9/7/2016); H/O esophageal ulcer; History of myocardial infarction (9/7/2016); History of rheumatic fever (9/7/2016); Hypertension; Hypothyroidism; Migraine (8/22/2016); Rheumatic fever; Ventral hernia (9/7/2016); and Vitamin D deficiency (6/20/2016).   PSxH: She  has a past surgical history that includes Hernia repair; Esophagus surgery; Colonoscopy; Tubal ligation (1982); Cardiac catheterization; Cataract extraction, bilateral; and Carpal tunnel release.     Anthropometrics     Height: 162.6 cm (64\")  Last filed wt: Weight: 95.3 kg (210 lb 3.2 oz) (08/22/18 0600)  Weight Method: Standing scale    BMI: BMI (Calculated): 36.9  Obese Class II: 35-39.9kg/m2    Ideal Body Weight (IBW) (kg): 55      Labs reviewed       Results from last 7 days  Lab Units " 08/22/18  0609 08/20/18  0505 08/19/18  0521  08/18/18  0359  08/16/18  1522   GLUCOSE mg/dL 197* 261* 116*  --  78  < > 222*   BUN mg/dL 24* 22 22  --  21  < > 21   CREATININE mg/dL 1.07 0.99 0.88  --  0.80  < > 0.90   SODIUM mmol/L 135 135 141  --  143  < > 136   CHLORIDE mmol/L 95* 92* 93*  --  98*  < > 97*   POTASSIUM mmol/L 5.0 4.5 4.1  < > 3.7  < > 4.5   PHOSPHORUS mg/dL  --  4.9 4.4  --  4.2  --   --    MAGNESIUM mg/dL  --  2.2 1.9  --  2.2  < >  --    ALT (SGPT) U/L  --   --  21  --  23  --  29   < > = values in this interval not displayed.    Results from last 7 days  Lab Units 08/19/18  0521 08/18/18  0359 08/16/18  1522   ALBUMIN g/dL 3.75 3.79 4.00   IONIZED CALCIUM mmol/L  --  1.14  --        Results from last 7 days  Lab Units 08/22/18  1122 08/22/18  0714 08/21/18  2138 08/21/18  1609 08/21/18  1114 08/21/18  0708   GLUCOSE mg/dL 235* 180* 219* 362* 338* 173*       Lab Results  Lab Value Date/Time   HGBA1C 9.40 (H) 08/17/2018 0451   HGBA1C 12.10 (H) 12/07/2017 1637   HGBA1C 12.60 (H) 08/28/2017 0000     Current Nutrition Prescription     PO: Diet Regular; Consistent Carbohydrate    Intake:  Per charting pt consuming 100% of 6 meals (8/19/18-8/21/18)         Nutrition Diagnosis       Problem No nutrition diagnosis at this time   Etiology    Signs/Symptoms        Monitoring/Evaluation:   Per protocol, PO intake      Will Continue to follow per protocol      Janna Watson  Time Spent: 15 minutes

## 2018-08-22 NOTE — DISCHARGE SUMMARY
[facility] Medicine Services  DISCHARGE SUMMARY    Patient Name: Janet Pisano  : 1945  MRN: [id number]    Date of Admission: 2018  Date of Discharge:  2018  Primary Care Physician: Jhoan Hdez MD    Consults     No orders found from 2018 to 2018.        Hospital Course   Presenting Problem:   Acute on chronic respiratory failure with hypoxia and hypercapnia (CMS/[facility]) [J96.21, J96.22]    Active Hospital Problems    Diagnosis Date Noted   • Pulmonary hypertension. Calculated RVSP 64 mmHg by echocardiogram 18 [I27.20] 2018   • Obesity hypoventilation syndrome (CMS/[facility]) [E66.2] 2018   • Acute on chronic diastolic congestive heart failure (CMS/[facility]) [I50.33] 2018   • Iron deficiency anemia [D50.9] 2018   • Morbid obesity with BMI of 40.0-44.9, adult (CMS/[facility]) [E66.01, Z68.41] 2018   • Chronic obstructive pulmonary disease in former smoker (CMS/[facility]) [J44.9] 2016   • Hypertension and diastolic dysfunction [I10] 2016   • Gastroesophageal reflux disease [K21.9] 2016   • Uncontrolled type 2 diabetes mellitus. Hemoglobin A1c 9.4 (CMS/[facility]) [E11.65] 2016   • Hypothyroidism [E03.9] 2016      Resolved Hospital Problems    Diagnosis Date Noted Date Resolved   • **Acute on chronic mixed hypoxemic/hypercapnic respiratory failure (CMS/HCC) [J96.21, J96.22] 2018      Hospital Course:  Janet Pisano is a 73 y.o. female mery is a 73 y.o. female with chronic obstructive airways disease, chronic respiratory failure, hypertension, diabetes mellitus, dyslipidemia, coronary artery disease, iron deficiency anemia hospitalized with worsening hypoxia and dyspnea.  Is normally hospitalized in [place]. She was there in April and again  through  with a COPD exacerbation, right lower lobe pneumonia, hypoxic respiratory failure. Records from [place] indicate her blood gas revealed pH 7.40,  "CO2 51, O2 58 she was discharged on Levaquin, steroid taper. She wears oxygen 3 L nasal cannula at home. She uses Breo Elliptca 100-25 1 puff daily, Incruse 1 puff daily and albuterol rescue inhaler or nebulizer as needed. She previously smoked 1 pack per day for 57 years quitting in December 2015. Today her home health nurse noted that her oxygen saturation was 69% when she got up to the bathroom. They increased her to 4 L nasal cannula and she remained in the mid 80s and therefore was brought to the emergency room. She has a productive cough of whitish sputum. She denies fever but has had subjective chills. She has noted increasing edema. Approximately 5 or 6 years ago in [place] she had a \"silent heart attack\". Heart catheterization revealed some disease in her right coronary artery but no intervention was done. Records are not available this is all verbal history from her son. He currently denies chest pain but admits that she will occasionally get chest pain. It is not necessarily exertional. In our emergency room blood gas revealed a pH of 7.30, CO2 of 72, O2 of 122 on a nonrebreather mask. BiPAP was initially attempted but she was restless and Pulling off the mask. She received 1 mg of Ativan. White blood cell count was normal but BNP was elevated and she received 1 dose of furosemide. She also received azithromycin. CTA of the chest was done and negative for pulmonary embolus but does reveal small effusions and alveolar edema.  Patient was admitted to ICU/Critical  Care Service for further evaluation and treatment.    Patient was stabilized in the ICU with lasix, abx, steroids and oxygen an transferred to the floor.  She received 7 days of Rocephin and will not be sent home with any antibiotics.  She needs to improve her diabetes control and keep her CHF under control.  She will be discharged with lasix and potasium.  She needs follow up with her family doctor within 7 days of discharge from the " hospital.  Family will transport home.    Discharge Follow Up Recommendations for labs/diagnostics:  --Follow-up with Dr. Hdez within 1 week of discharge from the hospital  --Check her blood pressure at least once a day and document the results and take them to Dr. Hdez  --Check her blood sugar a minimum of 4 times a day and document them and take him to Dr. Hdez  --Stop smoking  Day of Discharge   HPI:   Patient sitting up in a chair eating breakfast in NAD.  Patient talking in complete sentences, but states she was not ready to go until tomorrow.  Explained to patient that she uses home O2 and set up and she just needs to get a family member to come get her.  Positive BM.  Negative adverse events overnight.  No family in the room.    Review of Systems  Gen- No fevers, chills  CV- No chest pain, palpitations  Resp- No cough, dyspnea  GI- No N/V/D, abd pain  Otherwise ROS is negative except as mentioned in the HPI.    Vital Signs:   Temp:  [97.4 °F (36.3 °C)] 97.4 °F (36.3 °C)  Heart Rate:  [88] 88  Resp:  [18] 18  BP: (150)/(81) 150/81   Physical Exam:  General: Alert, well-developed obese female in no acute distress    Head: Normocephalic atraumatic    Eyes: PERRLA, EOMI, nonicteric, conjunctiva normal    ENT: Pink, moist mucous membranes    Neck: Supple, nontender, trachea midline without lymphadenopathy, JVD, nuchal rigidity.      Cardiovascular: RRR  no M/R/G +2DP pulses bilaterally    Respiratory: Nonlabored, symmetrical chest expansion, clear to auscultation bilaterally    Abdomen: Obese, soft, nontender, nondistended,  positive bowel sounds in all 4 quadrants     Extremities: FROM in upper and lower extremities bilaterally. Negative for edema.  Negative calf pain    Skin: Pink/warm/dry.  No rash or lesions noted    Neuro: Oriented to person place time and situation, speech is clear, follows all commands, recent and remote memory intact    Psych: Patient is pleasant and cooperative.  Normal affect.   Negative suicidal ideation or homicidal ideation.  Pertinent  and/or Most Recent Results       Results from last 7 days  Lab Units 08/22/18  0609 08/20/18  0505 08/20/18  0426 08/19/18  0521 08/18/18  2353 08/18/18  0359 08/17/18  0422 08/16/18  1522   WBC 10*3/mm3  --   --  8.17 8.36  --  10.25 7.30 7.77   HEMOGLOBIN g/dL  --   --  13.0 11.8  --  11.4* 11.4* 11.5   HEMATOCRIT %  --   --  42.1 37.1  --  38.5 37.9 40.0   PLATELETS 10*3/mm3  --   --  258 219  --  240 193 218   SODIUM mmol/L 135 135  --  141  --  143 140 136   POTASSIUM mmol/L 5.0 4.5  --  4.1 3.8 3.7 4.4 4.5   CHLORIDE mmol/L 95* 92*  --  93*  --  98* 98* 97*   CO2 mmol/L 31.0 36.0*  --  39.0*  --  37.0* 35.0* 35.0*   BUN mg/dL 24* 22  --  22  --  21 21 21   CREATININE mg/dL 1.07 0.99  --  0.88  --  0.80 0.86 0.90   GLUCOSE mg/dL 197* 261*  --  116*  --  78 223* 222*   CALCIUM mg/dL 9.0 9.2  --  8.8  --  8.7 8.2* 8.7       Results from last 7 days  Lab Units 08/19/18  0521 08/18/18  0359 08/16/18  1522   BILIRUBIN mg/dL 0.7 0.6 0.8   ALK PHOS U/L 61 55 63   ALT (SGPT) U/L 21 23 29   AST (SGOT) U/L 17 16 26           Invalid input(s): TG, LDLCALC, LDLREALC    Results from last 7 days  Lab Units 08/19/18  0521 08/17/18  0451 08/16/18  1522   HEMOGLOBIN A1C %  --  9.40*  --    BNP pg/mL 239.0*  --  649.0*     Brief Urine Lab Results     None        Microbiology Results Abnormal     None        Imaging Results (all)     Procedure Component Value Units Date/Time    XR Chest PA & Lateral [[id number]] Collected:  08/20/18 0918     Updated:  08/20/18 1004    Narrative:       EXAMINATION: XR CHEST PA AND LATERAL-      INDICATION: J96.21-Acute and chronic respiratory failure with hypoxia;  J96.22-Acute and chronic respiratory failure with hypercapnia;  O94-Hzugmke effusion, not elsewhere classified; I50.9-Heart failure,  unspecified; R79.89-Other specified abnormal findings of blood  chemistry; J44.1-Chronic obstructive pulmonary disease with  (acute)  exacerbation.     COMPARISON: 08/19/2018.     FINDINGS: PA and lateral views of the chest reveal heart to be  borderline enlarged. Underlying chronic and emphysematous changes are  seen within the lung fields bilaterally. Prominence of the pulmonary  vascularity. No new focal parenchymal opacification is present.  Degenerative change is seen within the spine.           Impression:       Prominence of the pulmonary vascularity with mild increased  markings seen at the left lung base with no definite pleural effusion or  pneumothorax. Heart is borderline enlarged.     D:  08/20/2018  E:  08/20/2018     This report was finalized on 8/20/2018 10:02 AM by Dr. Ave Fallon MD.       XR Chest 1 View [[id number]] Collected:  08/19/18 1301     Updated:  08/19/18 2341    Narrative:          EXAMINATION: XR CHEST 1 VW - 8/19/2018     INDICATION: J96.21-Acute and chronic respiratory failure with hypoxia;  J96.22-Acute and chronic respiratory failure with hypercapnia;  I27-Trpatzh effusion, not elsewhere classified; I50.9-Heart failure,  unspecified; R79.89-Other specified abnormal findings of blood  chemistry; J44.1-Chronic obstructive pulmonary disease with (acute)  exacerbation; I27.20-Pulmonary hypertension, unspecified.      COMPARISON: 8/18/2018.     FINDINGS: Heart and vasculature remain borderline enlarged. No overt  pulmonary edema is seen. There is minimal left basilar discoid  atelectasis or scarring. No new pulmonary parenchymal disease, effusion,  or pneumothorax is seen.           Impression:       Cardiomegaly and pulmonary venous hypertension. No new chest  disease seen.     DICTATED:   8/19/2018  EDITED/ls :   8/19/2018      This report was finalized on 8/19/2018 11:38 PM by DR. Jamil Vaughan MD.       XR Chest 1 View [[id number]] Collected:  08/18/18 1247     Updated:  08/18/18 2308    Narrative:          EXAMINATION: XR CHEST,  SINGLE VIEW - 8/18/2018     INDICATION: J96.21-Acute and chronic  respiratory failure with hypoxia;  J96.22-Acute and chronic respiratory failure with hypercapnia;  Z33-Fomlwdq effusion, not elsewhere classified; I50.9-Heart failure,  unspecified; R79.89-Other specified abnormal findings of blood  chemistry; J44.1-Chronic obstructive pulmonary disease with (acute)  exacerbation; I27.20-Pulmonary hypertension, unspecified.     COMPARISON: 8/16/2018.     FINDINGS: The heart is borderline enlarged. Vasculature appears upper  limits of normal. Since the previous exam, bibasilar effusion and  atelectasis have practically resolved. Lungs now appear grossly clear.            Impression:       Interval resolution of bibasilar atelectasis and effusion.  Mild pulmonary venous hypertension. No new chest disease.     DICTATED:   8/18/2018  EDITED/ls :   8/18/2018      This report was finalized on 8/18/2018 11:03 PM by DR. Jamil Vaughan MD.       CT Angiogram Chest With Contrast [[id number]] Collected:  08/16/18 1855     Updated:  08/16/18 1900    Narrative:       EXAMINATION: CT ANGIOGRAM CHEST W/CONTRAST - 8/16/2018      INDICATION: J96.21-Acute and chronic respiratory failure with hypoxia;  J96.22-Acute and chronic respiratory failure with hypercapnia;  O27-Xlhryla effusion, not elsewhere classified; I50.9-Heart failure,  unspecified; R79.89-Other specified abnormal findings of blood  chemistry; J44.1-Chronic obstructive pulmonary disease with (acute)  exacerbation; I27.20-Pulmonary hypertension, unspecified.     TECHNIQUE:  Axial CT data of the chest were obtained helically per PE  protocol following IV contrast administration.  Multiplanar reformatted  images and 2D reconstructions (MIPs) were generated and reviewed.   Computer-aided detection was utilized in the interpretation. The  radiation dose reduction device was turned on for each scan per the  ALARA (As Low as Reasonably Achievable) protocol.     COMPARISONS:  None.     FINDINGS:  No acute pulmonary embolism is seen. Central airways  are  patent without endobronchial lesion or debris.  Heart size is within  normal limits. Coronary artery disease noted. No suspicious lymph node.  Small bilateral pleural effusions and adjacent atelectasis. There is  interstitial septal thickening and groundglass opacity compatible with  edema. No pneumothorax. Chest wall soft tissues are normal.  Incidental  imaging of the upper abdomen is remarkable for a small left adrenal  myelolipoma. No aggressive bone lesion.       Impression:       1. No evidence for pulmonary embolism.  2. Small right, trace left pleural effusion with adjacent atelectasis.  Interstitial and alveolar edema.     DICTATED:   8/16/2018  EDITED/ls :   8/16/2018      This report was finalized on 8/16/2018 6:56 PM by Albino Chua.       XR Chest 1 View [[id number]] Collected:  08/16/18 1525     Updated:  08/16/18 1744    Narrative:       EXAMINATION: XR CHEST 1 VW- 08/16/2018     INDICATION: SOA triage protocol     TECHNIQUE:  Single view frontal chest.     COMPARISONS: 04/12/2016     FINDINGS:  Cardiomegaly, pulmonary edema and bilateral effusions. No  pneumothorax.       Impression:       Pulmonary edema and effusions.     D:  08/16/2018  E:  08/16/2018     This report was finalized on 8/16/2018 5:41 PM by Albino Chua.           Results for orders placed during the hospital encounter of 08/16/18   Adult Transthoracic Echo Complete W/ Cont if Necessary Per Protocol    Narrative · Left ventricular systolic function is hyperdynamic (EF > 70).  · Left ventricular wall thickness is consistent with borderline concentric   hypertrophy.  · Right atrial cavity size is borderline dilated.  · Mild mitral valve regurgitation is present  · Mild to moderate tricuspid valve regurgitation is present.  · Estimated right ventricular systolic pressure from tricuspid   regurgitation is markedly elevated (>55 mmHg).         Order Current Status    Alpha - 1 - Antitrypsin Deficiency In process    QuantiFERON TB Client  Incubated In process        Discharge Details        Discharge Medications      New Medications      Instructions Start Date   furosemide 40 MG tablet  Commonly known as:  LASIX   40 mg, Oral, Daily      hydrOXYzine 25 MG tablet  Commonly known as:  ATARAX   25 mg, Oral, 3 Times Daily PRN      insulin detemir 100 UNIT/ML injection  Commonly known as:  LEVEMIR   25 Units, Subcutaneous, Every 12 Hours Scheduled      insulin lispro 100 UNIT/ML injection  Commonly known as:  humaLOG   0-14 Units, Subcutaneous, 4 Times Daily With Meals & Nightly      insulin lispro 100 UNIT/ML injection  Commonly known as:  humaLOG   10 Units, Subcutaneous, 3 Times Daily With Meals      losartan 25 MG tablet  Commonly known as:  COZAAR   25 mg, Oral, Every 24 Hours Scheduled      potassium chloride 10 MEQ CR capsule  Commonly known as:  MICRO-K   20 mEq, Oral, Daily      predniSONE 10 MG tablet  Commonly known as:  DELTASONE   10 mg, Oral, Daily With Breakfast         Continue These Medications      Instructions Start Date   ACCU-CHEK TITUS PLUS test strip  Generic drug:  glucose blood   USE TO TEST THREE TIMES DAILY      ACCU-CHEK SOFTCLIX LANCETS lancets   USE THREE TIMES A DAY AS DIRECTED      aspirin 325 MG tablet   Oral, Daily      B-D ULTRAFINE III SHORT PEN 31G X 8 MM misc  Generic drug:  Insulin Pen Needle   USE ONE PEN DAILY WITH TOUJEO      BREO ELLIPTA 100-25 MCG/INH aerosol powder   Generic drug:  Fluticasone Furoate-Vilanterol   INHALE ONCE DAILY      cetirizine 10 MG tablet  Commonly known as:  zyrTEC   10 mg, Oral, Daily      FOLBIC PO   Oral      gabapentin 300 MG capsule  Commonly known as:  NEURONTIN   300 mg, Oral, 3 Times Daily      Garlic 1000 MG capsule   2,000 Units, Oral, 2 Times Daily      HYDROcodone-acetaminophen 5-325 MG per tablet  Commonly known as:  NORCO   1 tablet, Oral, Every 4 Hours PRN      INCRUSE ELLIPTA 62.5 MCG/INH aerosol powder   Generic drug:  Umeclidinium Bromide   INL 1 PUFF PO QD      Insulin  "Syringe 31G X 5/16\" 1 ML misc   Use daily to inject insulin      MUCINEX 600 MG 12 hr tablet  Generic drug:  guaiFENesin   TK 1 T PO BID      POLY-IRON 150 150 MG capsule  Generic drug:  iron polysaccharides   TK 1 C PO BID      pravastatin 40 MG tablet  Commonly known as:  PRAVACHOL   TAKE 1 TABLET BY MOUTH EVERY DAY      SYNTHROID 175 MCG tablet  Generic drug:  levothyroxine   TAKE 1 TABLET BY MOUTH EVERY DAY      VENTOLIN  (90 Base) MCG/ACT inhaler  Generic drug:  albuterol   INHALE 1 TO 2 PUFFS BY MOUTH EVERY 4 TO 6 HOURS AS NEEDED FOR WHEEZING OR COUGH      Vitamin D-3 1000 units capsule   2,000 Units, Oral, Daily         Stop These Medications    bisoprolol 5 MG tablet  Commonly known as:  ZEBeta     insulin aspart 100 UNIT/ML injection  Commonly known as:  NOVOLOG     Insulin Glargine 300 UNIT/ML solution pen-injector  Commonly known as:  TOUJEO SOLOSTAR          Discharge Disposition:  Home or Self Care    Discharge Diet:  Diet Instructions     Diet: Regular, Consistent Carbohydrate, Cardiac; Thin       Discharge Diet:   Regular  Consistent Carbohydrate  Cardiac       Fluid Consistency:  Thin        Discharge Activity:   Activity Instructions     Activity as Tolerated       Measure Blood Pressure           Code Status/Level of Support:  Code Status and Medical Interventions:   Ordered at: 08/16/18 2150     Limited Support to NOT Include:    Blood Products     Level Of Support Discussed With:    Next of Kin (If No Surrogate)     Code Status:    No CPR     Medical Interventions (Level of Support Prior to Arrest):    Limited     Comments:    Agree with mechanical ventilation for the time being, however would like to discuss prior to intubation if situation allows.  The son has a copy of her living will.     No future appointments.    Additional Instructions for the Follow-ups that You Need to Schedule     Discharge Follow-up with PCP    As directed      Currently Documented PCP:  Jhoan Hdez MD  PCP " Phone Number:  [phone]    Follow Up Details:  1 week; please schedule appointment prior to patient's discharge         Discharge Follow-up with Specified Provider: Dr Elliot Camejo Endrocrinologist-Diabetic doctor    As directed      To:  Dr Elliot Camejo Endrocrinologist-Diabetic doctor             Time Spent on Discharge:  50 minutes    Electronically signed by RONAK Garcia, 08/22/18, 12:25 PM.

## 2018-08-22 NOTE — PROGRESS NOTES
Case Management Discharge Note    Final Note: SW called Brendon with [facility] to let her know pt is discharging home today. Pt had no further discharge needs or concerns.     Destination     No service has been selected for the patient.      Durable Medical Equipment     No service has been selected for the patient.      Dialysis/Infusion     No service has been selected for the patient.      Home Medical Care - Selection Complete     Service Request Status Selected Specialties Address Phone Number Fax Number    [facility] Selected Home Health Services [address] [facility] [address] [phone]      Social Care     No service has been selected for the patient.             Final Discharge Disposition Code: 06 - home with home health care

## 2018-08-22 NOTE — THERAPY TREATMENT NOTE
Acute Care - Physical Therapy Treatment Note  [facility]     Patient Name: Janet Pisano  : 1945  MRN: [id number]  Today's Date: 2018  Onset of Illness/Injury or Date of Surgery: 18  Date of Referral to PT: 18  Referring Physician: MD Augie    Admit Date: 2018    Visit Dx:    ICD-10-CM ICD-9-CM   1. Acute on chronic respiratory failure with hypoxia and hypercapnia (CMS/[facility]) J96.21 518.84    J96.22 786.09     799.02   2. Pleural effusion J90 511.9   3. Acute on chronic congestive heart failure, unspecified congestive heart failure type (CMS/[facility]) I50.9 428.0   4. Positive D dimer R79.89 790.92   5. COPD with acute exacerbation (CMS/[facility]) J44.1 491.21   6. Pulmonary hypertension I27.20 416.8   7. Impaired mobility and ADLs Z74.09 799.89   8. Impaired functional mobility, balance, gait, and endurance Z74.09 V49.89     Patient Active Problem List   Diagnosis   • Anxiety   • Hypertension and diastolic dysfunction   • Chronic obstructive pulmonary disease in former smoker (CMS/[facility])   • Uncontrolled type 2 diabetes mellitus. Hemoglobin A1c 9.4 (CMS/[facility])   • Gastroesophageal reflux disease   • Hypothyroidism   • Insomnia   • Left carotid artery stenosis   • History of myocardial infarction   • Dyslipidemia   • History of rheumatic fever   • Coronary artery disease   • Iron deficiency anemia   • Morbid obesity with BMI of 40.0-44.9, adult (CMS/[facility])   • Acute on chronic mixed hypoxemic/hypercapnic respiratory failure (CMS/[facility])   • Acute on chronic diastolic congestive heart failure (CMS/[facility])   • Obesity hypoventilation syndrome (CMS/[facility])   • Pulmonary hypertension. Calculated RVSP 64 mmHg by echocardiogram 18       Therapy Treatment          Rehabilitation Treatment Summary     Row Name 18 0853             Treatment Time/Intention    Discipline physical therapist  -BD      Document Type therapy note (daily note)  -BD      Subjective Information complains of;fatigue  -BD      Mode  of Treatment physical therapy  -BD      Patient/Family Observations Family not opresent  -BD      Care Plan Review care plan/treatment goals reviewed  -BD      Therapy Frequency (PT Clinical Impression) daily  -BD      Patient Effort good  -BD      Recorded by [BD] Tonie Butler, PT 08/22/18 1017      Row Name 08/22/18 0853             Vital Signs    Pre Systolic BP Rehab 121  -BD      Pre Treatment Diastolic BP 57  -BD      Posttreatment Heart Rate (beats/min) 78  -BD      Pre SpO2 (%) 99  -BD      O2 Delivery Pre Treatment supplemental O2  -BD      O2 Delivery Intra Treatment supplemental O2  -BD      Post SpO2 (%) 94  -BD      O2 Delivery Post Treatment supplemental O2  -BD      Pre Patient Position Supine  -BD      Intra Patient Position Standing  -BD      Post Patient Position Supine  -BD      Recorded by [BD] Tonie Butler, PT 08/22/18 1017      Row Name 08/22/18 0853             Cognitive Assessment/Intervention    Additional Documentation Cognitive Assessment/Intervention (Group)  -BD      Recorded by [BD] Tonie Butler, PT 08/22/18 1017      Row Name 08/22/18 0853             Cognitive Assessment/Intervention- PT/OT    Affect/Mental Status (Cognitive) WNL  -BD      Orientation Status (Cognition) oriented x 4  -BD      Follows Commands (Cognition) WNL  -BD      Safety Deficit (Cognitive) mild deficit  -BD      Recorded by [BD] Tonie Butler, PT 08/22/18 1017      Row Name 08/22/18 0853             Bed Mobility Assessment/Treatment    Bed Mobility Assessment/Treatment supine-sit;sit-supine  -BD      Supine-Sit [place] (Bed Mobility) set up;contact guard  -BD      Sit-Supine [place] (Bed Mobility) set up;supervision  -BD      Bed Mobility, Safety Issues decreased use of arms for pushing/pulling;decreased use of legs for bridging/pushing  -BD      Recorded by [BD] Tonie Butler, PT 08/22/18 1017      Row Name 08/22/18 0853             Transfer Assessment/Treatment     Transfer Assessment/Treatment sit-stand transfer;stand-sit transfer  -BD      Maintains Weight-bearing Status (Transfers) able to maintain  -BD      Comment (Transfers) safety assist needed. O2 dependent  -BD      Recorded by [BD] Tonie Butler, PT 08/22/18 1017      Row Name 08/22/18 0853             Sit-Stand Transfer    Sit-Stand [place] (Transfers) supervision  -BD      Assistive Device (Sit-Stand Transfers) walker, front-wheeled  -BD      Recorded by [BD] Tonie Butler, PT 08/22/18 1017      Row Name 08/22/18 0853             Stand-Sit Transfer    Stand-Sit [place] (Transfers) supervision  -BD      Assistive Device (Stand-Sit Transfers) walker, front-wheeled  -BD      Recorded by [BD] Tonie Butler, PT 08/22/18 1017      Row Name 08/22/18 0853             Toilet Transfer    Type (Toilet Transfer) sit-stand;stand-sit  -BD      [place] Level (Toilet Transfer) supervision;set up  -BD      Assistive Device (Toilet Transfer) walker, front-wheeled  -BD      Recorded by [BD] Tonie Butler, PT 08/22/18 1017      Row Name 08/22/18 0853             Gait/Stairs Assessment/Training    21118 - Gait Training Minutes  15  -BD      Gait/Stairs Assessment/Training gait/ambulation independence;gait/ambulation assistive device;distance ambulated;gait pattern;gait deviations;maintains weight-bearing status  -BD      [place] Level (Gait) contact guard;set up  -BD      Assistive Device (Gait) walker, rolling platform  -BD      Distance in Feet (Gait) 160  -BD      Pattern (Gait) step-through  -BD      Deviations/Abnormal Patterns (Gait) bilateral deviations;base of support, wide  -BD      Bilateral Gait Deviations forward flexed posture;heel strike decreased  -BD      Maintains Weight-bearing Status (Gait) able to maintain  -BD      Recorded by [BD] Tonie Butler, PT 08/22/18 1017      Row Name 08/22/18 0853             Therapeutic Exercise    Therapeutic Exercise standing, lower  extremities  -BD      96090 - PT Therapeutic Exercise Minutes 13  -BD      71449 - PT Therapeutic Activity Minutes 5  -BD      Recorded by [BD] Tonie Butler, PT 08/22/18 1027      Row Name 08/22/18 0853             Lower Extremity Standing Therapeutic Exercise    Exercise Type, Standing Lower Extremity (Therapeutic Exercise) AROM (active range of motion)  -BD      Transfers Skills, Training to Functional Activity, Standing Lower Extremity (Therapeutic Exercise) able to transfer skills from training to functional activity  -BD      Comment, Standing Lower Extremity (Therapeutic Exercise) Knee flexion / ext., Heel raises  -BD      Recorded by [BD] Tonie Butler, PT 08/22/18 1027      Row Name 08/22/18 0853             Static Sitting Balance    Level of [place] (Unsupported Sitting, Static Balance) independent  -BD      Sitting Position (Unsupported Sitting, Static Balance) sitting on edge of bed  -BD      Recorded by [BD] Tonie Butler, PT 08/22/18 1027      Row Name 08/22/18 0853             Static Standing Balance    Level of [place] (Supported Standing, Static Balance) supervision  -BD      Assistive Device Utilized (Supported Standing, Static Balance) rolling walker  -BD      Recorded by [BD] Tonie Butler, PT 08/22/18 1027      Row Name 08/22/18 0853             Dynamic Standing Balance    Level of [place], Reaches Outside Midline (Standing, Dynamic Balance) contact guard assist  -BD      Recorded by [BD] Tonie Butler, PT 08/22/18 1027      Row Name 08/22/18 0853             Positioning and Restraints    Pre-Treatment Position in bed  -BD      Post Treatment Position bed  -BD      In Bed notified nsg;supine;call light within reach;encouraged to call for assist;exit alarm on;side rails up x2;with nsg  -BD      Recorded by [BD] Tonie Butler, PT 08/22/18 1017      Row Name 08/22/18 0853             Pain Assessment    Additional Documentation Pain Scale: Numbers  Pre/Post-Treatment (Group)  -BD      Recorded by [BD] Tonie Butler, PT 08/22/18 1017      Row Name 08/22/18 0853             Pain Scale: Numbers Pre/Post-Treatment    Pain Scale: Numbers, Pretreatment 7/10  -BD      Pain Scale: Numbers, Post-Treatment 7/10  -BD      Pain Location - Side Bilateral  -BD      Pain Location - Orientation lower  -BD      Pain Location abdomen   and low back   -BD      Recorded by [BD] Tonie Butler, PT 08/22/18 1017      Row Name 08/22/18 0853             Outcome Summary/Treatment Plan (PT)    Daily Summary of Progress (PT) progress toward functional goals is good  -BD      Barriers to Overall Progress (PT) Pt's respiratory status.  -BD      Anticipated Discharge Disposition (PT) home with home health  -BD      Recorded by [BD] Tonie Butler, PT 08/22/18 1017        User Key  (r) = Recorded By, (t) = Taken By, (c) = Cosigned By    Initials Name Effective Dates Discipline    BD Tonie Butler, PT 06/08/18 -  PT                     Physical Therapy Education     Title: PT OT SLP Therapies (Active)     Topic: Physical Therapy (Active)     Point: Mobility training (Active)    Learning Progress Summary     Learner Status Readiness Method Response Comment Documented by    Patient Active Acceptance E,D NR   08/22/18 1017     Active Acceptance E NR  NATALIIA 08/21/18 1430     Active Acceptance E,D NR  LS 08/19/18 1536    Family Active Acceptance E,D NR  LS 08/19/18 1536          Point: Home exercise program (Active)    Learning Progress Summary     Learner Status Readiness Method Response Comment Documented by    Patient Active Acceptance E,D NR  BD 08/22/18 1017     Active Acceptance E NR  NATALIIA 08/21/18 1430     Active Acceptance E,D NR  LS 08/19/18 1536    Family Active Acceptance E,D NR  LS 08/19/18 1536          Point: Body mechanics (Active)    Learning Progress Summary     Learner Status Readiness Method Response Comment Documented by    Patient Active Acceptance E,D NR    08/22/18 1017     Active Acceptance E NR  NATALIIA 08/21/18 1430     Active Acceptance E,D NR   08/19/18 1536    Family Active Acceptance E,D NR   08/19/18 1536          Point: Precautions (Active)    Learning Progress Summary     Learner Status Readiness Method Response Comment Documented by    Patient Active Acceptance E,D NR   08/22/18 1017     Active Acceptance E NR  NATALIIA 08/21/18 1430     Active Acceptance E,D NR  LS 08/19/18 1536    Family Active Acceptance E,D NR   08/19/18 1536                      User Key     Initials Effective Dates Name Provider Type Discipline     06/19/15 -  Tanya Sahni, PT Physical Therapist PT     06/19/15 -  Ashley Harrell, PT Physical Therapist PT     06/08/18 -  Tonie Butler, PT Physical Therapist PT                    PT Recommendation and Plan  Anticipated Discharge Disposition (PT): home with home health  Therapy Frequency (PT Clinical Impression): daily  Outcome Summary/Treatment Plan (PT)  Daily Summary of Progress (PT): progress toward functional goals is good  Barriers to Overall Progress (PT): Pt's respiratory status.  Anticipated Discharge Disposition (PT): home with home health  Plan of Care Reviewed With: patient  Progress: improving  Outcome Summary: Pt needs encouragement. Pt is able to ambulate w R walker and O2 suppliment. She c/o generalized pain. Encourage movement.          Outcome Measures     Row Name 08/22/18 0853 08/21/18 1645 08/21/18 1430       How much help from another person do you currently need...    Turning from your back to your side while in flat bed without using bedrails? 4  -BD  -- 4  -NATALIIA    Moving from lying on back to sitting on the side of a flat bed without bedrails? 3  -BD  -- 3  -NATALIIA    Moving to and from a bed to a chair (including a wheelchair)? 3  -BD  -- 3  -NATALIIA    Standing up from a chair using your arms (e.g., wheelchair, bedside chair)? 3  -BD  -- 3  -NATALIIA    Climbing 3-5 steps with a railing? 2  -BD  -- 2  -NATALIIA     To walk in hospital room? 3  -BD  -- 3  -NATALIIA    AM-PAC 6 Clicks Score 18  -BD  -- 18  -NATALIIA       How much help from another is currently needed...    Putting on and taking off regular lower body clothing?  -- 3  -AR  --    Bathing (including washing, rinsing, and drying)  -- 3  -AR  --    Toileting (which includes using toilet bed pan or urinal)  -- 3  -AR  --    Putting on and taking off regular upper body clothing  -- 3  -AR  --    Taking care of personal grooming (such as brushing teeth)  -- 4  -AR  --    Eating meals  -- 4  -AR  --    Score  -- 20  -AR  --       Functional Assessment    Outcome Measure Options AM-PAC 6 Clicks Basic Mobility (PT)  -BD AM-PAC 6 Clicks Daily Activity (OT)  -AR  --    Row Name 08/19/18 1359 08/19/18 1306          How much help from another person do you currently need...    Turning from your back to your side while in flat bed without using bedrails? 3  -LS  --     Moving from lying on back to sitting on the side of a flat bed without bedrails? 3  -LS  --     Moving to and from a bed to a chair (including a wheelchair)? 3  -LS  --     Standing up from a chair using your arms (e.g., wheelchair, bedside chair)? 3  -LS  --     Climbing 3-5 steps with a railing? 2  -LS  --     To walk in hospital room? 3  -LS  --     AM-PAC 6 Clicks Score 17  -LS  --        How much help from another is currently needed...    Putting on and taking off regular lower body clothing?  -- 3   mult. rest periods needed with all task  -JBA     Bathing (including washing, rinsing, and drying)  -- 3  -JBA     Toileting (which includes using toilet bed pan or urinal)  -- 3  -JBA     Putting on and taking off regular upper body clothing  -- 3  -JBA     Taking care of personal grooming (such as brushing teeth)  -- 4   sitting  -JBA     Eating meals  -- 4  -JBA     Score  -- 20  -JBA        Functional Assessment    Outcome Measure Options AM-PAC 6 Clicks Basic Mobility (PT)  -LS AM-PAC 6 Clicks Daily Activity (OT)   -JBA       User Key  (r) = Recorded By, (t) = Taken By, (c) = Cosigned By    Initials Name Provider Type    Tanya Jenkins, PT Physical Therapist    Yadira Thao, OT Occupational Therapist    Aurora Hines, OT Occupational Therapist    Ashley Wiley, PT Physical Therapist    BD Tonie Butler, PT Physical Therapist           Time Calculation:         PT Charges     Row Name 08/22/18 1021 08/22/18 0853          Time Calculation    Start Time 0853  -BD  --     PT Received On 08/22/18  -BD  --        Time Calculation- PT    Total Timed Code Minutes- PT 25 minute(s)  -BD  --        Timed Charges    86007 - PT Therapeutic Exercise Minutes  -- 13  -TY     22110 - Gait Training Minutes   -- 15  -BD     52481 - PT Therapeutic Activity Minutes  -- 5  -BD       User Key  (r) = Recorded By, (t) = Taken By, (c) = Cosigned By    Initials Name Provider Type    Tonie Williamson, PT Physical Therapist        Therapy Suggested Charges     Code   Minutes Charges    68576 (CPT®) Hc Pt Neuromusc Re Education Ea 15 Min      03685 (CPT®) Hc Pt Ther Proc Ea 15 Min 13 1    74412 (CPT®) Hc Gait Training Ea 15 Min 15 1    52955 (CPT®) Hc Pt Therapeutic Act Ea 15 Min 5     62855 (CPT®) Hc Pt Manual Therapy Ea 15 Min      80300 (CPT®) Hc Pt Iontophoresis Ea 15 Min      48761 (CPT®) Hc Pt Elec Stim Ea-Per 15 Min      46192 (CPT®) Hc Pt Ultrasound Ea 15 Min      31484 (CPT®) Hc Pt Self Care/Mgmt/Train Ea 15 Min      06919 (CPT®) Hc Pt Prosthetic (S) Train Initial Encounter, Each 15 Min      57960 (CPT®) Hc Pt Orthotic(S)/Prosthetic(S) Encounter, Each 15 Min      93415 (CPT®) Hc Orthotic(S) Mgmt/Train Initial Encounter, Each 15min      Total  33 2        Therapy Charges for Today     Code Description Service Date Service Provider Modifiers Qty    01792164462 HC PT THER PROC EA 15 MIN 8/22/2018 Tonie Butler, PT GP 1    33148746042 HC GAIT TRAINING EA 15 MIN 8/22/2018 Tonie Butler, PT GP 1           PT G-Codes  Outcome Measure Options: AM-PAC 6 Clicks Basic Mobility (PT)    Tonie Butler, PT  8/22/2018

## 2018-08-22 NOTE — PLAN OF CARE
Problem: Patient Care Overview  Goal: Plan of Care Review   08/22/18 1018   Coping/Psychosocial   Plan of Care Reviewed With patient   Plan of Care Review   Progress improving   OTHER   Outcome Summary Pt needs encouragement. Pt is able to ambulate w R walker and O2 suppliment. She c/o generalized pain. Encourage movement.

## 2018-08-22 NOTE — PLAN OF CARE
Problem: Patient Care Overview  Goal: Plan of Care Review  Outcome: Ongoing (interventions implemented as appropriate)   08/22/18 0324   Coping/Psychosocial   Plan of Care Reviewed With patient   Plan of Care Review   Progress improving   OTHER   Outcome Summary Pt O2 sat baseline 2-3L O2, intermittent nausea, looking to going home.     Goal: Individualization and Mutuality  Outcome: Ongoing (interventions implemented as appropriate)   08/22/18 0324   Individualization   Patient Specific Interventions Monitor O2 Sat, pain and nausea q 2hr and prn.       Problem: Fall Risk (Adult)  Goal: Identify Related Risk Factors and Signs and Symptoms  Outcome: Ongoing (interventions implemented as appropriate)   08/22/18 0324   Fall Risk (Adult)   Related Risk Factors (Fall Risk) age-related changes;gait/mobility problems   Signs and Symptoms (Fall Risk) presence of risk factors     Goal: Absence of Fall  Outcome: Ongoing (interventions implemented as appropriate)   08/22/18 0324   Fall Risk (Adult)   Absence of Fall making progress toward outcome       Problem: Breathing Pattern Ineffective (Adult)  Goal: Effective Oxygenation/Ventilation  Outcome: Ongoing (interventions implemented as appropriate)   08/22/18 0324   Breathing Pattern Ineffective (Adult)   Effective Oxygenation/Ventilation making progress toward outcome     Goal: Anxiety/Fear Reduction  Outcome: Ongoing (interventions implemented as appropriate)   08/22/18 0324   Breathing Pattern Ineffective (Adult)   Anxiety/Fear Reduction making progress toward outcome       Problem: Skin Injury Risk (Adult)  Goal: Skin Health and Integrity  Outcome: Ongoing (interventions implemented as appropriate)   08/22/18 0324   Skin Injury Risk (Adult)   Skin Health and Integrity making progress toward outcome       Problem: NPPV/CPAP (Adult)  Goal: Signs and Symptoms of Listed Potential Problems Will be Absent, Minimized or Managed (NPPV/CPAP)  Outcome: Outcome(s) achieved Date Met:  08/22/18

## 2018-08-23 ENCOUNTER — TELEPHONE (OUTPATIENT)
Dept: FAMILY MEDICINE CLINIC | Facility: CLINIC | Age: 73
End: 2018-08-23

## 2018-08-23 ENCOUNTER — TRANSITIONAL CARE MANAGEMENT TELEPHONE ENCOUNTER (OUTPATIENT)
Dept: FAMILY MEDICINE CLINIC | Facility: CLINIC | Age: 73
End: 2018-08-23

## 2018-08-23 ENCOUNTER — READMISSION MANAGEMENT (OUTPATIENT)
Dept: CALL CENTER | Facility: HOSPITAL | Age: 73
End: 2018-08-23

## 2018-08-23 NOTE — TELEPHONE ENCOUNTER
DEBRA call completed.  Please refer to TCM call flowsheet for call documentation.   Patient reports that she is starting to feel better.  She denies n/v/d/c and pain.  No SOA.  Patient denies questions regarding meds and d/c instructions from hospital.  appointment cancelled per request.   (Routing comment)

## 2018-08-23 NOTE — OUTREACH NOTE
DEBRA call completed.  Please refer to TCM call flowsheet for call documentation.  Patient reports that she is starting to feel better.  She denies n/v/d/c and pain.  No SOA.  Patient denies questions regarding meds and d/c instructions from hospital.  appointment cancelled per request.

## 2018-08-23 NOTE — OUTREACH NOTE
Prep Survey      Responses   Facility patient discharged from?  [place]   Is patient eligible?  Yes   Discharge diagnosis  Acute on chronic diastolic congestive heart failure    Does the patient have one of the following disease processes/diagnoses(primary or secondary)?  CHF   Does the patient have Home health ordered?  Yes   What is the Home health agency?   [facility]   Is there a DME ordered?  No   Prep survey completed?  Yes          Karen Elizalde RN

## 2018-08-23 NOTE — TELEPHONE ENCOUNTER
----- Message from Zamzam Chaney sent at 8/23/2018  2:52 PM EDT -----  Contact: [facility] CALLING TO LET YOU KNOW THAT PATIENT WAS DISCHARGED FROM [facility] YESTERDAY AND:    THEY NEED TO SEE HER FOR:  PHYSICAL THERAPY  OCCUPATIONAL THERAPY   NURSING    ALSO THERE HAVE BEEN SEVERAL CHANGES TO HER MEDICATIONS.    [id number]

## 2018-08-24 ENCOUNTER — TELEPHONE (OUTPATIENT)
Dept: FAMILY MEDICINE CLINIC | Facility: CLINIC | Age: 73
End: 2018-08-24

## 2018-08-24 ENCOUNTER — READMISSION MANAGEMENT (OUTPATIENT)
Dept: CALL CENTER | Facility: HOSPITAL | Age: 73
End: 2018-08-24

## 2018-08-24 LAB
PATHOLOGIST NAME: NORMAL
SERPINA1 GENE MUT ANL BLD/T: NORMAL
SERPINA1 GENE MUT TESTED BLD/T: NORMAL

## 2018-08-24 RX ORDER — NYSTATIN 100000 [USP'U]/G
POWDER TOPICAL 3 TIMES DAILY
Qty: 45 G | Refills: 1 | Status: ON HOLD | OUTPATIENT
Start: 2018-08-24 | End: 2019-09-07

## 2018-08-24 NOTE — OUTREACH NOTE
CHF Week 1 Survey      Responses   Facility patient discharged from?  [place]   Does the patient have one of the following disease processes/diagnoses(primary or secondary)?  CHF   Is there a successful TCM telephone encounter documented?  Yes          Kenia Mullins RN

## 2018-08-24 NOTE — TELEPHONE ENCOUNTER
----- Message from Anastasia Ryder sent at 8/24/2018 10:08 AM EDT -----  Contact: DR DAI   [facility] IS CALLING TO LET YOU KNOW THAT SHE HAS A YEAST RASH UNDER HER BREAST. THEY WANT TO KNOW IF YOU WILL SEND IN SOME NISTATIN POWDER TO [facility] IN [place]?  [phone] ([place])

## 2018-08-24 NOTE — TELEPHONE ENCOUNTER
SPOKE WITH PT AND INFORMED HER OF THIS AND PT VERBALIZED UNDERSTANDING AND SHE WILL CALL BACK AS I WOKE HER TO SCHEDULE APPT.   SPOKE WITH [facility] FOR HH AND OK ORDER AND [facility] VERBALIZED UNDERSTANDING.

## 2018-08-28 LAB
INTERPRETATION: NORMAL
M TB TUBERC IFN-G BLD QL: NEGATIVE
QFT TB AG MINUS NIL VALUE: <0 IU/ML
QUANTIFERON CRITERIA: NORMAL
QUANTIFERON MITOGEN VALUE: >10 IU/ML
QUANTIFERON NIL VALUE: 0.06 IU/ML
QUANTIFERON TB AG VALUE: 0.05 IU/ML

## 2018-08-30 ENCOUNTER — READMISSION MANAGEMENT (OUTPATIENT)
Dept: CALL CENTER | Facility: HOSPITAL | Age: 73
End: 2018-08-30

## 2018-09-06 ENCOUNTER — OFFICE VISIT (OUTPATIENT)
Dept: FAMILY MEDICINE CLINIC | Facility: CLINIC | Age: 73
End: 2018-09-06

## 2018-09-06 VITALS
WEIGHT: 215.5 LBS | SYSTOLIC BLOOD PRESSURE: 122 MMHG | HEIGHT: 64 IN | HEART RATE: 78 BPM | DIASTOLIC BLOOD PRESSURE: 80 MMHG | RESPIRATION RATE: 20 BRPM | BODY MASS INDEX: 36.79 KG/M2 | TEMPERATURE: 98.3 F

## 2018-09-06 DIAGNOSIS — J96.22 ACUTE ON CHRONIC RESPIRATORY FAILURE WITH HYPOXIA AND HYPERCAPNIA (HCC): Primary | ICD-10-CM

## 2018-09-06 DIAGNOSIS — J43.8 OTHER EMPHYSEMA (HCC): ICD-10-CM

## 2018-09-06 DIAGNOSIS — E03.9 ACQUIRED HYPOTHYROIDISM: ICD-10-CM

## 2018-09-06 DIAGNOSIS — I10 BENIGN ESSENTIAL HYPERTENSION: ICD-10-CM

## 2018-09-06 DIAGNOSIS — I27.20 PULMONARY HYPERTENSION (HCC): ICD-10-CM

## 2018-09-06 DIAGNOSIS — J96.21 ACUTE ON CHRONIC RESPIRATORY FAILURE WITH HYPOXIA AND HYPERCAPNIA (HCC): Primary | ICD-10-CM

## 2018-09-06 DIAGNOSIS — I50.33 ACUTE ON CHRONIC DIASTOLIC CONGESTIVE HEART FAILURE (HCC): ICD-10-CM

## 2018-09-06 LAB
ALBUMIN SERPL-MCNC: 4.15 G/DL (ref 3.2–4.8)
ALBUMIN/GLOB SERPL: 2 G/DL (ref 1.5–2.5)
ALP SERPL-CCNC: 52 U/L (ref 25–100)
ALT SERPL-CCNC: 23 U/L (ref 7–40)
AST SERPL-CCNC: 22 U/L (ref 0–33)
BASOPHILS # BLD AUTO: 0.03 10*3/MM3 (ref 0–0.2)
BASOPHILS NFR BLD AUTO: 0.5 % (ref 0–1)
BILIRUB SERPL-MCNC: 0.5 MG/DL (ref 0.3–1.2)
BUN SERPL-MCNC: 28 MG/DL (ref 9–23)
BUN/CREAT SERPL: 29.5 (ref 7–25)
CALCIUM SERPL-MCNC: 8.9 MG/DL (ref 8.7–10.4)
CHLORIDE SERPL-SCNC: 101 MMOL/L (ref 99–109)
CO2 SERPL-SCNC: 28 MMOL/L (ref 20–31)
CREAT SERPL-MCNC: 0.95 MG/DL (ref 0.6–1.3)
EOSINOPHIL # BLD AUTO: 0.18 10*3/MM3 (ref 0–0.3)
EOSINOPHIL NFR BLD AUTO: 2.9 % (ref 0–3)
ERYTHROCYTE [DISTWIDTH] IN BLOOD BY AUTOMATED COUNT: 14.8 % (ref 11.3–14.5)
GLOBULIN SER CALC-MCNC: 2.1 GM/DL
GLUCOSE SERPL-MCNC: 253 MG/DL (ref 70–100)
HCT VFR BLD AUTO: 39.4 % (ref 34.5–44)
HGB BLD-MCNC: 12 G/DL (ref 11.5–15.5)
IMM GRANULOCYTES # BLD: 0.01 10*3/MM3 (ref 0–0.03)
IMM GRANULOCYTES NFR BLD: 0.2 % (ref 0–0.6)
LYMPHOCYTES # BLD AUTO: 1.1 10*3/MM3 (ref 0.6–4.8)
LYMPHOCYTES NFR BLD AUTO: 17.6 % (ref 24–44)
MCH RBC QN AUTO: 29 PG (ref 27–31)
MCHC RBC AUTO-ENTMCNC: 30.5 G/DL (ref 32–36)
MCV RBC AUTO: 95.2 FL (ref 80–99)
MONOCYTES # BLD AUTO: 0.45 10*3/MM3 (ref 0–1)
MONOCYTES NFR BLD AUTO: 7.2 % (ref 0–12)
NEUTROPHILS # BLD AUTO: 4.47 10*3/MM3 (ref 1.5–8.3)
NEUTROPHILS NFR BLD AUTO: 71.6 % (ref 41–71)
PLATELET # BLD AUTO: 212 10*3/MM3 (ref 150–450)
POTASSIUM SERPL-SCNC: 4.2 MMOL/L (ref 3.5–5.5)
PROT SERPL-MCNC: 6.2 G/DL (ref 5.7–8.2)
RBC # BLD AUTO: 4.14 10*6/MM3 (ref 3.89–5.14)
SODIUM SERPL-SCNC: 138 MMOL/L (ref 132–146)
TSH SERPL DL<=0.005 MIU/L-ACNC: 1.74 MIU/ML (ref 0.35–5.35)
WBC # BLD AUTO: 6.24 10*3/MM3 (ref 3.5–10.8)

## 2018-09-06 PROCEDURE — 99214 OFFICE O/P EST MOD 30 MIN: CPT | Performed by: NURSE PRACTITIONER

## 2018-09-06 RX ORDER — INSULIN ASPART 100 [IU]/ML
INJECTION, SOLUTION INTRAVENOUS; SUBCUTANEOUS
Refills: 0 | COMMUNITY
Start: 2018-08-22

## 2018-09-06 RX ORDER — GLYCOPYRROLATE 25 UG/ML
SOLUTION RESPIRATORY (INHALATION)
Refills: 12 | Status: ON HOLD | COMMUNITY
Start: 2018-08-24 | End: 2019-09-07

## 2018-09-06 NOTE — PROGRESS NOTES
Subjective   Janet Pisano is a 73 y.o. female.     History of Present Illness   Hospital follow up   Admitted [facility] 8/16/18-discharged 8/22/18 for acute respiratory failure, COPD exacerbation, Pulmonary HTN, CHF,DMT2, CAD, WARD    Hospital coarse as followed   74 yo female with chronic obstructive airways disease, chronic respiratory failure, hypertension, diabetes mellitus, dyslipidemia, coronary artery disease, iron deficiency anemia hospitalized with worsening hypoxia and dyspnea. her home health nurse noted that her oxygen saturation was 69% when she got up to the bathroom. They increased her to 4 L nasal cannula and she remained in the mid 80s and therefore was brought to the emergency room. She has a productive cough of whitish sputum. She denies fever but has had subjective chills. She has noted increasing edema.  emergency room blood gas revealed a pH of 7.30, CO2 of 72, O2 of 122 on a nonrebreather mask. BiPAP was initially attempted but she was restless and Pulling off the mask. She received 1 mg of Ativan. White blood cell count was normal but BNP was elevated and she received 1 dose of furosemide. She also received azithromycin. CTA of the chest was done and negative for pulmonary embolus but does reveal small effusions and alveolar edema.  CXR showed pulmonary edema and effusion. Patient was admitted to ICU/Critical  Care Service Patient was stabilized in the ICU with lasix, abx, steroids and oxygen an transferred to the floor.  She received 7 days of Rocephin and will not be sent home with any antibiotics.Pt needs to improve her diabetes control and keep her CHF under control.  She was discharged with lasix and potasium.  She was supposed to follow up with her family doctor within 7 days of discharge from the hospital but cancelled her follow up appointment until today. She is accompanied by her son. She is feeling much better.    The following portions of the patient's history were reviewed and updated  as appropriate: allergies, current medications, past family history, past medical history, past social history, past surgical history and problem list.    Review of Systems   Constitutional: Positive for activity change. Negative for chills, diaphoresis, fatigue, fever, unexpected weight gain and unexpected weight loss.   HENT: Negative.    Respiratory: Negative for cough, chest tightness, shortness of breath and wheezing.    Cardiovascular: Negative for chest pain, palpitations and leg swelling.   Endocrine: Negative for polydipsia, polyphagia and polyuria.   Musculoskeletal: Negative.    Skin: Negative.    Neurological: Negative.  Negative for dizziness and light-headedness.   Psychiatric/Behavioral: Negative for sleep disturbance and depressed mood.       Objective   Physical Exam   Constitutional: She is oriented to person, place, and time. She appears well-developed and well-nourished. No distress.   HENT:   Head: Normocephalic.   Nose: Nose normal.   Neck: Neck supple. No JVD present.   Cardiovascular: Normal rate, regular rhythm and normal heart sounds.    Pulmonary/Chest: Effort normal and breath sounds normal. No respiratory distress. She has no wheezes. She has no rales.   Musculoskeletal: She exhibits no edema.   Seated in wheelchair     Neurological: She is alert and oriented to person, place, and time.   Skin: Skin is warm and dry. Capillary refill takes less than 2 seconds. She is not diaphoretic.   Psychiatric: She has a normal mood and affect. Her behavior is normal. Judgment and thought content normal.   Nursing note and vitals reviewed.        Assessment/Plan   Janet was seen today for hospital followup.    Diagnoses and all orders for this visit:    Acute on chronic mixed hypoxemic/hypercapnic respiratory failure (CMS/HCC)  Comments:  resolved     Other emphysema (CMS/HCC)    Acute on chronic diastolic congestive heart failure (CMS/HCC)    Pulmonary hypertension. Calculated RVSP 64 mmHg by  echocardiogram 8/17/18    Acquired hypothyroidism  -     TSH    Hypertension and diastolic dysfunction  -     Comprehensive Metabolic Panel  -     CBC & Differential      Will check labs and notify pt of results    At the end of the visit the pt informed me that she is going to be leaving this practice to get a new provider. She will sign release for records.

## 2018-09-07 ENCOUNTER — READMISSION MANAGEMENT (OUTPATIENT)
Dept: CALL CENTER | Facility: HOSPITAL | Age: 73
End: 2018-09-07

## 2018-09-07 NOTE — OUTREACH NOTE
CHF Week 3 Survey      Responses   Facility patient discharged from?  [facility]   Does the patient have one of the following disease processes/diagnoses(primary or secondary)?  CHF   Week 3 attempt successful?  Yes   Call start time  1505   Call end time  1516   Discharge diagnosis  Acute on chronic diastolic congestive heart failure    Is patient permission given to speak with other caregiver?  No   Meds reviewed with patient/caregiver?  Yes   Is the patient having any side effects they believe may be caused by any medication additions or changes?  No   Does the patient have all medications ordered at discharge?  Yes   Is the patient taking all medications as directed (includes completed medication regime)?  Yes   Medication comments  Reviewed blood sugar monitoring and insulin regimine with patient.    Does the patient have a primary care provider?   Yes   Does the patient have an appointment with their PCP within 7 days of discharge?  Yes   Comments regarding PCP  Dr Jhoan Hdez   Has the patient kept scheduled appointments due by today?  Yes   What is the Home health agency?   [facility]   Has home health visited the patient within 72 hours of discharge?  Yes   Psychosocial issues?  No   Did the patient receive a copy of their discharge instructions?  Yes   Nursing interventions  Reviewed instructions with patient   What is the patient's perception of their health status since discharge?  Improving   Nursing interventions  Nurse provided patient education   Is the patient weighing daily?  No   Does the patient have scales?  Yes   Daily weight interventions  Education provided on importance of daily weight   Is the patient able to teach back Heart Failure diet management?  Yes   Is the patient able to teach back Heart Failure Zones?  Yes   Is the patient able to teach back signs and symptoms of worsening condition? (i.e. weight gain, shortness of air, etc.)  Yes   Is the patient/caregiver able to teach  back the hierarchy of who to call/visit for symptoms/problems? PCP, Specialist, Home health nurse, Urgent Care, ED, 911  Yes   Additional teach back comments  Patient upset about how much she has to stick herself with blood sugar monitoring and insulin. Discussed meter type that allows you to stick alternative places than fingertips.    CHF Week 3 call completed?  Yes          Rosaline Rivera RN

## 2018-09-10 DIAGNOSIS — J43.8 OTHER EMPHYSEMA (HCC): ICD-10-CM

## 2018-09-10 DIAGNOSIS — Z79.4 UNCONTROLLED TYPE 2 DIABETES MELLITUS WITH HYPERGLYCEMIA, WITH LONG-TERM CURRENT USE OF INSULIN (HCC): ICD-10-CM

## 2018-09-10 DIAGNOSIS — E11.65 UNCONTROLLED TYPE 2 DIABETES MELLITUS WITH HYPERGLYCEMIA, WITH LONG-TERM CURRENT USE OF INSULIN (HCC): ICD-10-CM

## 2018-09-12 RX ORDER — INSULIN ASPART 100 [IU]/ML
INJECTION, SOLUTION INTRAVENOUS; SUBCUTANEOUS
Refills: 0 | OUTPATIENT
Start: 2018-09-12

## 2018-09-12 RX ORDER — GABAPENTIN 300 MG/1
300 CAPSULE ORAL 3 TIMES DAILY
Qty: 90 CAPSULE | Refills: 3 | OUTPATIENT
Start: 2018-09-12

## 2018-09-12 RX ORDER — LOSARTAN POTASSIUM 25 MG/1
25 TABLET ORAL
Qty: 30 TABLET | Refills: 0 | OUTPATIENT
Start: 2018-09-12

## 2018-09-14 RX ORDER — HYDROXYZINE HYDROCHLORIDE 25 MG/1
TABLET, FILM COATED ORAL
Qty: 30 TABLET | Refills: 0 | OUTPATIENT
Start: 2018-09-14

## 2018-09-14 RX ORDER — LOSARTAN POTASSIUM 25 MG/1
25 TABLET ORAL
Qty: 30 TABLET | Refills: 0 | OUTPATIENT
Start: 2018-09-14

## 2018-09-17 ENCOUNTER — READMISSION MANAGEMENT (OUTPATIENT)
Dept: CALL CENTER | Facility: HOSPITAL | Age: 73
End: 2018-09-17

## 2018-09-17 RX ORDER — INSULIN DETEMIR 100 [IU]/ML
INJECTION, SOLUTION SUBCUTANEOUS
Qty: 15 ML | Refills: 0 | Status: SHIPPED | OUTPATIENT
Start: 2018-09-17

## 2018-09-17 NOTE — OUTREACH NOTE
CHF Week 4 Survey      Responses   Facility patient discharged from?  [place]   Does the patient have one of the following disease processes/diagnoses(primary or secondary)?  CHF   Week 4 attempt successful?  Yes   Call start time  1426   Call end time  1434   Discharge diagnosis  Acute on chronic diastolic congestive heart failure    Meds reviewed with patient/caregiver?  Yes   Is the patient having any side effects they believe may be caused by any medication additions or changes?  No   Is the patient taking all medications as directed (includes completed medication regime)?  Yes   Has the patient kept scheduled appointments due by today?  Yes   Is the patient still receiving Home Health Services?  N/A   Psychosocial issues?  No   What is the patient's perception of their health status since discharge?  Improving   Is the patient weighing daily?  No   Does the patient have scales?  Yes   Daily weight interventions  Education provided on importance of daily weight   Is the patient able to teach back Heart Failure Zones?  Yes   Additional teach back comments  Pt is checking wts every other day per her report. Education given again on importance of daily wts.    Week 4 Call Completed?  Yes   Would the patient like one additional call?  No   Graduated  Yes   Did the patient feel the follow up calls were helpful during their recovery period?  Yes   Was the number of calls appropriate?  Yes          Kenia Mullins, SANKET

## 2018-09-20 DIAGNOSIS — J43.8 OTHER EMPHYSEMA (HCC): ICD-10-CM

## 2018-09-21 RX ORDER — INSULIN ASPART 100 [IU]/ML
INJECTION, SOLUTION INTRAVENOUS; SUBCUTANEOUS
Qty: 21 ML | Refills: 0 | OUTPATIENT
Start: 2018-09-21

## 2018-09-27 ENCOUNTER — TELEPHONE (OUTPATIENT)
Dept: FAMILY MEDICINE CLINIC | Facility: CLINIC | Age: 73
End: 2018-09-27

## 2018-09-27 NOTE — TELEPHONE ENCOUNTER
----- Message from Liv Almeida sent at 9/21/2018  3:50 PM EDT -----  Contact: мария; med refill   Pt was to switch to a new drNiki But she will not be seeing the new drNiki Until  The 26th.     Pt needs a refill on the following medications      losartan (COZAAR) 25 MG tablet Take 1 tablet by mouth Daily.     NOVOLOG FLEXPEN 100 UNIT/ML solution pen-injector sc pen     BREO ELLIPTA 100-25 MCG/INH aerosol powder  INHALE ONCE DAILY       [facility] [address] [place] [address] AT SEC OF [address] [facility] - [phone] PH - [phone] (Phone)  [phone] (Fax)    [phone]

## 2018-09-27 NOTE — TELEPHONE ENCOUNTER
I did not see this message until today, it looks like Ms Pisano has already seen her new PCP. Please confirm with the patient that she has her medications refilled. dario

## 2018-09-27 NOTE — TELEPHONE ENCOUNTER
"Pt say's, \"she has her med now but she had been without if for 6 days.\" I apologized and informed her I didn't know what happened to the message.   "

## 2018-11-26 ENCOUNTER — TELEPHONE (OUTPATIENT)
Dept: FAMILY MEDICINE CLINIC | Facility: CLINIC | Age: 73
End: 2018-11-26

## 2018-11-26 NOTE — TELEPHONE ENCOUNTER
----- Message from Andrew Kaufman sent at 11/26/2018  2:50 PM EST -----  Contact: ana [facility]; kim Crisostomo at [facility] has faxed over orders and needs them signed.  She states this is from august and is in timely needs insurance filed.     Phone [phone]

## 2019-09-07 ENCOUNTER — HOSPITAL ENCOUNTER (INPATIENT)
Facility: HOSPITAL | Age: 74
LOS: 3 days | Discharge: HOME HEALTH | End: 2019-09-10
Attending: INTERNAL MEDICINE | Admitting: INTERNAL MEDICINE

## 2019-09-07 DIAGNOSIS — J96.22 ACUTE ON CHRONIC RESPIRATORY FAILURE WITH HYPOXIA AND HYPERCAPNIA (HCC): ICD-10-CM

## 2019-09-07 DIAGNOSIS — J96.21 ACUTE ON CHRONIC RESPIRATORY FAILURE WITH HYPOXIA AND HYPERCAPNIA (HCC): ICD-10-CM

## 2019-09-07 DIAGNOSIS — Z74.09 IMPAIRED FUNCTIONAL MOBILITY, BALANCE, GAIT, AND ENDURANCE: Primary | ICD-10-CM

## 2019-09-07 PROBLEM — J44.1 COPD WITH ACUTE EXACERBATION (HCC): Status: ACTIVE | Noted: 2019-09-07

## 2019-09-07 LAB
ALBUMIN SERPL-MCNC: 4.1 G/DL (ref 3.5–5.2)
ALBUMIN/GLOB SERPL: 1.3 G/DL
ALP SERPL-CCNC: 64 U/L (ref 39–117)
ALT SERPL W P-5'-P-CCNC: 21 U/L (ref 1–33)
ANION GAP SERPL CALCULATED.3IONS-SCNC: 13 MMOL/L (ref 5–15)
APTT PPP: 26.3 SECONDS (ref 24–37)
ARTERIAL PATENCY WRIST A: POSITIVE
AST SERPL-CCNC: 29 U/L (ref 1–32)
ATMOSPHERIC PRESS: ABNORMAL MM[HG]
BASE EXCESS BLDA CALC-SCNC: 6.5 MMOL/L (ref 0–2)
BDY SITE: ABNORMAL
BILIRUB SERPL-MCNC: 0.7 MG/DL (ref 0.2–1.2)
BODY TEMPERATURE: 98.6 C
BUN BLD-MCNC: 18 MG/DL (ref 8–23)
BUN/CREAT SERPL: 24 (ref 7–25)
CALCIUM SPEC-SCNC: 9 MG/DL (ref 8.6–10.5)
CHLORIDE SERPL-SCNC: 95 MMOL/L (ref 98–107)
CO2 BLDA-SCNC: 34.7 MMOL/L (ref 23–27)
CO2 SERPL-SCNC: 27 MMOL/L (ref 22–29)
COHGB MFR BLD: 1.2 % (ref 0–2)
CREAT BLD-MCNC: 0.75 MG/DL (ref 0.57–1)
D-LACTATE SERPL-SCNC: 1.3 MMOL/L (ref 0.5–2)
EPAP: 0
GFR SERPL CREATININE-BSD FRML MDRD: 75 ML/MIN/1.73
GLOBULIN UR ELPH-MCNC: 3.1 GM/DL
GLUCOSE BLD-MCNC: 293 MG/DL (ref 65–99)
GLUCOSE BLDC GLUCOMTR-MCNC: 273 MG/DL (ref 70–130)
GLUCOSE BLDC GLUCOMTR-MCNC: 319 MG/DL (ref 70–130)
GLUCOSE BLDC GLUCOMTR-MCNC: 325 MG/DL (ref 70–130)
HCO3 BLDA-SCNC: 33.1 MMOL/L (ref 20–26)
HCT VFR BLD CALC: 41.3 %
HGB BLDA-MCNC: 13.5 G/DL (ref 14–18)
HOROWITZ INDEX BLD+IHG-RTO: 40 %
INR PPP: 0.97 (ref 0.85–1.16)
IPAP: 0
MAGNESIUM SERPL-MCNC: 1.8 MG/DL (ref 1.6–2.4)
METHGB BLD QL: 0.5 % (ref 0–1.5)
MODALITY: ABNORMAL
NOTE: ABNORMAL
NT-PROBNP SERPL-MCNC: 384.3 PG/ML (ref 5–1800)
OXYHGB MFR BLDV: 92.2 % (ref 94–99)
PAW @ PEAK INSP FLOW SETTING VENT: 0 CMH2O
PCO2 BLDA: 54.7 MM HG (ref 35–45)
PCO2 TEMP ADJ BLD: 54.7 MM HG (ref 35–45)
PEEP RESPIRATORY: 16 CM[H2O]
PH BLDA: 7.39 PH UNITS (ref 7.35–7.45)
PH, TEMP CORRECTED: 7.39 PH UNITS
PHOSPHATE SERPL-MCNC: 3.3 MG/DL (ref 2.5–4.5)
PO2 BLDA: 70.3 MM HG (ref 83–108)
PO2 TEMP ADJ BLD: 70.3 MM HG (ref 83–108)
POTASSIUM BLD-SCNC: 5.8 MMOL/L (ref 3.5–5.2)
PROCALCITONIN SERPL-MCNC: 0.02 NG/ML (ref 0.1–0.25)
PROT SERPL-MCNC: 7.2 G/DL (ref 6–8.5)
PROTHROMBIN TIME: 12.4 SECONDS (ref 11.2–14.3)
SODIUM BLD-SCNC: 135 MMOL/L (ref 136–145)
T4 FREE SERPL-MCNC: 1.61 NG/DL (ref 0.93–1.7)
TOTAL RATE: 0 BREATHS/MINUTE
TROPONIN T SERPL-MCNC: <0.01 NG/ML (ref 0–0.03)
TSH SERPL DL<=0.05 MIU/L-ACNC: 0.68 UIU/ML (ref 0.27–4.2)
VENTILATOR MODE: ABNORMAL

## 2019-09-07 PROCEDURE — 83605 ASSAY OF LACTIC ACID: CPT | Performed by: INTERNAL MEDICINE

## 2019-09-07 PROCEDURE — 5A09357 ASSISTANCE WITH RESPIRATORY VENTILATION, LESS THAN 24 CONSECUTIVE HOURS, CONTINUOUS POSITIVE AIRWAY PRESSURE: ICD-10-PCS | Performed by: INTERNAL MEDICINE

## 2019-09-07 PROCEDURE — 36600 WITHDRAWAL OF ARTERIAL BLOOD: CPT

## 2019-09-07 PROCEDURE — 84145 PROCALCITONIN (PCT): CPT | Performed by: INTERNAL MEDICINE

## 2019-09-07 PROCEDURE — 25010000002 ENOXAPARIN PER 10 MG: Performed by: INTERNAL MEDICINE

## 2019-09-07 PROCEDURE — 84484 ASSAY OF TROPONIN QUANT: CPT | Performed by: INTERNAL MEDICINE

## 2019-09-07 PROCEDURE — 83735 ASSAY OF MAGNESIUM: CPT | Performed by: INTERNAL MEDICINE

## 2019-09-07 PROCEDURE — 84439 ASSAY OF FREE THYROXINE: CPT | Performed by: INTERNAL MEDICINE

## 2019-09-07 PROCEDURE — 83880 ASSAY OF NATRIURETIC PEPTIDE: CPT | Performed by: INTERNAL MEDICINE

## 2019-09-07 PROCEDURE — 85730 THROMBOPLASTIN TIME PARTIAL: CPT | Performed by: INTERNAL MEDICINE

## 2019-09-07 PROCEDURE — 25010000002 METHYLPREDNISOLONE PER 40 MG: Performed by: INTERNAL MEDICINE

## 2019-09-07 PROCEDURE — 87040 BLOOD CULTURE FOR BACTERIA: CPT | Performed by: INTERNAL MEDICINE

## 2019-09-07 PROCEDURE — 84443 ASSAY THYROID STIM HORMONE: CPT | Performed by: INTERNAL MEDICINE

## 2019-09-07 PROCEDURE — 94660 CPAP INITIATION&MGMT: CPT

## 2019-09-07 PROCEDURE — 80053 COMPREHEN METABOLIC PANEL: CPT | Performed by: INTERNAL MEDICINE

## 2019-09-07 PROCEDURE — 82962 GLUCOSE BLOOD TEST: CPT

## 2019-09-07 PROCEDURE — 63710000001 INSULIN LISPRO (HUMAN) PER 5 UNITS: Performed by: INTERNAL MEDICINE

## 2019-09-07 PROCEDURE — 84100 ASSAY OF PHOSPHORUS: CPT | Performed by: INTERNAL MEDICINE

## 2019-09-07 PROCEDURE — 94640 AIRWAY INHALATION TREATMENT: CPT

## 2019-09-07 PROCEDURE — 82805 BLOOD GASES W/O2 SATURATION: CPT

## 2019-09-07 PROCEDURE — 99291 CRITICAL CARE FIRST HOUR: CPT | Performed by: INTERNAL MEDICINE

## 2019-09-07 PROCEDURE — 63710000001 INSULIN DETEMIR PER 5 UNITS: Performed by: INTERNAL MEDICINE

## 2019-09-07 PROCEDURE — 85610 PROTHROMBIN TIME: CPT | Performed by: INTERNAL MEDICINE

## 2019-09-07 RX ORDER — AZITHROMYCIN 250 MG/1
500 TABLET, FILM COATED ORAL DAILY
Status: COMPLETED | OUTPATIENT
Start: 2019-09-07 | End: 2019-09-07

## 2019-09-07 RX ORDER — LEVOTHYROXINE SODIUM 0.12 MG/1
125 TABLET ORAL
COMMUNITY
End: 2022-01-01 | Stop reason: HOSPADM

## 2019-09-07 RX ORDER — BACLOFEN 10 MG/1
10 TABLET ORAL 3 TIMES DAILY PRN
COMMUNITY

## 2019-09-07 RX ORDER — SODIUM CHLORIDE 0.9 % (FLUSH) 0.9 %
10 SYRINGE (ML) INJECTION AS NEEDED
Status: DISCONTINUED | OUTPATIENT
Start: 2019-09-07 | End: 2019-09-10 | Stop reason: HOSPADM

## 2019-09-07 RX ORDER — METHYLPREDNISOLONE SODIUM SUCCINATE 40 MG/ML
40 INJECTION, POWDER, LYOPHILIZED, FOR SOLUTION INTRAMUSCULAR; INTRAVENOUS EVERY 8 HOURS
Status: DISCONTINUED | OUTPATIENT
Start: 2019-09-07 | End: 2019-09-08

## 2019-09-07 RX ORDER — BUDESONIDE 0.5 MG/2ML
0.5 INHALANT ORAL
Status: DISCONTINUED | OUTPATIENT
Start: 2019-09-07 | End: 2019-09-09

## 2019-09-07 RX ORDER — ACETAMINOPHEN 325 MG/1
650 TABLET ORAL EVERY 4 HOURS PRN
Status: DISCONTINUED | OUTPATIENT
Start: 2019-09-07 | End: 2019-09-10 | Stop reason: HOSPADM

## 2019-09-07 RX ORDER — DEXTROSE MONOHYDRATE 25 G/50ML
25 INJECTION, SOLUTION INTRAVENOUS
Status: DISCONTINUED | OUTPATIENT
Start: 2019-09-07 | End: 2019-09-10 | Stop reason: HOSPADM

## 2019-09-07 RX ORDER — IPRATROPIUM BROMIDE AND ALBUTEROL SULFATE 2.5; .5 MG/3ML; MG/3ML
3 SOLUTION RESPIRATORY (INHALATION)
Status: DISCONTINUED | OUTPATIENT
Start: 2019-09-07 | End: 2019-09-09

## 2019-09-07 RX ORDER — AZITHROMYCIN 250 MG/1
250 TABLET, FILM COATED ORAL DAILY
Status: DISCONTINUED | OUTPATIENT
Start: 2019-09-08 | End: 2019-09-10 | Stop reason: HOSPADM

## 2019-09-07 RX ORDER — SODIUM CHLORIDE 9 MG/ML
75 INJECTION, SOLUTION INTRAVENOUS CONTINUOUS
Status: DISCONTINUED | OUTPATIENT
Start: 2019-09-07 | End: 2019-09-07

## 2019-09-07 RX ORDER — SODIUM CHLORIDE 0.9 % (FLUSH) 0.9 %
10 SYRINGE (ML) INJECTION EVERY 12 HOURS SCHEDULED
Status: DISCONTINUED | OUTPATIENT
Start: 2019-09-07 | End: 2019-09-10 | Stop reason: HOSPADM

## 2019-09-07 RX ORDER — ACETAMINOPHEN 650 MG/1
650 SUPPOSITORY RECTAL EVERY 4 HOURS PRN
Status: DISCONTINUED | OUTPATIENT
Start: 2019-09-07 | End: 2019-09-10 | Stop reason: HOSPADM

## 2019-09-07 RX ORDER — NICOTINE POLACRILEX 4 MG
15 LOZENGE BUCCAL
Status: DISCONTINUED | OUTPATIENT
Start: 2019-09-07 | End: 2019-09-10 | Stop reason: HOSPADM

## 2019-09-07 RX ADMIN — BUDESONIDE 0.5 MG: 0.5 INHALANT RESPIRATORY (INHALATION) at 19:12

## 2019-09-07 RX ADMIN — SODIUM CHLORIDE, PRESERVATIVE FREE 10 ML: 5 INJECTION INTRAVENOUS at 20:29

## 2019-09-07 RX ADMIN — ENOXAPARIN SODIUM 40 MG: 40 INJECTION SUBCUTANEOUS at 20:25

## 2019-09-07 RX ADMIN — INSULIN LISPRO 6 UNITS: 100 INJECTION, SOLUTION INTRAVENOUS; SUBCUTANEOUS at 16:58

## 2019-09-07 RX ADMIN — IPRATROPIUM BROMIDE AND ALBUTEROL SULFATE 3 ML: 2.5; .5 SOLUTION RESPIRATORY (INHALATION) at 19:12

## 2019-09-07 RX ADMIN — AZITHROMYCIN 500 MG: 250 TABLET, FILM COATED ORAL at 17:37

## 2019-09-07 RX ADMIN — INSULIN DETEMIR 10 UNITS: 100 INJECTION, SOLUTION SUBCUTANEOUS at 20:32

## 2019-09-07 RX ADMIN — SODIUM CHLORIDE 75 ML/HR: 9 INJECTION, SOLUTION INTRAVENOUS at 16:50

## 2019-09-07 RX ADMIN — METHYLPREDNISOLONE SODIUM SUCCINATE 40 MG: 40 INJECTION, POWDER, FOR SOLUTION INTRAMUSCULAR; INTRAVENOUS at 16:50

## 2019-09-07 NOTE — NURSING NOTE
ACC REVIEW REPORT: [facility]        PATIENT NAME: Janet Pisano    PATIENT ID: [id number]    BED: N211    BED TYPE: ICU    BED GIVEN TO: REY REARDON RN    TIME BED GIVEN: 1247    YOB: 1945    AGE: 74    GENDER: FEMALE    TODAY'S DATE: 9/7/2019    TRANSFER DATE: 9/7/19    TRANSFERRING FACILITY: [facility]    TRANSFERRING FACILITY PHONE # : [phone]    TRANSFERRING MD: DR. AGUIRRE    DATE/TIME REQUEST RECEIVED: 9/7/19 1231    [facility] RN: SHILPI HAHN RN    REPORT FROM: REY REARDON RN    TIME REPORT TAKEN: 1247    DIAGNOSIS: RESPIRATORY FAILURE    REASON FOR TRANSFER TO [facility]: HIGHER LEVEL OF CARE    TRANSPORTATION: AMBULANCE    CLINICAL REASON FOR TRANSFER TO [facility]: ADM 9/7/19 WITH SOA AND SOMNOLENCE. 02 SAT 80% ON ARRIVAL. PT HAD NEURONTIN RESTARTED AND HAS A HX OF COPD      CLINICAL INFORMATION    ALLERGIES: ALISKIREN,AMLODIPINE,CRESTOR,LISINOPRIL,METFORMIN,PENICILLIN,SITAGLIPTIN,STATINS AND VALSARTAN    BASSETT: NO    INFECTIOUS DISEASE: NO    ISOLATION: NO    LAST VITAL SIGNS:  TIME: 1230  TEMP: 97.9  PULSE: 65  B/P: 172/87  RESP: 18    LAB INFORMATION: GLUCOSE 319  LACTATE 2.1       CULTURE INFORMATION: BLOOD CULTURES DRAWN    MEDS/IV FLUIDS: RIGHT AC 18G SL  RECEIVED DUONEB AND SOLUMEDROL(SEE MED SHEETS)      CARDIAC SYSTEM:    CHEST PAIN: NONE    RHYTHM: SINUS RHYTHM    Is patient taking or has patient been given any drugs that could increase bleeding? YES  ASA 325MG Q DAY AT HOME    CARDIAC ENZYMES:    DATE: 9/7/19  TIME: 0836  CK:    CKMB:    EUFEMIA:    TROP: 0.103    DATE: 9/7/19  TIME: 1056  CK:    CKMB:    EUFEMIA:    TROP: 0.107    RESPIRATORY SYSTEM:    ABG DATE: 9/7/19        ABG TIME:     ABG RESULTS:    PH: 7.24  PO2: <58  PCO2: 85  HCO3: 36  O2 SAT: 90%  UNKNOWN BIPAP SETTINGS ON THESE RESULTS. SETTINGS WERE CHANGED TO RATE 18 AND 40%    OXYGEN: 40%    O2 SAT: 93%    ADMINISTRATION ROUTE: BIPAP    RESPIRATORY STATUS: NO S/S OF RESPIRATORY DISTRESS AT TIME OF  REPORT      CNS/MUSCULOSKELETAL    ALERT AND ORIENTED:    PERSON: Y  PLACE: Y  TIME: Y    SUZANNE COMA SCALE:    E: 4  M: 6  V: 5    PAST MEDICAL HISTORY: COPD,3LPM NC AT HOME,DM,HYPOTHYROIDISM AND NEUROPATHY      Sarah Carias RN  9/7/2019  1:01 PM

## 2019-09-07 NOTE — PLAN OF CARE
Problem: Patient Care Overview  Goal: Plan of Care Review  Outcome: Ongoing (interventions implemented as appropriate)   09/07/19 4230   Coping/Psychosocial   Plan of Care Reviewed With patient   Plan of Care Review   Progress no change   OTHER   Outcome Summary Pt VSS. Weaned from bipap to 6L NC. Alert and oriented. ALFIE Barber RN.       Problem: Chronic Obstructive Pulmonary Disease (Adult)  Goal: Signs and Symptoms of Listed Potential Problems Will be Absent, Minimized or Managed (Chronic Obstructive Pulmonary Disease)  Outcome: Ongoing (interventions implemented as appropriate)

## 2019-09-07 NOTE — H&P
Pulmonary/Critical Care History and Physical Exam     LOS: 0 days   Patient Care Team:  Yanira Grissom APRN as PCP - General (Family Medicine)    Chief Complaint/reason: Dyspnea/respiratory failure    Subjective      HPI:   74 y.o. female with history of COPD on chronic maintenance bronchodilators and inhaled corticosteroids, chronic hypoxemia on home oxygen 3 L around-the-clock, chronic pain, diabetes was transferred from [facility] emergency department for respiratory failure.  The patient apparently reported some increasing shortness of breath over the previous 2 weeks.  She is somewhat confused today but she does state that she thinks she was treated with some steroids or antibiotics by her primary care provider.  Additionally, she had been off of her usual gabapentin, which was previously dosed at 300 mg 3 times daily, and was switched to 400 mg 3 times daily which she restarted the day before admission.  The patient was somnolent when she presented to [facility] complaining of shortness of breath and fatigue.  Initial venous blood gas and there showed a pH of 7.26 with a PCO2 of 80.  She was placed on BiPAP and a repeat VBG showed a pH of 7.24 with a PCO2 of 85.  The hospitalist was called here and did not feel the patient was appropriate for the floor and I was called and accepted the patient to the ICU for respiratory failure.    On arrival here the patient is awake and alert.  She is somewhat confused about dates, times, and exactly what has happened to her but completely alert and awake.  Her main concern is that she wants to eat.  She does feel she has been breathing worse over the past 2 days with increased cough and dyspnea.  She has no other current complaints.  She is on BiPAP on arrival and was transported on BiPAP.    History taken from: Patient, chart.    Past Medical History:   Diagnosis Date   • Arthritis    • Bilateral carpal tunnel syndrome 2/24/2017   •  Bradycardia 8/22/2016   • COPD exacerbation (CMS/[facility])    • Coronary artery disease 9/7/2016   • Depression    • Diabetes mellitus (CMS/[facility])    • Diverticulosis 8/22/2016   • Dyslipidemia 9/7/2016   • H/O esophageal ulcer    • History of myocardial infarction 9/7/2016    Questionable history of myocardial infarction: Remote cardiac catheterization at [facility] in [place], incomplete database.  Reported stress test 2-3 years ago, negative per patient report, incomplete database.    • History of rheumatic fever 9/7/2016   • Hypertension    • Hypothyroidism    • Migraine 8/22/2016   • Rheumatic fever    • Ventral hernia 9/7/2016   • Vitamin D deficiency 6/20/2016       Past Surgical History:   Procedure Laterality Date   • CARDIAC CATHETERIZATION     • CARPAL TUNNEL RELEASE     • CATARACT EXTRACTION, BILATERAL     • COLONOSCOPY     • ESOPHAGUS SURGERY      hole repair   • HERNIA REPAIR      ventral   • TUBAL ABDOMINAL LIGATION  1982       Family History   Problem Relation Age of Onset   • Diabetes Mother    • Liver disease Mother    • Cancer Mother    • Obesity Mother    • Coronary artery disease Father    • Alcohol abuse Father        Social History     Socioeconomic History   • Marital status:      Spouse name: Not on file   • Number of children: Not on file   • Years of education: Not on file   • Highest education level: Not on file   Tobacco Use   • Smoking status: Former Smoker     Packs/day: 1.00     Types: Cigarettes     Start date: 1/1/1959     Last attempt to quit: 12/20/2015     Years since quitting: 3.7   • Smokeless tobacco: Never Used   Substance and Sexual Activity   • Alcohol use: No   • Drug use: No   • Sexual activity: Defer       Allergies   Allergen Reactions   • Aliskiren    • Amlodipine    • Crestor [Rosuvastatin Calcium]    • Lisinopril    • Metformin And Related    • Penicillins    • Sitagliptin    • Statins Confusion   • Valsartan Unknown (See Comments)     Tolerates  Losartan 8/20/18       PMH/FH/SocH were reviewed by me and updates were made.     Review of Systems:    Review of 14 systems was completed with positives and pertinent negatives noted in the subjective section.  All other systems reviewed and are negative.       Objective     Vital Signs  Temp:  [98.7 °F (37.1 °C)] 98.7 °F (37.1 °C)  Heart Rate:  [59-66] 66  Resp:  [22-30] 22  BP: (140-150)/() 140/62  There is no height or weight on file to calculate BMI.       Physical Exam:     General Appearance:    Alert, cooperative, on BiPAP   Head:    Normocephalic, without obvious abnormality, atraumatic   Eyes:            Lids and lashes normal, conjunctivae and sclerae normal, no   icterus, no pallor, corneas clear, PER   ENMT:   Ears appear intact with no abnormalities noted      No oral lesions, no thrush, oral mucosa moist   Neck:   No adenopathy, supple, trachea midline, no thyromegaly, no   carotid bruit, no JVD       Lungs/resp:    On BiPAP, normal effort, symmetric chest rise, no crepitus, diminished breath sounds diffusely, no chest wall tenderness                  Heart/CV:    Regular rhythm and normal rate, normal S1 and S2, no            murmur   Abdomen/GI:     Normal bowel sounds, no masses, no organomegaly, soft        non-tender, non-distended   G/U:     Deferred   Extremities/MSK:   No clubbing, cyanosis or edema.  Normal tone.  No deformities.    Pulses:   Pulses palpable and equal bilaterally   Skin:   No bleeding, bruising or rash   Hem/Lymph:   No cervical or supraclavicular adenopathy.    Neurologic:   Moves all extremities with no obvious focal motor deficit.  Cranial nerves 2 - 12 grossly intact            Psychiatric:  Normal mood and affect, oriented x 3.      Results Review:     I reviewed the patient's new clinical results.         Invalid input(s): LABALBU, PROT        Invalid input(s): NEUTOPHILPCT,  EOSPCT  Results from last 7 days   Lab Units 09/07/19  1608   PH, ARTERIAL pH units 7.390    PO2 ART mm Hg 70.3*   PCO2, ARTERIAL mm Hg 54.7*   HCO3 ART mmol/L 33.1*       I reviewed the patient's new imaging including images and reports.  Chest x-ray done outside facility was reviewed.  It shows cardiomegaly and basilar predominant interstitial disease.  There is prominence of the fissure suggesting some degree of volume overload.      Medication Review:     budesonide 0.5 mg Nebulization BID - RT   enoxaparin 40 mg Subcutaneous Q24H   insulin detemir 10 Units Subcutaneous Q12H   insulin lispro 0-14 Units Subcutaneous 4x Daily With Meals & Nightly   insulin lispro 6 Units Subcutaneous TID With Meals   ipratropium-albuterol 3 mL Nebulization 4x Daily - RT   [START ON 9/8/2019] levothyroxine 175 mcg Oral Q AM   methylPREDNISolone sodium succinate 40 mg Intravenous Q8H   sodium chloride 10 mL Intravenous Q12H          Assessment/Plan     Active Hospital Problems    Diagnosis   • Acute on chronic respiratory failure with hypoxia and hypercapnia (CMS/HCC)   • COPD with acute exacerbation (CMS/HCC)   • Pulmonary hypertension. Calculated RVSP 64 mmHg by echocardiogram 8/17/18   • Obesity hypoventilation syndrome (CMS/HCC)   • Acute on chronic diastolic congestive heart failure (CMS/HCC)   • Hypothyroidism   • Uncontrolled type 2 diabetes mellitus. Hemoglobin A1c 9.4 (CMS/HCC)     74 y.o. female with history of COPD, chronic diastolic dysfunction, obesity hypoventilation syndrome, hypothyroidism and diabetes presents with increasing cough and dyspnea as well as acute on chronic mixed hypoxemic and hypercapnic respiratory failure.  The patient has required BiPAP.  Chest x-ray at outside hospital was suggestive of some pulmonary edema however patient has normal proBNP.  She had a normal white blood cell count the outside facility.    I suspect that this is mainly a COPD exacerbation although her diastolic dysfunction may be playing a role.  I am also concerned that her Neurontin may be causing increased somnolence  and worsening her chronic respiratory failure.    Plan:  1.  Admit to the intensive care unit.  2.  BiPAP as needed.  3.  Repeat arterial blood gas (done).  4.  Bronchodilators.  5.  Scheduled systemic steroids.  6.  Basal and bolus insulin with correction.  7.  Azithromycin Z-Addi for bronchitis.  8.  Sputum and blood cultures, follow chest x-ray.  9.  Follow-up procalcitonin.    Tyrese Khan MD  09/07/19  4:59 PM    Patient is critically ill secondary to her respiratory failure.  She requires close monitoring and frequent reassessment.  She is currently on BiPAP and may require intubation/mechanical ventilation if her condition worsens.    The patient is a DO NOT RESUSCITATE in the event of cardiac arrest per her wishes.    Critical care time : 50 minutes.(This excludes time spent performing separately reportable procedures and services). including high complexity decision making to assess, manipulate, and support vital organ system failure in this individual who has impairment of one or more vital organ systems such that there is a high probability of imminent or life threatening deterioration in the patient’s condition.

## 2019-09-08 ENCOUNTER — APPOINTMENT (OUTPATIENT)
Dept: GENERAL RADIOLOGY | Facility: HOSPITAL | Age: 74
End: 2019-09-08

## 2019-09-08 LAB
ALBUMIN SERPL-MCNC: 3.9 G/DL (ref 3.5–5.2)
ALBUMIN/GLOB SERPL: 1.2 G/DL
ALP SERPL-CCNC: 63 U/L (ref 39–117)
ALT SERPL W P-5'-P-CCNC: 19 U/L (ref 1–33)
ANION GAP SERPL CALCULATED.3IONS-SCNC: 13 MMOL/L (ref 5–15)
AST SERPL-CCNC: 16 U/L (ref 1–32)
BASOPHILS # BLD AUTO: 0 10*3/MM3 (ref 0–0.2)
BASOPHILS NFR BLD AUTO: 0 % (ref 0–1.5)
BILIRUB SERPL-MCNC: 0.6 MG/DL (ref 0.2–1.2)
BUN BLD-MCNC: 22 MG/DL (ref 8–23)
BUN/CREAT SERPL: 28.9 (ref 7–25)
CALCIUM SPEC-SCNC: 8.9 MG/DL (ref 8.6–10.5)
CHLORIDE SERPL-SCNC: 95 MMOL/L (ref 98–107)
CO2 SERPL-SCNC: 29 MMOL/L (ref 22–29)
CREAT BLD-MCNC: 0.76 MG/DL (ref 0.57–1)
DEPRECATED RDW RBC AUTO: 50.4 FL (ref 37–54)
EOSINOPHIL # BLD AUTO: 0 10*3/MM3 (ref 0–0.4)
EOSINOPHIL NFR BLD AUTO: 0 % (ref 0.3–6.2)
ERYTHROCYTE [DISTWIDTH] IN BLOOD BY AUTOMATED COUNT: 13.8 % (ref 12.3–15.4)
GFR SERPL CREATININE-BSD FRML MDRD: 74 ML/MIN/1.73
GLOBULIN UR ELPH-MCNC: 3.2 GM/DL
GLUCOSE BLD-MCNC: 405 MG/DL (ref 65–99)
GLUCOSE BLDC GLUCOMTR-MCNC: 269 MG/DL (ref 70–130)
GLUCOSE BLDC GLUCOMTR-MCNC: 277 MG/DL (ref 70–130)
GLUCOSE BLDC GLUCOMTR-MCNC: 319 MG/DL (ref 70–130)
GLUCOSE BLDC GLUCOMTR-MCNC: 343 MG/DL (ref 70–130)
HCT VFR BLD AUTO: 42 % (ref 34–46.6)
HGB BLD-MCNC: 12.4 G/DL (ref 12–15.9)
IMM GRANULOCYTES # BLD AUTO: 0.04 10*3/MM3 (ref 0–0.05)
IMM GRANULOCYTES NFR BLD AUTO: 0.5 % (ref 0–0.5)
LYMPHOCYTES # BLD AUTO: 0.51 10*3/MM3 (ref 0.7–3.1)
LYMPHOCYTES NFR BLD AUTO: 6.2 % (ref 19.6–45.3)
MAGNESIUM SERPL-MCNC: 1.6 MG/DL (ref 1.6–2.4)
MCH RBC QN AUTO: 29.7 PG (ref 26.6–33)
MCHC RBC AUTO-ENTMCNC: 29.5 G/DL (ref 31.5–35.7)
MCV RBC AUTO: 100.7 FL (ref 79–97)
MONOCYTES # BLD AUTO: 0.17 10*3/MM3 (ref 0.1–0.9)
MONOCYTES NFR BLD AUTO: 2.1 % (ref 5–12)
NEUTROPHILS # BLD AUTO: 7.55 10*3/MM3 (ref 1.7–7)
NEUTROPHILS NFR BLD AUTO: 91.2 % (ref 42.7–76)
NRBC BLD AUTO-RTO: 0 /100 WBC (ref 0–0.2)
PHOSPHATE SERPL-MCNC: 2.7 MG/DL (ref 2.5–4.5)
PLATELET # BLD AUTO: 197 10*3/MM3 (ref 140–450)
PMV BLD AUTO: 12 FL (ref 6–12)
POTASSIUM BLD-SCNC: 4.5 MMOL/L (ref 3.5–5.2)
PROT SERPL-MCNC: 7.1 G/DL (ref 6–8.5)
RBC # BLD AUTO: 4.17 10*6/MM3 (ref 3.77–5.28)
SODIUM BLD-SCNC: 137 MMOL/L (ref 136–145)
WBC NRBC COR # BLD: 8.27 10*3/MM3 (ref 3.4–10.8)

## 2019-09-08 PROCEDURE — 85025 COMPLETE CBC W/AUTO DIFF WBC: CPT | Performed by: INTERNAL MEDICINE

## 2019-09-08 PROCEDURE — 94799 UNLISTED PULMONARY SVC/PX: CPT

## 2019-09-08 PROCEDURE — 84100 ASSAY OF PHOSPHORUS: CPT | Performed by: INTERNAL MEDICINE

## 2019-09-08 PROCEDURE — 97162 PT EVAL MOD COMPLEX 30 MIN: CPT

## 2019-09-08 PROCEDURE — 99233 SBSQ HOSP IP/OBS HIGH 50: CPT | Performed by: INTERNAL MEDICINE

## 2019-09-08 PROCEDURE — 80053 COMPREHEN METABOLIC PANEL: CPT | Performed by: INTERNAL MEDICINE

## 2019-09-08 PROCEDURE — 83735 ASSAY OF MAGNESIUM: CPT | Performed by: INTERNAL MEDICINE

## 2019-09-08 PROCEDURE — 25010000002 METHYLPREDNISOLONE PER 40 MG: Performed by: INTERNAL MEDICINE

## 2019-09-08 PROCEDURE — 63710000001 INSULIN DETEMIR PER 5 UNITS: Performed by: INTERNAL MEDICINE

## 2019-09-08 PROCEDURE — 82962 GLUCOSE BLOOD TEST: CPT

## 2019-09-08 PROCEDURE — 71045 X-RAY EXAM CHEST 1 VIEW: CPT

## 2019-09-08 PROCEDURE — 25010000002 ENOXAPARIN PER 10 MG: Performed by: INTERNAL MEDICINE

## 2019-09-08 RX ORDER — LEVOTHYROXINE SODIUM 0.07 MG/1
150 TABLET ORAL
Status: DISCONTINUED | OUTPATIENT
Start: 2019-09-09 | End: 2019-09-10 | Stop reason: HOSPADM

## 2019-09-08 RX ORDER — GABAPENTIN 300 MG/1
300 CAPSULE ORAL EVERY 8 HOURS SCHEDULED
Status: DISCONTINUED | OUTPATIENT
Start: 2019-09-08 | End: 2019-09-10 | Stop reason: HOSPADM

## 2019-09-08 RX ORDER — PREDNISONE 20 MG/1
40 TABLET ORAL
Status: DISCONTINUED | OUTPATIENT
Start: 2019-09-09 | End: 2019-09-10 | Stop reason: HOSPADM

## 2019-09-08 RX ORDER — MAGNESIUM SULFATE HEPTAHYDRATE 40 MG/ML
4 INJECTION, SOLUTION INTRAVENOUS ONCE
Status: COMPLETED | OUTPATIENT
Start: 2019-09-08 | End: 2019-09-08

## 2019-09-08 RX ADMIN — GABAPENTIN 300 MG: 300 CAPSULE ORAL at 10:36

## 2019-09-08 RX ADMIN — METHYLPREDNISOLONE SODIUM SUCCINATE 40 MG: 40 INJECTION, POWDER, FOR SOLUTION INTRAMUSCULAR; INTRAVENOUS at 10:36

## 2019-09-08 RX ADMIN — SODIUM CHLORIDE, PRESERVATIVE FREE 10 ML: 5 INJECTION INTRAVENOUS at 20:44

## 2019-09-08 RX ADMIN — GABAPENTIN 300 MG: 300 CAPSULE ORAL at 21:55

## 2019-09-08 RX ADMIN — METHYLPREDNISOLONE SODIUM SUCCINATE 40 MG: 40 INJECTION, POWDER, FOR SOLUTION INTRAMUSCULAR; INTRAVENOUS at 02:02

## 2019-09-08 RX ADMIN — INSULIN DETEMIR 20 UNITS: 100 INJECTION, SOLUTION SUBCUTANEOUS at 20:31

## 2019-09-08 RX ADMIN — MAGNESIUM SULFATE HEPTAHYDRATE 4 G: 40 INJECTION, SOLUTION INTRAVENOUS at 10:36

## 2019-09-08 RX ADMIN — INSULIN LISPRO 6 UNITS: 100 INJECTION, SOLUTION INTRAVENOUS; SUBCUTANEOUS at 08:17

## 2019-09-08 RX ADMIN — LEVOTHYROXINE SODIUM 175 MCG: 150 TABLET ORAL at 05:13

## 2019-09-08 RX ADMIN — ENOXAPARIN SODIUM 40 MG: 40 INJECTION SUBCUTANEOUS at 20:32

## 2019-09-08 RX ADMIN — INSULIN LISPRO 10 UNITS: 100 INJECTION, SOLUTION INTRAVENOUS; SUBCUTANEOUS at 12:17

## 2019-09-08 RX ADMIN — INSULIN LISPRO 10 UNITS: 100 INJECTION, SOLUTION INTRAVENOUS; SUBCUTANEOUS at 17:52

## 2019-09-08 RX ADMIN — AZITHROMYCIN 250 MG: 250 TABLET, FILM COATED ORAL at 08:17

## 2019-09-08 RX ADMIN — INSULIN DETEMIR 10 UNITS: 100 INJECTION, SOLUTION SUBCUTANEOUS at 08:18

## 2019-09-08 NOTE — PLAN OF CARE
Problem: Patient Care Overview  Goal: Plan of Care Review  Outcome: Ongoing (interventions implemented as appropriate)   09/08/19 3264   Coping/Psychosocial   Plan of Care Reviewed With patient   OTHER   Outcome Summary PT eval completed. Pt presents with decreased activity dariela and difficulty walking per PLOF. Pt ambulated 200ft with RW and SBA on 6LO2nc. Pt req several standing rest breaks due to fatigue. PT recommends d/c home with HHPT.

## 2019-09-08 NOTE — PLAN OF CARE
Problem: Skin Injury Risk (Adult)  Goal: Identify Related Risk Factors and Signs and Symptoms  Outcome: Ongoing (interventions implemented as appropriate)    Goal: Skin Health and Integrity  Outcome: Ongoing (interventions implemented as appropriate)      Problem: Patient Care Overview  Goal: Plan of Care Review  Outcome: Ongoing (interventions implemented as appropriate)   09/08/19 6952   Coping/Psychosocial   Plan of Care Reviewed With patient;family   Plan of Care Review   Progress improving   OTHER   Outcome Summary VSS. Mg replaced. Ambulated with  ft. Pt remains on 5LNC. Sliding scale/levemir adjusted. Gabapentin re-ordered at 300 mg dose. Code status updated. Steroids transitioned to PO. Tele orders.        Problem: Chronic Obstructive Pulmonary Disease (Adult)  Goal: Signs and Symptoms of Listed Potential Problems Will be Absent, Minimized or Managed (Chronic Obstructive Pulmonary Disease)  Outcome: Ongoing (interventions implemented as appropriate)

## 2019-09-08 NOTE — THERAPY EVALUATION
Patient Name: Janet Pisano  : 1945    MRN: [id number]                              Today's Date: 2019       Admit Date: 2019    Visit Dx:     ICD-10-CM ICD-9-CM   1. Impaired functional mobility, balance, gait, and endurance Z74.09 V49.89     Patient Active Problem List   Diagnosis   • Anxiety   • Hypertension and diastolic dysfunction   • Chronic obstructive pulmonary disease in former smoker (CMS/[facility])   • Uncontrolled type 2 diabetes mellitus. Hemoglobin A1c 9.4 (CMS/[facility])   • Gastroesophageal reflux disease   • Hypothyroidism   • Insomnia   • Left carotid artery stenosis   • History of myocardial infarction   • Dyslipidemia   • History of rheumatic fever   • Coronary artery disease   • Iron deficiency anemia   • Morbid obesity with BMI of 40.0-44.9, adult (CMS/[facility])   • Acute on chronic diastolic congestive heart failure (CMS/[facility])   • Obesity hypoventilation syndrome (CMS/[facility])   • Pulmonary hypertension. Calculated RVSP 64 mmHg by echocardiogram 18   • Acute on chronic respiratory failure with hypoxia and hypercapnia (CMS/[facility])   • COPD with acute exacerbation (CMS/[facility])     Past Medical History:   Diagnosis Date   • Arthritis    • Bilateral carpal tunnel syndrome 2017   • Bradycardia 2016   • COPD exacerbation (CMS/[facility])    • Coronary artery disease 2016   • Depression    • Diabetes mellitus (CMS/[facility])    • Diverticulosis 2016   • Dyslipidemia 2016   • H/O esophageal ulcer    • History of myocardial infarction 2016    Questionable history of myocardial infarction: Remote cardiac catheterization at [facility] in [place], incomplete database.  Reported stress test 2-3 years ago, negative per patient report, incomplete database.    • History of rheumatic fever 2016   • Hypertension    • Hypothyroidism    • Migraine 2016   • Rheumatic fever    • Ventral hernia 2016   • Vitamin D deficiency 2016     Past Surgical History:   Procedure  Laterality Date   • CARDIAC CATHETERIZATION     • CARPAL TUNNEL RELEASE     • CATARACT EXTRACTION, BILATERAL     • COLONOSCOPY     • ESOPHAGUS SURGERY      hole repair   • HERNIA REPAIR      ventral   • TUBAL ABDOMINAL LIGATION  1982     General Information     [facility] Name 09/08/19 1305          PT Evaluation Time/Intention    Document Type  evaluation  -     Mode of Treatment  individual therapy  -[facility] Name 09/08/19 1305          General Information    Patient Profile Reviewed?  yes  -     Prior Level of Function  independent:;all household mobility;gait;transfer;bed mobility;ADL's;dependent:;driving  -     Existing Precautions/Restrictions  oxygen therapy device and L/min  -     Barriers to Rehab  medically complex  -[facility] Name 09/08/19 1305          Relationship/Environment    Lives With  alone  -[facility] 09/08/19 1305          Resource/Environmental Concerns    Current Living Arrangements  home/apartment/condo  -[facility] 09/08/19 1305          Home Main Entrance    Number of Stairs, Main Entrance  none  -[facility] 09/08/19 1305          Stairs Within Home, Primary    Number of Stairs, Within Home, Primary  none  -SJ     Row Name 09/08/19 1305          Cognitive Assessment/Intervention- PT/OT    Orientation Status (Cognition)  oriented x 4  -[facility] Name 09/08/19 1305          Safety Issues, Functional Mobility    Safety Issues Affecting Function (Mobility)  awareness of need for assistance;impulsivity;insight into deficits/self awareness  -     Impairments Affecting Function (Mobility)  endurance/activity tolerance;balance;shortness of breath;strength  -       User Key  (r) = Recorded By, (t) = Taken By, (c) = Cosigned By    Initials Name Provider Type     Lea Tripp, PT Physical Therapist        Mobility     Row Name 09/08/19 1341          Bed Mobility Assessment/Treatment    Comment (Bed Mobility)  sitting EOB upon arrival  -[facility] Name 09/08/19 9871           Transfer Assessment/Treatment    Comment (Transfers)  cues for safety with lines. Pt stood impulsively before setup was complete  -SJ     Row Name 09/08/19 1341          Sit-Stand Transfer    Sit-Stand [place] (Transfers)  independent  -SJ     Row Name 09/08/19 1341          Gait/Stairs Assessment/Training    Gait/Stairs Assessment/Training  distance ambulated  -SJ     [place] Level (Gait)  stand by assist;1 person assist  -SJ     Assistive Device (Gait)  walker, front-wheeled  -SJ     Distance in Feet (Gait)  200  -SJ     Pattern (Gait)  step-through  -SJ     Bilateral Gait Deviations  forward flexed posture;heel strike decreased  -SJ     Left Sided Gait Deviations  forward flexed posture;heel strike decreased  -SJ     Comment (Gait/Stairs)  Pt req several standing rest breaks due to fatigue, VSS throughout.  -       User Key  (r) = Recorded By, (t) = Taken By, (c) = Cosigned By    Initials Name Provider Type    Lea Milner PT Physical Therapist        Obj/Interventions     Row Name 09/08/19 1344          General ROM    GENERAL ROM COMMENTS  BLE's WFL  -SJ     Row Name 09/08/19 1344          MMT (Manual Muscle Testing)    General MMT Comments  Pt would not allow testing  -SJ     Row Name 09/08/19 1344          Static Sitting Balance    Level of [place] (Unsupported Sitting, Static Balance)  independent  -SJ     Sitting Position (Unsupported Sitting, Static Balance)  sitting on edge of bed  -SJ     Time Able to Maintain Position (Unsupported Sitting, Static Balance)  more than 5 minutes  -     Row Name 09/08/19 1344          Static Standing Balance    Level of [place] (Supported Standing, Static Balance)  supervision  -     Assistive Device Utilized (Supported Standing, Static Balance)  walker, rolling  -       User Key  (r) = Recorded By, (t) = Taken By, (c) = Cosigned By    Initials Name Provider Type    Lea Milner PT Physical Therapist        Goals/Plan     [facility]  Name 09/08/19 1346          Bed Mobility Goal 1 (PT)    Activity/Assistive Device (Bed Mobility Goal 1, PT)  sit to supine;supine to sit  -SJ     [place] Level/Cues Needed (Bed Mobility Goal 1, PT)  conditional independence  -SJ     Time Frame (Bed Mobility Goal 1, PT)  long term goal (LTG);2 weeks  -SJ     Row Name 09/08/19 1346          Transfer Goal 1 (PT)    Activity/Assistive Device (Transfer Goal 1, PT)  sit-to-stand/stand-to-sit;bed-to-chair/chair-to-bed  -SJ     [place] Level/Cues Needed (Transfer Goal 1, PT)  conditional independence  -SJ     Time Frame (Transfer Goal 1, PT)  long term goal (LTG);2 weeks  -SJ     Row Name 09/08/19 1346          Gait Training Goal 1 (PT)    Activity/Assistive Device (Gait Training Goal 1, PT)  increase endurance/gait distance;walker, rolling  -SJ     [place] Level (Gait Training Goal 1, PT)  supervision required  -SJ     Distance (Gait Goal 1, PT)  400  -SJ     Time Frame (Gait Training Goal 1, PT)  long term goal (LTG);2 weeks  -SJ       User Key  (r) = Recorded By, (t) = Taken By, (c) = Cosigned By    Initials Name Provider Type    Lea Milner, PT Physical Therapist        Clinical Impression     Row Name 09/08/19 1345          Pain Assessment    Additional Documentation  Pain Scale: FACES Pre/Post-Treatment (Group)  -[facility] 09/08/19 1345          Pain Scale: Numbers Pre/Post-Treatment    Pain Scale: Numbers, Pretreatment  0/10 - no pain  -     Pain Scale: Numbers, Post-Treatment  0/10 - no pain  -SJ     Row Name 09/08/19 1345          Pain Scale: FACES Pre/Post-Treatment    Pain: FACES Scale, Pretreatment  0-->no hurt  -     Pain: FACES Scale, Post-Treatment  0-->no hurt  -     Row Name 09/08/19 1345          Plan of Care Review    Plan of Care Reviewed With  patient  -SJ     Row Name 09/08/19 1345          Physical Therapy Clinical Impression    Patient/Family Goals Statement (PT Clinical Impression)  return to home  -     Criteria  for Skilled Interventions Met (PT Clinical Impression)  yes;treatment indicated  -     Rehab Potential (PT Clinical Summary)  good, to achieve stated therapy goals  -     Predicted Duration of Therapy (PT)  2wks  -     Row Name 09/08/19 1345          Vital Signs    Pre SpO2 (%)  92  -SJ     O2 Delivery Pre Treatment  supplemental O2  -SJ     Intra SpO2 (%)  92  -SJ     O2 Delivery Intra Treatment  supplemental O2  -SJ     Post SpO2 (%)  94  -SJ     O2 Delivery Post Treatment  supplemental O2  -SJ     Row Name 09/08/19 1345          Positioning and Restraints    Pre-Treatment Position  in bed  -SJ     Post Treatment Position  bed  -SJ     In Bed  sitting EOB;call light within reach;encouraged to call for assist;with family/caregiver  -       User Key  (r) = Recorded By, (t) = Taken By, (c) = Cosigned By    Initials Name Provider Type    Lea Milner PT Physical Therapist        Outcome Measures    No documentation.       Physical Therapy Education     Title: PT OT SLP Therapies (In Progress)     Topic: Physical Therapy (In Progress)     Point: Mobility training (In Progress)     Learning Progress Summary           Patient Acceptance, E,D, NR by  at 9/8/2019  1:49 PM                   Point: Home exercise program (In Progress)     Learning Progress Summary           Patient Acceptance, E,D, NR by  at 9/8/2019  1:49 PM                   Point: Body mechanics (In Progress)     Learning Progress Summary           Patient Acceptance, E,D, NR by  at 9/8/2019  1:49 PM                   Point: Precautions (In Progress)     Learning Progress Summary           Patient Acceptance, E,D, NR by  at 9/8/2019  1:49 PM                               User Key     Initials Effective Dates Name Provider Type Discipline     06/19/15 -  Lea Tripp PT Physical Therapist PT              PT Recommendation and Plan  Planned Therapy Interventions (PT Eval): balance training, bed mobility training, gait training,  home exercise program, patient/family education, strengthening, transfer training  Outcome Summary/Treatment Plan (PT)  Anticipated Discharge Disposition (PT): home with home health  Plan of Care Reviewed With: patient  Outcome Summary: PT eval completed. Pt presents with decreased activity dariela and difficulty walking per PLOF. Pt ambulated 200ft with RW and SBA on 6LO2nc. Pt req several standing rest breaks due to fatigue. PT recommends d/c home with HHPT.     Time Calculation:   PT Charges     Row Name 09/08/19 0305             Time Calculation    Start Time  1305  -SJ      PT Received On  09/08/19  -      PT Goal Re-Cert Due Date  09/18/19  -        User Key  (r) = Recorded By, (t) = Taken By, (c) = Cosigned By    Initials Name Provider Type     Lea Tripp PT Physical Therapist        Therapy Charges for Today     Code Description Service Date Service Provider Modifiers Qty    32743854170 HC PT EVAL MOD COMPLEXITY 4 9/8/2019 Lea Tripp PT GP 1               Lea Tripp PT  9/8/2019

## 2019-09-08 NOTE — PLAN OF CARE
Problem: Skin Injury Risk (Adult)  Goal: Skin Health and Integrity  Outcome: Ongoing (interventions implemented as appropriate)      Problem: Patient Care Overview  Goal: Plan of Care Review  Outcome: Ongoing (interventions implemented as appropriate)      Problem: Chronic Obstructive Pulmonary Disease (Adult)  Goal: Signs and Symptoms of Listed Potential Problems Will be Absent, Minimized or Managed (Chronic Obstructive Pulmonary Disease)  Outcome: Ongoing (interventions implemented as appropriate)

## 2019-09-08 NOTE — PROGRESS NOTES
Pulmonary/Critical Care ICU Note     LOS: 1 day   Patient Care Team:  Yanira Grissom APRN as PCP - General (Family Medicine)    Chief Complaint/reason: Dyspnea/respiratory failure    Subjective      HPI:   74 y.o. female with history of COPD on chronic maintenance bronchodilators and inhaled corticosteroids, chronic hypoxemia on home oxygen 3 L around-the-clock, chronic pain, diabetes was transferred from [facility] emergency department for respiratory failure.  The patient apparently reported some increasing shortness of breath over the previous 2 weeks.  She is somewhat confused today but she does state that she thinks she was treated with some steroids or antibiotics by her primary care provider.  Additionally, she had been off of her usual gabapentin, which was previously dosed at 300 mg 3 times daily, and was switched to 400 mg 3 times daily which she restarted the day before admission.  The patient was somnolent when she presented to [facility] complaining of shortness of breath and fatigue.  Initial venous blood gas and there showed a pH of 7.26 with a PCO2 of 80.  She was placed on BiPAP and a repeat VBG showed a pH of 7.24 with a PCO2 of 85.  The hospitalist was called here and did not feel the patient was appropriate for the floor and I was called and accepted the patient to the ICU for respiratory failure.    On arrival here the patient is awake and alert.  She is somewhat confused about dates, times, and exactly what has happened to her but completely alert and awake.  Her main concern is that she wants to eat.  She does feel she has been breathing worse over the past 2 days with increased cough and dyspnea.  She has no other current complaints.  She is on BiPAP on arrival and was transported on BiPAP.    Interval history:  Patient is more awake and alert today.  She thinks she may have doubled up on her Neurontin prior to coming in as she took 1 of the 300 mg pills and  1 of the 400 mg pills.  She is complaining of bilateral hand pain from carpal tunnel today that is her main complaint.  No fevers or chills.  No significant cough.  Breathing is improved.  She is off BiPAP.  Tolerating a diet.  Blood sugars have been elevated.    History taken from: Patient, chart.    PMH/FH/SocH were reviewed by me and updates were made.     Review of Systems:    Review of 14 systems was completed with positives and pertinent negatives noted in the subjective section.  All other systems reviewed and are negative.       Objective     Vital Signs  Temp:  [98.6 °F (37 °C)-99.7 °F (37.6 °C)] 98.6 °F (37 °C)  Heart Rate:  [59-80] 75  Resp:  [18-30] 20  BP: (114-179)/() 157/74  There is no height or weight on file to calculate BMI.       Physical Exam:     General Appearance:    Alert, cooperative, no acute distress   Head:    Normocephalic, without obvious abnormality, atraumatic   Eyes:            Lids and lashes normal, conjunctivae and sclerae normal, no   icterus, no pallor, corneas clear, PER   ENMT:   Ears appear intact with no abnormalities noted      No oral lesions, no thrush, oral mucosa moist   Neck:   No adenopathy, supple, trachea midline, no thyromegaly, no   carotid bruit, no JVD       Lungs/resp:    Normal effort, symmetric chest rise, no crepitus, diminished breath sounds diffusely, no chest wall tenderness                  Heart/CV:    Regular rhythm and normal rate, normal S1 and S2, grade 2 out of 6 systolic murmur right upper sternal border.   Abdomen/GI:     Normal bowel sounds, no masses, no organomegaly, soft,        non-tender, non-distended   G/U:     Deferred   Extremities/MSK:   No clubbing, cyanosis or edema.  Normal tone.  No deformities.    Pulses:   Pulses palpable and equal bilaterally   Skin:   No bleeding, bruising or rash   Hem/Lymph:   No cervical or supraclavicular adenopathy.    Neurologic:   Moves all extremities with no obvious focal motor deficit.  Cranial  nerves 2 - 12 grossly intact            Psychiatric:  Normal mood and affect, oriented x 3.      Results Review:     I reviewed the patient's new clinical results.   Results from last 7 days   Lab Units 09/08/19  0348 09/07/19  1659   SODIUM mmol/L 137 135*   POTASSIUM mmol/L 4.5 5.8*   CHLORIDE mmol/L 95* 95*   CO2 mmol/L 29.0 27.0   BUN mg/dL 22 18   CREATININE mg/dL 0.76 0.75   CALCIUM mg/dL 8.9 9.0   BILIRUBIN mg/dL 0.6 0.7   ALK PHOS U/L 63 64   ALT (SGPT) U/L 19 21   AST (SGOT) U/L 16 29   GLUCOSE mg/dL 405* 293*     Results from last 7 days   Lab Units 09/08/19  0348   WBC 10*3/mm3 8.27   HEMOGLOBIN g/dL 12.4   HEMATOCRIT % 42.0   PLATELETS 10*3/mm3 197     Results from last 7 days   Lab Units 09/07/19  1608   PH, ARTERIAL pH units 7.390   PO2 ART mm Hg 70.3*   PCO2, ARTERIAL mm Hg 54.7*   HCO3 ART mmol/L 33.1*       I reviewed the patient's new imaging including images and reports.  Chest x-ray done this a.m.:.  Basilar predominant interstitial disease worse than prior 2018 chest x-ray.    Medication Review:     azithromycin 250 mg Oral Daily   budesonide 0.5 mg Nebulization BID - RT   enoxaparin 40 mg Subcutaneous Q24H   gabapentin 300 mg Oral Q8H   insulin detemir 20 Units Subcutaneous Q12H   insulin lispro 0-24 Units Subcutaneous 4x Daily With Meals & Nightly   insulin lispro 10 Units Subcutaneous TID With Meals   ipratropium-albuterol 3 mL Nebulization 4x Daily - RT   [START ON 9/9/2019] levothyroxine 150 mcg Oral Q AM   magnesium sulfate 4 g Intravenous Once   methylPREDNISolone sodium succinate 40 mg Intravenous Q8H   sodium chloride 10 mL Intravenous Q12H          Assessment/Plan     Active Hospital Problems    Diagnosis   • Acute on chronic respiratory failure with hypoxia and hypercapnia (CMS/HCC)   • COPD with acute exacerbation (CMS/HCC)   • Pulmonary hypertension. Calculated RVSP 64 mmHg by echocardiogram 8/17/18   • Obesity hypoventilation syndrome (CMS/HCC)   • Acute on chronic diastolic  congestive heart failure (CMS/[facility])   • Hypothyroidism   • Uncontrolled type 2 diabetes mellitus. Hemoglobin A1c 9.4 (CMS/[facility])     74 y.o. female with history of COPD, chronic diastolic dysfunction, obesity hypoventilation syndrome, hypothyroidism and diabetes presents with increasing cough and dyspnea as well as acute on chronic mixed hypoxemic and hypercapnic respiratory failure.  The patient has required BiPAP.  Chest x-ray at outside hospital was suggestive of some pulmonary edema however patient has normal proBNP.  She had a normal white blood cell count the outside facility.    Patient seems to be improving with treatment for COPD exacerbation.  I suspect her Neurontin played a role however the patient is adamant that she restart this.  I think I will will monitor her respiratory status on Neurontin.  Procalcitonin was very low so I doubt patient has significant pneumonia.    Plan:  1.  Okay to telemetry/hospitalist.  2.  BiPAP as needed.  3.  Increase basal insulin and prandial/correction insulin.  4.  Bronchodilators.  5.  Scheduled systemic steroids.  Transition from IV Solu-Medrol to prednisone.  6.  Monitor chest x-ray.  7.  Azithromycin Z-Addi for bronchitis.  8.  Follow-up sputum and blood cultures, follow chest x-ray.  9.  Restart Neurontin 300 mg 3 times daily.  If she tolerates this, will consider increasing to 400 mg 3 times daily with close monitoring of respiratory status.    Tyrese Khan MD  09/08/19  1:02 PM      The patient is a DO NOT RESUSCITATE in the event of cardiac arrest per her wishes.  She also has decided that she does not want to be intubated for respiratory failure.  I will make appropriate changes.

## 2019-09-08 NOTE — PLAN OF CARE
Problem: Patient Care Overview  Goal: Plan of Care Review   09/08/19 0501   OTHER   Outcome Summary VSS. Afebrile. Pt on 4-5 LNC and tolerating well. Pt up in chair most of the night. No complaints of pain. UOP adequate. Diet advanced to reg cardiac consistent carb, and tolerating. Will continue to monitor.

## 2019-09-09 ENCOUNTER — APPOINTMENT (OUTPATIENT)
Dept: GENERAL RADIOLOGY | Facility: HOSPITAL | Age: 74
End: 2019-09-09

## 2019-09-09 LAB
ANION GAP SERPL CALCULATED.3IONS-SCNC: 9 MMOL/L (ref 5–15)
BASOPHILS # BLD AUTO: 0.02 10*3/MM3 (ref 0–0.2)
BASOPHILS NFR BLD AUTO: 0.2 % (ref 0–1.5)
BUN BLD-MCNC: 24 MG/DL (ref 8–23)
BUN/CREAT SERPL: 31.6 (ref 7–25)
CALCIUM SPEC-SCNC: 8.8 MG/DL (ref 8.6–10.5)
CHLORIDE SERPL-SCNC: 96 MMOL/L (ref 98–107)
CO2 SERPL-SCNC: 34 MMOL/L (ref 22–29)
CREAT BLD-MCNC: 0.76 MG/DL (ref 0.57–1)
DEPRECATED RDW RBC AUTO: 51.4 FL (ref 37–54)
EOSINOPHIL # BLD AUTO: 0.03 10*3/MM3 (ref 0–0.4)
EOSINOPHIL NFR BLD AUTO: 0.3 % (ref 0.3–6.2)
ERYTHROCYTE [DISTWIDTH] IN BLOOD BY AUTOMATED COUNT: 14 % (ref 12.3–15.4)
GFR SERPL CREATININE-BSD FRML MDRD: 74 ML/MIN/1.73
GLUCOSE BLD-MCNC: 212 MG/DL (ref 65–99)
GLUCOSE BLDC GLUCOMTR-MCNC: 199 MG/DL (ref 70–130)
GLUCOSE BLDC GLUCOMTR-MCNC: 242 MG/DL (ref 70–130)
GLUCOSE BLDC GLUCOMTR-MCNC: 328 MG/DL (ref 70–130)
GLUCOSE BLDC GLUCOMTR-MCNC: 342 MG/DL (ref 70–130)
GLUCOSE BLDC GLUCOMTR-MCNC: 394 MG/DL (ref 70–130)
GLUCOSE BLDC GLUCOMTR-MCNC: 413 MG/DL (ref 70–130)
HCT VFR BLD AUTO: 43.2 % (ref 34–46.6)
HGB BLD-MCNC: 12.9 G/DL (ref 12–15.9)
IMM GRANULOCYTES # BLD AUTO: 0.04 10*3/MM3 (ref 0–0.05)
IMM GRANULOCYTES NFR BLD AUTO: 0.3 % (ref 0–0.5)
LYMPHOCYTES # BLD AUTO: 1.77 10*3/MM3 (ref 0.7–3.1)
LYMPHOCYTES NFR BLD AUTO: 15.4 % (ref 19.6–45.3)
MAGNESIUM SERPL-MCNC: 2.1 MG/DL (ref 1.6–2.4)
MCH RBC QN AUTO: 29.7 PG (ref 26.6–33)
MCHC RBC AUTO-ENTMCNC: 29.9 G/DL (ref 31.5–35.7)
MCV RBC AUTO: 99.5 FL (ref 79–97)
MONOCYTES # BLD AUTO: 0.78 10*3/MM3 (ref 0.1–0.9)
MONOCYTES NFR BLD AUTO: 6.8 % (ref 5–12)
NEUTROPHILS # BLD AUTO: 8.86 10*3/MM3 (ref 1.7–7)
NEUTROPHILS NFR BLD AUTO: 77 % (ref 42.7–76)
NRBC BLD AUTO-RTO: 0 /100 WBC (ref 0–0.2)
PHOSPHATE SERPL-MCNC: 3.8 MG/DL (ref 2.5–4.5)
PLATELET # BLD AUTO: 206 10*3/MM3 (ref 140–450)
PMV BLD AUTO: 12.6 FL (ref 6–12)
POTASSIUM BLD-SCNC: 4.6 MMOL/L (ref 3.5–5.2)
RBC # BLD AUTO: 4.34 10*6/MM3 (ref 3.77–5.28)
SODIUM BLD-SCNC: 139 MMOL/L (ref 136–145)
WBC NRBC COR # BLD: 11.5 10*3/MM3 (ref 3.4–10.8)

## 2019-09-09 PROCEDURE — 82962 GLUCOSE BLOOD TEST: CPT

## 2019-09-09 PROCEDURE — 63710000001 INSULIN LISPRO (HUMAN) PER 5 UNITS: Performed by: INTERNAL MEDICINE

## 2019-09-09 PROCEDURE — 25010000002 HYDRALAZINE PER 20 MG: Performed by: NURSE PRACTITIONER

## 2019-09-09 PROCEDURE — 63710000001 PREDNISONE PER 1 MG: Performed by: INTERNAL MEDICINE

## 2019-09-09 PROCEDURE — 84100 ASSAY OF PHOSPHORUS: CPT | Performed by: INTERNAL MEDICINE

## 2019-09-09 PROCEDURE — 63710000001 INSULIN DETEMIR PER 5 UNITS: Performed by: INTERNAL MEDICINE

## 2019-09-09 PROCEDURE — 83735 ASSAY OF MAGNESIUM: CPT | Performed by: INTERNAL MEDICINE

## 2019-09-09 PROCEDURE — 97530 THERAPEUTIC ACTIVITIES: CPT

## 2019-09-09 PROCEDURE — 99232 SBSQ HOSP IP/OBS MODERATE 35: CPT | Performed by: INTERNAL MEDICINE

## 2019-09-09 PROCEDURE — 80048 BASIC METABOLIC PNL TOTAL CA: CPT | Performed by: INTERNAL MEDICINE

## 2019-09-09 PROCEDURE — 97116 GAIT TRAINING THERAPY: CPT

## 2019-09-09 PROCEDURE — 25010000002 ENOXAPARIN PER 10 MG: Performed by: INTERNAL MEDICINE

## 2019-09-09 PROCEDURE — 71045 X-RAY EXAM CHEST 1 VIEW: CPT

## 2019-09-09 PROCEDURE — 85025 COMPLETE CBC W/AUTO DIFF WBC: CPT | Performed by: INTERNAL MEDICINE

## 2019-09-09 RX ORDER — HYDRALAZINE HYDROCHLORIDE 20 MG/ML
10 INJECTION INTRAMUSCULAR; INTRAVENOUS ONCE
Status: COMPLETED | OUTPATIENT
Start: 2019-09-09 | End: 2019-09-09

## 2019-09-09 RX ORDER — IPRATROPIUM BROMIDE AND ALBUTEROL SULFATE 2.5; .5 MG/3ML; MG/3ML
3 SOLUTION RESPIRATORY (INHALATION) 4 TIMES DAILY PRN
Status: DISCONTINUED | OUTPATIENT
Start: 2019-09-09 | End: 2019-09-10 | Stop reason: HOSPADM

## 2019-09-09 RX ORDER — BUDESONIDE 0.5 MG/2ML
0.5 INHALANT ORAL 2 TIMES DAILY PRN
Status: DISCONTINUED | OUTPATIENT
Start: 2019-09-09 | End: 2019-09-10 | Stop reason: HOSPADM

## 2019-09-09 RX ADMIN — INSULIN LISPRO 10 UNITS: 100 INJECTION, SOLUTION INTRAVENOUS; SUBCUTANEOUS at 08:17

## 2019-09-09 RX ADMIN — SODIUM CHLORIDE, PRESERVATIVE FREE 10 ML: 5 INJECTION INTRAVENOUS at 21:25

## 2019-09-09 RX ADMIN — GABAPENTIN 300 MG: 300 CAPSULE ORAL at 13:34

## 2019-09-09 RX ADMIN — HYDRALAZINE HYDROCHLORIDE 10 MG: 20 INJECTION INTRAMUSCULAR; INTRAVENOUS at 21:20

## 2019-09-09 RX ADMIN — INSULIN LISPRO 10 UNITS: 100 INJECTION, SOLUTION INTRAVENOUS; SUBCUTANEOUS at 17:30

## 2019-09-09 RX ADMIN — ENOXAPARIN SODIUM 40 MG: 40 INJECTION SUBCUTANEOUS at 21:24

## 2019-09-09 RX ADMIN — INSULIN DETEMIR 20 UNITS: 100 INJECTION, SOLUTION SUBCUTANEOUS at 08:18

## 2019-09-09 RX ADMIN — GABAPENTIN 300 MG: 300 CAPSULE ORAL at 21:24

## 2019-09-09 RX ADMIN — LEVOTHYROXINE SODIUM 150 MCG: 75 TABLET ORAL at 05:54

## 2019-09-09 RX ADMIN — AZITHROMYCIN 250 MG: 250 TABLET, FILM COATED ORAL at 08:17

## 2019-09-09 RX ADMIN — SODIUM CHLORIDE, PRESERVATIVE FREE 10 ML: 5 INJECTION INTRAVENOUS at 08:19

## 2019-09-09 RX ADMIN — INSULIN LISPRO 10 UNITS: 100 INJECTION, SOLUTION INTRAVENOUS; SUBCUTANEOUS at 13:34

## 2019-09-09 RX ADMIN — INSULIN DETEMIR 20 UNITS: 100 INJECTION, SOLUTION SUBCUTANEOUS at 21:29

## 2019-09-09 RX ADMIN — PREDNISONE 40 MG: 20 TABLET ORAL at 08:17

## 2019-09-09 RX ADMIN — GABAPENTIN 300 MG: 300 CAPSULE ORAL at 05:54

## 2019-09-09 NOTE — PLAN OF CARE
Problem: Patient Care Overview  Goal: Plan of Care Review  Outcome: Ongoing (interventions implemented as appropriate)   09/09/19 6633   Coping/Psychosocial   Plan of Care Reviewed With patient   Plan of Care Review   Progress improving   OTHER   Outcome Summary INCREASED DISTANCE AMBULATED WITH FEWER REST BREAKS NEEDED. PT AMBULATED 300 FEET WITH R WALKER AND SBA. O2 SATS 87% ON 4L O2 AFTER GAIT IN [place]. REBOUNDED TO 90% QUICKLY WITH REST. RECOMMEND HOME WITH HHPT IF PT AGREEABLE.

## 2019-09-09 NOTE — PROGRESS NOTES
Discharge Planning Assessment  [facility]     Patient Name: Janet Pisano  MRN: [id number]  Today's Date: 9/9/2019    Admit Date: 9/7/2019    Discharge Needs Assessment     Row Name 09/09/19 0932       Living Environment    Lives With  alone    Current Living Arrangements  home/apartment/condo    Primary Care Provided by  self    Provides Primary Care For  no one    Family Caregiver if Needed  child(federica), adult    Quality of Family Relationships  unable to assess    Able to Return to Prior Arrangements  yes       Resource/Environmental Concerns    Resource/Environmental Concerns  none       Transition Planning    Patient/Family Anticipates Transition to  home    Patient/Family Anticipated Services at Transition  ;rehabilitation services    Transportation Anticipated  family or friend will provide       Discharge Needs Assessment    Readmission Within the Last 30 Days  no previous admission in last 30 days    Concerns to be Addressed  discharge planning    Equipment Needed After Discharge  commode;shower chair;rollator;nebulizer;oxygen        Discharge Plan     Row Name 09/09/19 0933       Plan    Plan  Home    Patient/Family in Agreement with Plan  yes    Plan Comments  Spoke with patient in room to initiate discharge planning.  She lives alone in [facility].  She is independent with ADL's, using a rollator to assist with ambulation as needed.  She also has a shower chair, bedside commode, nebulizer machine and continuous home O2@3L through [facility].  She has had home health in the past, but can't remember the name of the provider.  Ms. Pisano has RX coverage and has her scripts filled at [facility].  Her goal is to return home at discharge.  Will await therapy recommendations to determine proper discharge placement.  CM will continue to follow.  Rebekah Schultz RN [phone]    Final Discharge Disposition Code  01 - home or self-care        Destination      No service  coordination in this encounter.      Durable Medical Equipment      No service coordination in this encounter.      Dialysis/Infusion      No service coordination in this encounter.      Home Medical Care      No service coordination in this encounter.      Therapy      No service coordination in this encounter.      Community Resources      No service coordination in this encounter.        Expected Discharge Date and Time     Expected Discharge Date Expected Discharge Time    Sep 12, 2019         Demographic Summary     Row Name 09/09/19 0931       General Information    Admission Type  inpatient    Arrived From  hospital    Referral Source  admission list    Reason for Consult  discharge planning    Preferred Language  English     Used During This Interaction  no    General Information Comments  PCP- Leni Freeman        Functional Status     Row Name 09/09/19 0932       Functional Status    Usual Activity Tolerance  moderate    Current Activity Tolerance  fair       Functional Status, IADL    Medications  assistive equipment    Meal Preparation  assistive equipment    Housekeeping  assistive equipment    Laundry  assistive equipment    Shopping  assistive equipment        Psychosocial    No documentation.       Abuse/Neglect    No documentation.       Legal     Row Name 09/09/19 0932       Financial/Legal    Finance Comments  Verified with patient that she has [employer] MCR.  No issues obtaining medications.        Substance Abuse    No documentation.       Patient Forms    No documentation.           Rebekah Schultz RN

## 2019-09-09 NOTE — PLAN OF CARE
Problem: Skin Injury Risk (Adult)  Goal: Identify Related Risk Factors and Signs and Symptoms  Outcome: Ongoing (interventions implemented as appropriate)    Goal: Skin Health and Integrity  Outcome: Ongoing (interventions implemented as appropriate)      Problem: Patient Care Overview  Goal: Plan of Care Review  Outcome: Ongoing (interventions implemented as appropriate)   09/09/19 9681   Coping/Psychosocial   Plan of Care Reviewed With patient   Plan of Care Review   Progress improving   OTHER   Outcome Summary Pt has been weaned from 5L to 1L NC this shift. Pt has been up w/ assist x1 w/ walker to the BSC and to the chair. Glucose has remained high after administration of insulin. VSS. Will continue to monitor.        Problem: Chronic Obstructive Pulmonary Disease (Adult)  Goal: Signs and Symptoms of Listed Potential Problems Will be Absent, Minimized or Managed (Chronic Obstructive Pulmonary Disease)  Outcome: Ongoing (interventions implemented as appropriate)      Problem: Fall Risk (Adult)  Goal: Absence of Fall  Outcome: Ongoing (interventions implemented as appropriate)

## 2019-09-09 NOTE — THERAPY TREATMENT NOTE
Patient Name: Janet Pisano  : 1945    MRN: [id number]                              Today's Date: 2019       Admit Date: 2019    Visit Dx:     ICD-10-CM ICD-9-CM   1. Impaired functional mobility, balance, gait, and endurance Z74.09 V49.89     Patient Active Problem List   Diagnosis   • Anxiety   • Hypertension and diastolic dysfunction   • Chronic obstructive pulmonary disease in former smoker (CMS/[facility])   • Uncontrolled type 2 diabetes mellitus. Hemoglobin A1c 9.4 (CMS/[facility])   • Gastroesophageal reflux disease   • Hypothyroidism   • Insomnia   • Left carotid artery stenosis   • History of myocardial infarction   • Dyslipidemia   • History of rheumatic fever   • Coronary artery disease   • Iron deficiency anemia   • Morbid obesity with BMI of 40.0-44.9, adult (CMS/[facility])   • Acute on chronic diastolic congestive heart failure (CMS/[facility])   • Obesity hypoventilation syndrome (CMS/[facility])   • Pulmonary hypertension. Calculated RVSP 64 mmHg by echocardiogram 18   • Acute on chronic respiratory failure with hypoxia and hypercapnia (CMS/[facility])   • COPD with acute exacerbation (CMS/[facility])     Past Medical History:   Diagnosis Date   • Arthritis    • Bilateral carpal tunnel syndrome 2017   • Bradycardia 2016   • COPD exacerbation (CMS/[facility])    • Coronary artery disease 2016   • Depression    • Diabetes mellitus (CMS/[facility])    • Diverticulosis 2016   • Dyslipidemia 2016   • H/O esophageal ulcer    • History of myocardial infarction 2016    Questionable history of myocardial infarction: Remote cardiac catheterization at [facility] in [place], incomplete database.  Reported stress test 2-3 years ago, negative per patient report, incomplete database.    • History of rheumatic fever 2016   • Hypertension    • Hypothyroidism    • Migraine 2016   • Rheumatic fever    • Ventral hernia 2016   • Vitamin D deficiency 2016     Past Surgical History:   Procedure  Laterality Date   • CARDIAC CATHETERIZATION     • CARPAL TUNNEL RELEASE     • CATARACT EXTRACTION, BILATERAL     • COLONOSCOPY     • ESOPHAGUS SURGERY      hole repair   • HERNIA REPAIR      ventral   • TUBAL ABDOMINAL LIGATION  1982     General Information     Row Name 09/09/19 1612          PT Evaluation Time/Intention    Document Type  therapy note (daily note)  -CD     Mode of Treatment  physical therapy  -CD     Row Name 09/09/19 1612          General Information    Existing Precautions/Restrictions  oxygen therapy device and L/min  -CD     Barriers to Rehab  medically complex  -CD     Row Name 09/09/19 1612          Cognitive Assessment/Intervention- PT/OT    Orientation Status (Cognition)  oriented x 4  -CD     Row Name 09/09/19 1612          Safety Issues, Functional Mobility    Safety Issues Affecting Function (Mobility)  impulsivity;insight into deficits/self awareness;awareness of need for assistance  -CD     Impairments Affecting Function (Mobility)  endurance/activity tolerance;shortness of breath;strength  -CD     Comment, Safety Issues/Impairments (Mobility)  Monitor O2 sats.   -CD       User Key  (r) = Recorded By, (t) = Taken By, (c) = Cosigned By    Initials Name Provider Type    CD Tammie Mercedes, PT Physical Therapist        Mobility     Row Name 09/09/19 1613          Bed Mobility Assessment/Treatment    Comment (Bed Mobility)  Pt [facility].   -CD     Row Name 09/09/19 1613          Transfer Assessment/Treatment    Comment (Transfers)  CUES FOR HAND PLACEMENT, SAFETY WITH O2 LINES.   -CD     Row Name 09/09/19 1613          Sit-Stand Transfer    Sit-Stand [place] (Transfers)  independent;verbal cues  -CD     Assistive Device (Sit-Stand Transfers)  walker, front-wheeled  -CD     Row Name 09/09/19 1613          Gait/Stairs Assessment/Training    [place] Level (Gait)  stand by assist  -CD     Assistive Device (Gait)  walker, front-wheeled  -CD     Distance in Feet (Gait)  300   -CD      Deviations/Abnormal Patterns (Gait)  gait speed decreased  -CD     Bilateral Gait Deviations  forward flexed posture;heel strike decreased  -CD     Comment (Gait/Stairs)  2 STANDING REST BREAKS WITH CUES FOR PLB. DISTANCE LIMITED BY FATIGUE.   -CD       User Key  (r) = Recorded By, (t) = Taken By, (c) = Cosigned By    Initials Name Provider Type    Tammie Barnes, PT Physical Therapist        Obj/Interventions     Row Name 09/09/19 1615          Static Sitting Balance    Level of [place] (Unsupported Sitting, Static Balance)  independent  -CD     Sitting Position (Unsupported Sitting, Static Balance)  sitting in chair  -CD     Row Name 09/09/19 1615          Static Standing Balance    Level of [place] (Supported Standing, Static Balance)  supervision  -CD     Assistive Device Utilized (Supported Standing, Static Balance)  walker, rolling  -CD     Row Name 09/09/19 1615          Dynamic Standing Balance    Level of [place], Reaches Outside Midline (Standing, Dynamic Balance)  standby assist  -CD     Assistive Device Utilized (Supported Standing, Dynamic Balance)  walker, rolling  -CD       User Key  (r) = Recorded By, (t) = Taken By, (c) = Cosigned By    Initials Name Provider Type    CD Tammie Mercedes, PT Physical Therapist        Goals/Plan    No documentation.       Clinical Impression     Row Name 09/09/19 1616          Pain Scale: Numbers Pre/Post-Treatment    Pain Scale: Numbers, Pretreatment  0/10 - no pain  -CD     Pain Scale: Numbers, Post-Treatment  0/10 - no pain  -CD     Row Name 09/09/19 1616          Plan of Care Review    Plan of Care Reviewed With  patient  -CD     Row Name 09/09/19 [facility]6          Physical Therapy Clinical Impression    Patient/Family Goals Statement (PT Clinical Impression)  HOME. WILL CONSIDER HH IF NEEDED.   -CD     Criteria for Skilled Interventions Met (PT Clinical Impression)  yes  -CD     Rehab Potential (PT Clinical Summary)  good, to achieve stated therapy  goals  -CD     Row Name 09/09/19 1616          Vital Signs    Pre SpO2 (%)  93  -CD     O2 Delivery Pre Treatment  supplemental O2 3L  -CD     Intra SpO2 (%)  87 4L   -CD     O2 Delivery Intra Treatment  supplemental O2  -CD     Post SpO2 (%)  90 3L  -CD     O2 Delivery Post Treatment  supplemental O2  -CD     Pre Patient Position  Sitting  -CD     Intra Patient Position  Standing  -CD     Post Patient Position  Sitting  -CD     Row Name 09/09/19 1616          Positioning and Restraints    Pre-Treatment Position  sitting in chair/recliner  -CD     Post Treatment Position  chair  -CD     In Chair  sitting;call light within reach;encouraged to call for assist NSG OK'S NO EXIT ALARM. PT CALLS OUT FOR ASSISTANCE.   -CD       User Key  (r) = Recorded By, (t) = Taken By, (c) = Cosigned By    Initials Name Provider Type    Tammie Barnes, PT Physical Therapist        Outcome Measures     Row Name 09/09/19 1619          How much help from another person do you currently need...    Turning from your back to your side while in flat bed without using bedrails?  4  -CD     Moving from lying on back to sitting on the side of a flat bed without bedrails?  4  -CD     Moving to and from a bed to a chair (including a wheelchair)?  4  -CD     Standing up from a chair using your arms (e.g., wheelchair, bedside chair)?  4  -CD     Climbing 3-5 steps with a railing?  3  -CD     To walk in hospital room?  3  -CD     AM-PAC 6 Clicks Score (PT)  22  -CD       User Key  (r) = Recorded By, (t) = Taken By, (c) = Cosigned By    Initials Name Provider Type    Tammie Barnes, PT Physical Therapist        Physical Therapy Education     Title: PT OT SLP Therapies (Done)     Topic: Physical Therapy (Done)     Point: Mobility training (Done)     Learning Progress Summary           Patient AcceptanceORLANDO VU by DARIUSZ at 9/9/2019  4:19 PM    Comment:  SEE FLOWSHEET.    AcceptanceORLANDO D, NR by MOLINA at 9/8/2019  1:49 PM                   Point: Home  exercise program (Done)     Learning Progress Summary           Patient Acceptance, E, VU by  at 9/9/2019  4:19 PM    Comment:  SEE FLOWSHEET.    Acceptance, E,D, NR by  at 9/8/2019  1:49 PM                   Point: Body mechanics (Done)     Learning Progress Summary           Patient Acceptance, E, VU by  at 9/9/2019  4:19 PM    Comment:  SEE FLOWSHEET.    Acceptance, E,D, NR by  at 9/8/2019  1:49 PM                   Point: Precautions (Done)     Learning Progress Summary           Patient Acceptance, E, VU by  at 9/9/2019  4:19 PM    Comment:  SEE FLOWSHEET.    Acceptance, E,D, NR by  at 9/8/2019  1:49 PM                               User Key     Initials Effective Dates Name Provider Type Discipline     06/19/15 -  Tammie Mercedes PT Physical Therapist PT     06/19/15 -  Lea Tripp PT Physical Therapist PT              PT Recommendation and Plan     Outcome Summary/Treatment Plan (PT)  Anticipated Discharge Disposition (PT): home with home health  Plan of Care Reviewed With: patient  Progress: improving  Outcome Summary: INCREASED DISTANCE AMBULATED WITH FEWER REST BREAKS NEEDED. PT AMBULATED 300 FEET WITH R WALKER AND SBA. O2 SATS 87% ON 4L O2 AFTER GAIT IN [place]. REBOUNDED TO 90% QUICKLY WITH REST. RECOMMEND HOME WITH HHPT IF PT AGREEABLE.      Time Calculation:   PT Charges     Row Name 09/09/19 1621             Time Calculation    Start Time  1547  -CD      PT Received On  09/09/19  -      PT Goal Re-Cert Due Date  09/18/19  -         Time Calculation- PT    Total Timed Code Minutes- PT  23 minute(s)  -CD         Timed Charges    86439 - Gait Training Minutes   18  -CD      97264 - PT Therapeutic Activity Minutes  5  -CD        User Key  (r) = Recorded By, (t) = Taken By, (c) = Cosigned By    Initials Name Provider Type    CD Tammie Mercedes, PT Physical Therapist        Therapy Charges for Today     Code Description Service Date Service Provider Modifiers Qty    21232904025 HC GAIT  TRAINING EA 15 MIN 9/9/2019 Tammie Mercedes, PT GP 1    84407689124 HC PT THERAPEUTIC ACT EA 15 MIN 9/9/2019 Tammie Mercedes, PT GP 1          PT G-Codes  AM-PAC 6 Clicks Score (PT): 22    Tammie Mercedes, PT  9/9/2019

## 2019-09-09 NOTE — PROGRESS NOTES
[facility] Medicine Services  PROGRESS NOTE    Patient Name: Janet Pisano  : 1945  MRN: [id number]    Date of Admission: 2019  Length of Stay: 2  Primary Care Physician: Leni Freeman    Subjective   Subjective     CC:ICU transfer for resp failure    HPI: No acute events overnight, patient continues to feel short of air, denies any fevers or chills.    Review of Systems  Gen- No fevers, chills  CV- No chest pain, palpitations  Resp- No cough, +dyspnea  GI- No N/V/D, abd pain    All other systems reviewed and negative except any additional pertinent positives and negatives discussed in HPI.     Objective   Objective     Vital Signs:   Temp:  [97.1 °F (36.2 °C)-97.9 °F (36.6 °C)] 97.7 °F (36.5 °C)  Heart Rate:  [61-80] 61  Resp:  [18-20] 18  BP: (114-162)/(61-77) 157/75        Physical Exam:  Constitutional: No acute distress, awake, alert, seated in bed  HENT: NCAT, mucous membranes moist  Respiratory: Moderately labored respirations, moving air well, mild expiratory wheezes, on 5 L nasal cannula  Cardiovascular: RRR, no murmurs, rubs, or gallops, palpable pedal pulses bilaterally  Gastrointestinal: Positive bowel sounds, soft, nontender, nondistended  Musculoskeletal: No bilateral ankle edema  Psychiatric: Appropriate affect, cooperative  Neurologic: Oriented x 3, no focal deficits.    Skin: No rashes    Results Reviewed:    Results from last 7 days   Lab Units 19  0819  03419  1659   WBC 10*3/mm3 11.50* 8.27  --    HEMOGLOBIN g/dL 12.9 12.4  --    HEMATOCRIT % 43.2 42.0  --    PLATELETS 10*3/mm3 206 197  --    INR   --   --  0.97   PROCALCITONIN ng/mL  --   --  0.02*     Results from last 7 days   Lab Units 19  0819  03419  1659   SODIUM mmol/L 139 137 135*   POTASSIUM mmol/L 4.6 4.5 5.8*   CHLORIDE mmol/L 96* 95* 95*   CO2 mmol/L 34.0* 29.0 27.0   BUN mg/dL 24* 22 18   CREATININE mg/dL 0.76 0.76 0.75   GLUCOSE mg/dL 212* 405*  293*   CALCIUM mg/dL 8.8 8.9 9.0   ALT (SGPT) U/L  --  19 21   AST (SGOT) U/L  --  16 29   TROPONIN T ng/mL  --   --  <0.010   PROBNP pg/mL  --   --  384.3     Estimated Creatinine Clearance: 68.7 mL/min (by C-G formula based on SCr of 0.76 mg/dL).    Microbiology Results Abnormal     Procedure Component Value - Date/Time    Blood Culture - Blood, Hand, Right [[id number]] Collected:  09/07/19 1659    Lab Status:  Preliminary result Specimen:  Blood from Hand, Right Updated:  09/08/19 1715     Blood Culture No growth at 24 hours    Blood Culture - Blood, Hand, Left [[id number]] Collected:  09/07/19 1659    Lab Status:  Preliminary result Specimen:  Blood from Hand, Left Updated:  09/08/19 1715     Blood Culture No growth at 24 hours          Imaging Results (last 24 hours)     Procedure Component Value Units Date/Time    XR Chest 1 View [[id number]] Collected:  09/09/19 0831     Updated:  09/09/19 0832    Narrative:          EXAMINATION: XR CHEST 1 VW-      INDICATION: f/u respiratory illness.; Z74.09-Other reduced mobility      COMPARISON: NONE     FINDINGS: Portable chest reveals heart to be borderline large. Mild  increased markings seen within the right lung. Left lung is clear.  Degenerative changes seen within the spine. The upper lung fields are  clear.           Impression:       Mild increased markings identified throughout the right  lung. Left lung is clear. Degenerative changes seen within the spine.  Findings are stable          XR Chest 1 View [[id number]] Collected:  09/08/19 0739     Updated:  09/08/19 1641    Narrative:          EXAMINATION: XR CHEST 1 VW - 09/08/2019     INDICATION: Follow-up respiratory illness.     COMPARISON: Chest radiograph of 08/20/2018.      FINDINGS: Single portable chest radiograph is submitted for review. The  heart is prominent in size, similar to prior. Interval development of  hazy interstitial airspace changes predominantly involving the periphery  of the lungs with  additional left lower lobe airspace disease noted.  Trace bilateral pleural effusion suggested. No pneumothorax appreciated.  The visualized upper abdomen is unremarkable. No acute osseous injury.            Impression:          Similar cardiomegaly with interval worsening of peripherally oriented  multifocal airspace disease and left lower lobe airspace disease with a  questionable trace left pleural effusion.     DICTATED:   09/08/2019  EDITED/ls :   09/08/2019              Results for orders placed during the hospital encounter of 08/16/18   Adult Transthoracic Echo Complete W/ Cont if Necessary Per Protocol    Narrative · Left ventricular systolic function is hyperdynamic (EF > 70).  · Left ventricular wall thickness is consistent with borderline concentric   hypertrophy.  · Right atrial cavity size is borderline dilated.  · Mild mitral valve regurgitation is present  · Mild to moderate tricuspid valve regurgitation is present.  · Estimated right ventricular systolic pressure from tricuspid   regurgitation is markedly elevated (>55 mmHg).          I have reviewed the medications:  Scheduled Meds:    azithromycin 250 mg Oral Daily   budesonide 0.5 mg Nebulization BID - RT   enoxaparin 40 mg Subcutaneous Q24H   gabapentin 300 mg Oral Q8H   insulin detemir 20 Units Subcutaneous Q12H   insulin lispro 0-24 Units Subcutaneous 4x Daily With Meals & Nightly   insulin lispro 10 Units Subcutaneous TID With Meals   ipratropium-albuterol 3 mL Nebulization 4x Daily - RT   levothyroxine 150 mcg Oral Q AM   predniSONE 40 mg Oral Daily With Breakfast   sodium chloride 10 mL Intravenous Q12H     Continuous Infusions:   PRN Meds:.•  acetaminophen **OR** acetaminophen  •  dextrose  •  dextrose  •  glucagon (human recombinant)  •  sodium chloride      Assessment/Plan   Assessment / Plan     Active Hospital Problems    Diagnosis  POA   • Acute on chronic respiratory failure with hypoxia and hypercapnia (CMS/HCC) [J96.21, J96.22]   Yes   • COPD with acute exacerbation (CMS/[facility]) [J44.1]  Yes   • Pulmonary hypertension. Calculated RVSP 64 mmHg by echocardiogram 8/17/18 [I27.20]  Yes   • Obesity hypoventilation syndrome (CMS/[facility]) [E66.2]  Yes   • Acute on chronic diastolic congestive heart failure (CMS/[facility]) [I50.33]  Yes   • Hypothyroidism [E03.9]  Yes   • Uncontrolled type 2 diabetes mellitus. Hemoglobin A1c 9.4 (CMS/[facility]) [E11.65]  Yes      Resolved Hospital Problems   No resolved problems to display.        Brief Hospital Course to date:  Janet Pisano is a 74 y.o. female with a past medical history of COPD, chronic diastolic dysfunction, pulmonary hypertension, obesity hypoventilation syndrome, poorly controlled type 2 diabetes, hypothyroidism.  Patient presented to [facility] from [place] ED with progressively worsening shortness of breath, and confusion requiring ICU admission.  Here patient was placed on BiPAP, started on IV steroids with improvement, white count was within normal limits, Procol was low/pneumonia was doubted.  Patient was deemed stable for transfer to the floor for continued care.    Plan  -Acute on chronic respiratory failure with hypoxia and hypercapnia, etiology likely multifactorial, sec to pulmonary hypertension, OHS, COPD with exacerbation, this was likely triggered by being off and Neurontin.  Baseline home O2 of 3 L, currently at 5 L continue to wean.  -COPD with exacerbation, improving, continue oral steroids, azithromycin duo nebs and O2 supplementation as above  -Poorly controlled type 2 diabetes, last A1c 9.4%, continue basal, prandial and SSI, adjust as warranted especially while she is on steroids.  -Hypothyroidism, TSH is appropriate, continue home Synthroid.  -Neuropathy, gabapentin resumed at 300 mg every 8 hours, continue this for now as we monitor respiratory status.  -PT/OT/CM    DVT Prophylaxis: Lovenox    Disposition: TBD    CODE STATUS:   Code Status and Medical Interventions:   Ordered at: 09/08/19 7442      Limited Support to NOT Include:    Intubation     Level Of Support Discussed With:    Patient     Code Status:    No CPR     Medical Interventions (Level of Support Prior to Arrest):    Limited       Electronically signed by Jay Moody MD, 09/09/19, 11:24 AM.

## 2019-09-09 NOTE — PLAN OF CARE
Problem: Skin Injury Risk (Adult)  Goal: Identify Related Risk Factors and Signs and Symptoms  Outcome: Ongoing (interventions implemented as appropriate)   09/09/19 0148   Skin Injury Risk (Adult)   Related Risk Factors (Skin Injury Risk) body weight extremes;edema     Goal: Skin Health and Integrity  Outcome: Ongoing (interventions implemented as appropriate)   09/09/19 0148   Skin Injury Risk (Adult)   Skin Health and Integrity making progress toward outcome       Problem: Patient Care Overview  Goal: Plan of Care Review  Outcome: Ongoing (interventions implemented as appropriate)   09/09/19 0148   Coping/Psychosocial   Plan of Care Reviewed With patient   Plan of Care Review   Progress improving   OTHER   Outcome Summary patient admitted to this unit this shift. VSS, able to make needs known to staff, patient on 5L humidified NC, pt did not need bipap this shift. possible discharge today or tomorrow       Problem: Chronic Obstructive Pulmonary Disease (Adult)  Goal: Signs and Symptoms of Listed Potential Problems Will be Absent, Minimized or Managed (Chronic Obstructive Pulmonary Disease)  Outcome: Ongoing (interventions implemented as appropriate)   09/09/19 0148   Goal/Outcome Evaluation   Problems Assessed (Chronic Obstructive Pulmonary Disease (COPD)) all   Problems Present (COPD, Bronch/Emphy) respiratory compromise;situational response

## 2019-09-10 VITALS
HEART RATE: 67 BPM | DIASTOLIC BLOOD PRESSURE: 84 MMHG | OXYGEN SATURATION: 94 % | SYSTOLIC BLOOD PRESSURE: 156 MMHG | HEIGHT: 64 IN | WEIGHT: 213.7 LBS | TEMPERATURE: 98.2 F | RESPIRATION RATE: 18 BRPM | BODY MASS INDEX: 36.48 KG/M2

## 2019-09-10 PROBLEM — J96.21 ACUTE ON CHRONIC RESPIRATORY FAILURE WITH HYPOXIA AND HYPERCAPNIA (HCC): Status: RESOLVED | Noted: 2019-09-07 | Resolved: 2019-09-10

## 2019-09-10 PROBLEM — J44.1 COPD WITH ACUTE EXACERBATION (HCC): Status: RESOLVED | Noted: 2019-09-07 | Resolved: 2019-09-10

## 2019-09-10 PROBLEM — J96.22 ACUTE ON CHRONIC RESPIRATORY FAILURE WITH HYPOXIA AND HYPERCAPNIA (HCC): Status: RESOLVED | Noted: 2019-09-07 | Resolved: 2019-09-10

## 2019-09-10 LAB
GLUCOSE BLDC GLUCOMTR-MCNC: 169 MG/DL (ref 70–130)
GLUCOSE BLDC GLUCOMTR-MCNC: 188 MG/DL (ref 70–130)
GLUCOSE BLDC GLUCOMTR-MCNC: 286 MG/DL (ref 70–130)

## 2019-09-10 PROCEDURE — 63710000001 PREDNISONE PER 1 MG: Performed by: INTERNAL MEDICINE

## 2019-09-10 PROCEDURE — 99238 HOSP IP/OBS DSCHRG MGMT 30/<: CPT | Performed by: INTERNAL MEDICINE

## 2019-09-10 PROCEDURE — 82962 GLUCOSE BLOOD TEST: CPT

## 2019-09-10 PROCEDURE — 63710000001 INSULIN DETEMIR PER 5 UNITS: Performed by: INTERNAL MEDICINE

## 2019-09-10 RX ORDER — PREDNISONE 20 MG/1
40 TABLET ORAL
Qty: 4 TABLET | Refills: 0 | Status: SHIPPED | OUTPATIENT
Start: 2019-09-10 | End: 2019-09-12

## 2019-09-10 RX ORDER — AZITHROMYCIN 250 MG/1
TABLET, FILM COATED ORAL
Qty: 3 TABLET | Refills: 0 | Status: ON HOLD | OUTPATIENT
Start: 2019-09-10 | End: 2022-01-01

## 2019-09-10 RX ADMIN — GABAPENTIN 300 MG: 300 CAPSULE ORAL at 06:16

## 2019-09-10 RX ADMIN — LEVOTHYROXINE SODIUM 150 MCG: 75 TABLET ORAL at 06:16

## 2019-09-10 RX ADMIN — PREDNISONE 40 MG: 20 TABLET ORAL at 08:06

## 2019-09-10 RX ADMIN — AZITHROMYCIN 250 MG: 250 TABLET, FILM COATED ORAL at 08:06

## 2019-09-10 RX ADMIN — INSULIN DETEMIR 20 UNITS: 100 INJECTION, SOLUTION SUBCUTANEOUS at 08:07

## 2019-09-10 RX ADMIN — INSULIN LISPRO 10 UNITS: 100 INJECTION, SOLUTION INTRAVENOUS; SUBCUTANEOUS at 08:10

## 2019-09-10 RX ADMIN — INSULIN LISPRO 10 UNITS: 100 INJECTION, SOLUTION INTRAVENOUS; SUBCUTANEOUS at 11:40

## 2019-09-10 RX ADMIN — SODIUM CHLORIDE, PRESERVATIVE FREE 10 ML: 5 INJECTION INTRAVENOUS at 11:39

## 2019-09-10 NOTE — PLAN OF CARE
Problem: Skin Injury Risk (Adult)  Goal: Identify Related Risk Factors and Signs and Symptoms  Outcome: Ongoing (interventions implemented as appropriate)   09/10/19 0237   Skin Injury Risk (Adult)   Related Risk Factors (Skin Injury Risk) body weight extremes;advanced age     Goal: Skin Health and Integrity  Outcome: Ongoing (interventions implemented as appropriate)   09/10/19 0237   Skin Injury Risk (Adult)   Skin Health and Integrity making progress toward outcome       Problem: Patient Care Overview  Goal: Plan of Care Review  Outcome: Ongoing (interventions implemented as appropriate)   09/10/19 0237   Coping/Psychosocial   Plan of Care Reviewed With patient   Plan of Care Review   Progress improving   OTHER   Outcome Summary Patient has had a decent shift, sleeping on and off tonight. VSS (B/P was elevated earlier, but PRN B/P medications brought it back to within acceptable levels), and patient has been alert and oriented times four. No complaints of pain thus far tonight. Nursing staff will continue to monitor and assess the patient.     Goal: Individualization and Mutuality  Outcome: Ongoing (interventions implemented as appropriate)   09/10/19 0237   Individualization   Patient Specific Interventions Monitor and assess for pain q2hrs and prn       Problem: Chronic Obstructive Pulmonary Disease (Adult)  Goal: Signs and Symptoms of Listed Potential Problems Will be Absent, Minimized or Managed (Chronic Obstructive Pulmonary Disease)  Outcome: Ongoing (interventions implemented as appropriate)   09/10/19 0237   Goal/Outcome Evaluation   Problems Assessed (Chronic Obstructive Pulmonary Disease (COPD)) all   Problems Present (COPD, Bronch/Emphy) situational response;respiratory compromise       Problem: Fall Risk (Adult)  Goal: Identify Related Risk Factors and Signs and Symptoms  Outcome: Ongoing (interventions implemented as appropriate)   09/10/19 0237   Fall Risk (Adult)   Related Risk Factors (Fall Risk)  age-related changes;fear of falling;environment unfamiliar   Signs and Symptoms (Fall Risk) presence of risk factors     Goal: Absence of Fall  Outcome: Ongoing (interventions implemented as appropriate)   09/10/19 0237   Fall Risk (Adult)   Absence of Fall making progress toward outcome       Problem: Breathing Pattern Ineffective (Adult)  Goal: Identify Related Risk Factors and Signs and Symptoms  Outcome: Ongoing (interventions implemented as appropriate)   09/10/19 0237   Breathing Pattern Ineffective (Adult)   Related Risk Factors (Breathing Pattern Ineffective) advanced age;obesity   Signs and Symptoms (Breathing Pattern Ineffective) activity intolerance     Goal: Effective Oxygenation/Ventilation  Outcome: Ongoing (interventions implemented as appropriate)   09/10/19 0237   Breathing Pattern Ineffective (Adult)   Effective Oxygenation/Ventilation making progress toward outcome

## 2019-09-10 NOTE — DISCHARGE SUMMARY
[facility] Medicine Services  DISCHARGE SUMMARY    Patient Name: Janet Pisano  : 1945  MRN: [id number]    Date of Admission: 2019  Date of Discharge:  9/10/2019  Primary Care Physician: Leni Freeman    Consults     No orders found from 2019 to 2019.          Hospital Course     Presenting Problem:   Respiratory failure (CMS/HCC) [J96.90]  Acute on chronic respiratory failure with hypoxia and hypercapnia (CMS/HCC) [J96.21, J96.22]    Active Hospital Problems    Diagnosis  POA   • Pulmonary hypertension. Calculated RVSP 64 mmHg by echocardiogram 18 [I27.20]  Yes   • Obesity hypoventilation syndrome (CMS/HCC) [E66.2]  Yes   • Acute on chronic diastolic congestive heart failure (CMS/HCC) [I50.33]  Yes   • Hypothyroidism [E03.9]  Yes   • Uncontrolled type 2 diabetes mellitus. Hemoglobin A1c 9.4 (CMS/[facility]) [E11.65]  Yes      Resolved Hospital Problems    Diagnosis Date Resolved POA   • Acute on chronic respiratory failure with hypoxia and hypercapnia (CMS/HCC) [J96.21, J96.22] 09/10/2019 Yes   • COPD with acute exacerbation (CMS/[facility]) [J44.1] 09/10/2019 Yes      Hospital Course:  Janet Pisano is a 74 y.o. female with a past medical history of COPD, chronic diastolic dysfunction, pulmonary hypertension, obesity hypoventilation syndrome, poorly controlled type 2 diabetes, hypothyroidism.  Patient presented to [facility] from [place] ED with progressively worsening shortness of breath, and confusion requiring ICU admission.  Here patient was placed on BiPAP, started on IV steroids with improvement, wbc was within normal limits, Procal was low/pneumonia was unlikely.  Patient was deemed stable for transfer to the floor for continued care.     Plan  -Acute on chronic respiratory failure with hypoxia and hypercapnia, etiology was multifactorial, sec to pulmonary hypertension, OHS, COPD with exacerbation, likely triggered by being off and Neurontin.   she was placed on Bipap,  duonebs and azithromycin and started on IV steroids with good response. Steroids have been transitioned to PO for which she will continue at d/c, she will also continue azithromycin to finish 5 day course. Her O2 requirement is now back to her home baseline of 3L down from 5L.    -Poorly controlled type 2 diabetes, last A1c 9.4%, continue basal, prandial and SSI, adjust as warranted especially while she is on steroids.    -Hypothyroidism, TSH is appropriate, continue home Synthroid.    -Neuropathy, gabapentin resumed at 300 mg every 8 hours, she has tolerated this well.    Discharge Follow Up Recommendations for labs/diagnostics:  F/u with PCP in  1 week    Day of Discharge     HPI: No acute events overnight, patient states that she is doing well, slept ok, breathing is improved.    Review of Systems  Gen- No fevers, chills  CV- No chest pain, palpitations  Resp- No cough, +dyspnea  GI- No N/V/D, abd pain    All other systems reviewed and negative except any additional pertinent positives and negatives discussed in HPI.     Vital Signs:   Temp:  [97.4 °F (36.3 °C)-98.1 °F (36.7 °C)] 97.7 °F (36.5 °C)  Heart Rate:  [66-92] 71  Resp:  [18-20] 18  BP: (140-202)/() 142/88     Physical Exam:  Constitutional: No acute distress, awake, alert  HENT: NCAT, mucous membranes moist  Respiratory: Non-labored respirations, diffuse coarse BS, mild exp wheezes, on 3L NC  Cardiovascular: RRR, no murmurs, rubs, or gallops, palpable pedal pulses bilaterally  Gastrointestinal: Positive bowel sounds, soft, nontender, nondistended  Musculoskeletal: No bilateral ankle edema  Psychiatric: Appropriate affect, cooperative  Neurologic: Oriented x 3,no focal deficits  Skin: No rashes    Pertinent  and/or Most Recent Results     Results from last 7 days   Lab Units 09/09/19  0801 09/08/19  0348 09/07/19  1659   WBC 10*3/mm3 11.50* 8.27  --    HEMOGLOBIN g/dL 12.9 12.4  --    HEMATOCRIT % 43.2 42.0  --    PLATELETS 10*3/mm3 206 197  --     SODIUM mmol/L 139 137 135*   POTASSIUM mmol/L 4.6 4.5 5.8*   CHLORIDE mmol/L 96* 95* 95*   CO2 mmol/L 34.0* 29.0 27.0   BUN mg/dL 24* 22 18   CREATININE mg/dL 0.76 0.76 0.75   GLUCOSE mg/dL 212* 405* 293*   CALCIUM mg/dL 8.8 8.9 9.0     Results from last 7 days   Lab Units 09/08/19  0348 09/07/19  1659   BILIRUBIN mg/dL 0.6 0.7   ALK PHOS U/L 63 64   ALT (SGPT) U/L 19 21   AST (SGOT) U/L 16 29   PROTIME Seconds  --  12.4   INR   --  0.97   APTT seconds  --  26.3           Invalid input(s): TG, LDLCALC, LDLREALC  Results from last 7 days   Lab Units 09/07/19  1659   TSH uIU/mL 0.682   PROBNP pg/mL 384.3   TROPONIN T ng/mL <0.010   PROCALCITONIN ng/mL 0.02*   LACTATE mmol/L 1.3       Brief Urine Lab Results     None          Microbiology Results Abnormal     Procedure Component Value - Date/Time    Blood Culture - Blood, Hand, Right [[id number]] Collected:  09/07/19 1659    Lab Status:  Preliminary result Specimen:  Blood from Hand, Right Updated:  09/09/19 1715     Blood Culture No growth at 2 days    Blood Culture - Blood, Hand, Left [[id number]] Collected:  09/07/19 1659    Lab Status:  Preliminary result Specimen:  Blood from Hand, Left Updated:  09/09/19 1715     Blood Culture No growth at 2 days          Imaging Results (all)     Procedure Component Value Units Date/Time    XR Chest 1 View [[id number]] Collected:  09/09/19 0831     Updated:  09/09/19 1756    Narrative:       EXAMINATION: XR CHEST 1 VW-      INDICATION: Followup respiratory illness.; Z74.09-Other reduced  mobility.      COMPARISON: None.     FINDINGS: Portable chest reveals heart to be borderline enlarged. Mild  increased markings seen within the right lung. Left lung is clear.  Degenerative change is seen within the spine. The upper lung fields are  clear.           Impression:       Mild increased markings identified throughout the right  lung. Left lung is clear. Degenerative change is seen within the spine.  Findings are stable.     D:   09/09/2019  E:  09/09/2019     This report was finalized on 9/9/2019 5:53 PM by Dr. Ave Fallon MD.       XR Chest 1 View [[id number]] Collected:  09/08/19 0739     Updated:  09/08/19 1641    Narrative:          EXAMINATION: XR CHEST 1 VW - 09/08/2019     INDICATION: Follow-up respiratory illness.     COMPARISON: Chest radiograph of 08/20/2018.      FINDINGS: Single portable chest radiograph is submitted for review. The  heart is prominent in size, similar to prior. Interval development of  hazy interstitial airspace changes predominantly involving the periphery  of the lungs with additional left lower lobe airspace disease noted.  Trace bilateral pleural effusion suggested. No pneumothorax appreciated.  The visualized upper abdomen is unremarkable. No acute osseous injury.            Impression:          Similar cardiomegaly with interval worsening of peripherally oriented  multifocal airspace disease and left lower lobe airspace disease with a  questionable trace left pleural effusion.     DICTATED:   09/08/2019  EDITED/ls :   09/08/2019                        Results for orders placed during the hospital encounter of 08/16/18   Adult Transthoracic Echo Complete W/ Cont if Necessary Per Protocol    Narrative · Left ventricular systolic function is hyperdynamic (EF > 70).  · Left ventricular wall thickness is consistent with borderline concentric   hypertrophy.  · Right atrial cavity size is borderline dilated.  · Mild mitral valve regurgitation is present  · Mild to moderate tricuspid valve regurgitation is present.  · Estimated right ventricular systolic pressure from tricuspid   regurgitation is markedly elevated (>55 mmHg).           Order Current Status    Blood Culture - Blood, Hand, Left Preliminary result    Blood Culture - Blood, Hand, Right Preliminary result        Discharge Details        Discharge Medications      New Medications      Instructions Start Date   azithromycin 250 MG  tablet  Commonly known as:  ZITHROMAX   Take  1 tablet daily for 3 days.      predniSONE 20 MG tablet  Commonly known as:  DELTASONE   40 mg, Oral, Daily With Breakfast         Changes to Medications      Instructions Start Date   LEVEMIR FLEXTOUCH 100 UNIT/ML injection  Generic drug:  insulin detemir  What changed:  See the new instructions.   INJECT 25 UNITS SUBCUTANEOUSLY EVERY 12 HOURS         Continue These Medications      Instructions Start Date   aspirin 325 MG tablet   325 mg, Oral, Daily      baclofen 10 MG tablet  Commonly known as:  LIORESAL   10 mg, Oral, 3 Times Daily PRN      gabapentin 300 MG capsule  Commonly known as:  NEURONTIN   300 mg, Oral, 3 Times Daily      Garlic 1000 MG capsule   2,000 Units, Oral, Daily      NOVOLOG FLEXPEN 100 UNIT/ML solution pen-injector sc pen  Generic drug:  insulin aspart   No dose, route, or frequency recorded.      pravastatin 40 MG tablet  Commonly known as:  PRAVACHOL   TAKE 1 TABLET BY MOUTH EVERY DAY      SYNTHROID 150 MCG tablet  Generic drug:  levothyroxine   150 mcg, Oral, Daily      VENTOLIN  (90 Base) MCG/ACT inhaler  Generic drug:  albuterol sulfate HFA   INHALE 1 TO 2 PUFFS BY MOUTH EVERY 4 TO 6 HOURS AS NEEDED FOR WHEEZING OR COUGH      Vitamin D-3 1000 units capsule   2,000 Units, Oral, Daily             Allergies   Allergen Reactions   • Aliskiren    • Amlodipine    • Crestor [Rosuvastatin Calcium]    • Lisinopril    • Metformin And Related    • Penicillins    • Sitagliptin    • Statins Confusion   • Valsartan Unknown (See Comments)     Tolerates Losartan 8/20/18       Discharge Disposition: Home    Diet:  Hospital:  Diet Order   Procedures   • Diet Regular; Thin; Cardiac, Consistent Carbohydrate     Discharge:     Discharge Activity: As tolerated    CODE STATUS:    Code Status and Medical Interventions:   Ordered at: 09/08/19 8964     Limited Support to NOT Include:    Intubation     Level Of Support Discussed With:    Patient     Code Status:     No CPR     Medical Interventions (Level of Support Prior to Arrest):    Limited     No future appointments.    Time Spent on Discharge:  30 minutes    Electronically signed by Jay Moody MD, 09/10/19, 11:40 AM.

## 2019-09-10 NOTE — PROGRESS NOTES
Case Management Discharge Note    Final Note: Spoke with patient in room.  Therapy recommending home health at discharge.  Patient agreeable and prefers [facility].  Referral called to [facility] for PT, OT and Skilled nursing.  Patient denies any other needs.  Rebekah Schutlz, RN [phone]    Destination      No service has been selected for the patient.      Durable Medical Equipment      No service has been selected for the patient.      Dialysis/Infusion      No service has been selected for the patient.      Home Medical Care      Service Provider Request Status Selected Services Address Phone Number Fax Number    [facility] [address] [facility] [address] [phone]      Therapy      No service has been selected for the patient.      Community Resources      No service has been selected for the patient.             Final Discharge Disposition Code: 06 - home with home health care

## 2019-09-10 NOTE — CONSULTS
Referring Provider: MD Fransisco  Reason for Consultation: AECOD    Subjective .   Education:  NN spoke with pt at BS.  Pt alert and able to answer questions appropriately.  Pt O2 sat    93% on   3L currently, home O2 use 3 L.  Pt states use of rescue inhaler   7 times weekly, relief of SOB within   2-3     Mins.  Pt report is unclear on  the ability to ambulate at baseline.  Up to date on flu and PNA vaccines.  Pt reports no previous hx of formal COPD teaching, no understanding of action plan, or TX.  COPD education completed in the form of explanation, handouts, and videos.  Key Steps to Staying Well started at BS.Stop light report, NN contact information, instructions for accessing iT[facility] and list of educational videos given to pt.  No new concerns or questions voiced at this time.  NN will continue to follow as needed.    Age: 74 y.o.  Sex: female  Smoker Status: former, quit date 2015, 50 pack year hx  Pulmonologist: Declined  FEV1 (PFT): NA  Home O2: 3L    Objective     SpO2 SpO2: 94 % (09/10/19 0739)  Device Device (Oxygen Therapy): nasal cannula (09/09/19 2000)  Flow Flow (L/min): 3 (09/09/19 2000)  Incentive Spirometer  demonstrated  IS Predicted Level (mL)     Number of Repetitions   5  Level Incentive Spirometer (mL)  500-750  Patient Tolerance   tolerated  Inhaler Treatment Status    Treatment Route        Home Medications:  Medications Prior to Admission   Medication Sig Dispense Refill Last Dose   • baclofen (LIORESAL) 10 MG tablet Take 10 mg by mouth 3 (Three) Times a Day As Needed for Muscle Spasms.      • SYNTHROID 150 MCG tablet Take 150 mcg by mouth Daily.      • aspirin 325 MG tablet Take 325 mg by mouth Daily.   Taking   • Cholecalciferol (VITAMIN D-3) 1000 UNITS capsule Take 2,000 Units by mouth daily.   Taking   • gabapentin (NEURONTIN) 300 MG capsule Take 1 capsule by mouth 3 (Three) Times a Day. 90 capsule 3 Taking   • Garlic 1000 MG capsule Take 2,000 Units by mouth Daily.   Taking   • LEVEMIR  FLEXTOUCH 100 UNIT/ML injection INJECT 25 UNITS SUBCUTANEOUSLY EVERY 12 HOURS (Patient taking differently: INJECT 56 UNITS SUBCUTANEOUSLY EVERY 12 HOURS) 15 mL 0    • NOVOLOG FLEXPEN 100 UNIT/ML solution pen-injector sc pen   0    • pravastatin (PRAVACHOL) 40 MG tablet TAKE 1 TABLET BY MOUTH EVERY DAY 90 tablet 3 Taking   • VENTOLIN  (90 Base) MCG/ACT inhaler INHALE 1 TO 2 PUFFS BY MOUTH EVERY 4 TO 6 HOURS AS NEEDED FOR WHEEZING OR COUGH 18 g 0 Taking       Discussion: Per current GOLD Standards, please consider: Pt states that she has tried many medications and she reacts to all of them.  Pt states that she does not want to see another doctor.  Pt states that her son is aware of her DNR/DNI wishes. Education has been completed.          Flora Redmond RN

## 2019-09-11 ENCOUNTER — READMISSION MANAGEMENT (OUTPATIENT)
Dept: CALL CENTER | Facility: HOSPITAL | Age: 74
End: 2019-09-11

## 2019-09-11 NOTE — OUTREACH NOTE
Prep Survey      Responses   Facility patient discharged from?  [place]   Is patient eligible?  Yes   Discharge diagnosis  Acute on chronic respiratory failure with hypoxia and hypercapnia    Does the patient have one of the following disease processes/diagnoses(primary or secondary)?  COPD/Pneumonia   Does the patient have Home health ordered?  Yes   What is the Home health agency?   [facility]    Is there a DME ordered?  No   Prep survey completed?  Yes          Bri Brown RN

## 2019-09-12 ENCOUNTER — READMISSION MANAGEMENT (OUTPATIENT)
Dept: CALL CENTER | Facility: HOSPITAL | Age: 74
End: 2019-09-12

## 2019-09-12 LAB
BACTERIA SPEC AEROBE CULT: NORMAL
BACTERIA SPEC AEROBE CULT: NORMAL

## 2019-09-12 NOTE — OUTREACH NOTE
COPD/PN Week 1 Survey      Responses   Facility patient discharged from?  [place]   Does the patient have one of the following disease processes/diagnoses(primary or secondary)?  COPD/Pneumonia   Is there a successful TCM telephone encounter documented?  No   Was the primary reason for admission:  COPD exacerbation   Week 1 attempt successful?  Yes   Call start time  0848   Call end time  0852   Discharge diagnosis  Acute on chronic respiratory failure with hypoxia and hypercapnia    Meds reviewed with patient/caregiver?  Yes   Is the patient having any side effects they believe may be caused by any medication additions or changes?  No   Does the patient have all medications ordered at discharge?  Yes   Is the patient taking all medications as directed (includes completed medication regime)?  Yes   Does the patient have a primary care provider?   Yes   Does the patient have an appointment with their PCP or pulmonologist within 7 days of discharge?  Greater than 7 days   What is preventing the patient from scheduling follow up appointments within 7 days of discharge?  Earlier appointment not available   Has the patient kept scheduled appointments due by today?  N/A   What is the Home health agency?   [facility]    Has home health visited the patient within 72 hours of discharge?  Call prior to 72 hours   Psychosocial issues?  No   Did the patient receive a copy of their discharge instructions?  Yes   Nursing interventions  Reviewed instructions with patient   What is the patient's perception of their health status since discharge?  Improving   Is the patient/caregiver able to teach back the hierarchy of who to call/visit for symptoms/problems? PCP, Specialist, Home health nurse, Urgent Care, ED, 911  Yes   Is the patient able to teach back COPD zones?  Yes   Nursing interventions  Education provided on various zones   Patient reports what zone on this call?  Green Zone   Green Zone  Reports doing well, Breathing without  shortness of breath, Sleeping well, Appetite is good   Green Zone interventions:  Take daily medications, Use oxygen as prescribed   Week 1 call completed?  Yes          Rosaline Becerra RN

## 2019-09-20 ENCOUNTER — READMISSION MANAGEMENT (OUTPATIENT)
Dept: CALL CENTER | Facility: HOSPITAL | Age: 74
End: 2019-09-20

## 2019-09-20 NOTE — OUTREACH NOTE
COPD/PN Week 2 Survey      Responses   Facility patient discharged from?  [place]   Does the patient have one of the following disease processes/diagnoses(primary or secondary)?  COPD/Pneumonia   Was the primary reason for admission:  COPD exacerbation   Week 2 attempt successful?  No   Unsuccessful attempts  Attempt 1          Liliana Lim RN

## 2019-09-24 ENCOUNTER — READMISSION MANAGEMENT (OUTPATIENT)
Dept: CALL CENTER | Facility: HOSPITAL | Age: 74
End: 2019-09-24

## 2019-09-24 NOTE — OUTREACH NOTE
COPD/PN Week 2 Survey      Responses   Facility patient discharged from?  [place]   Does the patient have one of the following disease processes/diagnoses(primary or secondary)?  COPD/Pneumonia   Was the primary reason for admission:  COPD exacerbation   Week 2 attempt successful?  No   Unsuccessful attempts  Attempt 2          Lisha Murphy RN

## 2019-09-26 ENCOUNTER — READMISSION MANAGEMENT (OUTPATIENT)
Dept: CALL CENTER | Facility: HOSPITAL | Age: 74
End: 2019-09-26

## 2019-09-26 NOTE — OUTREACH NOTE
COPD/PN Week 3 Survey      Responses   Facility patient discharged from?  [place]   Does the patient have one of the following disease processes/diagnoses(primary or secondary)?  COPD/Pneumonia   Was the primary reason for admission:  COPD exacerbation   Week 3 attempt successful?  No   Unsuccessful attempts  Attempt 1          Evens Dorsey RN

## 2019-09-30 ENCOUNTER — READMISSION MANAGEMENT (OUTPATIENT)
Dept: CALL CENTER | Facility: HOSPITAL | Age: 74
End: 2019-09-30

## 2019-09-30 NOTE — OUTREACH NOTE
COPD/PN Week 3 Survey      Responses   Facility patient discharged from?  [place]   Does the patient have one of the following disease processes/diagnoses(primary or secondary)?  COPD/Pneumonia   Was the primary reason for admission:  COPD exacerbation   Week 3 attempt successful?  Yes   Call start time  0938   Revoke  Readmitted [Admitted to [facility] last night.]   Discharge diagnosis  Acute on chronic respiratory failure with hypoxia and hypercapnia           Eide Sherman RN

## 2019-10-14 ENCOUNTER — OUTSIDE FACILITY SERVICE (OUTPATIENT)
Dept: HOSPITALIST | Facility: HOSPITAL | Age: 74
End: 2019-10-14

## 2019-10-14 PROCEDURE — G0180 MD CERTIFICATION HHA PATIENT: HCPCS | Performed by: INTERNAL MEDICINE

## 2019-11-21 ENCOUNTER — LAB (OUTPATIENT)
Dept: LAB | Facility: HOSPITAL | Age: 74
End: 2019-11-21

## 2019-11-21 ENCOUNTER — TRANSCRIBE ORDERS (OUTPATIENT)
Dept: LAB | Facility: HOSPITAL | Age: 74
End: 2019-11-21

## 2019-11-21 DIAGNOSIS — I27.20 PROGRESSIVE PULMONARY HYPERTENSION (HCC): Primary | ICD-10-CM

## 2019-11-21 DIAGNOSIS — I27.20 PROGRESSIVE PULMONARY HYPERTENSION (HCC): ICD-10-CM

## 2019-11-21 DIAGNOSIS — R06.02 SHORTNESS OF BREATH: ICD-10-CM

## 2019-11-21 LAB
ANION GAP SERPL CALCULATED.3IONS-SCNC: 14.1 MMOL/L (ref 5–15)
BUN BLD-MCNC: 17 MG/DL (ref 8–23)
BUN/CREAT SERPL: 21.3 (ref 7–25)
CALCIUM SPEC-SCNC: 8.9 MG/DL (ref 8.6–10.5)
CHLORIDE SERPL-SCNC: 95 MMOL/L (ref 98–107)
CO2 SERPL-SCNC: 32.9 MMOL/L (ref 22–29)
CREAT BLD-MCNC: 0.8 MG/DL (ref 0.57–1)
GFR SERPL CREATININE-BSD FRML MDRD: 70 ML/MIN/1.73
GLUCOSE BLD-MCNC: 275 MG/DL (ref 65–99)
POTASSIUM BLD-SCNC: 4.4 MMOL/L (ref 3.5–5.2)
SODIUM BLD-SCNC: 142 MMOL/L (ref 136–145)

## 2019-11-21 PROCEDURE — 36415 COLL VENOUS BLD VENIPUNCTURE: CPT

## 2019-11-21 PROCEDURE — 83880 ASSAY OF NATRIURETIC PEPTIDE: CPT

## 2019-11-21 PROCEDURE — 80048 BASIC METABOLIC PNL TOTAL CA: CPT

## 2019-11-22 LAB — NT-PROBNP SERPL-MCNC: 499.4 PG/ML (ref 5–1800)

## 2021-04-03 NOTE — OUTREACH NOTE
CHF Week 2 Survey      Responses   Facility patient discharged from?  [place]   Does the patient have one of the following disease processes/diagnoses(primary or secondary)?  CHF   Week 2 attempt successful?  Yes   Call start time  1619   Call end time  1638   Discharge diagnosis  Acute on chronic diastolic congestive heart failure    Meds reviewed with patient/caregiver?  Yes   Is the patient having any side effects they believe may be caused by any medication additions or changes?  No   Does the patient have all medications ordered at discharge?  Yes   Is the patient taking all medications as directed (includes completed medication regime)?  Yes   Medication comments  PCP increased Levemir to 28 units. Spent considerable time explaining insulin administration.   Does the patient have a primary care provider?   Yes   Does the patient have an appointment with their PCP within 7 days of discharge?  Yes   Has the patient kept scheduled appointments due by today?  Yes   Comments  Seen PCP. She may benefit from more diabetes education however that would mean son would have to take more time off of work. Advised to request transportation assistance PRN.   What is the Home health agency?   [facility]   Has home health visited the patient within 72 hours of discharge?  Yes   Psychosocial issues?  No   Did the patient receive a copy of their discharge instructions?  Yes   Nursing interventions  Reviewed instructions with patient   What is the patient's perception of their health status since discharge?  Improving   Nursing interventions  Nurse provided patient education   Is the patient weighing daily?  No   Does the patient have scales?  Yes   Daily weight interventions  Education provided on importance of daily weight   Is the patient able to teach back Heart Failure diet management?  Yes   Is the patient able to teach back Heart Failure Zones?  Yes   Is the patient able to teach back signs and symptoms of  worsening condition? (i.e. weight gain, shortness of air, etc.)  Yes   Is the patient/caregiver able to teach back the hierarchy of who to call/visit for symptoms/problems? PCP, Specialist, Home health nurse, Urgent Care, ED, 911  Yes   Additional teach back comments  She isnt smoking since 2 yrs. Spent significant time explaining diabetes medications and nutritional guidelines. Explained about fluid level monitoring and seeking medical attention PRN. Advised to keep bS and BP log for PCP.   CHF Week 2 call completed?  Yes          Evens Dorsey RN   English

## 2021-10-29 NOTE — DISCHARGE INSTR - ACTIVITY
Activity as tolerated   Per patient myChart message:  From: Daniella Sexton      Created: 10/28/2021 9:29 PM      i need these meds filled and for some reason I cant do it threw the website.  gabapentin 300 mg cap 3x daily.  amitripyline 50 mg 2x at bedtime  tizanidine 4mg tab  oxycodone 5mg diclofenac      _________________________________    Mychart sent to pt:  From: Carli Vargas RN      Created: 10/29/2021 3:18 PM      Hi Daniella,  An appointment is needed before we can process your oxycodone refill. We have a policy that you need to see the provider at least every 3 months for opioid medications.  You can call to schedule at: [phone].     Please call your pharmacy for the other medication refills.  The pharmacy will then notify us w/ the information.     Carli Cohen RN-BSN  [facility]

## 2022-01-01 ENCOUNTER — APPOINTMENT (OUTPATIENT)
Dept: GENERAL RADIOLOGY | Facility: HOSPITAL | Age: 77
End: 2022-01-01

## 2022-01-01 ENCOUNTER — READMISSION MANAGEMENT (OUTPATIENT)
Dept: CALL CENTER | Facility: HOSPITAL | Age: 77
End: 2022-01-01

## 2022-01-01 ENCOUNTER — HOSPITAL ENCOUNTER (INPATIENT)
Facility: HOSPITAL | Age: 77
LOS: 16 days | End: 2022-08-01
Attending: INTERNAL MEDICINE | Admitting: INTERNAL MEDICINE

## 2022-01-01 ENCOUNTER — APPOINTMENT (OUTPATIENT)
Dept: OTHER | Facility: HOSPITAL | Age: 77
End: 2022-01-01

## 2022-01-01 ENCOUNTER — APPOINTMENT (OUTPATIENT)
Dept: CARDIOLOGY | Facility: HOSPITAL | Age: 77
End: 2022-01-01

## 2022-01-01 ENCOUNTER — HOSPITAL ENCOUNTER (INPATIENT)
Facility: HOSPITAL | Age: 77
LOS: 2 days | Discharge: HOME | End: 2022-06-17
Attending: STUDENT IN AN ORGANIZED HEALTH CARE EDUCATION/TRAINING PROGRAM | Admitting: HOSPITALIST

## 2022-01-01 ENCOUNTER — APPOINTMENT (OUTPATIENT)
Dept: CT IMAGING | Facility: HOSPITAL | Age: 77
End: 2022-01-01

## 2022-01-01 ENCOUNTER — HOSPITAL ENCOUNTER (INPATIENT)
Facility: HOSPITAL | Age: 77
LOS: 1 days | End: 2022-08-01
Attending: INTERNAL MEDICINE | Admitting: INTERNAL MEDICINE

## 2022-01-01 ENCOUNTER — ANCILLARY PROCEDURE (OUTPATIENT)
Dept: SPEECH THERAPY | Facility: HOSPITAL | Age: 77
End: 2022-01-01

## 2022-01-01 ENCOUNTER — APPOINTMENT (OUTPATIENT)
Dept: NEUROLOGY | Facility: HOSPITAL | Age: 77
End: 2022-01-01

## 2022-01-01 VITALS
SYSTOLIC BLOOD PRESSURE: 153 MMHG | OXYGEN SATURATION: 94 % | WEIGHT: 215 LBS | HEIGHT: 61 IN | HEART RATE: 55 BPM | BODY MASS INDEX: 40.59 KG/M2 | DIASTOLIC BLOOD PRESSURE: 65 MMHG | RESPIRATION RATE: 17 BRPM | TEMPERATURE: 97.5 F

## 2022-01-01 VITALS
TEMPERATURE: 98.2 F | RESPIRATION RATE: 18 BRPM | SYSTOLIC BLOOD PRESSURE: 142 MMHG | DIASTOLIC BLOOD PRESSURE: 83 MMHG | BODY MASS INDEX: 41.54 KG/M2 | HEART RATE: 65 BPM | HEIGHT: 61 IN | WEIGHT: 220 LBS | OXYGEN SATURATION: 95 %

## 2022-01-01 VITALS
OXYGEN SATURATION: 96 % | BODY MASS INDEX: 41.54 KG/M2 | HEART RATE: 65 BPM | TEMPERATURE: 98.2 F | SYSTOLIC BLOOD PRESSURE: 142 MMHG | DIASTOLIC BLOOD PRESSURE: 83 MMHG | RESPIRATION RATE: 18 BRPM | HEIGHT: 61 IN | WEIGHT: 220.02 LBS

## 2022-01-01 DIAGNOSIS — J96.22 ACUTE ON CHRONIC RESPIRATORY FAILURE WITH HYPOXIA AND HYPERCAPNIA: Primary | ICD-10-CM

## 2022-01-01 DIAGNOSIS — I50.9 CONGESTIVE HEART FAILURE, UNSPECIFIED HF CHRONICITY, UNSPECIFIED HEART FAILURE TYPE: ICD-10-CM

## 2022-01-01 DIAGNOSIS — Z79.4 UNCONTROLLED TYPE 2 DIABETES MELLITUS WITH HYPERGLYCEMIA, WITH LONG-TERM CURRENT USE OF INSULIN: ICD-10-CM

## 2022-01-01 DIAGNOSIS — J96.21 ACUTE ON CHRONIC RESPIRATORY FAILURE WITH HYPOXIA AND HYPERCAPNIA: Primary | ICD-10-CM

## 2022-01-01 DIAGNOSIS — N17.9 AKI (ACUTE KIDNEY INJURY): ICD-10-CM

## 2022-01-01 DIAGNOSIS — E11.65 UNCONTROLLED TYPE 2 DIABETES MELLITUS WITH HYPERGLYCEMIA, WITH LONG-TERM CURRENT USE OF INSULIN: ICD-10-CM

## 2022-01-01 DIAGNOSIS — R41.841 COGNITIVE COMMUNICATION DEFICIT: ICD-10-CM

## 2022-01-01 DIAGNOSIS — J44.1 COPD WITH ACUTE EXACERBATION: ICD-10-CM

## 2022-01-01 DIAGNOSIS — R13.12 OROPHARYNGEAL DYSPHAGIA: Primary | ICD-10-CM

## 2022-01-01 LAB
ALBUMIN SERPL-MCNC: 2.7 G/DL (ref 3.5–5.2)
ALBUMIN SERPL-MCNC: 2.7 G/DL (ref 3.5–5.2)
ALBUMIN SERPL-MCNC: 3 G/DL (ref 3.5–5.2)
ALBUMIN SERPL-MCNC: 3.3 G/DL (ref 3.5–5.2)
ALBUMIN SERPL-MCNC: 3.6 G/DL (ref 3.5–5.2)
ALBUMIN SERPL-MCNC: 3.9 G/DL (ref 3.5–5.2)
ALBUMIN SERPL-MCNC: 4.1 G/DL (ref 3.5–5.2)
ALBUMIN SERPL-MCNC: 4.2 G/DL (ref 3.5–5.2)
ALBUMIN/GLOB SERPL: 0.9 G/DL
ALBUMIN/GLOB SERPL: 1 G/DL
ALBUMIN/GLOB SERPL: 1.1 G/DL
ALBUMIN/GLOB SERPL: 1.2 G/DL
ALBUMIN/GLOB SERPL: 1.4 G/DL
ALP SERPL-CCNC: 101 U/L (ref 39–117)
ALP SERPL-CCNC: 113 U/L (ref 39–117)
ALP SERPL-CCNC: 79 U/L (ref 39–117)
ALP SERPL-CCNC: 85 U/L (ref 39–117)
ALP SERPL-CCNC: 89 U/L (ref 39–117)
ALP SERPL-CCNC: 95 U/L (ref 39–117)
ALT SERPL W P-5'-P-CCNC: 14 U/L (ref 1–33)
ALT SERPL W P-5'-P-CCNC: 15 U/L (ref 1–33)
ALT SERPL W P-5'-P-CCNC: 18 U/L (ref 1–33)
ALT SERPL W P-5'-P-CCNC: 21 U/L (ref 1–33)
ALT SERPL W P-5'-P-CCNC: 21 U/L (ref 1–33)
ALT SERPL W P-5'-P-CCNC: 23 U/L (ref 1–33)
ALT SERPL W P-5'-P-CCNC: 40 U/L (ref 1–33)
ALT SERPL W P-5'-P-CCNC: 5 U/L (ref 1–33)
ANION GAP SERPL CALCULATED.3IONS-SCNC: 10 MMOL/L (ref 5–15)
ANION GAP SERPL CALCULATED.3IONS-SCNC: 10 MMOL/L (ref 5–15)
ANION GAP SERPL CALCULATED.3IONS-SCNC: 11 MMOL/L (ref 5–15)
ANION GAP SERPL CALCULATED.3IONS-SCNC: 11 MMOL/L (ref 5–15)
ANION GAP SERPL CALCULATED.3IONS-SCNC: 12 MMOL/L (ref 5–15)
ANION GAP SERPL CALCULATED.3IONS-SCNC: 12 MMOL/L (ref 5–15)
ANION GAP SERPL CALCULATED.3IONS-SCNC: 14 MMOL/L (ref 5–15)
ANION GAP SERPL CALCULATED.3IONS-SCNC: 14 MMOL/L (ref 5–15)
ANION GAP SERPL CALCULATED.3IONS-SCNC: 3 MMOL/L (ref 5–15)
ANION GAP SERPL CALCULATED.3IONS-SCNC: 5 MMOL/L (ref 5–15)
ANION GAP SERPL CALCULATED.3IONS-SCNC: 6 MMOL/L (ref 5–15)
ANION GAP SERPL CALCULATED.3IONS-SCNC: 6 MMOL/L (ref 5–15)
ANION GAP SERPL CALCULATED.3IONS-SCNC: 7 MMOL/L (ref 5–15)
ANION GAP SERPL CALCULATED.3IONS-SCNC: 8 MMOL/L (ref 5–15)
ANION GAP SERPL CALCULATED.3IONS-SCNC: 8 MMOL/L (ref 5–15)
ANION GAP SERPL CALCULATED.3IONS-SCNC: 9 MMOL/L (ref 5–15)
ARTERIAL PATENCY WRIST A: ABNORMAL
AST SERPL-CCNC: 10 U/L (ref 1–32)
AST SERPL-CCNC: 14 U/L (ref 1–32)
AST SERPL-CCNC: 16 U/L (ref 1–32)
AST SERPL-CCNC: 20 U/L (ref 1–32)
AST SERPL-CCNC: 22 U/L (ref 1–32)
AST SERPL-CCNC: 24 U/L (ref 1–32)
AST SERPL-CCNC: 31 U/L (ref 1–32)
AST SERPL-CCNC: 9 U/L (ref 1–32)
ATMOSPHERIC PRESS: ABNORMAL MM[HG]
B PARAPERT DNA SPEC QL NAA+PROBE: NOT DETECTED
B PARAPERT DNA SPEC QL NAA+PROBE: NOT DETECTED
B PERT DNA SPEC QL NAA+PROBE: NOT DETECTED
B PERT DNA SPEC QL NAA+PROBE: NOT DETECTED
BACTERIA SPEC AEROBE CULT: ABNORMAL
BACTERIA SPEC AEROBE CULT: ABNORMAL
BACTERIA SPEC AEROBE CULT: NORMAL
BACTERIA UR QL AUTO: ABNORMAL /HPF
BASE EXCESS BLDA CALC-SCNC: 11.4 MMOL/L (ref 0–2)
BASE EXCESS BLDA CALC-SCNC: 13.1 MMOL/L (ref 0–2)
BASE EXCESS BLDA CALC-SCNC: 2.4 MMOL/L (ref 0–2)
BASE EXCESS BLDA CALC-SCNC: 2.8 MMOL/L (ref 0–2)
BASE EXCESS BLDA CALC-SCNC: 3.5 MMOL/L (ref 0–2)
BASE EXCESS BLDA CALC-SCNC: 4 MMOL/L (ref 0–2)
BASE EXCESS BLDA CALC-SCNC: 7.7 MMOL/L (ref 0–2)
BASE EXCESS BLDA CALC-SCNC: 9 MMOL/L (ref 0–2)
BASOPHILS # BLD AUTO: 0.01 10*3/MM3 (ref 0–0.2)
BASOPHILS # BLD AUTO: 0.02 10*3/MM3 (ref 0–0.2)
BASOPHILS # BLD AUTO: 0.02 10*3/MM3 (ref 0–0.2)
BASOPHILS # BLD AUTO: 0.03 10*3/MM3 (ref 0–0.2)
BASOPHILS # BLD AUTO: 0.04 10*3/MM3 (ref 0–0.2)
BASOPHILS # BLD AUTO: 0.05 10*3/MM3 (ref 0–0.2)
BASOPHILS # BLD AUTO: 0.06 10*3/MM3 (ref 0–0.2)
BASOPHILS NFR BLD AUTO: 0.1 % (ref 0–1.5)
BASOPHILS NFR BLD AUTO: 0.2 % (ref 0–1.5)
BASOPHILS NFR BLD AUTO: 0.3 % (ref 0–1.5)
BASOPHILS NFR BLD AUTO: 0.4 % (ref 0–1.5)
BDY SITE: ABNORMAL
BH CV ECHO MEAS - AO MAX PG: 5.8 MMHG
BH CV ECHO MEAS - AO MEAN PG: 3 MMHG
BH CV ECHO MEAS - AO ROOT DIAM: 3.3 CM
BH CV ECHO MEAS - AO V2 MAX: 120 CM/SEC
BH CV ECHO MEAS - AO V2 VTI: 25.8 CM
BH CV ECHO MEAS - EDV(CUBED): 148.9 ML
BH CV ECHO MEAS - EDV(MOD-SP2): 50.9 ML
BH CV ECHO MEAS - EDV(MOD-SP4): 84.6 ML
BH CV ECHO MEAS - EF(MOD-BP): 55.4 %
BH CV ECHO MEAS - EF(MOD-SP2): 53 %
BH CV ECHO MEAS - EF(MOD-SP4): 57.9 %
BH CV ECHO MEAS - ESV(CUBED): 35.9 ML
BH CV ECHO MEAS - ESV(MOD-SP2): 23.9 ML
BH CV ECHO MEAS - ESV(MOD-SP4): 35.6 ML
BH CV ECHO MEAS - FS: 37.7 %
BH CV ECHO MEAS - IVS/LVPW: 0.86 CM
BH CV ECHO MEAS - IVSD: 1.2 CM
BH CV ECHO MEAS - LA DIMENSION: 4.5 CM
BH CV ECHO MEAS - LAT PEAK E' VEL: 8.5 CM/SEC
BH CV ECHO MEAS - LV MASS(C)D: 286.9 GRAMS
BH CV ECHO MEAS - LVIDD: 5.3 CM
BH CV ECHO MEAS - LVIDS: 3.3 CM
BH CV ECHO MEAS - LVOT AREA: 3.1 CM2
BH CV ECHO MEAS - LVOT DIAM: 2 CM
BH CV ECHO MEAS - LVPWD: 1.4 CM
BH CV ECHO MEAS - MED PEAK E' VEL: 7.5 CM/SEC
BH CV ECHO MEAS - MV A MAX VEL: 48.2 CM/SEC
BH CV ECHO MEAS - MV DEC TIME: 0.15 MSEC
BH CV ECHO MEAS - MV E MAX VEL: 98.5 CM/SEC
BH CV ECHO MEAS - MV E/A: 2.04
BH CV ECHO MEAS - RAP SYSTOLE: 8 MMHG
BH CV ECHO MEAS - RVSP: 42 MMHG
BH CV ECHO MEAS - SV(MOD-SP2): 27 ML
BH CV ECHO MEAS - SV(MOD-SP4): 49 ML
BH CV ECHO MEAS - TAPSE (>1.6): 1.6 CM
BH CV ECHO MEAS - TR MAX PG: 34.1 MMHG
BH CV ECHO MEAS - TR MAX VEL: 288.3 CM/SEC
BH CV ECHO MEASUREMENTS AVERAGE E/E' RATIO: 12.31
BH CV VAS BP LEFT ARM: NORMAL MMHG
BH CV VAS BP RIGHT ARM: NORMAL MMHG
BH CV XLRA - RV BASE: 4.1 CM
BH CV XLRA - RV LENGTH: 6.4 CM
BH CV XLRA - RV MID: 3.1 CM
BH CV XLRA - TDI S': 11.9 CM/SEC
BILIRUB SERPL-MCNC: 0.6 MG/DL (ref 0–1.2)
BILIRUB SERPL-MCNC: 0.8 MG/DL (ref 0–1.2)
BILIRUB SERPL-MCNC: 0.9 MG/DL (ref 0–1.2)
BILIRUB SERPL-MCNC: 1 MG/DL (ref 0–1.2)
BILIRUB SERPL-MCNC: 1.1 MG/DL (ref 0–1.2)
BILIRUB SERPL-MCNC: 1.2 MG/DL (ref 0–1.2)
BILIRUB SERPL-MCNC: 1.2 MG/DL (ref 0–1.2)
BILIRUB SERPL-MCNC: 1.6 MG/DL (ref 0–1.2)
BILIRUB UR QL STRIP: NEGATIVE
BODY TEMPERATURE: 37 C
BUN SERPL-MCNC: 27 MG/DL (ref 8–23)
BUN SERPL-MCNC: 30 MG/DL (ref 8–23)
BUN SERPL-MCNC: 30 MG/DL (ref 8–23)
BUN SERPL-MCNC: 31 MG/DL (ref 8–23)
BUN SERPL-MCNC: 34 MG/DL (ref 8–23)
BUN SERPL-MCNC: 34 MG/DL (ref 8–23)
BUN SERPL-MCNC: 36 MG/DL (ref 8–23)
BUN SERPL-MCNC: 36 MG/DL (ref 8–23)
BUN SERPL-MCNC: 37 MG/DL (ref 8–23)
BUN SERPL-MCNC: 38 MG/DL (ref 8–23)
BUN SERPL-MCNC: 38 MG/DL (ref 8–23)
BUN SERPL-MCNC: 41 MG/DL (ref 8–23)
BUN SERPL-MCNC: 43 MG/DL (ref 8–23)
BUN SERPL-MCNC: 43 MG/DL (ref 8–23)
BUN SERPL-MCNC: 46 MG/DL (ref 8–23)
BUN SERPL-MCNC: 46 MG/DL (ref 8–23)
BUN SERPL-MCNC: 47 MG/DL (ref 8–23)
BUN SERPL-MCNC: 49 MG/DL (ref 8–23)
BUN SERPL-MCNC: 52 MG/DL (ref 8–23)
BUN SERPL-MCNC: 53 MG/DL (ref 8–23)
BUN/CREAT SERPL: 26.1 (ref 7–25)
BUN/CREAT SERPL: 27.2 (ref 7–25)
BUN/CREAT SERPL: 27.6 (ref 7–25)
BUN/CREAT SERPL: 28.6 (ref 7–25)
BUN/CREAT SERPL: 29.5 (ref 7–25)
BUN/CREAT SERPL: 30.6 (ref 7–25)
BUN/CREAT SERPL: 30.8 (ref 7–25)
BUN/CREAT SERPL: 31 (ref 7–25)
BUN/CREAT SERPL: 32.1 (ref 7–25)
BUN/CREAT SERPL: 32.4 (ref 7–25)
BUN/CREAT SERPL: 32.7 (ref 7–25)
BUN/CREAT SERPL: 36.6 (ref 7–25)
BUN/CREAT SERPL: 38.4 (ref 7–25)
BUN/CREAT SERPL: 39.1 (ref 7–25)
BUN/CREAT SERPL: 40 (ref 7–25)
BUN/CREAT SERPL: 41.7 (ref 7–25)
BUN/CREAT SERPL: 45.3 (ref 7–25)
C PNEUM DNA NPH QL NAA+NON-PROBE: NOT DETECTED
C PNEUM DNA NPH QL NAA+NON-PROBE: NOT DETECTED
CALCIUM SPEC-SCNC: 8.2 MG/DL (ref 8.6–10.5)
CALCIUM SPEC-SCNC: 8.4 MG/DL (ref 8.6–10.5)
CALCIUM SPEC-SCNC: 8.5 MG/DL (ref 8.6–10.5)
CALCIUM SPEC-SCNC: 8.5 MG/DL (ref 8.6–10.5)
CALCIUM SPEC-SCNC: 8.9 MG/DL (ref 8.6–10.5)
CALCIUM SPEC-SCNC: 8.9 MG/DL (ref 8.6–10.5)
CALCIUM SPEC-SCNC: 9 MG/DL (ref 8.6–10.5)
CALCIUM SPEC-SCNC: 9 MG/DL (ref 8.6–10.5)
CALCIUM SPEC-SCNC: 9.1 MG/DL (ref 8.6–10.5)
CALCIUM SPEC-SCNC: 9.2 MG/DL (ref 8.6–10.5)
CALCIUM SPEC-SCNC: 9.3 MG/DL (ref 8.6–10.5)
CALCIUM SPEC-SCNC: 9.5 MG/DL (ref 8.6–10.5)
CALCIUM SPEC-SCNC: 9.7 MG/DL (ref 8.6–10.5)
CHLORIDE SERPL-SCNC: 100 MMOL/L (ref 98–107)
CHLORIDE SERPL-SCNC: 101 MMOL/L (ref 98–107)
CHLORIDE SERPL-SCNC: 102 MMOL/L (ref 98–107)
CHLORIDE SERPL-SCNC: 104 MMOL/L (ref 98–107)
CHLORIDE SERPL-SCNC: 106 MMOL/L (ref 98–107)
CHLORIDE SERPL-SCNC: 110 MMOL/L (ref 98–107)
CHLORIDE SERPL-SCNC: 111 MMOL/L (ref 98–107)
CHLORIDE SERPL-SCNC: 93 MMOL/L (ref 98–107)
CHLORIDE SERPL-SCNC: 93 MMOL/L (ref 98–107)
CHLORIDE SERPL-SCNC: 94 MMOL/L (ref 98–107)
CHLORIDE SERPL-SCNC: 95 MMOL/L (ref 98–107)
CHLORIDE SERPL-SCNC: 95 MMOL/L (ref 98–107)
CHLORIDE SERPL-SCNC: 96 MMOL/L (ref 98–107)
CHLORIDE SERPL-SCNC: 96 MMOL/L (ref 98–107)
CHLORIDE SERPL-SCNC: 98 MMOL/L (ref 98–107)
CHLORIDE SERPL-SCNC: 99 MMOL/L (ref 98–107)
CHOLEST SERPL-MCNC: 101 MG/DL (ref 0–200)
CHOLEST SERPL-MCNC: 114 MG/DL (ref 0–200)
CHOLEST SERPL-MCNC: 129 MG/DL (ref 0–200)
CHOLEST SERPL-MCNC: 154 MG/DL (ref 0–200)
CK SERPL-CCNC: 45 U/L (ref 20–180)
CLARITY UR: ABNORMAL
CLARITY UR: CLEAR
CO2 BLDA-SCNC: 29 MMOL/L (ref 22–33)
CO2 BLDA-SCNC: 30.8 MMOL/L (ref 22–33)
CO2 BLDA-SCNC: 31.9 MMOL/L (ref 22–33)
CO2 BLDA-SCNC: 31.9 MMOL/L (ref 22–33)
CO2 BLDA-SCNC: 41.2 MMOL/L (ref 22–33)
CO2 BLDA-SCNC: 41.6 MMOL/L (ref 22–33)
CO2 BLDA-SCNC: 42.2 MMOL/L (ref 22–33)
CO2 BLDA-SCNC: 43.1 MMOL/L (ref 22–33)
CO2 SERPL-SCNC: 24 MMOL/L (ref 22–29)
CO2 SERPL-SCNC: 27 MMOL/L (ref 22–29)
CO2 SERPL-SCNC: 27 MMOL/L (ref 22–29)
CO2 SERPL-SCNC: 28 MMOL/L (ref 22–29)
CO2 SERPL-SCNC: 30 MMOL/L (ref 22–29)
CO2 SERPL-SCNC: 31 MMOL/L (ref 22–29)
CO2 SERPL-SCNC: 32 MMOL/L (ref 22–29)
CO2 SERPL-SCNC: 33 MMOL/L (ref 22–29)
CO2 SERPL-SCNC: 33 MMOL/L (ref 22–29)
CO2 SERPL-SCNC: 34 MMOL/L (ref 22–29)
CO2 SERPL-SCNC: 36 MMOL/L (ref 22–29)
CO2 SERPL-SCNC: 37 MMOL/L (ref 22–29)
CO2 SERPL-SCNC: 38 MMOL/L (ref 22–29)
CO2 SERPL-SCNC: 39 MMOL/L (ref 22–29)
CO2 SERPL-SCNC: 39 MMOL/L (ref 22–29)
COHGB MFR BLD: 0.8 % (ref 0–2)
COHGB MFR BLD: 0.9 % (ref 0–2)
COHGB MFR BLD: 1 % (ref 0–2)
COHGB MFR BLD: 1.1 % (ref 0–2)
COHGB MFR BLD: 1.3 % (ref 0–2)
COHGB MFR BLD: 1.3 % (ref 0–2)
COLOR UR: YELLOW
CREAT SERPL-MCNC: 0.72 MG/DL (ref 0.57–1)
CREAT SERPL-MCNC: 0.79 MG/DL (ref 0.57–1)
CREAT SERPL-MCNC: 0.82 MG/DL (ref 0.57–1)
CREAT SERPL-MCNC: 0.92 MG/DL (ref 0.57–1)
CREAT SERPL-MCNC: 1 MG/DL (ref 0.57–1)
CREAT SERPL-MCNC: 1.03 MG/DL (ref 0.57–1)
CREAT SERPL-MCNC: 1.04 MG/DL (ref 0.57–1)
CREAT SERPL-MCNC: 1.06 MG/DL (ref 0.57–1)
CREAT SERPL-MCNC: 1.12 MG/DL (ref 0.57–1)
CREAT SERPL-MCNC: 1.17 MG/DL (ref 0.57–1)
CREAT SERPL-MCNC: 1.29 MG/DL (ref 0.57–1)
CREAT SERPL-MCNC: 1.3 MG/DL (ref 0.57–1)
CREAT SERPL-MCNC: 1.33 MG/DL (ref 0.57–1)
CREAT SERPL-MCNC: 1.34 MG/DL (ref 0.57–1)
CREAT SERPL-MCNC: 1.42 MG/DL (ref 0.57–1)
CREAT SERPL-MCNC: 1.42 MG/DL (ref 0.57–1)
CREAT SERPL-MCNC: 1.44 MG/DL (ref 0.57–1)
CREAT SERPL-MCNC: 1.56 MG/DL (ref 0.57–1)
CREAT SERPL-MCNC: 1.59 MG/DL (ref 0.57–1)
CREAT SERPL-MCNC: 1.69 MG/DL (ref 0.57–1)
CREAT UR-MCNC: 22.3 MG/DL
CRP SERPL-MCNC: 0.7 MG/DL (ref 0–0.5)
CRP SERPL-MCNC: 2.19 MG/DL (ref 0–0.5)
D DIMER PPP FEU-MCNC: 0.63 MCGFEU/ML (ref 0.01–0.5)
D-LACTATE SERPL-SCNC: 1 MMOL/L (ref 0.5–2)
D-LACTATE SERPL-SCNC: 1.7 MMOL/L (ref 0.5–2)
D-LACTATE SERPL-SCNC: 2 MMOL/L (ref 0.5–2)
DEPRECATED RDW RBC AUTO: 46.4 FL (ref 37–54)
DEPRECATED RDW RBC AUTO: 47.2 FL (ref 37–54)
DEPRECATED RDW RBC AUTO: 47.7 FL (ref 37–54)
DEPRECATED RDW RBC AUTO: 48 FL (ref 37–54)
DEPRECATED RDW RBC AUTO: 48.2 FL (ref 37–54)
DEPRECATED RDW RBC AUTO: 48.5 FL (ref 37–54)
DEPRECATED RDW RBC AUTO: 48.7 FL (ref 37–54)
DEPRECATED RDW RBC AUTO: 48.8 FL (ref 37–54)
DEPRECATED RDW RBC AUTO: 49.1 FL (ref 37–54)
DEPRECATED RDW RBC AUTO: 49.2 FL (ref 37–54)
DEPRECATED RDW RBC AUTO: 49.4 FL (ref 37–54)
DEPRECATED RDW RBC AUTO: 49.6 FL (ref 37–54)
DEPRECATED RDW RBC AUTO: 51.6 FL (ref 37–54)
DEPRECATED RDW RBC AUTO: 52.1 FL (ref 37–54)
DEPRECATED RDW RBC AUTO: 53.6 FL (ref 37–54)
DEPRECATED RDW RBC AUTO: 55 FL (ref 37–54)
DEPRECATED RDW RBC AUTO: 56.1 FL (ref 37–54)
EGFRCR SERPLBLD CKD-EPI 2021: 30.8 ML/MIN/1.73
EGFRCR SERPLBLD CKD-EPI 2021: 33.1 ML/MIN/1.73
EGFRCR SERPLBLD CKD-EPI 2021: 33.9 ML/MIN/1.73
EGFRCR SERPLBLD CKD-EPI 2021: 37.9 ML/MIN/1.73
EGFRCR SERPLBLD CKD-EPI 2021: 38.2 ML/MIN/1.73
EGFRCR SERPLBLD CKD-EPI 2021: 40.7 ML/MIN/1.73
EGFRCR SERPLBLD CKD-EPI 2021: 41.3 ML/MIN/1.73
EGFRCR SERPLBLD CKD-EPI 2021: 42.4 ML/MIN/1.73
EGFRCR SERPLBLD CKD-EPI 2021: 42.6 ML/MIN/1.73
EGFRCR SERPLBLD CKD-EPI 2021: 48.2 ML/MIN/1.73
EGFRCR SERPLBLD CKD-EPI 2021: 50.8 ML/MIN/1.73
EGFRCR SERPLBLD CKD-EPI 2021: 54.2 ML/MIN/1.73
EGFRCR SERPLBLD CKD-EPI 2021: 55.5 ML/MIN/1.73
EGFRCR SERPLBLD CKD-EPI 2021: 58.1 ML/MIN/1.73
EGFRCR SERPLBLD CKD-EPI 2021: 64.3 ML/MIN/1.73
EGFRCR SERPLBLD CKD-EPI 2021: 73.8 ML/MIN/1.73
EGFRCR SERPLBLD CKD-EPI 2021: 86.2 ML/MIN/1.73
EOSINOPHIL # BLD AUTO: 0 10*3/MM3 (ref 0–0.4)
EOSINOPHIL # BLD AUTO: 0.01 10*3/MM3 (ref 0–0.4)
EOSINOPHIL # BLD AUTO: 0.04 10*3/MM3 (ref 0–0.4)
EOSINOPHIL # BLD AUTO: 0.05 10*3/MM3 (ref 0–0.4)
EOSINOPHIL # BLD AUTO: 0.09 10*3/MM3 (ref 0–0.4)
EOSINOPHIL # BLD AUTO: 0.1 10*3/MM3 (ref 0–0.4)
EOSINOPHIL # BLD AUTO: 0.12 10*3/MM3 (ref 0–0.4)
EOSINOPHIL # BLD AUTO: 0.16 10*3/MM3 (ref 0–0.4)
EOSINOPHIL # BLD AUTO: 0.17 10*3/MM3 (ref 0–0.4)
EOSINOPHIL # BLD AUTO: 0.23 10*3/MM3 (ref 0–0.4)
EOSINOPHIL # BLD AUTO: 0.26 10*3/MM3 (ref 0–0.4)
EOSINOPHIL # BLD AUTO: 0.28 10*3/MM3 (ref 0–0.4)
EOSINOPHIL # BLD AUTO: 0.29 10*3/MM3 (ref 0–0.4)
EOSINOPHIL # BLD AUTO: 0.32 10*3/MM3 (ref 0–0.4)
EOSINOPHIL NFR BLD AUTO: 0 % (ref 0.3–6.2)
EOSINOPHIL NFR BLD AUTO: 0.1 % (ref 0.3–6.2)
EOSINOPHIL NFR BLD AUTO: 0.4 % (ref 0.3–6.2)
EOSINOPHIL NFR BLD AUTO: 0.4 % (ref 0.3–6.2)
EOSINOPHIL NFR BLD AUTO: 0.6 % (ref 0.3–6.2)
EOSINOPHIL NFR BLD AUTO: 0.7 % (ref 0.3–6.2)
EOSINOPHIL NFR BLD AUTO: 1.2 % (ref 0.3–6.2)
EOSINOPHIL NFR BLD AUTO: 1.3 % (ref 0.3–6.2)
EOSINOPHIL NFR BLD AUTO: 1.5 % (ref 0.3–6.2)
EOSINOPHIL NFR BLD AUTO: 1.7 % (ref 0.3–6.2)
EOSINOPHIL NFR BLD AUTO: 1.8 % (ref 0.3–6.2)
EOSINOPHIL NFR BLD AUTO: 1.9 % (ref 0.3–6.2)
EPAP: 0
EPAP: 10
ERYTHROCYTE [DISTWIDTH] IN BLOOD BY AUTOMATED COUNT: 13.7 % (ref 12.3–15.4)
ERYTHROCYTE [DISTWIDTH] IN BLOOD BY AUTOMATED COUNT: 13.8 % (ref 12.3–15.4)
ERYTHROCYTE [DISTWIDTH] IN BLOOD BY AUTOMATED COUNT: 13.8 % (ref 12.3–15.4)
ERYTHROCYTE [DISTWIDTH] IN BLOOD BY AUTOMATED COUNT: 13.9 % (ref 12.3–15.4)
ERYTHROCYTE [DISTWIDTH] IN BLOOD BY AUTOMATED COUNT: 14.3 % (ref 12.3–15.4)
ERYTHROCYTE [DISTWIDTH] IN BLOOD BY AUTOMATED COUNT: 14.3 % (ref 12.3–15.4)
ERYTHROCYTE [DISTWIDTH] IN BLOOD BY AUTOMATED COUNT: 14.4 % (ref 12.3–15.4)
ERYTHROCYTE [DISTWIDTH] IN BLOOD BY AUTOMATED COUNT: 14.8 % (ref 12.3–15.4)
ERYTHROCYTE [DISTWIDTH] IN BLOOD BY AUTOMATED COUNT: 14.9 % (ref 12.3–15.4)
ERYTHROCYTE [DISTWIDTH] IN BLOOD BY AUTOMATED COUNT: 15.1 % (ref 12.3–15.4)
ERYTHROCYTE [DISTWIDTH] IN BLOOD BY AUTOMATED COUNT: 15.2 % (ref 12.3–15.4)
ERYTHROCYTE [DISTWIDTH] IN BLOOD BY AUTOMATED COUNT: 15.6 % (ref 12.3–15.4)
ERYTHROCYTE [SEDIMENTATION RATE] IN BLOOD: 54 MM/HR (ref 0–30)
FLUAV SUBTYP SPEC NAA+PROBE: NOT DETECTED
FLUAV SUBTYP SPEC NAA+PROBE: NOT DETECTED
FLUBV RNA ISLT QL NAA+PROBE: NOT DETECTED
FLUBV RNA ISLT QL NAA+PROBE: NOT DETECTED
GLOBULIN UR ELPH-MCNC: 2.7 GM/DL
GLOBULIN UR ELPH-MCNC: 3.2 GM/DL
GLOBULIN UR ELPH-MCNC: 3.2 GM/DL
GLOBULIN UR ELPH-MCNC: 3.4 GM/DL
GLOBULIN UR ELPH-MCNC: 3.4 GM/DL
GLUCOSE BLDC GLUCOMTR-MCNC: 107 MG/DL (ref 70–130)
GLUCOSE BLDC GLUCOMTR-MCNC: 114 MG/DL (ref 70–130)
GLUCOSE BLDC GLUCOMTR-MCNC: 118 MG/DL (ref 70–130)
GLUCOSE BLDC GLUCOMTR-MCNC: 126 MG/DL (ref 70–130)
GLUCOSE BLDC GLUCOMTR-MCNC: 132 MG/DL (ref 70–130)
GLUCOSE BLDC GLUCOMTR-MCNC: 134 MG/DL (ref 70–130)
GLUCOSE BLDC GLUCOMTR-MCNC: 137 MG/DL (ref 70–130)
GLUCOSE BLDC GLUCOMTR-MCNC: 137 MG/DL (ref 70–130)
GLUCOSE BLDC GLUCOMTR-MCNC: 139 MG/DL (ref 70–130)
GLUCOSE BLDC GLUCOMTR-MCNC: 142 MG/DL (ref 70–130)
GLUCOSE BLDC GLUCOMTR-MCNC: 142 MG/DL (ref 70–130)
GLUCOSE BLDC GLUCOMTR-MCNC: 143 MG/DL (ref 70–130)
GLUCOSE BLDC GLUCOMTR-MCNC: 144 MG/DL (ref 70–130)
GLUCOSE BLDC GLUCOMTR-MCNC: 151 MG/DL (ref 70–130)
GLUCOSE BLDC GLUCOMTR-MCNC: 151 MG/DL (ref 70–130)
GLUCOSE BLDC GLUCOMTR-MCNC: 154 MG/DL (ref 70–130)
GLUCOSE BLDC GLUCOMTR-MCNC: 156 MG/DL (ref 70–130)
GLUCOSE BLDC GLUCOMTR-MCNC: 158 MG/DL (ref 70–130)
GLUCOSE BLDC GLUCOMTR-MCNC: 160 MG/DL (ref 70–130)
GLUCOSE BLDC GLUCOMTR-MCNC: 161 MG/DL (ref 70–130)
GLUCOSE BLDC GLUCOMTR-MCNC: 169 MG/DL (ref 70–130)
GLUCOSE BLDC GLUCOMTR-MCNC: 172 MG/DL (ref 70–130)
GLUCOSE BLDC GLUCOMTR-MCNC: 172 MG/DL (ref 70–130)
GLUCOSE BLDC GLUCOMTR-MCNC: 173 MG/DL (ref 70–130)
GLUCOSE BLDC GLUCOMTR-MCNC: 177 MG/DL (ref 70–130)
GLUCOSE BLDC GLUCOMTR-MCNC: 177 MG/DL (ref 70–130)
GLUCOSE BLDC GLUCOMTR-MCNC: 180 MG/DL (ref 70–130)
GLUCOSE BLDC GLUCOMTR-MCNC: 181 MG/DL (ref 70–130)
GLUCOSE BLDC GLUCOMTR-MCNC: 184 MG/DL (ref 70–130)
GLUCOSE BLDC GLUCOMTR-MCNC: 185 MG/DL (ref 70–130)
GLUCOSE BLDC GLUCOMTR-MCNC: 186 MG/DL (ref 70–130)
GLUCOSE BLDC GLUCOMTR-MCNC: 187 MG/DL (ref 70–130)
GLUCOSE BLDC GLUCOMTR-MCNC: 195 MG/DL (ref 70–130)
GLUCOSE BLDC GLUCOMTR-MCNC: 196 MG/DL (ref 70–130)
GLUCOSE BLDC GLUCOMTR-MCNC: 199 MG/DL (ref 70–130)
GLUCOSE BLDC GLUCOMTR-MCNC: 205 MG/DL (ref 70–130)
GLUCOSE BLDC GLUCOMTR-MCNC: 208 MG/DL (ref 70–130)
GLUCOSE BLDC GLUCOMTR-MCNC: 208 MG/DL (ref 70–130)
GLUCOSE BLDC GLUCOMTR-MCNC: 209 MG/DL (ref 70–130)
GLUCOSE BLDC GLUCOMTR-MCNC: 210 MG/DL (ref 70–130)
GLUCOSE BLDC GLUCOMTR-MCNC: 214 MG/DL (ref 70–130)
GLUCOSE BLDC GLUCOMTR-MCNC: 215 MG/DL (ref 70–130)
GLUCOSE BLDC GLUCOMTR-MCNC: 223 MG/DL (ref 70–130)
GLUCOSE BLDC GLUCOMTR-MCNC: 235 MG/DL (ref 70–130)
GLUCOSE BLDC GLUCOMTR-MCNC: 235 MG/DL (ref 70–130)
GLUCOSE BLDC GLUCOMTR-MCNC: 245 MG/DL (ref 70–130)
GLUCOSE BLDC GLUCOMTR-MCNC: 247 MG/DL (ref 70–130)
GLUCOSE BLDC GLUCOMTR-MCNC: 250 MG/DL (ref 70–130)
GLUCOSE BLDC GLUCOMTR-MCNC: 257 MG/DL (ref 70–130)
GLUCOSE BLDC GLUCOMTR-MCNC: 262 MG/DL (ref 70–130)
GLUCOSE BLDC GLUCOMTR-MCNC: 266 MG/DL (ref 70–130)
GLUCOSE BLDC GLUCOMTR-MCNC: 269 MG/DL (ref 70–130)
GLUCOSE BLDC GLUCOMTR-MCNC: 269 MG/DL (ref 70–130)
GLUCOSE BLDC GLUCOMTR-MCNC: 270 MG/DL (ref 70–130)
GLUCOSE BLDC GLUCOMTR-MCNC: 273 MG/DL (ref 70–130)
GLUCOSE BLDC GLUCOMTR-MCNC: 273 MG/DL (ref 70–130)
GLUCOSE BLDC GLUCOMTR-MCNC: 275 MG/DL (ref 70–130)
GLUCOSE BLDC GLUCOMTR-MCNC: 276 MG/DL (ref 70–130)
GLUCOSE BLDC GLUCOMTR-MCNC: 279 MG/DL (ref 70–130)
GLUCOSE BLDC GLUCOMTR-MCNC: 280 MG/DL (ref 70–130)
GLUCOSE BLDC GLUCOMTR-MCNC: 281 MG/DL (ref 70–130)
GLUCOSE BLDC GLUCOMTR-MCNC: 283 MG/DL (ref 70–130)
GLUCOSE BLDC GLUCOMTR-MCNC: 283 MG/DL (ref 70–130)
GLUCOSE BLDC GLUCOMTR-MCNC: 285 MG/DL (ref 70–130)
GLUCOSE BLDC GLUCOMTR-MCNC: 287 MG/DL (ref 70–130)
GLUCOSE BLDC GLUCOMTR-MCNC: 296 MG/DL (ref 70–130)
GLUCOSE BLDC GLUCOMTR-MCNC: 299 MG/DL (ref 70–130)
GLUCOSE BLDC GLUCOMTR-MCNC: 308 MG/DL (ref 70–130)
GLUCOSE BLDC GLUCOMTR-MCNC: 313 MG/DL (ref 70–130)
GLUCOSE BLDC GLUCOMTR-MCNC: 327 MG/DL (ref 70–130)
GLUCOSE BLDC GLUCOMTR-MCNC: 327 MG/DL (ref 70–130)
GLUCOSE BLDC GLUCOMTR-MCNC: 335 MG/DL (ref 70–130)
GLUCOSE BLDC GLUCOMTR-MCNC: 336 MG/DL (ref 70–130)
GLUCOSE BLDC GLUCOMTR-MCNC: 345 MG/DL (ref 70–130)
GLUCOSE BLDC GLUCOMTR-MCNC: 355 MG/DL (ref 70–130)
GLUCOSE BLDC GLUCOMTR-MCNC: 383 MG/DL (ref 70–130)
GLUCOSE BLDC GLUCOMTR-MCNC: 400 MG/DL (ref 70–130)
GLUCOSE BLDC GLUCOMTR-MCNC: 436 MG/DL (ref 70–130)
GLUCOSE BLDC GLUCOMTR-MCNC: 78 MG/DL (ref 70–130)
GLUCOSE BLDC GLUCOMTR-MCNC: 79 MG/DL (ref 70–130)
GLUCOSE SERPL-MCNC: 117 MG/DL (ref 65–99)
GLUCOSE SERPL-MCNC: 124 MG/DL (ref 65–99)
GLUCOSE SERPL-MCNC: 125 MG/DL (ref 65–99)
GLUCOSE SERPL-MCNC: 166 MG/DL (ref 65–99)
GLUCOSE SERPL-MCNC: 188 MG/DL (ref 65–99)
GLUCOSE SERPL-MCNC: 194 MG/DL (ref 65–99)
GLUCOSE SERPL-MCNC: 196 MG/DL (ref 65–99)
GLUCOSE SERPL-MCNC: 197 MG/DL (ref 65–99)
GLUCOSE SERPL-MCNC: 203 MG/DL (ref 65–99)
GLUCOSE SERPL-MCNC: 212 MG/DL (ref 65–99)
GLUCOSE SERPL-MCNC: 221 MG/DL (ref 65–99)
GLUCOSE SERPL-MCNC: 224 MG/DL (ref 65–99)
GLUCOSE SERPL-MCNC: 224 MG/DL (ref 65–99)
GLUCOSE SERPL-MCNC: 227 MG/DL (ref 65–99)
GLUCOSE SERPL-MCNC: 268 MG/DL (ref 65–99)
GLUCOSE SERPL-MCNC: 275 MG/DL (ref 65–99)
GLUCOSE SERPL-MCNC: 276 MG/DL (ref 65–99)
GLUCOSE SERPL-MCNC: 282 MG/DL (ref 65–99)
GLUCOSE SERPL-MCNC: 292 MG/DL (ref 65–99)
GLUCOSE SERPL-MCNC: 296 MG/DL (ref 65–99)
GLUCOSE UR STRIP-MCNC: ABNORMAL MG/DL
GLUCOSE UR STRIP-MCNC: NEGATIVE MG/DL
HADV DNA SPEC NAA+PROBE: NOT DETECTED
HADV DNA SPEC NAA+PROBE: NOT DETECTED
HBA1C MFR BLD: 8.6 % (ref 4.8–5.6)
HBA1C MFR BLD: 9.3 % (ref 4.8–5.6)
HCO3 BLDA-SCNC: 27.8 MMOL/L (ref 20–26)
HCO3 BLDA-SCNC: 29.1 MMOL/L (ref 20–26)
HCO3 BLDA-SCNC: 30.2 MMOL/L (ref 20–26)
HCO3 BLDA-SCNC: 30.3 MMOL/L (ref 20–26)
HCO3 BLDA-SCNC: 38.3 MMOL/L (ref 20–26)
HCO3 BLDA-SCNC: 38.9 MMOL/L (ref 20–26)
HCO3 BLDA-SCNC: 40.1 MMOL/L (ref 20–26)
HCO3 BLDA-SCNC: 40.5 MMOL/L (ref 20–26)
HCOV 229E RNA SPEC QL NAA+PROBE: NOT DETECTED
HCOV 229E RNA SPEC QL NAA+PROBE: NOT DETECTED
HCOV HKU1 RNA SPEC QL NAA+PROBE: NOT DETECTED
HCOV HKU1 RNA SPEC QL NAA+PROBE: NOT DETECTED
HCOV NL63 RNA SPEC QL NAA+PROBE: NOT DETECTED
HCOV NL63 RNA SPEC QL NAA+PROBE: NOT DETECTED
HCOV OC43 RNA SPEC QL NAA+PROBE: NOT DETECTED
HCOV OC43 RNA SPEC QL NAA+PROBE: NOT DETECTED
HCT VFR BLD AUTO: 32.7 % (ref 34–46.6)
HCT VFR BLD AUTO: 33.3 % (ref 34–46.6)
HCT VFR BLD AUTO: 33.5 % (ref 34–46.6)
HCT VFR BLD AUTO: 34.8 % (ref 34–46.6)
HCT VFR BLD AUTO: 38.1 % (ref 34–46.6)
HCT VFR BLD AUTO: 39.1 % (ref 34–46.6)
HCT VFR BLD AUTO: 40.6 % (ref 34–46.6)
HCT VFR BLD AUTO: 40.6 % (ref 34–46.6)
HCT VFR BLD AUTO: 40.7 % (ref 34–46.6)
HCT VFR BLD AUTO: 41.2 % (ref 34–46.6)
HCT VFR BLD AUTO: 41.3 % (ref 34–46.6)
HCT VFR BLD AUTO: 41.7 % (ref 34–46.6)
HCT VFR BLD AUTO: 42.2 % (ref 34–46.6)
HCT VFR BLD AUTO: 42.5 % (ref 34–46.6)
HCT VFR BLD AUTO: 44.9 % (ref 34–46.6)
HCT VFR BLD AUTO: 49.1 % (ref 34–46.6)
HCT VFR BLD AUTO: 49.3 % (ref 34–46.6)
HCT VFR BLD AUTO: 49.7 % (ref 34–46.6)
HCT VFR BLD CALC: 30.4 % (ref 38–51)
HCT VFR BLD CALC: 31.5 % (ref 38–51)
HCT VFR BLD CALC: 33.3 % (ref 38–51)
HCT VFR BLD CALC: 34.7 % (ref 38–51)
HCT VFR BLD CALC: 38.5 % (ref 38–51)
HCT VFR BLD CALC: 41.3 % (ref 38–51)
HCT VFR BLD CALC: 41.5 % (ref 38–51)
HCT VFR BLD CALC: 45.2 % (ref 38–51)
HDLC SERPL-MCNC: 38 MG/DL (ref 40–60)
HGB BLD-MCNC: 10.1 G/DL (ref 12–15.9)
HGB BLD-MCNC: 10.2 G/DL (ref 12–15.9)
HGB BLD-MCNC: 10.3 G/DL (ref 12–15.9)
HGB BLD-MCNC: 10.4 G/DL (ref 12–15.9)
HGB BLD-MCNC: 11.4 G/DL (ref 12–15.9)
HGB BLD-MCNC: 11.7 G/DL (ref 12–15.9)
HGB BLD-MCNC: 12.4 G/DL (ref 12–15.9)
HGB BLD-MCNC: 12.6 G/DL (ref 12–15.9)
HGB BLD-MCNC: 12.7 G/DL (ref 12–15.9)
HGB BLD-MCNC: 12.8 G/DL (ref 12–15.9)
HGB BLD-MCNC: 13 G/DL (ref 12–15.9)
HGB BLD-MCNC: 13 G/DL (ref 12–15.9)
HGB BLD-MCNC: 13.2 G/DL (ref 12–15.9)
HGB BLD-MCNC: 13.4 G/DL (ref 12–15.9)
HGB BLD-MCNC: 13.7 G/DL (ref 12–15.9)
HGB BLD-MCNC: 15.2 G/DL (ref 12–15.9)
HGB BLD-MCNC: 15.2 G/DL (ref 12–15.9)
HGB BLD-MCNC: 15.5 G/DL (ref 12–15.9)
HGB BLDA-MCNC: 10.3 G/DL (ref 14–18)
HGB BLDA-MCNC: 10.9 G/DL (ref 14–18)
HGB BLDA-MCNC: 11.3 G/DL (ref 14–18)
HGB BLDA-MCNC: 12.6 G/DL (ref 14–18)
HGB BLDA-MCNC: 13.5 G/DL (ref 14–18)
HGB BLDA-MCNC: 13.5 G/DL (ref 14–18)
HGB BLDA-MCNC: 14.8 G/DL (ref 14–18)
HGB BLDA-MCNC: 9.9 G/DL (ref 14–18)
HGB UR QL STRIP.AUTO: ABNORMAL
HGB UR QL STRIP.AUTO: NEGATIVE
HMPV RNA NPH QL NAA+NON-PROBE: NOT DETECTED
HMPV RNA NPH QL NAA+NON-PROBE: NOT DETECTED
HOLD SPECIMEN: NORMAL
HPIV1 RNA ISLT QL NAA+PROBE: NOT DETECTED
HPIV1 RNA ISLT QL NAA+PROBE: NOT DETECTED
HPIV2 RNA SPEC QL NAA+PROBE: NOT DETECTED
HPIV2 RNA SPEC QL NAA+PROBE: NOT DETECTED
HPIV3 RNA NPH QL NAA+PROBE: NOT DETECTED
HPIV3 RNA NPH QL NAA+PROBE: NOT DETECTED
HPIV4 P GENE NPH QL NAA+PROBE: NOT DETECTED
HPIV4 P GENE NPH QL NAA+PROBE: NOT DETECTED
HYALINE CASTS UR QL AUTO: ABNORMAL /LPF
IMM GRANULOCYTES # BLD AUTO: 0.03 10*3/MM3 (ref 0–0.05)
IMM GRANULOCYTES # BLD AUTO: 0.03 10*3/MM3 (ref 0–0.05)
IMM GRANULOCYTES # BLD AUTO: 0.04 10*3/MM3 (ref 0–0.05)
IMM GRANULOCYTES # BLD AUTO: 0.04 10*3/MM3 (ref 0–0.05)
IMM GRANULOCYTES # BLD AUTO: 0.05 10*3/MM3 (ref 0–0.05)
IMM GRANULOCYTES # BLD AUTO: 0.06 10*3/MM3 (ref 0–0.05)
IMM GRANULOCYTES # BLD AUTO: 0.15 10*3/MM3 (ref 0–0.05)
IMM GRANULOCYTES # BLD AUTO: 0.17 10*3/MM3 (ref 0–0.05)
IMM GRANULOCYTES # BLD AUTO: 0.19 10*3/MM3 (ref 0–0.05)
IMM GRANULOCYTES # BLD AUTO: 0.22 10*3/MM3 (ref 0–0.05)
IMM GRANULOCYTES # BLD AUTO: 0.25 10*3/MM3 (ref 0–0.05)
IMM GRANULOCYTES # BLD AUTO: 0.35 10*3/MM3 (ref 0–0.05)
IMM GRANULOCYTES # BLD AUTO: 0.36 10*3/MM3 (ref 0–0.05)
IMM GRANULOCYTES # BLD AUTO: 0.36 10*3/MM3 (ref 0–0.05)
IMM GRANULOCYTES NFR BLD AUTO: 0.3 % (ref 0–0.5)
IMM GRANULOCYTES NFR BLD AUTO: 0.4 % (ref 0–0.5)
IMM GRANULOCYTES NFR BLD AUTO: 0.4 % (ref 0–0.5)
IMM GRANULOCYTES NFR BLD AUTO: 0.5 % (ref 0–0.5)
IMM GRANULOCYTES NFR BLD AUTO: 1.2 % (ref 0–0.5)
IMM GRANULOCYTES NFR BLD AUTO: 1.2 % (ref 0–0.5)
IMM GRANULOCYTES NFR BLD AUTO: 1.3 % (ref 0–0.5)
IMM GRANULOCYTES NFR BLD AUTO: 1.5 % (ref 0–0.5)
IMM GRANULOCYTES NFR BLD AUTO: 1.7 % (ref 0–0.5)
IMM GRANULOCYTES NFR BLD AUTO: 1.8 % (ref 0–0.5)
IMM GRANULOCYTES NFR BLD AUTO: 1.8 % (ref 0–0.5)
IMM GRANULOCYTES NFR BLD AUTO: 2.2 % (ref 0–0.5)
INHALED O2 CONCENTRATION: 36 %
INHALED O2 CONCENTRATION: 39 %
INHALED O2 CONCENTRATION: 50 %
INHALED O2 CONCENTRATION: 55 %
INHALED O2 CONCENTRATION: 60 %
INHALED O2 CONCENTRATION: 60 %
INHALED O2 CONCENTRATION: 80 %
INHALED O2 CONCENTRATION: 97 %
IPAP: 0
IPAP: 22
KETONES UR QL STRIP: NEGATIVE
LDLC SERPL CALC-MCNC: 73 MG/DL (ref 0–100)
LDLC/HDLC SERPL: 1.67 {RATIO}
LEUKOCYTE ESTERASE UR QL STRIP.AUTO: ABNORMAL
LV EF 2D ECHO EST: 55 %
LV EF 2D ECHO EST: 60 %
LYMPHOCYTES # BLD AUTO: 0.24 10*3/MM3 (ref 0.7–3.1)
LYMPHOCYTES # BLD AUTO: 0.33 10*3/MM3 (ref 0.7–3.1)
LYMPHOCYTES # BLD AUTO: 0.38 10*3/MM3 (ref 0.7–3.1)
LYMPHOCYTES # BLD AUTO: 0.48 10*3/MM3 (ref 0.7–3.1)
LYMPHOCYTES # BLD AUTO: 0.57 10*3/MM3 (ref 0.7–3.1)
LYMPHOCYTES # BLD AUTO: 0.78 10*3/MM3 (ref 0.7–3.1)
LYMPHOCYTES # BLD AUTO: 0.79 10*3/MM3 (ref 0.7–3.1)
LYMPHOCYTES # BLD AUTO: 0.83 10*3/MM3 (ref 0.7–3.1)
LYMPHOCYTES # BLD AUTO: 0.88 10*3/MM3 (ref 0.7–3.1)
LYMPHOCYTES # BLD AUTO: 0.91 10*3/MM3 (ref 0.7–3.1)
LYMPHOCYTES # BLD AUTO: 0.94 10*3/MM3 (ref 0.7–3.1)
LYMPHOCYTES # BLD AUTO: 1.11 10*3/MM3 (ref 0.7–3.1)
LYMPHOCYTES # BLD AUTO: 1.12 10*3/MM3 (ref 0.7–3.1)
LYMPHOCYTES # BLD AUTO: 2.21 10*3/MM3 (ref 0.7–3.1)
LYMPHOCYTES NFR BLD AUTO: 18 % (ref 19.6–45.3)
LYMPHOCYTES NFR BLD AUTO: 2.3 % (ref 19.6–45.3)
LYMPHOCYTES NFR BLD AUTO: 2.7 % (ref 19.6–45.3)
LYMPHOCYTES NFR BLD AUTO: 3.8 % (ref 19.6–45.3)
LYMPHOCYTES NFR BLD AUTO: 4.2 % (ref 19.6–45.3)
LYMPHOCYTES NFR BLD AUTO: 4.8 % (ref 19.6–45.3)
LYMPHOCYTES NFR BLD AUTO: 5.4 % (ref 19.6–45.3)
LYMPHOCYTES NFR BLD AUTO: 5.5 % (ref 19.6–45.3)
LYMPHOCYTES NFR BLD AUTO: 5.5 % (ref 19.6–45.3)
LYMPHOCYTES NFR BLD AUTO: 5.8 % (ref 19.6–45.3)
LYMPHOCYTES NFR BLD AUTO: 5.9 % (ref 19.6–45.3)
LYMPHOCYTES NFR BLD AUTO: 6.5 % (ref 19.6–45.3)
LYMPHOCYTES NFR BLD AUTO: 6.7 % (ref 19.6–45.3)
LYMPHOCYTES NFR BLD AUTO: 7.3 % (ref 19.6–45.3)
Lab: ABNORMAL
M PNEUMO IGG SER IA-ACNC: NOT DETECTED
M PNEUMO IGG SER IA-ACNC: NOT DETECTED
MAGNESIUM SERPL-MCNC: 1.9 MG/DL (ref 1.6–2.4)
MAGNESIUM SERPL-MCNC: 2 MG/DL (ref 1.6–2.4)
MAGNESIUM SERPL-MCNC: 2 MG/DL (ref 1.6–2.4)
MAGNESIUM SERPL-MCNC: 2.1 MG/DL (ref 1.6–2.4)
MAGNESIUM SERPL-MCNC: 2.2 MG/DL (ref 1.6–2.4)
MAGNESIUM SERPL-MCNC: 2.4 MG/DL (ref 1.6–2.4)
MAXIMAL PREDICTED HEART RATE: 142 BPM
MAXIMAL PREDICTED HEART RATE: 143 BPM
MCH RBC QN AUTO: 29.3 PG (ref 26.6–33)
MCH RBC QN AUTO: 29.5 PG (ref 26.6–33)
MCH RBC QN AUTO: 29.6 PG (ref 26.6–33)
MCH RBC QN AUTO: 29.7 PG (ref 26.6–33)
MCH RBC QN AUTO: 29.7 PG (ref 26.6–33)
MCH RBC QN AUTO: 29.8 PG (ref 26.6–33)
MCH RBC QN AUTO: 29.8 PG (ref 26.6–33)
MCH RBC QN AUTO: 29.9 PG (ref 26.6–33)
MCH RBC QN AUTO: 30 PG (ref 26.6–33)
MCH RBC QN AUTO: 30 PG (ref 26.6–33)
MCHC RBC AUTO-ENTMCNC: 29.2 G/DL (ref 31.5–35.7)
MCHC RBC AUTO-ENTMCNC: 29.9 G/DL (ref 31.5–35.7)
MCHC RBC AUTO-ENTMCNC: 30.4 G/DL (ref 31.5–35.7)
MCHC RBC AUTO-ENTMCNC: 30.5 G/DL (ref 31.5–35.7)
MCHC RBC AUTO-ENTMCNC: 30.5 G/DL (ref 31.5–35.7)
MCHC RBC AUTO-ENTMCNC: 30.6 G/DL (ref 31.5–35.7)
MCHC RBC AUTO-ENTMCNC: 30.6 G/DL (ref 31.5–35.7)
MCHC RBC AUTO-ENTMCNC: 30.7 G/DL (ref 31.5–35.7)
MCHC RBC AUTO-ENTMCNC: 30.8 G/DL (ref 31.5–35.7)
MCHC RBC AUTO-ENTMCNC: 30.9 G/DL (ref 31.5–35.7)
MCHC RBC AUTO-ENTMCNC: 30.9 G/DL (ref 31.5–35.7)
MCHC RBC AUTO-ENTMCNC: 31 G/DL (ref 31.5–35.7)
MCHC RBC AUTO-ENTMCNC: 31.2 G/DL (ref 31.5–35.7)
MCHC RBC AUTO-ENTMCNC: 31.2 G/DL (ref 31.5–35.7)
MCHC RBC AUTO-ENTMCNC: 31.5 G/DL (ref 31.5–35.7)
MCHC RBC AUTO-ENTMCNC: 31.7 G/DL (ref 31.5–35.7)
MCHC RBC AUTO-ENTMCNC: 31.8 G/DL (ref 31.5–35.7)
MCV RBC AUTO: 100 FL (ref 79–97)
MCV RBC AUTO: 102.4 FL (ref 79–97)
MCV RBC AUTO: 93.1 FL (ref 79–97)
MCV RBC AUTO: 93.4 FL (ref 79–97)
MCV RBC AUTO: 94.9 FL (ref 79–97)
MCV RBC AUTO: 95 FL (ref 79–97)
MCV RBC AUTO: 95.4 FL (ref 79–97)
MCV RBC AUTO: 95.9 FL (ref 79–97)
MCV RBC AUTO: 95.9 FL (ref 79–97)
MCV RBC AUTO: 96 FL (ref 79–97)
MCV RBC AUTO: 96.1 FL (ref 79–97)
MCV RBC AUTO: 96.5 FL (ref 79–97)
MCV RBC AUTO: 96.5 FL (ref 79–97)
MCV RBC AUTO: 96.7 FL (ref 79–97)
MCV RBC AUTO: 97.4 FL (ref 79–97)
MCV RBC AUTO: 98.2 FL (ref 79–97)
MCV RBC AUTO: 98.5 FL (ref 79–97)
METHGB BLD QL: 0.1 % (ref 0–1.5)
METHGB BLD QL: 0.4 % (ref 0–1.5)
METHGB BLD QL: 0.6 % (ref 0–1.5)
METHGB BLD QL: 0.6 % (ref 0–1.5)
METHGB BLD QL: ABNORMAL
MODALITY: ABNORMAL
MONOCYTES # BLD AUTO: 0.08 10*3/MM3 (ref 0.1–0.9)
MONOCYTES # BLD AUTO: 0.11 10*3/MM3 (ref 0.1–0.9)
MONOCYTES # BLD AUTO: 0.38 10*3/MM3 (ref 0.1–0.9)
MONOCYTES # BLD AUTO: 0.55 10*3/MM3 (ref 0.1–0.9)
MONOCYTES # BLD AUTO: 0.62 10*3/MM3 (ref 0.1–0.9)
MONOCYTES # BLD AUTO: 0.71 10*3/MM3 (ref 0.1–0.9)
MONOCYTES # BLD AUTO: 0.72 10*3/MM3 (ref 0.1–0.9)
MONOCYTES # BLD AUTO: 0.73 10*3/MM3 (ref 0.1–0.9)
MONOCYTES # BLD AUTO: 0.77 10*3/MM3 (ref 0.1–0.9)
MONOCYTES # BLD AUTO: 0.87 10*3/MM3 (ref 0.1–0.9)
MONOCYTES # BLD AUTO: 0.91 10*3/MM3 (ref 0.1–0.9)
MONOCYTES # BLD AUTO: 0.93 10*3/MM3 (ref 0.1–0.9)
MONOCYTES # BLD AUTO: 0.98 10*3/MM3 (ref 0.1–0.9)
MONOCYTES # BLD AUTO: 1 10*3/MM3 (ref 0.1–0.9)
MONOCYTES NFR BLD AUTO: 0.7 % (ref 5–12)
MONOCYTES NFR BLD AUTO: 1.1 % (ref 5–12)
MONOCYTES NFR BLD AUTO: 4.3 % (ref 5–12)
MONOCYTES NFR BLD AUTO: 4.6 % (ref 5–12)
MONOCYTES NFR BLD AUTO: 4.9 % (ref 5–12)
MONOCYTES NFR BLD AUTO: 5 % (ref 5–12)
MONOCYTES NFR BLD AUTO: 5.3 % (ref 5–12)
MONOCYTES NFR BLD AUTO: 5.3 % (ref 5–12)
MONOCYTES NFR BLD AUTO: 5.5 % (ref 5–12)
MONOCYTES NFR BLD AUTO: 5.7 % (ref 5–12)
MONOCYTES NFR BLD AUTO: 8.1 % (ref 5–12)
MONOCYTES NFR BLD AUTO: 8.1 % (ref 5–12)
NEUTROPHILS NFR BLD AUTO: 10.79 10*3/MM3 (ref 1.7–7)
NEUTROPHILS NFR BLD AUTO: 11.54 10*3/MM3 (ref 1.7–7)
NEUTROPHILS NFR BLD AUTO: 12.09 10*3/MM3 (ref 1.7–7)
NEUTROPHILS NFR BLD AUTO: 12.61 10*3/MM3 (ref 1.7–7)
NEUTROPHILS NFR BLD AUTO: 12.62 10*3/MM3 (ref 1.7–7)
NEUTROPHILS NFR BLD AUTO: 13.98 10*3/MM3 (ref 1.7–7)
NEUTROPHILS NFR BLD AUTO: 14.07 10*3/MM3 (ref 1.7–7)
NEUTROPHILS NFR BLD AUTO: 16.21 10*3/MM3 (ref 1.7–7)
NEUTROPHILS NFR BLD AUTO: 17.18 10*3/MM3 (ref 1.7–7)
NEUTROPHILS NFR BLD AUTO: 7.42 10*3/MM3 (ref 1.7–7)
NEUTROPHILS NFR BLD AUTO: 72.9 % (ref 42.7–76)
NEUTROPHILS NFR BLD AUTO: 8.91 10*3/MM3 (ref 1.7–7)
NEUTROPHILS NFR BLD AUTO: 8.96 10*3/MM3 (ref 1.7–7)
NEUTROPHILS NFR BLD AUTO: 81.8 % (ref 42.7–76)
NEUTROPHILS NFR BLD AUTO: 84.6 % (ref 42.7–76)
NEUTROPHILS NFR BLD AUTO: 84.6 % (ref 42.7–76)
NEUTROPHILS NFR BLD AUTO: 85.6 % (ref 42.7–76)
NEUTROPHILS NFR BLD AUTO: 85.7 % (ref 42.7–76)
NEUTROPHILS NFR BLD AUTO: 87 % (ref 42.7–76)
NEUTROPHILS NFR BLD AUTO: 87.3 % (ref 42.7–76)
NEUTROPHILS NFR BLD AUTO: 87.4 % (ref 42.7–76)
NEUTROPHILS NFR BLD AUTO: 88.6 % (ref 42.7–76)
NEUTROPHILS NFR BLD AUTO: 88.8 % (ref 42.7–76)
NEUTROPHILS NFR BLD AUTO: 89 % (ref 42.7–76)
NEUTROPHILS NFR BLD AUTO: 9.84 10*3/MM3 (ref 1.7–7)
NEUTROPHILS NFR BLD AUTO: 9.85 10*3/MM3 (ref 1.7–7)
NEUTROPHILS NFR BLD AUTO: 95.7 % (ref 42.7–76)
NEUTROPHILS NFR BLD AUTO: 96.1 % (ref 42.7–76)
NITRITE UR QL STRIP: NEGATIVE
NOTE: ABNORMAL
NOTIFIED BY: ABNORMAL
NOTIFIED WHO: ABNORMAL
NRBC BLD AUTO-RTO: 0 /100 WBC (ref 0–0.2)
NT-PROBNP SERPL-MCNC: 1735 PG/ML (ref 0–1800)
NT-PROBNP SERPL-MCNC: ABNORMAL PG/ML (ref 0–1800)
OXYHGB MFR BLDV: 81.5 % (ref 94–99)
OXYHGB MFR BLDV: 87.7 % (ref 94–99)
OXYHGB MFR BLDV: 92.1 % (ref 94–99)
OXYHGB MFR BLDV: 92.7 % (ref 94–99)
OXYHGB MFR BLDV: 93.9 % (ref 94–99)
OXYHGB MFR BLDV: 94.4 % (ref 94–99)
OXYHGB MFR BLDV: 95.3 % (ref 94–99)
OXYHGB MFR BLDV: 96.8 % (ref 94–99)
PAW @ PEAK INSP FLOW SETTING VENT: 0 CMH2O
PCO2 BLDA: 40.1 MM HG (ref 35–45)
PCO2 BLDA: 51.6 MM HG (ref 35–45)
PCO2 BLDA: 53.2 MM HG (ref 35–45)
PCO2 BLDA: 57.4 MM HG (ref 35–45)
PCO2 BLDA: 66.1 MM HG (ref 35–45)
PCO2 BLDA: 83.5 MM HG (ref 35–45)
PCO2 BLDA: 88 MM HG (ref 35–45)
PCO2 BLDA: 94.1 MM HG (ref 35–45)
PCO2 TEMP ADJ BLD: 40.1 MM HG (ref 35–45)
PCO2 TEMP ADJ BLD: 51.6 MM HG (ref 35–45)
PCO2 TEMP ADJ BLD: 53.2 MM HG (ref 35–45)
PCO2 TEMP ADJ BLD: 57.4 MM HG (ref 35–45)
PCO2 TEMP ADJ BLD: 66.1 MM HG (ref 35–45)
PCO2 TEMP ADJ BLD: 83.5 MM HG (ref 35–45)
PCO2 TEMP ADJ BLD: 88 MM HG (ref 35–45)
PCO2 TEMP ADJ BLD: 94.1 MM HG (ref 35–45)
PH BLDA: 7.22 PH UNITS (ref 7.35–7.45)
PH BLDA: 7.25 PH UNITS (ref 7.35–7.45)
PH BLDA: 7.29 PH UNITS (ref 7.35–7.45)
PH BLDA: 7.33 PH UNITS (ref 7.35–7.45)
PH BLDA: 7.35 PH UNITS (ref 7.35–7.45)
PH BLDA: 7.38 PH UNITS (ref 7.35–7.45)
PH BLDA: 7.39 PH UNITS (ref 7.35–7.45)
PH BLDA: 7.45 PH UNITS (ref 7.35–7.45)
PH UR STRIP.AUTO: 5.5 [PH] (ref 5–8)
PH UR STRIP.AUTO: 5.5 [PH] (ref 5–8)
PH UR STRIP.AUTO: <=5 [PH] (ref 5–8)
PH UR STRIP.AUTO: <=5 [PH] (ref 5–8)
PH, TEMP CORRECTED: 7.22 PH UNITS
PH, TEMP CORRECTED: 7.25 PH UNITS
PH, TEMP CORRECTED: 7.29 PH UNITS
PH, TEMP CORRECTED: 7.33 PH UNITS
PH, TEMP CORRECTED: 7.35 PH UNITS
PH, TEMP CORRECTED: 7.38 PH UNITS
PH, TEMP CORRECTED: 7.39 PH UNITS
PH, TEMP CORRECTED: 7.45 PH UNITS
PHOSPHATE SERPL-MCNC: 2.5 MG/DL (ref 2.5–4.5)
PHOSPHATE SERPL-MCNC: 2.7 MG/DL (ref 2.5–4.5)
PHOSPHATE SERPL-MCNC: 3.1 MG/DL (ref 2.5–4.5)
PHOSPHATE SERPL-MCNC: 4.4 MG/DL (ref 2.5–4.5)
PLATELET # BLD AUTO: 134 10*3/MM3 (ref 140–450)
PLATELET # BLD AUTO: 141 10*3/MM3 (ref 140–450)
PLATELET # BLD AUTO: 144 10*3/MM3 (ref 140–450)
PLATELET # BLD AUTO: 146 10*3/MM3 (ref 140–450)
PLATELET # BLD AUTO: 167 10*3/MM3 (ref 140–450)
PLATELET # BLD AUTO: 167 10*3/MM3 (ref 140–450)
PLATELET # BLD AUTO: 174 10*3/MM3 (ref 140–450)
PLATELET # BLD AUTO: 179 10*3/MM3 (ref 140–450)
PLATELET # BLD AUTO: 193 10*3/MM3 (ref 140–450)
PLATELET # BLD AUTO: 193 10*3/MM3 (ref 140–450)
PLATELET # BLD AUTO: 203 10*3/MM3 (ref 140–450)
PLATELET # BLD AUTO: 229 10*3/MM3 (ref 140–450)
PLATELET # BLD AUTO: 239 10*3/MM3 (ref 140–450)
PLATELET # BLD AUTO: 242 10*3/MM3 (ref 140–450)
PLATELET # BLD AUTO: 245 10*3/MM3 (ref 140–450)
PLATELET # BLD AUTO: 303 10*3/MM3 (ref 140–450)
PLATELET # BLD AUTO: 310 10*3/MM3 (ref 140–450)
PMV BLD AUTO: 11 FL (ref 6–12)
PMV BLD AUTO: 11.1 FL (ref 6–12)
PMV BLD AUTO: 11.8 FL (ref 6–12)
PMV BLD AUTO: 11.9 FL (ref 6–12)
PMV BLD AUTO: 12 FL (ref 6–12)
PMV BLD AUTO: 12.1 FL (ref 6–12)
PMV BLD AUTO: 12.2 FL (ref 6–12)
PMV BLD AUTO: 12.4 FL (ref 6–12)
PMV BLD AUTO: 12.4 FL (ref 6–12)
PMV BLD AUTO: 12.5 FL (ref 6–12)
PMV BLD AUTO: 12.7 FL (ref 6–12)
PMV BLD AUTO: 12.8 FL (ref 6–12)
PMV BLD AUTO: 12.9 FL (ref 6–12)
PMV BLD AUTO: 13 FL (ref 6–12)
PO2 BLDA: 50.5 MM HG (ref 83–108)
PO2 BLDA: 55.7 MM HG (ref 83–108)
PO2 BLDA: 66.3 MM HG (ref 83–108)
PO2 BLDA: 73.7 MM HG (ref 83–108)
PO2 BLDA: 75.7 MM HG (ref 83–108)
PO2 BLDA: 76.2 MM HG (ref 83–108)
PO2 BLDA: 79.9 MM HG (ref 83–108)
PO2 BLDA: 98.1 MM HG (ref 83–108)
PO2 TEMP ADJ BLD: 50.5 MM HG (ref 83–108)
PO2 TEMP ADJ BLD: 55.7 MM HG (ref 83–108)
PO2 TEMP ADJ BLD: 66.3 MM HG (ref 83–108)
PO2 TEMP ADJ BLD: 73.7 MM HG (ref 83–108)
PO2 TEMP ADJ BLD: 75.7 MM HG (ref 83–108)
PO2 TEMP ADJ BLD: 76.2 MM HG (ref 83–108)
PO2 TEMP ADJ BLD: 79.9 MM HG (ref 83–108)
PO2 TEMP ADJ BLD: 98.1 MM HG (ref 83–108)
POTASSIUM SERPL-SCNC: 3.3 MMOL/L (ref 3.5–5.2)
POTASSIUM SERPL-SCNC: 3.8 MMOL/L (ref 3.5–5.2)
POTASSIUM SERPL-SCNC: 4 MMOL/L (ref 3.5–5.2)
POTASSIUM SERPL-SCNC: 4.1 MMOL/L (ref 3.5–5.2)
POTASSIUM SERPL-SCNC: 4.2 MMOL/L (ref 3.5–5.2)
POTASSIUM SERPL-SCNC: 4.3 MMOL/L (ref 3.5–5.2)
POTASSIUM SERPL-SCNC: 4.4 MMOL/L (ref 3.5–5.2)
POTASSIUM SERPL-SCNC: 4.5 MMOL/L (ref 3.5–5.2)
POTASSIUM SERPL-SCNC: 4.5 MMOL/L (ref 3.5–5.2)
POTASSIUM SERPL-SCNC: 4.7 MMOL/L (ref 3.5–5.2)
POTASSIUM SERPL-SCNC: 4.8 MMOL/L (ref 3.5–5.2)
POTASSIUM SERPL-SCNC: 4.9 MMOL/L (ref 3.5–5.2)
POTASSIUM SERPL-SCNC: 4.9 MMOL/L (ref 3.5–5.2)
PROCALCITONIN SERPL-MCNC: 0.03 NG/ML (ref 0–0.25)
PROCALCITONIN SERPL-MCNC: 0.05 NG/ML (ref 0–0.25)
PROCALCITONIN SERPL-MCNC: 0.05 NG/ML (ref 0–0.25)
PROCALCITONIN SERPL-MCNC: 0.08 NG/ML (ref 0–0.25)
PROCALCITONIN SERPL-MCNC: 0.08 NG/ML (ref 0–0.25)
PROCALCITONIN SERPL-MCNC: 0.11 NG/ML (ref 0–0.25)
PROCALCITONIN SERPL-MCNC: 0.16 NG/ML (ref 0–0.25)
PROT SERPL-MCNC: 5.1 G/DL (ref 6–8.5)
PROT SERPL-MCNC: 6 G/DL (ref 6–8.5)
PROT SERPL-MCNC: 6.2 G/DL (ref 6–8.5)
PROT SERPL-MCNC: 6.5 G/DL (ref 6–8.5)
PROT SERPL-MCNC: 6.5 G/DL (ref 6–8.5)
PROT SERPL-MCNC: 6.6 G/DL (ref 6–8.5)
PROT SERPL-MCNC: 7 G/DL (ref 6–8.5)
PROT SERPL-MCNC: 7.6 G/DL (ref 6–8.5)
PROT UR QL STRIP: ABNORMAL
PSV: 12 CMH2O
QT INTERVAL: 270 MS
QT INTERVAL: 276 MS
QT INTERVAL: 342 MS
QT INTERVAL: 356 MS
QT INTERVAL: 380 MS
QT INTERVAL: 384 MS
QT INTERVAL: 402 MS
QT INTERVAL: 402 MS
QTC INTERVAL: 406 MS
QTC INTERVAL: 406 MS
QTC INTERVAL: 428 MS
QTC INTERVAL: 452 MS
QTC INTERVAL: 463 MS
QTC INTERVAL: 463 MS
QTC INTERVAL: 472 MS
QTC INTERVAL: 506 MS
RBC # BLD AUTO: 3.41 10*6/MM3 (ref 3.77–5.28)
RBC # BLD AUTO: 3.41 10*6/MM3 (ref 3.77–5.28)
RBC # BLD AUTO: 3.45 10*6/MM3 (ref 3.77–5.28)
RBC # BLD AUTO: 3.48 10*6/MM3 (ref 3.77–5.28)
RBC # BLD AUTO: 3.82 10*6/MM3 (ref 3.77–5.28)
RBC # BLD AUTO: 3.97 10*6/MM3 (ref 3.77–5.28)
RBC # BLD AUTO: 4.2 10*6/MM3 (ref 3.77–5.28)
RBC # BLD AUTO: 4.23 10*6/MM3 (ref 3.77–5.28)
RBC # BLD AUTO: 4.29 10*6/MM3 (ref 3.77–5.28)
RBC # BLD AUTO: 4.33 10*6/MM3 (ref 3.77–5.28)
RBC # BLD AUTO: 4.43 10*6/MM3 (ref 3.77–5.28)
RBC # BLD AUTO: 4.48 10*6/MM3 (ref 3.77–5.28)
RBC # BLD AUTO: 4.52 10*6/MM3 (ref 3.77–5.28)
RBC # BLD AUTO: 4.56 10*6/MM3 (ref 3.77–5.28)
RBC # BLD AUTO: 5.11 10*6/MM3 (ref 3.77–5.28)
RBC # BLD AUTO: 5.11 10*6/MM3 (ref 3.77–5.28)
RBC # BLD AUTO: 5.23 10*6/MM3 (ref 3.77–5.28)
RBC # UR STRIP: ABNORMAL /HPF
REF LAB TEST METHOD: ABNORMAL
RHINOVIRUS RNA SPEC NAA+PROBE: NOT DETECTED
RHINOVIRUS RNA SPEC NAA+PROBE: NOT DETECTED
RSV RNA NPH QL NAA+NON-PROBE: NOT DETECTED
RSV RNA NPH QL NAA+NON-PROBE: NOT DETECTED
SARS-COV-2 RDRP RESP QL NAA+PROBE: NORMAL
SARS-COV-2 RNA NPH QL NAA+NON-PROBE: NOT DETECTED
SARS-COV-2 RNA NPH QL NAA+NON-PROBE: NOT DETECTED
SET MECH RESP RATE: 16
SODIUM SERPL-SCNC: 134 MMOL/L (ref 136–145)
SODIUM SERPL-SCNC: 135 MMOL/L (ref 136–145)
SODIUM SERPL-SCNC: 136 MMOL/L (ref 136–145)
SODIUM SERPL-SCNC: 138 MMOL/L (ref 136–145)
SODIUM SERPL-SCNC: 139 MMOL/L (ref 136–145)
SODIUM SERPL-SCNC: 140 MMOL/L (ref 136–145)
SODIUM SERPL-SCNC: 141 MMOL/L (ref 136–145)
SODIUM SERPL-SCNC: 142 MMOL/L (ref 136–145)
SODIUM SERPL-SCNC: 142 MMOL/L (ref 136–145)
SODIUM SERPL-SCNC: 144 MMOL/L (ref 136–145)
SODIUM SERPL-SCNC: 144 MMOL/L (ref 136–145)
SODIUM SERPL-SCNC: 145 MMOL/L (ref 136–145)
SODIUM SERPL-SCNC: 148 MMOL/L (ref 136–145)
SODIUM SERPL-SCNC: 150 MMOL/L (ref 136–145)
SP GR UR STRIP: 1.01 (ref 1–1.03)
SP GR UR STRIP: 1.02 (ref 1–1.03)
SQUAMOUS #/AREA URNS HPF: ABNORMAL /HPF
STRESS TARGET HR: 121 BPM
STRESS TARGET HR: 122 BPM
T4 FREE SERPL-MCNC: 0.76 NG/DL (ref 0.93–1.7)
T4 FREE SERPL-MCNC: 1.9 NG/DL (ref 0.93–1.7)
TOTAL RATE: 0 BREATHS/MINUTE
TOTAL RATE: 18 BREATHS/MINUTE
TRIGL SERPL-MCNC: 165 MG/DL (ref 0–150)
TRIGL SERPL-MCNC: 176 MG/DL (ref 0–150)
TRIGL SERPL-MCNC: 262 MG/DL (ref 0–150)
TRIGL SERPL-MCNC: 82 MG/DL (ref 0–150)
TROPONIN T SERPL-MCNC: 0.02 NG/ML (ref 0–0.03)
TROPONIN T SERPL-MCNC: 0.03 NG/ML (ref 0–0.03)
TROPONIN T SERPL-MCNC: 0.03 NG/ML (ref 0–0.03)
TROPONIN T SERPL-MCNC: <0.01 NG/ML (ref 0–0.03)
TSH SERPL DL<=0.05 MIU/L-ACNC: 0.09 UIU/ML (ref 0.27–4.2)
TSH SERPL DL<=0.05 MIU/L-ACNC: 7.26 UIU/ML (ref 0.27–4.2)
UROBILINOGEN UR QL STRIP: ABNORMAL
VENTILATOR MODE: ABNORMAL
VLDLC SERPL-MCNC: 43 MG/DL (ref 5–40)
VT ON VENT VENT: 355 ML
WBC # UR STRIP: ABNORMAL /HPF
WBC NRBC COR # BLD: 10.05 10*3/MM3 (ref 3.4–10.8)
WBC NRBC COR # BLD: 10.28 10*3/MM3 (ref 3.4–10.8)
WBC NRBC COR # BLD: 12.01 10*3/MM3 (ref 3.4–10.8)
WBC NRBC COR # BLD: 12.06 10*3/MM3 (ref 3.4–10.8)
WBC NRBC COR # BLD: 12.3 10*3/MM3 (ref 3.4–10.8)
WBC NRBC COR # BLD: 12.38 10*3/MM3 (ref 3.4–10.8)
WBC NRBC COR # BLD: 12.77 10*3/MM3 (ref 3.4–10.8)
WBC NRBC COR # BLD: 13.26 10*3/MM3 (ref 3.4–10.8)
WBC NRBC COR # BLD: 13.61 10*3/MM3 (ref 3.4–10.8)
WBC NRBC COR # BLD: 14.44 10*3/MM3 (ref 3.4–10.8)
WBC NRBC COR # BLD: 14.48 10*3/MM3 (ref 3.4–10.8)
WBC NRBC COR # BLD: 14.95 10*3/MM3 (ref 3.4–10.8)
WBC NRBC COR # BLD: 16.43 10*3/MM3 (ref 3.4–10.8)
WBC NRBC COR # BLD: 16.53 10*3/MM3 (ref 3.4–10.8)
WBC NRBC COR # BLD: 18.95 10*3/MM3 (ref 3.4–10.8)
WBC NRBC COR # BLD: 19.69 10*3/MM3 (ref 3.4–10.8)
WBC NRBC COR # BLD: 8.33 10*3/MM3 (ref 3.4–10.8)
WHOLE BLOOD HOLD COAG: NORMAL
WHOLE BLOOD HOLD SPECIMEN: NORMAL
YEAST URNS QL MICRO: ABNORMAL /HPF
YEAST URNS QL MICRO: ABNORMAL /HPF

## 2022-01-01 PROCEDURE — 71045 X-RAY EXAM CHEST 1 VIEW: CPT

## 2022-01-01 PROCEDURE — 25010000002 MORPHINE PER 10 MG: Performed by: FAMILY MEDICINE

## 2022-01-01 PROCEDURE — 82375 ASSAY CARBOXYHB QUANT: CPT

## 2022-01-01 PROCEDURE — 84439 ASSAY OF FREE THYROXINE: CPT | Performed by: INTERNAL MEDICINE

## 2022-01-01 PROCEDURE — 82805 BLOOD GASES W/O2 SATURATION: CPT

## 2022-01-01 PROCEDURE — 63710000001 INSULIN LISPRO (HUMAN) PER 5 UNITS: Performed by: NURSE PRACTITIONER

## 2022-01-01 PROCEDURE — 99223 1ST HOSP IP/OBS HIGH 75: CPT | Performed by: INTERNAL MEDICINE

## 2022-01-01 PROCEDURE — 99232 SBSQ HOSP IP/OBS MODERATE 35: CPT | Performed by: INTERNAL MEDICINE

## 2022-01-01 PROCEDURE — 80048 BASIC METABOLIC PNL TOTAL CA: CPT | Performed by: FAMILY MEDICINE

## 2022-01-01 PROCEDURE — 63710000001 INSULIN DETEMIR PER 5 UNITS: Performed by: FAMILY MEDICINE

## 2022-01-01 PROCEDURE — 80053 COMPREHEN METABOLIC PANEL: CPT | Performed by: NURSE PRACTITIONER

## 2022-01-01 PROCEDURE — 83605 ASSAY OF LACTIC ACID: CPT | Performed by: NURSE PRACTITIONER

## 2022-01-01 PROCEDURE — 99232 SBSQ HOSP IP/OBS MODERATE 35: CPT

## 2022-01-01 PROCEDURE — 63710000001 INSULIN LISPRO (HUMAN) PER 5 UNITS: Performed by: FAMILY MEDICINE

## 2022-01-01 PROCEDURE — 97535 SELF CARE MNGMENT TRAINING: CPT

## 2022-01-01 PROCEDURE — 85025 COMPLETE CBC W/AUTO DIFF WBC: CPT | Performed by: INTERNAL MEDICINE

## 2022-01-01 PROCEDURE — 82962 GLUCOSE BLOOD TEST: CPT

## 2022-01-01 PROCEDURE — 94664 DEMO&/EVAL PT USE INHALER: CPT

## 2022-01-01 PROCEDURE — 94761 N-INVAS EAR/PLS OXIMETRY MLT: CPT

## 2022-01-01 PROCEDURE — 80048 BASIC METABOLIC PNL TOTAL CA: CPT | Performed by: INTERNAL MEDICINE

## 2022-01-01 PROCEDURE — 85025 COMPLETE CBC W/AUTO DIFF WBC: CPT | Performed by: FAMILY MEDICINE

## 2022-01-01 PROCEDURE — 0 IOPAMIDOL PER 1 ML: Performed by: FAMILY MEDICINE

## 2022-01-01 PROCEDURE — 97530 THERAPEUTIC ACTIVITIES: CPT

## 2022-01-01 PROCEDURE — 93010 ELECTROCARDIOGRAM REPORT: CPT | Performed by: INTERNAL MEDICINE

## 2022-01-01 PROCEDURE — 97110 THERAPEUTIC EXERCISES: CPT

## 2022-01-01 PROCEDURE — 93005 ELECTROCARDIOGRAM TRACING: CPT | Performed by: INTERNAL MEDICINE

## 2022-01-01 PROCEDURE — 36600 WITHDRAWAL OF ARTERIAL BLOOD: CPT

## 2022-01-01 PROCEDURE — 25010000002 FUROSEMIDE PER 20 MG: Performed by: NURSE PRACTITIONER

## 2022-01-01 PROCEDURE — 86140 C-REACTIVE PROTEIN: CPT | Performed by: NURSE PRACTITIONER

## 2022-01-01 PROCEDURE — 70498 CT ANGIOGRAPHY NECK: CPT

## 2022-01-01 PROCEDURE — 83050 HGB METHEMOGLOBIN QUAN: CPT

## 2022-01-01 PROCEDURE — 25010000002 HEPARIN (PORCINE) PER 1000 UNITS: Performed by: NURSE PRACTITIONER

## 2022-01-01 PROCEDURE — 99232 SBSQ HOSP IP/OBS MODERATE 35: CPT | Performed by: HOSPITALIST

## 2022-01-01 PROCEDURE — 94660 CPAP INITIATION&MGMT: CPT

## 2022-01-01 PROCEDURE — 84439 ASSAY OF FREE THYROXINE: CPT | Performed by: PHYSICIAN ASSISTANT

## 2022-01-01 PROCEDURE — 94799 UNLISTED PULMONARY SVC/PX: CPT

## 2022-01-01 PROCEDURE — 25010000002 METHYLPREDNISOLONE PER 40 MG: Performed by: INTERNAL MEDICINE

## 2022-01-01 PROCEDURE — 84145 PROCALCITONIN (PCT): CPT | Performed by: INTERNAL MEDICINE

## 2022-01-01 PROCEDURE — 80048 BASIC METABOLIC PNL TOTAL CA: CPT | Performed by: NURSE PRACTITIONER

## 2022-01-01 PROCEDURE — 85027 COMPLETE CBC AUTOMATED: CPT | Performed by: FAMILY MEDICINE

## 2022-01-01 PROCEDURE — 25010000002 MEROPENEM PER 100 MG: Performed by: NURSE PRACTITIONER

## 2022-01-01 PROCEDURE — 25010000002 HALOPERIDOL LACTATE PER 5 MG: Performed by: FAMILY MEDICINE

## 2022-01-01 PROCEDURE — 93005 ELECTROCARDIOGRAM TRACING: CPT | Performed by: PHYSICIAN ASSISTANT

## 2022-01-01 PROCEDURE — 85025 COMPLETE CBC W/AUTO DIFF WBC: CPT | Performed by: NURSE PRACTITIONER

## 2022-01-01 PROCEDURE — 25010000002 FUROSEMIDE PER 20 MG: Performed by: INTERNAL MEDICINE

## 2022-01-01 PROCEDURE — 25010000002 PIPERACILLIN SOD-TAZOBACTAM PER 1 G: Performed by: FAMILY MEDICINE

## 2022-01-01 PROCEDURE — 97165 OT EVAL LOW COMPLEX 30 MIN: CPT

## 2022-01-01 PROCEDURE — 63710000001 INSULIN LISPRO (HUMAN) PER 5 UNITS: Performed by: INTERNAL MEDICINE

## 2022-01-01 PROCEDURE — 25010000002 CEFTRIAXONE PER 250 MG: Performed by: FAMILY MEDICINE

## 2022-01-01 PROCEDURE — 99232 SBSQ HOSP IP/OBS MODERATE 35: CPT | Performed by: FAMILY MEDICINE

## 2022-01-01 PROCEDURE — 63710000001 INSULIN DETEMIR PER 5 UNITS: Performed by: INTERNAL MEDICINE

## 2022-01-01 PROCEDURE — 80053 COMPREHEN METABOLIC PANEL: CPT | Performed by: STUDENT IN AN ORGANIZED HEALTH CARE EDUCATION/TRAINING PROGRAM

## 2022-01-01 PROCEDURE — 84145 PROCALCITONIN (PCT): CPT | Performed by: STUDENT IN AN ORGANIZED HEALTH CARE EDUCATION/TRAINING PROGRAM

## 2022-01-01 PROCEDURE — 99239 HOSP IP/OBS DSCHRG MGMT >30: CPT | Performed by: HOSPITALIST

## 2022-01-01 PROCEDURE — 97116 GAIT TRAINING THERAPY: CPT

## 2022-01-01 PROCEDURE — 93306 TTE W/DOPPLER COMPLETE: CPT | Performed by: INTERNAL MEDICINE

## 2022-01-01 PROCEDURE — 87086 URINE CULTURE/COLONY COUNT: CPT | Performed by: NURSE PRACTITIONER

## 2022-01-01 PROCEDURE — 92612 ENDOSCOPY SWALLOW (FEES) VID: CPT

## 2022-01-01 PROCEDURE — 25010000002 ZIPRASIDONE MESYLATE PER 10 MG: Performed by: STUDENT IN AN ORGANIZED HEALTH CARE EDUCATION/TRAINING PROGRAM

## 2022-01-01 PROCEDURE — 0202U NFCT DS 22 TRGT SARS-COV-2: CPT | Performed by: STUDENT IN AN ORGANIZED HEALTH CARE EDUCATION/TRAINING PROGRAM

## 2022-01-01 PROCEDURE — 25010000002 METHYLPREDNISOLONE PER 125 MG: Performed by: NURSE PRACTITIONER

## 2022-01-01 PROCEDURE — 83735 ASSAY OF MAGNESIUM: CPT | Performed by: STUDENT IN AN ORGANIZED HEALTH CARE EDUCATION/TRAINING PROGRAM

## 2022-01-01 PROCEDURE — 25010000002 ALBUMIN HUMAN 25% PER 50 ML: Performed by: INTERNAL MEDICINE

## 2022-01-01 PROCEDURE — 74018 RADEX ABDOMEN 1 VIEW: CPT

## 2022-01-01 PROCEDURE — 99233 SBSQ HOSP IP/OBS HIGH 50: CPT | Performed by: INTERNAL MEDICINE

## 2022-01-01 PROCEDURE — 93005 ELECTROCARDIOGRAM TRACING: CPT | Performed by: NURSE PRACTITIONER

## 2022-01-01 PROCEDURE — 25010000002 HYDROMORPHONE PER 4 MG: Performed by: INTERNAL MEDICINE

## 2022-01-01 PROCEDURE — 80053 COMPREHEN METABOLIC PANEL: CPT | Performed by: INTERNAL MEDICINE

## 2022-01-01 PROCEDURE — 63710000001 PREDNISONE PER 1 MG: Performed by: HOSPITALIST

## 2022-01-01 PROCEDURE — 87040 BLOOD CULTURE FOR BACTERIA: CPT | Performed by: NURSE PRACTITIONER

## 2022-01-01 PROCEDURE — 92610 EVALUATE SWALLOWING FUNCTION: CPT

## 2022-01-01 PROCEDURE — 84478 ASSAY OF TRIGLYCERIDES: CPT

## 2022-01-01 PROCEDURE — 85652 RBC SED RATE AUTOMATED: CPT | Performed by: NURSE PRACTITIONER

## 2022-01-01 PROCEDURE — 99232 SBSQ HOSP IP/OBS MODERATE 35: CPT | Performed by: NURSE PRACTITIONER

## 2022-01-01 PROCEDURE — 25010000002 FUROSEMIDE PER 20 MG: Performed by: STUDENT IN AN ORGANIZED HEALTH CARE EDUCATION/TRAINING PROGRAM

## 2022-01-01 PROCEDURE — 92507 TX SP LANG VOICE COMM INDIV: CPT

## 2022-01-01 PROCEDURE — 80061 LIPID PANEL: CPT

## 2022-01-01 PROCEDURE — 70450 CT HEAD/BRAIN W/O DYE: CPT

## 2022-01-01 PROCEDURE — 82465 ASSAY BLD/SERUM CHOLESTEROL: CPT

## 2022-01-01 PROCEDURE — 25010000002 LORAZEPAM PER 2 MG: Performed by: STUDENT IN AN ORGANIZED HEALTH CARE EDUCATION/TRAINING PROGRAM

## 2022-01-01 PROCEDURE — P9047 ALBUMIN (HUMAN), 25%, 50ML: HCPCS | Performed by: INTERNAL MEDICINE

## 2022-01-01 PROCEDURE — 99231 SBSQ HOSP IP/OBS SF/LOW 25: CPT | Performed by: NURSE PRACTITIONER

## 2022-01-01 PROCEDURE — 83735 ASSAY OF MAGNESIUM: CPT

## 2022-01-01 PROCEDURE — 97110 THERAPEUTIC EXERCISES: CPT | Performed by: OCCUPATIONAL THERAPIST

## 2022-01-01 PROCEDURE — 82550 ASSAY OF CK (CPK): CPT | Performed by: INTERNAL MEDICINE

## 2022-01-01 PROCEDURE — 84145 PROCALCITONIN (PCT): CPT | Performed by: FAMILY MEDICINE

## 2022-01-01 PROCEDURE — 25010000002 AMIODARONE IN DEXTROSE 5% 360-4.14 MG/200ML-% SOLUTION: Performed by: INTERNAL MEDICINE

## 2022-01-01 PROCEDURE — 25010000002 ALBUMIN HUMAN 25% PER 50 ML: Performed by: NURSE PRACTITIONER

## 2022-01-01 PROCEDURE — 85027 COMPLETE CBC AUTOMATED: CPT | Performed by: NURSE PRACTITIONER

## 2022-01-01 PROCEDURE — 25010000002 METOCLOPRAMIDE PER 10 MG: Performed by: NURSE PRACTITIONER

## 2022-01-01 PROCEDURE — 63710000001 INSULIN DETEMIR PER 5 UNITS: Performed by: NURSE PRACTITIONER

## 2022-01-01 PROCEDURE — 87040 BLOOD CULTURE FOR BACTERIA: CPT | Performed by: STUDENT IN AN ORGANIZED HEALTH CARE EDUCATION/TRAINING PROGRAM

## 2022-01-01 PROCEDURE — 25010000002 AMIODARONE IN DEXTROSE 5% 150-4.21 MG/100ML-% SOLUTION: Performed by: INTERNAL MEDICINE

## 2022-01-01 PROCEDURE — 25010000002 DIAZEPAM PER 5 MG: Performed by: NURSE PRACTITIONER

## 2022-01-01 PROCEDURE — 84100 ASSAY OF PHOSPHORUS: CPT

## 2022-01-01 PROCEDURE — 0202U NFCT DS 22 TRGT SARS-COV-2: CPT | Performed by: NURSE PRACTITIONER

## 2022-01-01 PROCEDURE — 94640 AIRWAY INHALATION TREATMENT: CPT

## 2022-01-01 PROCEDURE — 85025 COMPLETE CBC W/AUTO DIFF WBC: CPT | Performed by: STUDENT IN AN ORGANIZED HEALTH CARE EDUCATION/TRAINING PROGRAM

## 2022-01-01 PROCEDURE — 87086 URINE CULTURE/COLONY COUNT: CPT | Performed by: FAMILY MEDICINE

## 2022-01-01 PROCEDURE — 63710000001 INSULIN REGULAR HUMAN PER 5 UNITS: Performed by: NURSE PRACTITIONER

## 2022-01-01 PROCEDURE — 93308 TTE F-UP OR LMTD: CPT | Performed by: INTERNAL MEDICINE

## 2022-01-01 PROCEDURE — 73030 X-RAY EXAM OF SHOULDER: CPT

## 2022-01-01 PROCEDURE — 85014 HEMATOCRIT: CPT | Performed by: NURSE PRACTITIONER

## 2022-01-01 PROCEDURE — 83880 ASSAY OF NATRIURETIC PEPTIDE: CPT | Performed by: INTERNAL MEDICINE

## 2022-01-01 PROCEDURE — 80053 COMPREHEN METABOLIC PANEL: CPT

## 2022-01-01 PROCEDURE — 63710000001 INSULIN LISPRO (HUMAN) PER 5 UNITS: Performed by: HOSPITALIST

## 2022-01-01 PROCEDURE — 81001 URINALYSIS AUTO W/SCOPE: CPT | Performed by: NURSE PRACTITIONER

## 2022-01-01 PROCEDURE — 92610 EVALUATE SWALLOWING FUNCTION: CPT | Performed by: SPEECH-LANGUAGE PATHOLOGIST

## 2022-01-01 PROCEDURE — 25010000002 FLUCONAZOLE PER 200 MG: Performed by: FAMILY MEDICINE

## 2022-01-01 PROCEDURE — 83735 ASSAY OF MAGNESIUM: CPT | Performed by: NURSE PRACTITIONER

## 2022-01-01 PROCEDURE — 70496 CT ANGIOGRAPHY HEAD: CPT

## 2022-01-01 PROCEDURE — 63710000001 INSULIN REGULAR HUMAN PER 5 UNITS

## 2022-01-01 PROCEDURE — 99222 1ST HOSP IP/OBS MODERATE 55: CPT | Performed by: FAMILY MEDICINE

## 2022-01-01 PROCEDURE — 84443 ASSAY THYROID STIM HORMONE: CPT | Performed by: PHYSICIAN ASSISTANT

## 2022-01-01 PROCEDURE — 97162 PT EVAL MOD COMPLEX 30 MIN: CPT

## 2022-01-01 PROCEDURE — 93005 ELECTROCARDIOGRAM TRACING: CPT | Performed by: STUDENT IN AN ORGANIZED HEALTH CARE EDUCATION/TRAINING PROGRAM

## 2022-01-01 PROCEDURE — 25010000002 DIAZEPAM PER 5 MG: Performed by: INTERNAL MEDICINE

## 2022-01-01 PROCEDURE — 83880 ASSAY OF NATRIURETIC PEPTIDE: CPT | Performed by: STUDENT IN AN ORGANIZED HEALTH CARE EDUCATION/TRAINING PROGRAM

## 2022-01-01 PROCEDURE — 83036 HEMOGLOBIN GLYCOSYLATED A1C: CPT | Performed by: NURSE PRACTITIONER

## 2022-01-01 PROCEDURE — 85018 HEMOGLOBIN: CPT | Performed by: NURSE PRACTITIONER

## 2022-01-01 PROCEDURE — 99222 1ST HOSP IP/OBS MODERATE 55: CPT

## 2022-01-01 PROCEDURE — 25010000002 CEFTRIAXONE PER 250 MG: Performed by: NURSE PRACTITIONER

## 2022-01-01 PROCEDURE — 93306 TTE W/DOPPLER COMPLETE: CPT

## 2022-01-01 PROCEDURE — 92526 ORAL FUNCTION THERAPY: CPT

## 2022-01-01 PROCEDURE — 84484 ASSAY OF TROPONIN QUANT: CPT | Performed by: NURSE PRACTITIONER

## 2022-01-01 PROCEDURE — 84484 ASSAY OF TROPONIN QUANT: CPT | Performed by: STUDENT IN AN ORGANIZED HEALTH CARE EDUCATION/TRAINING PROGRAM

## 2022-01-01 PROCEDURE — 95819 EEG AWAKE AND ASLEEP: CPT

## 2022-01-01 PROCEDURE — 83605 ASSAY OF LACTIC ACID: CPT | Performed by: STUDENT IN AN ORGANIZED HEALTH CARE EDUCATION/TRAINING PROGRAM

## 2022-01-01 PROCEDURE — 63710000001 INSULIN REGULAR HUMAN PER 5 UNITS: Performed by: FAMILY MEDICINE

## 2022-01-01 PROCEDURE — 25010000002 FLUCONAZOLE PER 200 MG: Performed by: NURSE PRACTITIONER

## 2022-01-01 PROCEDURE — 84145 PROCALCITONIN (PCT): CPT | Performed by: NURSE PRACTITIONER

## 2022-01-01 PROCEDURE — 87635 SARS-COV-2 COVID-19 AMP PRB: CPT | Performed by: INTERNAL MEDICINE

## 2022-01-01 PROCEDURE — 82570 ASSAY OF URINE CREATININE: CPT | Performed by: NURSE PRACTITIONER

## 2022-01-01 PROCEDURE — 63710000001 INSULIN DETEMIR PER 5 UNITS: Performed by: HOSPITALIST

## 2022-01-01 PROCEDURE — 84100 ASSAY OF PHOSPHORUS: CPT | Performed by: STUDENT IN AN ORGANIZED HEALTH CARE EDUCATION/TRAINING PROGRAM

## 2022-01-01 PROCEDURE — 86140 C-REACTIVE PROTEIN: CPT | Performed by: STUDENT IN AN ORGANIZED HEALTH CARE EDUCATION/TRAINING PROGRAM

## 2022-01-01 PROCEDURE — 99233 SBSQ HOSP IP/OBS HIGH 50: CPT | Performed by: NURSE PRACTITIONER

## 2022-01-01 PROCEDURE — 92523 SPEECH SOUND LANG COMPREHEN: CPT

## 2022-01-01 PROCEDURE — 99233 SBSQ HOSP IP/OBS HIGH 50: CPT | Performed by: FAMILY MEDICINE

## 2022-01-01 PROCEDURE — 25010000002 METHYLPREDNISOLONE PER 125 MG: Performed by: STUDENT IN AN ORGANIZED HEALTH CARE EDUCATION/TRAINING PROGRAM

## 2022-01-01 PROCEDURE — P9047 ALBUMIN (HUMAN), 25%, 50ML: HCPCS | Performed by: NURSE PRACTITIONER

## 2022-01-01 PROCEDURE — 84443 ASSAY THYROID STIM HORMONE: CPT | Performed by: INTERNAL MEDICINE

## 2022-01-01 PROCEDURE — 36415 COLL VENOUS BLD VENIPUNCTURE: CPT

## 2022-01-01 PROCEDURE — 99285 EMERGENCY DEPT VISIT HI MDM: CPT

## 2022-01-01 PROCEDURE — 99222 1ST HOSP IP/OBS MODERATE 55: CPT | Performed by: INTERNAL MEDICINE

## 2022-01-01 PROCEDURE — 85379 FIBRIN DEGRADATION QUANT: CPT | Performed by: NURSE PRACTITIONER

## 2022-01-01 PROCEDURE — 93005 ELECTROCARDIOGRAM TRACING: CPT | Performed by: FAMILY MEDICINE

## 2022-01-01 PROCEDURE — 97161 PT EVAL LOW COMPLEX 20 MIN: CPT

## 2022-01-01 PROCEDURE — 97165 OT EVAL LOW COMPLEX 30 MIN: CPT | Performed by: OCCUPATIONAL THERAPIST

## 2022-01-01 PROCEDURE — 81001 URINALYSIS AUTO W/SCOPE: CPT | Performed by: FAMILY MEDICINE

## 2022-01-01 PROCEDURE — 93308 TTE F-UP OR LMTD: CPT

## 2022-01-01 PROCEDURE — 25010000002 MAGNESIUM SULFATE IN D5W 1G/100ML (PREMIX) 1-5 GM/100ML-% SOLUTION: Performed by: STUDENT IN AN ORGANIZED HEALTH CARE EDUCATION/TRAINING PROGRAM

## 2022-01-01 PROCEDURE — 83036 HEMOGLOBIN GLYCOSYLATED A1C: CPT

## 2022-01-01 PROCEDURE — 25010000002 DIGOXIN PER 500 MCG: Performed by: NURSE PRACTITIONER

## 2022-01-01 RX ORDER — OLANZAPINE 10 MG/1
7.5 INJECTION, POWDER, LYOPHILIZED, FOR SOLUTION INTRAMUSCULAR ONCE AS NEEDED
Status: COMPLETED | OUTPATIENT
Start: 2022-01-01 | End: 2022-01-01

## 2022-01-01 RX ORDER — DEXTROSE MONOHYDRATE 25 G/50ML
25 INJECTION, SOLUTION INTRAVENOUS
Status: DISCONTINUED | OUTPATIENT
Start: 2022-01-01 | End: 2022-01-01 | Stop reason: SDUPTHER

## 2022-01-01 RX ORDER — PREDNISONE 20 MG/1
10 TABLET ORAL
Qty: 2 TABLET | Refills: 0 | Status: SHIPPED | OUTPATIENT
Start: 2022-01-01 | End: 2022-01-01

## 2022-01-01 RX ORDER — INSULIN LISPRO 100 [IU]/ML
3 INJECTION, SOLUTION INTRAVENOUS; SUBCUTANEOUS
Status: DISCONTINUED | OUTPATIENT
Start: 2022-01-01 | End: 2022-01-01

## 2022-01-01 RX ORDER — ALBUMIN (HUMAN) 12.5 G/50ML
25 SOLUTION INTRAVENOUS ONCE
Status: COMPLETED | OUTPATIENT
Start: 2022-01-01 | End: 2022-01-01

## 2022-01-01 RX ORDER — GABAPENTIN 300 MG/1
600 CAPSULE ORAL EVERY 8 HOURS SCHEDULED
Status: DISCONTINUED | OUTPATIENT
Start: 2022-01-01 | End: 2022-01-01

## 2022-01-01 RX ORDER — MORPHINE SULFATE 2 MG/ML
2 INJECTION, SOLUTION INTRAMUSCULAR; INTRAVENOUS
Status: DISCONTINUED | OUTPATIENT
Start: 2022-01-01 | End: 2022-01-01

## 2022-01-01 RX ORDER — MAGNESIUM SULFATE 1 G/100ML
1 INJECTION INTRAVENOUS ONCE
Status: COMPLETED | OUTPATIENT
Start: 2022-01-01 | End: 2022-01-01

## 2022-01-01 RX ORDER — AMINO ACIDS/PROTEIN HYDROLYS 15G-100/30
1 LIQUID (ML) ORAL DAILY
Status: DISCONTINUED | OUTPATIENT
Start: 2022-01-01 | End: 2022-01-01

## 2022-01-01 RX ORDER — AMIODARONE HYDROCHLORIDE 200 MG/1
200 TABLET ORAL EVERY 12 HOURS
Status: DISCONTINUED | OUTPATIENT
Start: 2022-08-05 | End: 2022-01-01

## 2022-01-01 RX ORDER — METHYLPREDNISOLONE SODIUM SUCCINATE 125 MG/2ML
60 INJECTION, POWDER, LYOPHILIZED, FOR SOLUTION INTRAMUSCULAR; INTRAVENOUS EVERY 8 HOURS
Status: DISCONTINUED | OUTPATIENT
Start: 2022-01-01 | End: 2022-01-01

## 2022-01-01 RX ORDER — POLYETHYLENE GLYCOL 3350 17 G/17G
17 POWDER, FOR SOLUTION ORAL DAILY PRN
Status: DISCONTINUED | OUTPATIENT
Start: 2022-01-01 | End: 2022-01-01

## 2022-01-01 RX ORDER — HYDROXYZINE HYDROCHLORIDE 25 MG/1
25 TABLET, FILM COATED ORAL 3 TIMES DAILY PRN
Status: DISCONTINUED | OUTPATIENT
Start: 2022-01-01 | End: 2022-01-01 | Stop reason: HOSPADM

## 2022-01-01 RX ORDER — ACETAMINOPHEN 160 MG/5ML
650 SOLUTION ORAL EVERY 6 HOURS PRN
Status: DISCONTINUED | OUTPATIENT
Start: 2022-01-01 | End: 2022-01-01 | Stop reason: HOSPADM

## 2022-01-01 RX ORDER — IPRATROPIUM BROMIDE AND ALBUTEROL SULFATE 2.5; .5 MG/3ML; MG/3ML
3 SOLUTION RESPIRATORY (INHALATION) ONCE
Status: COMPLETED | OUTPATIENT
Start: 2022-01-01 | End: 2022-01-01

## 2022-01-01 RX ORDER — BISACODYL 10 MG
10 SUPPOSITORY, RECTAL RECTAL DAILY PRN
Status: DISCONTINUED | OUTPATIENT
Start: 2022-01-01 | End: 2022-01-01 | Stop reason: HOSPADM

## 2022-01-01 RX ORDER — HYDROXYZINE HYDROCHLORIDE 10 MG/1
10 TABLET, FILM COATED ORAL ONCE
Status: COMPLETED | OUTPATIENT
Start: 2022-01-01 | End: 2022-01-01

## 2022-01-01 RX ORDER — HYDROXYZINE HYDROCHLORIDE 25 MG/1
25 TABLET, FILM COATED ORAL 3 TIMES DAILY PRN
Status: CANCELLED | OUTPATIENT
Start: 2022-01-01

## 2022-01-01 RX ORDER — METOPROLOL TARTRATE 5 MG/5ML
5 INJECTION INTRAVENOUS ONCE
Status: COMPLETED | OUTPATIENT
Start: 2022-01-01 | End: 2022-01-01

## 2022-01-01 RX ORDER — MORPHINE SULFATE 4 MG/ML
4 INJECTION, SOLUTION INTRAMUSCULAR; INTRAVENOUS
Status: DISCONTINUED | OUTPATIENT
Start: 2022-01-01 | End: 2022-01-01 | Stop reason: HOSPADM

## 2022-01-01 RX ORDER — FUROSEMIDE 10 MG/ML
40 INJECTION INTRAMUSCULAR; INTRAVENOUS ONCE
Status: COMPLETED | OUTPATIENT
Start: 2022-01-01 | End: 2022-01-01

## 2022-01-01 RX ORDER — METHYLPREDNISOLONE SODIUM SUCCINATE 40 MG/ML
40 INJECTION, POWDER, LYOPHILIZED, FOR SOLUTION INTRAMUSCULAR; INTRAVENOUS DAILY
Status: DISCONTINUED | OUTPATIENT
Start: 2022-01-01 | End: 2022-01-01

## 2022-01-01 RX ORDER — DEXTROSE MONOHYDRATE 25 G/50ML
25 INJECTION, SOLUTION INTRAVENOUS
Status: DISCONTINUED | OUTPATIENT
Start: 2022-01-01 | End: 2022-01-01 | Stop reason: HOSPADM

## 2022-01-01 RX ORDER — NEBIVOLOL 2.5 MG/1
2.5 TABLET ORAL
Qty: 30 TABLET | Refills: 0 | Status: SHIPPED | OUTPATIENT
Start: 2022-01-01 | End: 2022-01-01

## 2022-01-01 RX ORDER — ATORVASTATIN CALCIUM 40 MG/1
80 TABLET, FILM COATED ORAL NIGHTLY
Status: CANCELLED | OUTPATIENT
Start: 2022-01-01

## 2022-01-01 RX ORDER — DOCUSATE SODIUM 50 MG/5 ML
100 LIQUID (ML) ORAL 2 TIMES DAILY
Status: DISCONTINUED | OUTPATIENT
Start: 2022-01-01 | End: 2022-01-01

## 2022-01-01 RX ORDER — PRAVASTATIN SODIUM 40 MG
40 TABLET ORAL NIGHTLY
Status: DISCONTINUED | OUTPATIENT
Start: 2022-01-01 | End: 2022-01-01

## 2022-01-01 RX ORDER — TRAMADOL HYDROCHLORIDE 50 MG/1
50 TABLET ORAL EVERY 8 HOURS PRN
Status: DISCONTINUED | OUTPATIENT
Start: 2022-01-01 | End: 2022-01-01

## 2022-01-01 RX ORDER — HALOPERIDOL 5 MG/ML
0.5 INJECTION INTRAMUSCULAR EVERY 6 HOURS PRN
Status: CANCELLED | OUTPATIENT
Start: 2022-01-01

## 2022-01-01 RX ORDER — FUROSEMIDE 10 MG/ML
80 INJECTION INTRAMUSCULAR; INTRAVENOUS DAILY
Status: DISCONTINUED | OUTPATIENT
Start: 2022-01-01 | End: 2022-01-01 | Stop reason: HOSPADM

## 2022-01-01 RX ORDER — GABAPENTIN 100 MG/1
200 CAPSULE ORAL EVERY 12 HOURS SCHEDULED
Status: DISCONTINUED | OUTPATIENT
Start: 2022-01-01 | End: 2022-01-01

## 2022-01-01 RX ORDER — BISACODYL 10 MG
10 SUPPOSITORY, RECTAL RECTAL DAILY PRN
Status: DISCONTINUED | OUTPATIENT
Start: 2022-01-01 | End: 2022-08-02 | Stop reason: HOSPADM

## 2022-01-01 RX ORDER — POTASSIUM CHLORIDE 750 MG/1
40 CAPSULE, EXTENDED RELEASE ORAL ONCE
Status: COMPLETED | OUTPATIENT
Start: 2022-01-01 | End: 2022-01-01

## 2022-01-01 RX ORDER — SODIUM CHLORIDE 0.9 % (FLUSH) 0.9 %
10 SYRINGE (ML) INJECTION AS NEEDED
Status: CANCELLED | OUTPATIENT
Start: 2022-01-01

## 2022-01-01 RX ORDER — GLYCOPYRROLATE 0.2 MG/ML
0.4 INJECTION INTRAMUSCULAR; INTRAVENOUS EVERY 4 HOURS PRN
Status: DISCONTINUED | OUTPATIENT
Start: 2022-01-01 | End: 2022-08-02 | Stop reason: HOSPADM

## 2022-01-01 RX ORDER — TRAMADOL HYDROCHLORIDE 50 MG/1
25 TABLET ORAL EVERY 6 HOURS PRN
Status: DISCONTINUED | OUTPATIENT
Start: 2022-01-01 | End: 2022-01-01 | Stop reason: HOSPADM

## 2022-01-01 RX ORDER — DOCUSATE SODIUM 50 MG/5 ML
100 LIQUID (ML) ORAL 2 TIMES DAILY PRN
Status: DISCONTINUED | OUTPATIENT
Start: 2022-01-01 | End: 2022-01-01

## 2022-01-01 RX ORDER — ASPIRIN 81 MG/1
81 TABLET, CHEWABLE ORAL DAILY
Status: DISCONTINUED | OUTPATIENT
Start: 2022-01-01 | End: 2022-01-01

## 2022-01-01 RX ORDER — ALBUMIN (HUMAN) 12.5 G/50ML
12.5 SOLUTION INTRAVENOUS AS NEEDED
Status: CANCELLED | OUTPATIENT
Start: 2022-01-01

## 2022-01-01 RX ORDER — SODIUM CHLORIDE 0.9 % (FLUSH) 0.9 %
10 SYRINGE (ML) INJECTION EVERY 12 HOURS SCHEDULED
Status: CANCELLED | OUTPATIENT
Start: 2022-01-01

## 2022-01-01 RX ORDER — BISACODYL 10 MG
10 SUPPOSITORY, RECTAL RECTAL DAILY PRN
Status: DISCONTINUED | OUTPATIENT
Start: 2022-01-01 | End: 2022-01-01

## 2022-01-01 RX ORDER — LOPERAMIDE HYDROCHLORIDE 2 MG/1
2 CAPSULE ORAL 4 TIMES DAILY PRN
Status: DISCONTINUED | OUTPATIENT
Start: 2022-01-01 | End: 2022-01-01

## 2022-01-01 RX ORDER — BUDESONIDE 0.5 MG/2ML
0.5 INHALANT ORAL
Status: DISCONTINUED | OUTPATIENT
Start: 2022-01-01 | End: 2022-01-01

## 2022-01-01 RX ORDER — NICOTINE POLACRILEX 4 MG
15 LOZENGE BUCCAL
Status: DISCONTINUED | OUTPATIENT
Start: 2022-01-01 | End: 2022-01-01 | Stop reason: HOSPADM

## 2022-01-01 RX ORDER — METOPROLOL TARTRATE 50 MG/1
50 TABLET, FILM COATED ORAL EVERY 12 HOURS SCHEDULED
Status: DISCONTINUED | OUTPATIENT
Start: 2022-01-01 | End: 2022-01-01

## 2022-01-01 RX ORDER — DOCUSATE SODIUM 50 MG/5 ML
100 LIQUID (ML) ORAL 2 TIMES DAILY PRN
Status: DISCONTINUED | OUTPATIENT
Start: 2022-01-01 | End: 2022-01-01 | Stop reason: HOSPADM

## 2022-01-01 RX ORDER — SODIUM CHLORIDE 0.9 % (FLUSH) 0.9 %
10 SYRINGE (ML) INJECTION AS NEEDED
Status: DISCONTINUED | OUTPATIENT
Start: 2022-01-01 | End: 2022-01-01 | Stop reason: HOSPADM

## 2022-01-01 RX ORDER — TRAMADOL HYDROCHLORIDE 50 MG/1
50 TABLET ORAL EVERY 8 HOURS PRN
COMMUNITY

## 2022-01-01 RX ORDER — LORAZEPAM 2 MG/ML
0.5 INJECTION INTRAMUSCULAR ONCE
Status: COMPLETED | OUTPATIENT
Start: 2022-01-01 | End: 2022-01-01

## 2022-01-01 RX ORDER — FUROSEMIDE 80 MG
120 TABLET ORAL DAILY
COMMUNITY
End: 2022-01-01 | Stop reason: SDUPTHER

## 2022-01-01 RX ORDER — POLYETHYLENE GLYCOL 3350 17 G/17G
17 POWDER, FOR SOLUTION ORAL DAILY PRN
Status: CANCELLED | OUTPATIENT
Start: 2022-01-01

## 2022-01-01 RX ORDER — HEPARIN SODIUM 5000 [USP'U]/ML
5000 INJECTION, SOLUTION INTRAVENOUS; SUBCUTANEOUS EVERY 12 HOURS SCHEDULED
Status: DISCONTINUED | OUTPATIENT
Start: 2022-01-01 | End: 2022-01-01 | Stop reason: HOSPADM

## 2022-01-01 RX ORDER — NEBIVOLOL 5 MG/1
2.5 TABLET ORAL
Status: DISCONTINUED | OUTPATIENT
Start: 2022-01-01 | End: 2022-01-01

## 2022-01-01 RX ORDER — SODIUM CHLORIDE 450 MG/100ML
75 INJECTION, SOLUTION INTRAVENOUS CONTINUOUS
Status: DISCONTINUED | OUTPATIENT
Start: 2022-01-01 | End: 2022-01-01

## 2022-01-01 RX ORDER — SODIUM CHLORIDE 0.9 % (FLUSH) 0.9 %
10 SYRINGE (ML) INJECTION AS NEEDED
Status: DISCONTINUED | OUTPATIENT
Start: 2022-01-01 | End: 2022-08-02 | Stop reason: HOSPADM

## 2022-01-01 RX ORDER — MORPHINE SULFATE 4 MG/ML
4 INJECTION, SOLUTION INTRAMUSCULAR; INTRAVENOUS
Status: CANCELLED | OUTPATIENT
Start: 2022-01-01 | End: 2022-08-11

## 2022-01-01 RX ORDER — POTASSIUM CHLORIDE 20 MEQ/1
20 TABLET, EXTENDED RELEASE ORAL DAILY
Qty: 30 TABLET | Refills: 0 | Status: SHIPPED | OUTPATIENT
Start: 2022-01-01 | End: 2022-01-01

## 2022-01-01 RX ORDER — FUROSEMIDE 10 MG/ML
40 INJECTION INTRAMUSCULAR; INTRAVENOUS EVERY 6 HOURS PRN
Status: DISCONTINUED | OUTPATIENT
Start: 2022-01-01 | End: 2022-08-02 | Stop reason: HOSPADM

## 2022-01-01 RX ORDER — FLUCONAZOLE 2 MG/ML
200 INJECTION, SOLUTION INTRAVENOUS DAILY
Status: COMPLETED | OUTPATIENT
Start: 2022-01-01 | End: 2022-01-01

## 2022-01-01 RX ORDER — AMIODARONE HYDROCHLORIDE 200 MG/1
200 TABLET ORAL DAILY
Status: DISCONTINUED | OUTPATIENT
Start: 2022-08-19 | End: 2022-01-01

## 2022-01-01 RX ORDER — CEFDINIR 300 MG/1
300 CAPSULE ORAL 2 TIMES DAILY
Qty: 8 CAPSULE | Refills: 0 | Status: SHIPPED | OUTPATIENT
Start: 2022-01-01 | End: 2022-01-01

## 2022-01-01 RX ORDER — BUDESONIDE 0.5 MG/2ML
0.5 INHALANT ORAL
Status: CANCELLED | OUTPATIENT
Start: 2022-01-01

## 2022-01-01 RX ORDER — INSULIN LISPRO 100 [IU]/ML
8 INJECTION, SOLUTION INTRAVENOUS; SUBCUTANEOUS
Status: DISCONTINUED | OUTPATIENT
Start: 2022-01-01 | End: 2022-01-01

## 2022-01-01 RX ORDER — DIAZEPAM 5 MG/ML
2.5 INJECTION, SOLUTION INTRAMUSCULAR; INTRAVENOUS EVERY 6 HOURS PRN
Status: DISCONTINUED | OUTPATIENT
Start: 2022-01-01 | End: 2022-01-01

## 2022-01-01 RX ORDER — QUETIAPINE FUMARATE 25 MG/1
12.5 TABLET, FILM COATED ORAL NIGHTLY
Status: DISCONTINUED | OUTPATIENT
Start: 2022-01-01 | End: 2022-01-01

## 2022-01-01 RX ORDER — GABAPENTIN 600 MG/1
600 TABLET ORAL 3 TIMES DAILY
COMMUNITY

## 2022-01-01 RX ORDER — METHYLPREDNISOLONE SODIUM SUCCINATE 125 MG/2ML
125 INJECTION, POWDER, LYOPHILIZED, FOR SOLUTION INTRAMUSCULAR; INTRAVENOUS ONCE
Status: COMPLETED | OUTPATIENT
Start: 2022-01-01 | End: 2022-01-01

## 2022-01-01 RX ORDER — PRAVASTATIN SODIUM 40 MG
40 TABLET ORAL DAILY
Status: DISCONTINUED | OUTPATIENT
Start: 2022-01-01 | End: 2022-01-01 | Stop reason: HOSPADM

## 2022-01-01 RX ORDER — DIAZEPAM 5 MG/ML
5 INJECTION, SOLUTION INTRAMUSCULAR; INTRAVENOUS EVERY 8 HOURS
Status: DISCONTINUED | OUTPATIENT
Start: 2022-01-01 | End: 2022-08-02 | Stop reason: HOSPADM

## 2022-01-01 RX ORDER — MORPHINE SULFATE 2 MG/ML
2 INJECTION, SOLUTION INTRAMUSCULAR; INTRAVENOUS
Status: CANCELLED | OUTPATIENT
Start: 2022-01-01 | End: 2022-08-11

## 2022-01-01 RX ORDER — DIAZEPAM 5 MG/ML
2.5 INJECTION, SOLUTION INTRAMUSCULAR; INTRAVENOUS EVERY 6 HOURS PRN
Status: CANCELLED | OUTPATIENT
Start: 2022-01-01 | End: 2022-08-07

## 2022-01-01 RX ORDER — ASPIRIN 300 MG/1
300 SUPPOSITORY RECTAL DAILY
Status: DISCONTINUED | OUTPATIENT
Start: 2022-01-01 | End: 2022-01-01

## 2022-01-01 RX ORDER — FUROSEMIDE 10 MG/ML
20 INJECTION INTRAMUSCULAR; INTRAVENOUS ONCE
Status: DISCONTINUED | OUTPATIENT
Start: 2022-01-01 | End: 2022-01-01

## 2022-01-01 RX ORDER — DEXTROSE MONOHYDRATE 25 G/50ML
25 INJECTION, SOLUTION INTRAVENOUS
Status: CANCELLED | OUTPATIENT
Start: 2022-01-01

## 2022-01-01 RX ORDER — HYDROXYZINE HYDROCHLORIDE 25 MG/1
25 TABLET, FILM COATED ORAL 3 TIMES DAILY PRN
Status: DISCONTINUED | OUTPATIENT
Start: 2022-01-01 | End: 2022-01-01

## 2022-01-01 RX ORDER — LEVOTHYROXINE SODIUM 112 UG/1
112 TABLET ORAL
Status: DISCONTINUED | OUTPATIENT
Start: 2022-01-01 | End: 2022-01-01

## 2022-01-01 RX ORDER — HYDROMORPHONE HYDROCHLORIDE 1 MG/ML
0.5 INJECTION, SOLUTION INTRAMUSCULAR; INTRAVENOUS; SUBCUTANEOUS EVERY 4 HOURS
Status: DISCONTINUED | OUTPATIENT
Start: 2022-01-01 | End: 2022-08-02 | Stop reason: HOSPADM

## 2022-01-01 RX ORDER — INSULIN LISPRO 100 [IU]/ML
0-7 INJECTION, SOLUTION INTRAVENOUS; SUBCUTANEOUS
Status: DISCONTINUED | OUTPATIENT
Start: 2022-01-01 | End: 2022-01-01

## 2022-01-01 RX ORDER — SODIUM CHLORIDE 0.9 % (FLUSH) 0.9 %
10 SYRINGE (ML) INJECTION EVERY 12 HOURS SCHEDULED
Status: DISCONTINUED | OUTPATIENT
Start: 2022-01-01 | End: 2022-01-01

## 2022-01-01 RX ORDER — NICOTINE POLACRILEX 4 MG
15 LOZENGE BUCCAL
Status: DISCONTINUED | OUTPATIENT
Start: 2022-01-01 | End: 2022-01-01 | Stop reason: SDUPTHER

## 2022-01-01 RX ORDER — FUROSEMIDE 10 MG/ML
60 INJECTION INTRAMUSCULAR; INTRAVENOUS ONCE
Status: COMPLETED | OUTPATIENT
Start: 2022-01-01 | End: 2022-01-01

## 2022-01-01 RX ORDER — BUDESONIDE 0.5 MG/2ML
0.5 INHALANT ORAL
Status: DISCONTINUED | OUTPATIENT
Start: 2022-01-01 | End: 2022-01-01 | Stop reason: HOSPADM

## 2022-01-01 RX ORDER — ACETAMINOPHEN 650 MG/1
650 SUPPOSITORY RECTAL EVERY 4 HOURS PRN
Status: DISCONTINUED | OUTPATIENT
Start: 2022-01-01 | End: 2022-08-02 | Stop reason: HOSPADM

## 2022-01-01 RX ORDER — QUETIAPINE FUMARATE 25 MG/1
25 TABLET, FILM COATED ORAL NIGHTLY
Status: DISCONTINUED | OUTPATIENT
Start: 2022-01-01 | End: 2022-01-01

## 2022-01-01 RX ORDER — METHYLPREDNISOLONE SODIUM SUCCINATE 40 MG/ML
40 INJECTION, POWDER, LYOPHILIZED, FOR SOLUTION INTRAMUSCULAR; INTRAVENOUS EVERY 12 HOURS
Status: DISCONTINUED | OUTPATIENT
Start: 2022-01-01 | End: 2022-01-01

## 2022-01-01 RX ORDER — IPRATROPIUM BROMIDE AND ALBUTEROL SULFATE 2.5; .5 MG/3ML; MG/3ML
3 SOLUTION RESPIRATORY (INHALATION) EVERY 6 HOURS PRN
Status: DISCONTINUED | OUTPATIENT
Start: 2022-01-01 | End: 2022-01-01 | Stop reason: HOSPADM

## 2022-01-01 RX ORDER — DOXYCYCLINE 100 MG/1
100 CAPSULE ORAL EVERY 12 HOURS SCHEDULED
Status: DISCONTINUED | OUTPATIENT
Start: 2022-01-01 | End: 2022-01-01 | Stop reason: HOSPADM

## 2022-01-01 RX ORDER — MORPHINE SULFATE 4 MG/ML
4 INJECTION, SOLUTION INTRAMUSCULAR; INTRAVENOUS
Status: DISCONTINUED | OUTPATIENT
Start: 2022-01-01 | End: 2022-01-01

## 2022-01-01 RX ORDER — GABAPENTIN 400 MG/1
400 CAPSULE ORAL EVERY 8 HOURS SCHEDULED
Status: DISCONTINUED | OUTPATIENT
Start: 2022-01-01 | End: 2022-01-01

## 2022-01-01 RX ORDER — ZIPRASIDONE MESYLATE 20 MG/ML
5 INJECTION, POWDER, LYOPHILIZED, FOR SOLUTION INTRAMUSCULAR EVERY 6 HOURS PRN
Status: DISCONTINUED | OUTPATIENT
Start: 2022-01-01 | End: 2022-01-01

## 2022-01-01 RX ORDER — IPRATROPIUM BROMIDE AND ALBUTEROL SULFATE 2.5; .5 MG/3ML; MG/3ML
3 SOLUTION RESPIRATORY (INHALATION) EVERY 4 HOURS PRN
Status: DISCONTINUED | OUTPATIENT
Start: 2022-01-01 | End: 2022-08-02 | Stop reason: HOSPADM

## 2022-01-01 RX ORDER — SCOLOPAMINE TRANSDERMAL SYSTEM 1 MG/1
1 PATCH, EXTENDED RELEASE TRANSDERMAL
Status: DISCONTINUED | OUTPATIENT
Start: 2022-01-01 | End: 2022-08-02 | Stop reason: HOSPADM

## 2022-01-01 RX ORDER — FUROSEMIDE 10 MG/ML
20 INJECTION INTRAMUSCULAR; INTRAVENOUS ONCE
Status: COMPLETED | OUTPATIENT
Start: 2022-01-01 | End: 2022-01-01

## 2022-01-01 RX ORDER — TRAMADOL HYDROCHLORIDE 50 MG/1
50 TABLET ORAL EVERY 6 HOURS PRN
Status: DISCONTINUED | OUTPATIENT
Start: 2022-01-01 | End: 2022-01-01

## 2022-01-01 RX ORDER — INSULIN LISPRO 100 [IU]/ML
5 INJECTION, SOLUTION INTRAVENOUS; SUBCUTANEOUS
Status: DISCONTINUED | OUTPATIENT
Start: 2022-01-01 | End: 2022-01-01

## 2022-01-01 RX ORDER — IPRATROPIUM BROMIDE AND ALBUTEROL SULFATE 2.5; .5 MG/3ML; MG/3ML
3 SOLUTION RESPIRATORY (INHALATION)
Status: DISCONTINUED | OUTPATIENT
Start: 2022-01-01 | End: 2022-01-01

## 2022-01-01 RX ORDER — GABAPENTIN 300 MG/1
300 CAPSULE ORAL EVERY 12 HOURS
Qty: 60 CAPSULE | Refills: 0 | Status: SHIPPED | OUTPATIENT
Start: 2022-01-01 | End: 2022-01-01

## 2022-01-01 RX ORDER — DOXYCYCLINE HYCLATE 50 MG/1
50 CAPSULE ORAL 2 TIMES DAILY
Qty: 8 CAPSULE | Refills: 0 | Status: SHIPPED | OUTPATIENT
Start: 2022-01-01 | End: 2022-01-01

## 2022-01-01 RX ORDER — POLYETHYLENE GLYCOL 3350 17 G/17G
17 POWDER, FOR SOLUTION ORAL DAILY PRN
Status: DISCONTINUED | OUTPATIENT
Start: 2022-01-01 | End: 2022-01-01 | Stop reason: HOSPADM

## 2022-01-01 RX ORDER — ACETAMINOPHEN 160 MG/5ML
650 SOLUTION ORAL EVERY 6 HOURS PRN
Status: DISCONTINUED | OUTPATIENT
Start: 2022-01-01 | End: 2022-01-01

## 2022-01-01 RX ORDER — AMIODARONE HYDROCHLORIDE 200 MG/1
400 TABLET ORAL 2 TIMES DAILY WITH MEALS
Status: DISCONTINUED | OUTPATIENT
Start: 2022-01-01 | End: 2022-01-01

## 2022-01-01 RX ORDER — METOPROLOL TARTRATE 5 MG/5ML
2.5 INJECTION INTRAVENOUS ONCE
Status: COMPLETED | OUTPATIENT
Start: 2022-01-01 | End: 2022-01-01

## 2022-01-01 RX ORDER — ASPIRIN 325 MG
325 TABLET ORAL DAILY
Status: DISCONTINUED | OUTPATIENT
Start: 2022-01-01 | End: 2022-01-01 | Stop reason: HOSPADM

## 2022-01-01 RX ORDER — FUROSEMIDE 20 MG/1
20 TABLET ORAL
Status: DISCONTINUED | OUTPATIENT
Start: 2022-01-01 | End: 2022-01-01

## 2022-01-01 RX ORDER — BISACODYL 10 MG
10 SUPPOSITORY, RECTAL RECTAL DAILY PRN
Status: CANCELLED | OUTPATIENT
Start: 2022-01-01

## 2022-01-01 RX ORDER — ALBUMIN (HUMAN) 12.5 G/50ML
12.5 SOLUTION INTRAVENOUS AS NEEDED
Status: DISCONTINUED | OUTPATIENT
Start: 2022-01-01 | End: 2022-01-01 | Stop reason: HOSPADM

## 2022-01-01 RX ORDER — TRAMADOL HYDROCHLORIDE 50 MG/1
50 TABLET ORAL ONCE
Status: COMPLETED | OUTPATIENT
Start: 2022-01-01 | End: 2022-01-01

## 2022-01-01 RX ORDER — FUROSEMIDE 80 MG
120 TABLET ORAL DAILY
Qty: 45 TABLET | Refills: 0 | Status: SHIPPED | OUTPATIENT
Start: 2022-01-01 | End: 2022-01-01

## 2022-01-01 RX ORDER — KETOROLAC TROMETHAMINE 15 MG/ML
15 INJECTION, SOLUTION INTRAMUSCULAR; INTRAVENOUS EVERY 6 HOURS PRN
Status: DISCONTINUED | OUTPATIENT
Start: 2022-01-01 | End: 2022-08-02 | Stop reason: HOSPADM

## 2022-01-01 RX ORDER — NYSTATIN 100000 [USP'U]/G
POWDER TOPICAL EVERY 12 HOURS SCHEDULED
Status: COMPLETED | OUTPATIENT
Start: 2022-01-01 | End: 2022-01-01

## 2022-01-01 RX ORDER — FUROSEMIDE 40 MG/1
80 TABLET ORAL DAILY
Status: DISCONTINUED | OUTPATIENT
Start: 2022-01-01 | End: 2022-01-01

## 2022-01-01 RX ORDER — DIAZEPAM 5 MG/ML
5 INJECTION, SOLUTION INTRAMUSCULAR; INTRAVENOUS EVERY 4 HOURS PRN
Status: DISCONTINUED | OUTPATIENT
Start: 2022-01-01 | End: 2022-08-02 | Stop reason: HOSPADM

## 2022-01-01 RX ORDER — HALOPERIDOL 5 MG/ML
5 INJECTION INTRAMUSCULAR EVERY 6 HOURS PRN
Status: DISCONTINUED | OUTPATIENT
Start: 2022-01-01 | End: 2022-01-01

## 2022-01-01 RX ORDER — HALOPERIDOL 5 MG/ML
0.5 INJECTION INTRAMUSCULAR EVERY 6 HOURS PRN
Status: DISCONTINUED | OUTPATIENT
Start: 2022-01-01 | End: 2022-01-01

## 2022-01-01 RX ORDER — HALOPERIDOL 5 MG/ML
1 INJECTION INTRAMUSCULAR EVERY 4 HOURS PRN
Status: DISCONTINUED | OUTPATIENT
Start: 2022-01-01 | End: 2022-08-02 | Stop reason: HOSPADM

## 2022-01-01 RX ORDER — FLUCONAZOLE 2 MG/ML
200 INJECTION, SOLUTION INTRAVENOUS ONCE
Status: COMPLETED | OUTPATIENT
Start: 2022-01-01 | End: 2022-01-01

## 2022-01-01 RX ORDER — PREDNISONE 20 MG/1
20 TABLET ORAL
Status: DISCONTINUED | OUTPATIENT
Start: 2022-01-01 | End: 2022-01-01 | Stop reason: HOSPADM

## 2022-01-01 RX ORDER — ALBUMIN (HUMAN) 12.5 G/50ML
12.5 SOLUTION INTRAVENOUS AS NEEDED
Status: DISCONTINUED | OUTPATIENT
Start: 2022-01-01 | End: 2022-01-01

## 2022-01-01 RX ORDER — LOSARTAN POTASSIUM 25 MG/1
25 TABLET ORAL 2 TIMES DAILY
COMMUNITY
End: 2022-01-01 | Stop reason: HOSPADM

## 2022-01-01 RX ORDER — MORPHINE SULFATE 2 MG/ML
2 INJECTION, SOLUTION INTRAMUSCULAR; INTRAVENOUS
Status: DISCONTINUED | OUTPATIENT
Start: 2022-01-01 | End: 2022-01-01 | Stop reason: HOSPADM

## 2022-01-01 RX ORDER — DILTIAZEM HCL IN NACL,ISO-OSM 125 MG/125
5-15 PLASTIC BAG, INJECTION (ML) INTRAVENOUS
Status: DISCONTINUED | OUTPATIENT
Start: 2022-01-01 | End: 2022-01-01

## 2022-01-01 RX ORDER — HYDROMORPHONE HYDROCHLORIDE 1 MG/ML
0.5 INJECTION, SOLUTION INTRAMUSCULAR; INTRAVENOUS; SUBCUTANEOUS
Status: DISCONTINUED | OUTPATIENT
Start: 2022-01-01 | End: 2022-08-02 | Stop reason: HOSPADM

## 2022-01-01 RX ORDER — DIAZEPAM 5 MG/ML
2.5 INJECTION, SOLUTION INTRAMUSCULAR; INTRAVENOUS EVERY 6 HOURS PRN
Status: DISCONTINUED | OUTPATIENT
Start: 2022-01-01 | End: 2022-01-01 | Stop reason: HOSPADM

## 2022-01-01 RX ORDER — NICOTINE POLACRILEX 4 MG
15 LOZENGE BUCCAL
Status: DISCONTINUED | OUTPATIENT
Start: 2022-01-01 | End: 2022-01-01

## 2022-01-01 RX ORDER — INSULIN LISPRO 100 [IU]/ML
0-9 INJECTION, SOLUTION INTRAVENOUS; SUBCUTANEOUS
Status: DISCONTINUED | OUTPATIENT
Start: 2022-01-01 | End: 2022-01-01

## 2022-01-01 RX ORDER — GABAPENTIN 300 MG/1
300 CAPSULE ORAL 3 TIMES DAILY
Status: DISCONTINUED | OUTPATIENT
Start: 2022-01-01 | End: 2022-01-01 | Stop reason: HOSPADM

## 2022-01-01 RX ORDER — NEBIVOLOL 5 MG/1
2.5 TABLET ORAL
Status: DISCONTINUED | OUTPATIENT
Start: 2022-01-01 | End: 2022-01-01 | Stop reason: HOSPADM

## 2022-01-01 RX ORDER — LEVOTHYROXINE SODIUM 112 UG/1
112 TABLET ORAL DAILY
Qty: 30 TABLET | Refills: 0 | Status: SHIPPED | OUTPATIENT
Start: 2022-01-01 | End: 2022-01-01

## 2022-01-01 RX ORDER — CHOLECALCIFEROL (VITAMIN D3) 125 MCG
5 CAPSULE ORAL NIGHTLY
Status: DISCONTINUED | OUTPATIENT
Start: 2022-01-01 | End: 2022-01-01 | Stop reason: HOSPADM

## 2022-01-01 RX ORDER — DEXTROSE MONOHYDRATE 25 G/50ML
25 INJECTION, SOLUTION INTRAVENOUS
Status: DISCONTINUED | OUTPATIENT
Start: 2022-01-01 | End: 2022-01-01

## 2022-01-01 RX ORDER — METOCLOPRAMIDE HYDROCHLORIDE 5 MG/ML
5 INJECTION INTRAMUSCULAR; INTRAVENOUS ONCE
Status: COMPLETED | OUTPATIENT
Start: 2022-01-01 | End: 2022-01-01

## 2022-01-01 RX ORDER — ALBUTEROL SULFATE 2.5 MG/3ML
2.5 SOLUTION RESPIRATORY (INHALATION) EVERY 6 HOURS PRN
Refills: 0 | Status: DISCONTINUED | OUTPATIENT
Start: 2022-01-01 | End: 2022-01-01

## 2022-01-01 RX ORDER — SODIUM CHLORIDE 0.9 % (FLUSH) 0.9 %
10 SYRINGE (ML) INJECTION EVERY 12 HOURS SCHEDULED
Status: DISCONTINUED | OUTPATIENT
Start: 2022-01-01 | End: 2022-01-01 | Stop reason: HOSPADM

## 2022-01-01 RX ORDER — POTASSIUM CHLORIDE 20 MEQ/1
20 TABLET, EXTENDED RELEASE ORAL DAILY
COMMUNITY
End: 2022-01-01 | Stop reason: SDUPTHER

## 2022-01-01 RX ORDER — HALOPERIDOL 5 MG/ML
0.5 INJECTION INTRAMUSCULAR EVERY 6 HOURS PRN
Status: DISCONTINUED | OUTPATIENT
Start: 2022-01-01 | End: 2022-01-01 | Stop reason: HOSPADM

## 2022-01-01 RX ORDER — DIGOXIN 0.25 MG/ML
500 INJECTION INTRAMUSCULAR; INTRAVENOUS ONCE
Status: COMPLETED | OUTPATIENT
Start: 2022-01-01 | End: 2022-01-01

## 2022-01-01 RX ORDER — ALBUTEROL SULFATE 2.5 MG/3ML
2.5 SOLUTION RESPIRATORY (INHALATION)
Status: DISCONTINUED | OUTPATIENT
Start: 2022-01-01 | End: 2022-01-01

## 2022-01-01 RX ORDER — ACETAMINOPHEN 160 MG/5ML
650 SOLUTION ORAL EVERY 6 HOURS PRN
Status: CANCELLED | OUTPATIENT
Start: 2022-01-01

## 2022-01-01 RX ORDER — LEVOTHYROXINE SODIUM 0.12 MG/1
125 TABLET ORAL
Status: DISCONTINUED | OUTPATIENT
Start: 2022-01-01 | End: 2022-01-01

## 2022-01-01 RX ORDER — METOPROLOL SUCCINATE 25 MG/1
25 TABLET, EXTENDED RELEASE ORAL DAILY
COMMUNITY
End: 2022-01-01 | Stop reason: HOSPADM

## 2022-01-01 RX ORDER — AMIODARONE HYDROCHLORIDE 200 MG/1
200 TABLET ORAL EVERY 8 HOURS
Status: DISCONTINUED | OUTPATIENT
Start: 2022-01-01 | End: 2022-01-01

## 2022-01-01 RX ORDER — IPRATROPIUM BROMIDE AND ALBUTEROL SULFATE 2.5; .5 MG/3ML; MG/3ML
3 SOLUTION RESPIRATORY (INHALATION) EVERY 4 HOURS PRN
Status: DISCONTINUED | OUTPATIENT
Start: 2022-01-01 | End: 2022-01-01 | Stop reason: SDUPTHER

## 2022-01-01 RX ORDER — DOCUSATE SODIUM 50 MG/5 ML
100 LIQUID (ML) ORAL 2 TIMES DAILY PRN
Status: CANCELLED | OUTPATIENT
Start: 2022-01-01

## 2022-01-01 RX ORDER — INSULIN LISPRO 100 [IU]/ML
10 INJECTION, SOLUTION INTRAVENOUS; SUBCUTANEOUS
Status: DISCONTINUED | OUTPATIENT
Start: 2022-01-01 | End: 2022-01-01 | Stop reason: HOSPADM

## 2022-01-01 RX ORDER — INSULIN LISPRO 100 [IU]/ML
0-14 INJECTION, SOLUTION INTRAVENOUS; SUBCUTANEOUS
Status: DISCONTINUED | OUTPATIENT
Start: 2022-01-01 | End: 2022-01-01

## 2022-01-01 RX ORDER — INSULIN LISPRO 100 [IU]/ML
0-14 INJECTION, SOLUTION INTRAVENOUS; SUBCUTANEOUS
Status: DISCONTINUED | OUTPATIENT
Start: 2022-01-01 | End: 2022-01-01 | Stop reason: HOSPADM

## 2022-01-01 RX ORDER — DULOXETIN HYDROCHLORIDE 30 MG/1
30 CAPSULE, DELAYED RELEASE ORAL DAILY
Status: DISCONTINUED | OUTPATIENT
Start: 2022-01-01 | End: 2022-01-01

## 2022-01-01 RX ADMIN — QUETIAPINE FUMARATE 12.5 MG: 25 TABLET ORAL at 23:05

## 2022-01-01 RX ADMIN — GABAPENTIN 600 MG: 300 CAPSULE ORAL at 22:42

## 2022-01-01 RX ADMIN — INSULIN HUMAN 5 UNITS: 100 INJECTION, SOLUTION PARENTERAL at 11:56

## 2022-01-01 RX ADMIN — METHYLPREDNISOLONE SODIUM SUCCINATE 125 MG: 125 INJECTION, POWDER, FOR SOLUTION INTRAMUSCULAR; INTRAVENOUS at 21:36

## 2022-01-01 RX ADMIN — FUROSEMIDE 40 MG: 10 INJECTION, SOLUTION INTRAMUSCULAR; INTRAVENOUS at 08:33

## 2022-01-01 RX ADMIN — NYSTATIN: 100000 POWDER TOPICAL at 13:52

## 2022-01-01 RX ADMIN — GABAPENTIN 600 MG: 300 CAPSULE ORAL at 13:27

## 2022-01-01 RX ADMIN — INSULIN DETEMIR 15 UNITS: 100 INJECTION, SOLUTION SUBCUTANEOUS at 11:38

## 2022-01-01 RX ADMIN — ASPIRIN 81 MG CHEWABLE TABLET 81 MG: 81 TABLET CHEWABLE at 08:43

## 2022-01-01 RX ADMIN — GABAPENTIN 600 MG: 300 CAPSULE ORAL at 05:20

## 2022-01-01 RX ADMIN — GABAPENTIN 600 MG: 300 CAPSULE ORAL at 21:09

## 2022-01-01 RX ADMIN — AMIODARONE HYDROCHLORIDE 200 MG: 200 TABLET ORAL at 15:42

## 2022-01-01 RX ADMIN — APIXABAN 5 MG: 5 TABLET, FILM COATED ORAL at 00:54

## 2022-01-01 RX ADMIN — DIAZEPAM 2.5 MG: 5 INJECTION, SOLUTION INTRAMUSCULAR; INTRAVENOUS at 00:56

## 2022-01-01 RX ADMIN — Medication 1 PACKET: at 11:24

## 2022-01-01 RX ADMIN — GABAPENTIN 600 MG: 300 CAPSULE ORAL at 13:52

## 2022-01-01 RX ADMIN — APIXABAN 5 MG: 5 TABLET, FILM COATED ORAL at 20:37

## 2022-01-01 RX ADMIN — DULOXETINE 30 MG: 30 CAPSULE, DELAYED RELEASE ORAL at 09:13

## 2022-01-01 RX ADMIN — ASPIRIN 81 MG CHEWABLE TABLET 81 MG: 81 TABLET CHEWABLE at 09:19

## 2022-01-01 RX ADMIN — MEROPENEM 1 G: 1 INJECTION, POWDER, FOR SOLUTION INTRAVENOUS at 08:09

## 2022-01-01 RX ADMIN — LEVOTHYROXINE SODIUM 112 MCG: 0.11 TABLET ORAL at 06:27

## 2022-01-01 RX ADMIN — NEBIVOLOL 2.5 MG: 5 TABLET ORAL at 08:20

## 2022-01-01 RX ADMIN — Medication 10 ML: at 22:28

## 2022-01-01 RX ADMIN — HALOPERIDOL LACTATE 5 MG: 5 INJECTION, SOLUTION INTRAMUSCULAR at 11:24

## 2022-01-01 RX ADMIN — TAZOBACTAM SODIUM AND PIPERACILLIN SODIUM 3.38 G: 375; 3 INJECTION, SOLUTION INTRAVENOUS at 22:03

## 2022-01-01 RX ADMIN — APIXABAN 5 MG: 5 TABLET, FILM COATED ORAL at 21:46

## 2022-01-01 RX ADMIN — FUROSEMIDE 20 MG: 20 TABLET ORAL at 13:27

## 2022-01-01 RX ADMIN — AMIODARONE HYDROCHLORIDE 400 MG: 200 TABLET ORAL at 09:02

## 2022-01-01 RX ADMIN — ASPIRIN 81 MG CHEWABLE TABLET 81 MG: 81 TABLET CHEWABLE at 08:24

## 2022-01-01 RX ADMIN — APIXABAN 5 MG: 5 TABLET, FILM COATED ORAL at 08:43

## 2022-01-01 RX ADMIN — QUETIAPINE FUMARATE 12.5 MG: 25 TABLET ORAL at 17:25

## 2022-01-01 RX ADMIN — NYSTATIN: 100000 POWDER TOPICAL at 21:09

## 2022-01-01 RX ADMIN — AMIODARONE HYDROCHLORIDE 200 MG: 200 TABLET ORAL at 23:04

## 2022-01-01 RX ADMIN — FUROSEMIDE 80 MG: 40 TABLET ORAL at 10:00

## 2022-01-01 RX ADMIN — LOPERAMIDE HYDROCHLORIDE 2 MG: 2 CAPSULE ORAL at 17:51

## 2022-01-01 RX ADMIN — AMIODARONE HYDROCHLORIDE 200 MG: 200 TABLET ORAL at 11:08

## 2022-01-01 RX ADMIN — INSULIN HUMAN 4 UNITS: 100 INJECTION, SOLUTION PARENTERAL at 06:16

## 2022-01-01 RX ADMIN — ACETAMINOPHEN 650 MG: 160 SOLUTION ORAL at 08:47

## 2022-01-01 RX ADMIN — GABAPENTIN 600 MG: 300 CAPSULE ORAL at 20:51

## 2022-01-01 RX ADMIN — IPRATROPIUM BROMIDE AND ALBUTEROL SULFATE 3 ML: .5; 3 SOLUTION RESPIRATORY (INHALATION) at 12:24

## 2022-01-01 RX ADMIN — ASPIRIN 300 MG: 300 SUPPOSITORY RECTAL at 09:40

## 2022-01-01 RX ADMIN — MORPHINE SULFATE 2 MG: 2 INJECTION, SOLUTION INTRAMUSCULAR; INTRAVENOUS at 15:27

## 2022-01-01 RX ADMIN — GABAPENTIN 600 MG: 300 CAPSULE ORAL at 06:27

## 2022-01-01 RX ADMIN — LEVOTHYROXINE SODIUM 112 MCG: 0.11 TABLET ORAL at 05:00

## 2022-01-01 RX ADMIN — IPRATROPIUM BROMIDE AND ALBUTEROL SULFATE 3 ML: .5; 3 SOLUTION RESPIRATORY (INHALATION) at 15:15

## 2022-01-01 RX ADMIN — DULOXETINE 30 MG: 30 CAPSULE, DELAYED RELEASE ORAL at 09:19

## 2022-01-01 RX ADMIN — Medication 10 ML: at 08:47

## 2022-01-01 RX ADMIN — METOPROLOL TARTRATE 50 MG: 50 TABLET, FILM COATED ORAL at 20:11

## 2022-01-01 RX ADMIN — APIXABAN 5 MG: 5 TABLET, FILM COATED ORAL at 21:06

## 2022-01-01 RX ADMIN — INSULIN HUMAN 3 UNITS: 100 INJECTION, SOLUTION PARENTERAL at 06:33

## 2022-01-01 RX ADMIN — FUROSEMIDE 20 MG: 20 TABLET ORAL at 17:48

## 2022-01-01 RX ADMIN — INSULIN LISPRO 5 UNITS: 100 INJECTION, SOLUTION INTRAVENOUS; SUBCUTANEOUS at 08:29

## 2022-01-01 RX ADMIN — INSULIN LISPRO 2 UNITS: 100 INJECTION, SOLUTION INTRAVENOUS; SUBCUTANEOUS at 09:17

## 2022-01-01 RX ADMIN — PRAVASTATIN SODIUM 40 MG: 40 TABLET ORAL at 08:16

## 2022-01-01 RX ADMIN — INSULIN LISPRO 3 UNITS: 100 INJECTION, SOLUTION INTRAVENOUS; SUBCUTANEOUS at 11:50

## 2022-01-01 RX ADMIN — GABAPENTIN 600 MG: 300 CAPSULE ORAL at 22:01

## 2022-01-01 RX ADMIN — INSULIN LISPRO 8 UNITS: 100 INJECTION, SOLUTION INTRAVENOUS; SUBCUTANEOUS at 17:13

## 2022-01-01 RX ADMIN — NYSTATIN: 100000 POWDER TOPICAL at 20:13

## 2022-01-01 RX ADMIN — INSULIN LISPRO 10 UNITS: 100 INJECTION, SOLUTION INTRAVENOUS; SUBCUTANEOUS at 17:19

## 2022-01-01 RX ADMIN — NYSTATIN: 100000 POWDER TOPICAL at 22:29

## 2022-01-01 RX ADMIN — INSULIN HUMAN 8 UNITS: 100 INJECTION, SOLUTION PARENTERAL at 18:21

## 2022-01-01 RX ADMIN — TAZOBACTAM SODIUM AND PIPERACILLIN SODIUM 3.38 G: 375; 3 INJECTION, SOLUTION INTRAVENOUS at 20:09

## 2022-01-01 RX ADMIN — SENNOSIDES 10 ML: 8.8 LIQUID ORAL at 11:32

## 2022-01-01 RX ADMIN — Medication 10 ML: at 08:43

## 2022-01-01 RX ADMIN — DOCUSATE SODIUM 100 MG: 50 LIQUID ORAL at 08:32

## 2022-01-01 RX ADMIN — POTASSIUM CHLORIDE 40 MEQ: 750 CAPSULE, EXTENDED RELEASE ORAL at 13:24

## 2022-01-01 RX ADMIN — AMIODARONE HYDROCHLORIDE 1 MG/MIN: 1.8 INJECTION, SOLUTION INTRAVENOUS at 14:14

## 2022-01-01 RX ADMIN — NYSTATIN: 100000 POWDER TOPICAL at 08:47

## 2022-01-01 RX ADMIN — INSULIN LISPRO 5 UNITS: 100 INJECTION, SOLUTION INTRAVENOUS; SUBCUTANEOUS at 16:52

## 2022-01-01 RX ADMIN — FUROSEMIDE 40 MG: 10 INJECTION, SOLUTION INTRAMUSCULAR; INTRAVENOUS at 11:09

## 2022-01-01 RX ADMIN — PREDNISONE 20 MG: 20 TABLET ORAL at 08:16

## 2022-01-01 RX ADMIN — GABAPENTIN 300 MG: 300 CAPSULE ORAL at 08:16

## 2022-01-01 RX ADMIN — FUROSEMIDE 40 MG: 10 INJECTION, SOLUTION INTRAMUSCULAR; INTRAVENOUS at 15:06

## 2022-01-01 RX ADMIN — PRAVASTATIN SODIUM 40 MG: 40 TABLET ORAL at 21:55

## 2022-01-01 RX ADMIN — INSULIN HUMAN 8 UNITS: 100 INJECTION, SOLUTION PARENTERAL at 23:32

## 2022-01-01 RX ADMIN — APIXABAN 5 MG: 5 TABLET, FILM COATED ORAL at 11:24

## 2022-01-01 RX ADMIN — GABAPENTIN 300 MG: 300 CAPSULE ORAL at 15:43

## 2022-01-01 RX ADMIN — BUDESONIDE 0.5 MG: 0.5 SUSPENSION RESPIRATORY (INHALATION) at 06:51

## 2022-01-01 RX ADMIN — INSULIN LISPRO 2 UNITS: 100 INJECTION, SOLUTION INTRAVENOUS; SUBCUTANEOUS at 16:40

## 2022-01-01 RX ADMIN — INSULIN DETEMIR 11 UNITS: 100 INJECTION, SOLUTION SUBCUTANEOUS at 08:10

## 2022-01-01 RX ADMIN — Medication 10 ML: at 09:18

## 2022-01-01 RX ADMIN — HEPARIN SODIUM 5000 UNITS: 5000 INJECTION INTRAVENOUS; SUBCUTANEOUS at 08:15

## 2022-01-01 RX ADMIN — DOCUSATE SODIUM 100 MG: 50 LIQUID ORAL at 16:04

## 2022-01-01 RX ADMIN — ASPIRIN 81 MG CHEWABLE TABLET 81 MG: 81 TABLET CHEWABLE at 08:09

## 2022-01-01 RX ADMIN — DULOXETINE 30 MG: 30 CAPSULE, DELAYED RELEASE ORAL at 09:02

## 2022-01-01 RX ADMIN — IOPAMIDOL 75 ML: 755 INJECTION, SOLUTION INTRAVENOUS at 12:10

## 2022-01-01 RX ADMIN — DOXYCYCLINE 100 MG: 100 INJECTION, POWDER, LYOPHILIZED, FOR SOLUTION INTRAVENOUS at 10:03

## 2022-01-01 RX ADMIN — MORPHINE SULFATE 2 MG: 2 INJECTION, SOLUTION INTRAMUSCULAR; INTRAVENOUS at 20:10

## 2022-01-01 RX ADMIN — Medication 10 ML: at 22:08

## 2022-01-01 RX ADMIN — IPRATROPIUM BROMIDE AND ALBUTEROL SULFATE 3 ML: .5; 3 SOLUTION RESPIRATORY (INHALATION) at 04:18

## 2022-01-01 RX ADMIN — INSULIN HUMAN 8 UNITS: 100 INJECTION, SOLUTION PARENTERAL at 11:17

## 2022-01-01 RX ADMIN — INSULIN DETEMIR 5 UNITS: 100 INJECTION, SOLUTION SUBCUTANEOUS at 11:10

## 2022-01-01 RX ADMIN — ALBUTEROL SULFATE 2.5 MG: 2.5 SOLUTION RESPIRATORY (INHALATION) at 02:22

## 2022-01-01 RX ADMIN — INSULIN DETEMIR 5 UNITS: 100 INJECTION, SOLUTION SUBCUTANEOUS at 09:17

## 2022-01-01 RX ADMIN — INSULIN LISPRO 2 UNITS: 100 INJECTION, SOLUTION INTRAVENOUS; SUBCUTANEOUS at 16:48

## 2022-01-01 RX ADMIN — TAZOBACTAM SODIUM AND PIPERACILLIN SODIUM 3.38 G: 375; 3 INJECTION, SOLUTION INTRAVENOUS at 03:30

## 2022-01-01 RX ADMIN — LORAZEPAM 0.5 MG: 2 INJECTION, SOLUTION INTRAMUSCULAR; INTRAVENOUS at 21:36

## 2022-01-01 RX ADMIN — IPRATROPIUM BROMIDE AND ALBUTEROL SULFATE 3 ML: .5; 3 SOLUTION RESPIRATORY (INHALATION) at 18:55

## 2022-01-01 RX ADMIN — BUDESONIDE 0.5 MG: 0.5 SUSPENSION RESPIRATORY (INHALATION) at 10:42

## 2022-01-01 RX ADMIN — ASPIRIN 81 MG CHEWABLE TABLET 81 MG: 81 TABLET CHEWABLE at 09:15

## 2022-01-01 RX ADMIN — Medication 10 ML: at 20:53

## 2022-01-01 RX ADMIN — Medication 10 ML: at 20:10

## 2022-01-01 RX ADMIN — LEVOTHYROXINE SODIUM 112 MCG: 0.11 TABLET ORAL at 05:33

## 2022-01-01 RX ADMIN — METOPROLOL TARTRATE 50 MG: 50 TABLET, FILM COATED ORAL at 09:59

## 2022-01-01 RX ADMIN — GABAPENTIN 600 MG: 300 CAPSULE ORAL at 20:12

## 2022-01-01 RX ADMIN — GABAPENTIN 200 MG: 100 CAPSULE ORAL at 11:08

## 2022-01-01 RX ADMIN — NEBIVOLOL 2.5 MG: 5 TABLET ORAL at 08:17

## 2022-01-01 RX ADMIN — LEVOTHYROXINE SODIUM 112 MCG: 0.11 TABLET ORAL at 06:16

## 2022-01-01 RX ADMIN — Medication 5 MG: at 20:49

## 2022-01-01 RX ADMIN — DOXYCYCLINE 100 MG: 100 INJECTION, POWDER, LYOPHILIZED, FOR SOLUTION INTRAVENOUS at 02:13

## 2022-01-01 RX ADMIN — Medication 10 ML: at 08:10

## 2022-01-01 RX ADMIN — APIXABAN 5 MG: 5 TABLET, FILM COATED ORAL at 22:20

## 2022-01-01 RX ADMIN — IPRATROPIUM BROMIDE AND ALBUTEROL SULFATE 3 ML: .5; 3 SOLUTION RESPIRATORY (INHALATION) at 15:31

## 2022-01-01 RX ADMIN — INSULIN DETEMIR 12 UNITS: 100 INJECTION, SOLUTION SUBCUTANEOUS at 22:05

## 2022-01-01 RX ADMIN — IPRATROPIUM BROMIDE AND ALBUTEROL SULFATE 3 ML: .5; 3 SOLUTION RESPIRATORY (INHALATION) at 22:19

## 2022-01-01 RX ADMIN — INSULIN HUMAN 8 UNITS: 100 INJECTION, SOLUTION PARENTERAL at 18:11

## 2022-01-01 RX ADMIN — SODIUM CHLORIDE 75 ML/HR: 4.5 INJECTION, SOLUTION INTRAVENOUS at 04:02

## 2022-01-01 RX ADMIN — INSULIN LISPRO 5 UNITS: 100 INJECTION, SOLUTION INTRAVENOUS; SUBCUTANEOUS at 11:37

## 2022-01-01 RX ADMIN — PRAVASTATIN SODIUM 40 MG: 40 TABLET ORAL at 22:42

## 2022-01-01 RX ADMIN — INSULIN DETEMIR 5 UNITS: 100 INJECTION, SOLUTION SUBCUTANEOUS at 09:13

## 2022-01-01 RX ADMIN — QUETIAPINE FUMARATE 12.5 MG: 25 TABLET ORAL at 21:10

## 2022-01-01 RX ADMIN — MORPHINE SULFATE 2 MG: 2 INJECTION, SOLUTION INTRAMUSCULAR; INTRAVENOUS at 22:31

## 2022-01-01 RX ADMIN — LEVOTHYROXINE SODIUM 112 MCG: 0.11 TABLET ORAL at 06:01

## 2022-01-01 RX ADMIN — DOXYCYCLINE 100 MG: 100 INJECTION, POWDER, LYOPHILIZED, FOR SOLUTION INTRAVENOUS at 05:23

## 2022-01-01 RX ADMIN — METOPROLOL TARTRATE 50 MG: 50 TABLET, FILM COATED ORAL at 08:31

## 2022-01-01 RX ADMIN — Medication 10 ML: at 08:51

## 2022-01-01 RX ADMIN — METOPROLOL TARTRATE 50 MG: 50 TABLET, FILM COATED ORAL at 08:09

## 2022-01-01 RX ADMIN — INSULIN HUMAN 3 UNITS: 100 INJECTION, SOLUTION PARENTERAL at 11:24

## 2022-01-01 RX ADMIN — INSULIN DETEMIR 15 UNITS: 100 INJECTION, SOLUTION SUBCUTANEOUS at 08:16

## 2022-01-01 RX ADMIN — IPRATROPIUM BROMIDE AND ALBUTEROL SULFATE 3 ML: .5; 3 SOLUTION RESPIRATORY (INHALATION) at 10:42

## 2022-01-01 RX ADMIN — Medication 1 PACKET: at 08:52

## 2022-01-01 RX ADMIN — ALBUMIN HUMAN 25 G: 0.25 SOLUTION INTRAVENOUS at 20:09

## 2022-01-01 RX ADMIN — MORPHINE SULFATE 2 MG: 2 INJECTION, SOLUTION INTRAMUSCULAR; INTRAVENOUS at 08:38

## 2022-01-01 RX ADMIN — HYDROXYZINE HYDROCHLORIDE 25 MG: 25 TABLET ORAL at 08:37

## 2022-01-01 RX ADMIN — FUROSEMIDE 80 MG: 10 INJECTION, SOLUTION INTRAMUSCULAR; INTRAVENOUS at 08:16

## 2022-01-01 RX ADMIN — INSULIN DETEMIR 10 UNITS: 100 INJECTION, SOLUTION SUBCUTANEOUS at 09:55

## 2022-01-01 RX ADMIN — MORPHINE SULFATE 2 MG: 2 INJECTION, SOLUTION INTRAMUSCULAR; INTRAVENOUS at 11:16

## 2022-01-01 RX ADMIN — GABAPENTIN 600 MG: 300 CAPSULE ORAL at 05:55

## 2022-01-01 RX ADMIN — ZIPRASIDONE MESYLATE 5 MG: 20 INJECTION, POWDER, LYOPHILIZED, FOR SOLUTION INTRAMUSCULAR at 09:36

## 2022-01-01 RX ADMIN — HALOPERIDOL LACTATE 5 MG: 5 INJECTION, SOLUTION INTRAMUSCULAR at 03:58

## 2022-01-01 RX ADMIN — Medication 10 ML: at 11:09

## 2022-01-01 RX ADMIN — HALOPERIDOL LACTATE 5 MG: 5 INJECTION, SOLUTION INTRAMUSCULAR at 17:49

## 2022-01-01 RX ADMIN — MEROPENEM 1 G: 1 INJECTION, POWDER, FOR SOLUTION INTRAVENOUS at 21:09

## 2022-01-01 RX ADMIN — Medication 10 ML: at 08:23

## 2022-01-01 RX ADMIN — IPRATROPIUM BROMIDE AND ALBUTEROL SULFATE 3 ML: .5; 3 SOLUTION RESPIRATORY (INHALATION) at 06:54

## 2022-01-01 RX ADMIN — GABAPENTIN 600 MG: 300 CAPSULE ORAL at 05:52

## 2022-01-01 RX ADMIN — ACETAMINOPHEN 650 MG: 160 SOLUTION ORAL at 17:02

## 2022-01-01 RX ADMIN — GABAPENTIN 300 MG: 300 CAPSULE ORAL at 21:31

## 2022-01-01 RX ADMIN — AMIODARONE HYDROCHLORIDE 150 MG: 1.5 INJECTION, SOLUTION INTRAVENOUS at 13:06

## 2022-01-01 RX ADMIN — Medication 5 MG: at 21:31

## 2022-01-01 RX ADMIN — Medication 10 ML: at 21:55

## 2022-01-01 RX ADMIN — MORPHINE SULFATE 2 MG: 2 INJECTION, SOLUTION INTRAMUSCULAR; INTRAVENOUS at 08:17

## 2022-01-01 RX ADMIN — SODIUM CHLORIDE 1 G: 9 INJECTION, SOLUTION INTRAVENOUS at 02:13

## 2022-01-01 RX ADMIN — HYDROMORPHONE HYDROCHLORIDE 0.5 MG: 1 INJECTION, SOLUTION INTRAMUSCULAR; INTRAVENOUS; SUBCUTANEOUS at 21:05

## 2022-01-01 RX ADMIN — HEPARIN SODIUM 5000 UNITS: 5000 INJECTION INTRAVENOUS; SUBCUTANEOUS at 10:00

## 2022-01-01 RX ADMIN — ACETAMINOPHEN 650 MG: 160 SOLUTION ORAL at 23:20

## 2022-01-01 RX ADMIN — PRAVASTATIN SODIUM 40 MG: 40 TABLET ORAL at 20:38

## 2022-01-01 RX ADMIN — APIXABAN 5 MG: 5 TABLET, FILM COATED ORAL at 20:51

## 2022-01-01 RX ADMIN — TAZOBACTAM SODIUM AND PIPERACILLIN SODIUM 3.38 G: 375; 3 INJECTION, SOLUTION INTRAVENOUS at 05:20

## 2022-01-01 RX ADMIN — DIAZEPAM 2.5 MG: 5 INJECTION, SOLUTION INTRAMUSCULAR; INTRAVENOUS at 17:41

## 2022-01-01 RX ADMIN — GABAPENTIN 600 MG: 300 CAPSULE ORAL at 13:00

## 2022-01-01 RX ADMIN — INSULIN HUMAN 8 UNITS: 100 INJECTION, SOLUTION PARENTERAL at 11:46

## 2022-01-01 RX ADMIN — METOPROLOL TARTRATE 50 MG: 50 TABLET, FILM COATED ORAL at 21:06

## 2022-01-01 RX ADMIN — MINERAL OIL: 1000 LIQUID ORAL at 21:52

## 2022-01-01 RX ADMIN — QUETIAPINE FUMARATE 12.5 MG: 25 TABLET ORAL at 21:55

## 2022-01-01 RX ADMIN — DICLOFENAC 2 G: 10 GEL TOPICAL at 13:38

## 2022-01-01 RX ADMIN — INSULIN HUMAN 8 UNITS: 100 INJECTION, SOLUTION PARENTERAL at 05:54

## 2022-01-01 RX ADMIN — FUROSEMIDE 40 MG: 10 INJECTION, SOLUTION INTRAMUSCULAR; INTRAVENOUS at 11:56

## 2022-01-01 RX ADMIN — INSULIN LISPRO 6 UNITS: 100 INJECTION, SOLUTION INTRAVENOUS; SUBCUTANEOUS at 08:16

## 2022-01-01 RX ADMIN — HALOPERIDOL LACTATE 5 MG: 5 INJECTION, SOLUTION INTRAMUSCULAR at 14:42

## 2022-01-01 RX ADMIN — Medication 10 ML: at 20:12

## 2022-01-01 RX ADMIN — METOPROLOL TARTRATE 25 MG: 25 TABLET, FILM COATED ORAL at 20:13

## 2022-01-01 RX ADMIN — HEPARIN SODIUM 5000 UNITS: 5000 INJECTION INTRAVENOUS; SUBCUTANEOUS at 20:49

## 2022-01-01 RX ADMIN — GABAPENTIN 600 MG: 300 CAPSULE ORAL at 14:00

## 2022-01-01 RX ADMIN — NYSTATIN 1 APPLICATION: 100000 POWDER TOPICAL at 21:34

## 2022-01-01 RX ADMIN — METOPROLOL TARTRATE 50 MG: 50 TABLET, FILM COATED ORAL at 08:19

## 2022-01-01 RX ADMIN — APIXABAN 5 MG: 5 TABLET, FILM COATED ORAL at 09:15

## 2022-01-01 RX ADMIN — INSULIN DETEMIR 5 UNITS: 100 INJECTION, SOLUTION SUBCUTANEOUS at 09:27

## 2022-01-01 RX ADMIN — TAZOBACTAM SODIUM AND PIPERACILLIN SODIUM 3.38 G: 375; 3 INJECTION, SOLUTION INTRAVENOUS at 11:33

## 2022-01-01 RX ADMIN — DIAZEPAM 5 MG: 5 INJECTION, SOLUTION INTRAMUSCULAR; INTRAVENOUS at 18:13

## 2022-01-01 RX ADMIN — INSULIN DETEMIR 10 UNITS: 100 INJECTION, SOLUTION SUBCUTANEOUS at 21:48

## 2022-01-01 RX ADMIN — MORPHINE SULFATE 2 MG: 2 INJECTION, SOLUTION INTRAMUSCULAR; INTRAVENOUS at 23:20

## 2022-01-01 RX ADMIN — NYSTATIN: 100000 POWDER TOPICAL at 09:15

## 2022-01-01 RX ADMIN — Medication 10 ML: at 10:10

## 2022-01-01 RX ADMIN — INSULIN DETEMIR 5 UNITS: 100 INJECTION, SOLUTION SUBCUTANEOUS at 20:10

## 2022-01-01 RX ADMIN — QUETIAPINE FUMARATE 12.5 MG: 25 TABLET ORAL at 22:05

## 2022-01-01 RX ADMIN — Medication 1 PACKET: at 08:03

## 2022-01-01 RX ADMIN — APIXABAN 5 MG: 5 TABLET, FILM COATED ORAL at 09:13

## 2022-01-01 RX ADMIN — MORPHINE SULFATE 2 MG: 2 INJECTION, SOLUTION INTRAMUSCULAR; INTRAVENOUS at 01:00

## 2022-01-01 RX ADMIN — HALOPERIDOL LACTATE 5 MG: 5 INJECTION, SOLUTION INTRAMUSCULAR at 12:52

## 2022-01-01 RX ADMIN — QUETIAPINE FUMARATE 25 MG: 25 TABLET ORAL at 00:56

## 2022-01-01 RX ADMIN — MORPHINE SULFATE 2 MG: 2 INJECTION, SOLUTION INTRAMUSCULAR; INTRAVENOUS at 13:08

## 2022-01-01 RX ADMIN — BUDESONIDE 0.5 MG: 0.5 SUSPENSION RESPIRATORY (INHALATION) at 20:16

## 2022-01-01 RX ADMIN — NYSTATIN: 100000 POWDER TOPICAL at 21:47

## 2022-01-01 RX ADMIN — MORPHINE SULFATE 2 MG: 2 INJECTION, SOLUTION INTRAMUSCULAR; INTRAVENOUS at 19:43

## 2022-01-01 RX ADMIN — MORPHINE SULFATE 2 MG: 2 INJECTION, SOLUTION INTRAMUSCULAR; INTRAVENOUS at 17:29

## 2022-01-01 RX ADMIN — QUETIAPINE FUMARATE 25 MG: 25 TABLET ORAL at 20:10

## 2022-01-01 RX ADMIN — DOXYCYCLINE 100 MG: 100 INJECTION, POWDER, LYOPHILIZED, FOR SOLUTION INTRAVENOUS at 11:06

## 2022-01-01 RX ADMIN — APIXABAN 5 MG: 5 TABLET, FILM COATED ORAL at 23:08

## 2022-01-01 RX ADMIN — QUETIAPINE FUMARATE 12.5 MG: 25 TABLET ORAL at 20:37

## 2022-01-01 RX ADMIN — BUDESONIDE 0.5 MG: 0.5 SUSPENSION RESPIRATORY (INHALATION) at 19:11

## 2022-01-01 RX ADMIN — IPRATROPIUM BROMIDE AND ALBUTEROL SULFATE 3 ML: .5; 3 SOLUTION RESPIRATORY (INHALATION) at 23:27

## 2022-01-01 RX ADMIN — APIXABAN 5 MG: 5 TABLET, FILM COATED ORAL at 11:08

## 2022-01-01 RX ADMIN — APIXABAN 5 MG: 5 TABLET, FILM COATED ORAL at 21:12

## 2022-01-01 RX ADMIN — ASPIRIN 325 MG ORAL TABLET 325 MG: 325 PILL ORAL at 10:00

## 2022-01-01 RX ADMIN — INSULIN DETEMIR 5 UNITS: 100 INJECTION, SOLUTION SUBCUTANEOUS at 20:50

## 2022-01-01 RX ADMIN — AMIODARONE HYDROCHLORIDE 200 MG: 200 TABLET ORAL at 08:38

## 2022-01-01 RX ADMIN — METOPROLOL TARTRATE 50 MG: 50 TABLET, FILM COATED ORAL at 22:01

## 2022-01-01 RX ADMIN — GABAPENTIN 400 MG: 400 CAPSULE ORAL at 05:49

## 2022-01-01 RX ADMIN — ACETAMINOPHEN 650 MG: 160 SOLUTION ORAL at 08:32

## 2022-01-01 RX ADMIN — MORPHINE SULFATE 2 MG: 2 INJECTION, SOLUTION INTRAMUSCULAR; INTRAVENOUS at 16:03

## 2022-01-01 RX ADMIN — GABAPENTIN 300 MG: 300 CAPSULE ORAL at 20:49

## 2022-01-01 RX ADMIN — PRAVASTATIN SODIUM 40 MG: 40 TABLET ORAL at 20:51

## 2022-01-01 RX ADMIN — AMIODARONE HYDROCHLORIDE 1 MG/MIN: 1.8 INJECTION, SOLUTION INTRAVENOUS at 08:20

## 2022-01-01 RX ADMIN — FUROSEMIDE 40 MG: 10 INJECTION, SOLUTION INTRAMUSCULAR; INTRAVENOUS at 21:06

## 2022-01-01 RX ADMIN — Medication 10 ML: at 09:16

## 2022-01-01 RX ADMIN — INSULIN DETEMIR 10 UNITS: 100 INJECTION, SOLUTION SUBCUTANEOUS at 21:06

## 2022-01-01 RX ADMIN — LEVOTHYROXINE SODIUM 112 MCG: 0.11 TABLET ORAL at 05:06

## 2022-01-01 RX ADMIN — GABAPENTIN 600 MG: 300 CAPSULE ORAL at 21:06

## 2022-01-01 RX ADMIN — APIXABAN 5 MG: 5 TABLET, FILM COATED ORAL at 08:49

## 2022-01-01 RX ADMIN — HEPARIN SODIUM 5000 UNITS: 5000 INJECTION INTRAVENOUS; SUBCUTANEOUS at 08:16

## 2022-01-01 RX ADMIN — INSULIN DETEMIR 10 UNITS: 100 INJECTION, SOLUTION SUBCUTANEOUS at 11:34

## 2022-01-01 RX ADMIN — INSULIN LISPRO 2 UNITS: 100 INJECTION, SOLUTION INTRAVENOUS; SUBCUTANEOUS at 09:13

## 2022-01-01 RX ADMIN — FUROSEMIDE 20 MG: 20 TABLET ORAL at 08:32

## 2022-01-01 RX ADMIN — ALBUTEROL SULFATE 2.5 MG: 2.5 SOLUTION RESPIRATORY (INHALATION) at 10:48

## 2022-01-01 RX ADMIN — INSULIN LISPRO 2 UNITS: 100 INJECTION, SOLUTION INTRAVENOUS; SUBCUTANEOUS at 13:35

## 2022-01-01 RX ADMIN — Medication 10 ML: at 22:13

## 2022-01-01 RX ADMIN — GABAPENTIN 600 MG: 300 CAPSULE ORAL at 20:39

## 2022-01-01 RX ADMIN — METHYLPREDNISOLONE SODIUM SUCCINATE 40 MG: 40 INJECTION, POWDER, FOR SOLUTION INTRAMUSCULAR; INTRAVENOUS at 10:03

## 2022-01-01 RX ADMIN — INSULIN LISPRO 4 UNITS: 100 INJECTION, SOLUTION INTRAVENOUS; SUBCUTANEOUS at 18:01

## 2022-01-01 RX ADMIN — LEVOTHYROXINE SODIUM 112 MCG: 0.11 TABLET ORAL at 05:48

## 2022-01-01 RX ADMIN — ASPIRIN 81 MG CHEWABLE TABLET 81 MG: 81 TABLET CHEWABLE at 11:08

## 2022-01-01 RX ADMIN — GABAPENTIN 600 MG: 300 CAPSULE ORAL at 05:57

## 2022-01-01 RX ADMIN — IPRATROPIUM BROMIDE AND ALBUTEROL SULFATE 3 ML: .5; 3 SOLUTION RESPIRATORY (INHALATION) at 11:25

## 2022-01-01 RX ADMIN — GABAPENTIN 600 MG: 300 CAPSULE ORAL at 09:12

## 2022-01-01 RX ADMIN — GABAPENTIN 600 MG: 300 CAPSULE ORAL at 06:01

## 2022-01-01 RX ADMIN — BUDESONIDE 0.5 MG: 0.5 SUSPENSION RESPIRATORY (INHALATION) at 06:53

## 2022-01-01 RX ADMIN — DICLOFENAC 2 G: 10 GEL TOPICAL at 18:27

## 2022-01-01 RX ADMIN — LEVOTHYROXINE SODIUM 112 MCG: 0.11 TABLET ORAL at 05:52

## 2022-01-01 RX ADMIN — HYDROXYZINE HYDROCHLORIDE 10 MG: 10 TABLET ORAL at 12:42

## 2022-01-01 RX ADMIN — INSULIN HUMAN 5 UNITS: 100 INJECTION, SOLUTION PARENTERAL at 11:46

## 2022-01-01 RX ADMIN — INSULIN HUMAN 10 UNITS: 100 INJECTION, SOLUTION PARENTERAL at 11:35

## 2022-01-01 RX ADMIN — FUROSEMIDE 20 MG: 10 INJECTION INTRAMUSCULAR; INTRAVENOUS at 01:37

## 2022-01-01 RX ADMIN — Medication 10 ML: at 21:17

## 2022-01-01 RX ADMIN — GABAPENTIN 300 MG: 300 CAPSULE ORAL at 17:08

## 2022-01-01 RX ADMIN — DOXYCYCLINE 100 MG: 100 INJECTION, POWDER, LYOPHILIZED, FOR SOLUTION INTRAVENOUS at 06:10

## 2022-01-01 RX ADMIN — DICLOFENAC 2 G: 10 GEL TOPICAL at 20:14

## 2022-01-01 RX ADMIN — LEVOTHYROXINE SODIUM 112 MCG: 0.11 TABLET ORAL at 05:55

## 2022-01-01 RX ADMIN — MORPHINE SULFATE 2 MG: 2 INJECTION, SOLUTION INTRAMUSCULAR; INTRAVENOUS at 15:02

## 2022-01-01 RX ADMIN — INSULIN HUMAN 8 UNITS: 100 INJECTION, SOLUTION PARENTERAL at 17:25

## 2022-01-01 RX ADMIN — MORPHINE SULFATE 2 MG: 2 INJECTION, SOLUTION INTRAMUSCULAR; INTRAVENOUS at 03:52

## 2022-01-01 RX ADMIN — PRAVASTATIN SODIUM 40 MG: 40 TABLET ORAL at 20:14

## 2022-01-01 RX ADMIN — DICLOFENAC 2 G: 10 GEL TOPICAL at 09:15

## 2022-01-01 RX ADMIN — INSULIN LISPRO 9 UNITS: 100 INJECTION, SOLUTION INTRAVENOUS; SUBCUTANEOUS at 11:39

## 2022-01-01 RX ADMIN — ASPIRIN 81 MG CHEWABLE TABLET 81 MG: 81 TABLET CHEWABLE at 08:31

## 2022-01-01 RX ADMIN — PRAVASTATIN SODIUM 40 MG: 40 TABLET ORAL at 22:05

## 2022-01-01 RX ADMIN — DICLOFENAC 2 G: 10 GEL TOPICAL at 08:23

## 2022-01-01 RX ADMIN — MORPHINE SULFATE 4 MG: 4 INJECTION, SOLUTION INTRAMUSCULAR; INTRAVENOUS at 15:53

## 2022-01-01 RX ADMIN — MORPHINE SULFATE 2 MG: 2 INJECTION, SOLUTION INTRAMUSCULAR; INTRAVENOUS at 01:59

## 2022-01-01 RX ADMIN — MORPHINE SULFATE 2 MG: 2 INJECTION, SOLUTION INTRAMUSCULAR; INTRAVENOUS at 18:24

## 2022-01-01 RX ADMIN — METOPROLOL TARTRATE 2.5 MG: 5 INJECTION INTRAVENOUS at 20:52

## 2022-01-01 RX ADMIN — APIXABAN 5 MG: 5 TABLET, FILM COATED ORAL at 08:09

## 2022-01-01 RX ADMIN — AMIODARONE HYDROCHLORIDE 1 MG/MIN: 1.8 INJECTION, SOLUTION INTRAVENOUS at 02:04

## 2022-01-01 RX ADMIN — DIGOXIN 500 MCG: 0.25 INJECTION INTRAMUSCULAR; INTRAVENOUS at 01:54

## 2022-01-01 RX ADMIN — SODIUM CHLORIDE 1 G: 900 INJECTION INTRAVENOUS at 08:51

## 2022-01-01 RX ADMIN — DOCUSATE SODIUM 100 MG: 50 LIQUID ORAL at 21:06

## 2022-01-01 RX ADMIN — METOPROLOL TARTRATE 2.5 MG: 5 INJECTION INTRAVENOUS at 01:44

## 2022-01-01 RX ADMIN — QUETIAPINE FUMARATE 12.5 MG: 25 TABLET ORAL at 22:02

## 2022-01-01 RX ADMIN — MORPHINE SULFATE 2 MG: 2 INJECTION, SOLUTION INTRAMUSCULAR; INTRAVENOUS at 23:41

## 2022-01-01 RX ADMIN — MORPHINE SULFATE 4 MG: 4 INJECTION, SOLUTION INTRAMUSCULAR; INTRAVENOUS at 13:34

## 2022-01-01 RX ADMIN — INSULIN HUMAN 3 UNITS: 100 INJECTION, SOLUTION PARENTERAL at 06:50

## 2022-01-01 RX ADMIN — Medication 1 PACKET: at 08:19

## 2022-01-01 RX ADMIN — LEVOTHYROXINE SODIUM 112 MCG: 0.11 TABLET ORAL at 05:57

## 2022-01-01 RX ADMIN — ASPIRIN 81 MG CHEWABLE TABLET 81 MG: 81 TABLET CHEWABLE at 08:19

## 2022-01-01 RX ADMIN — GABAPENTIN 600 MG: 300 CAPSULE ORAL at 21:46

## 2022-01-01 RX ADMIN — DICLOFENAC 2 G: 10 GEL TOPICAL at 17:57

## 2022-01-01 RX ADMIN — DOXYCYCLINE 100 MG: 100 INJECTION, POWDER, LYOPHILIZED, FOR SOLUTION INTRAVENOUS at 22:31

## 2022-01-01 RX ADMIN — MINERAL OIL: 1000 LIQUID ORAL at 18:13

## 2022-01-01 RX ADMIN — TAZOBACTAM SODIUM AND PIPERACILLIN SODIUM 3.38 G: 375; 3 INJECTION, SOLUTION INTRAVENOUS at 11:56

## 2022-01-01 RX ADMIN — INSULIN LISPRO 2 UNITS: 100 INJECTION, SOLUTION INTRAVENOUS; SUBCUTANEOUS at 12:29

## 2022-01-01 RX ADMIN — MAGNESIUM SULFATE 1 G: 1 INJECTION INTRAVENOUS at 21:36

## 2022-01-01 RX ADMIN — INSULIN HUMAN 10 UNITS: 100 INJECTION, SOLUTION PARENTERAL at 00:18

## 2022-01-01 RX ADMIN — ALBUMIN HUMAN 25 G: 0.25 SOLUTION INTRAVENOUS at 14:52

## 2022-01-01 RX ADMIN — SODIUM CHLORIDE 75 ML/HR: 4.5 INJECTION, SOLUTION INTRAVENOUS at 16:16

## 2022-01-01 RX ADMIN — DICLOFENAC 2 G: 10 GEL TOPICAL at 22:13

## 2022-01-01 RX ADMIN — INSULIN LISPRO 4 UNITS: 100 INJECTION, SOLUTION INTRAVENOUS; SUBCUTANEOUS at 09:20

## 2022-01-01 RX ADMIN — NEBIVOLOL 2.5 MG: 5 TABLET ORAL at 10:00

## 2022-01-01 RX ADMIN — ACETAMINOPHEN 650 MG: 160 SOLUTION ORAL at 00:47

## 2022-01-01 RX ADMIN — ALBUTEROL SULFATE 2.5 MG: 2.5 SOLUTION RESPIRATORY (INHALATION) at 18:34

## 2022-01-01 RX ADMIN — DICLOFENAC 2 G: 10 GEL TOPICAL at 17:30

## 2022-01-01 RX ADMIN — Medication 10 ML: at 08:30

## 2022-01-01 RX ADMIN — AMIODARONE HYDROCHLORIDE 1 MG/MIN: 1.8 INJECTION, SOLUTION INTRAVENOUS at 13:56

## 2022-01-01 RX ADMIN — INSULIN LISPRO 3 UNITS: 100 INJECTION, SOLUTION INTRAVENOUS; SUBCUTANEOUS at 11:09

## 2022-01-01 RX ADMIN — INSULIN LISPRO 2 UNITS: 100 INJECTION, SOLUTION INTRAVENOUS; SUBCUTANEOUS at 11:29

## 2022-01-01 RX ADMIN — ASPIRIN 81 MG CHEWABLE TABLET 81 MG: 81 TABLET CHEWABLE at 09:02

## 2022-01-01 RX ADMIN — METOPROLOL TARTRATE 25 MG: 25 TABLET, FILM COATED ORAL at 12:41

## 2022-01-01 RX ADMIN — GABAPENTIN 600 MG: 300 CAPSULE ORAL at 05:00

## 2022-01-01 RX ADMIN — LEVOTHYROXINE SODIUM 125 MCG: 0.12 TABLET ORAL at 06:05

## 2022-01-01 RX ADMIN — ACETAMINOPHEN 650 MG: 160 SOLUTION ORAL at 08:08

## 2022-01-01 RX ADMIN — HYDROXYZINE HYDROCHLORIDE 25 MG: 25 TABLET ORAL at 20:14

## 2022-01-01 RX ADMIN — INSULIN LISPRO 3 UNITS: 100 INJECTION, SOLUTION INTRAVENOUS; SUBCUTANEOUS at 12:41

## 2022-01-01 RX ADMIN — TAZOBACTAM SODIUM AND PIPERACILLIN SODIUM 3.38 G: 375; 3 INJECTION, SOLUTION INTRAVENOUS at 04:45

## 2022-01-01 RX ADMIN — PRAVASTATIN SODIUM 40 MG: 40 TABLET ORAL at 23:04

## 2022-01-01 RX ADMIN — FUROSEMIDE 80 MG: 40 TABLET ORAL at 08:17

## 2022-01-01 RX ADMIN — INSULIN DETEMIR 5 UNITS: 100 INJECTION, SOLUTION SUBCUTANEOUS at 08:24

## 2022-01-01 RX ADMIN — MEROPENEM 1 G: 1 INJECTION, POWDER, FOR SOLUTION INTRAVENOUS at 08:49

## 2022-01-01 RX ADMIN — ALBUTEROL SULFATE 2.5 MG: 2.5 SOLUTION RESPIRATORY (INHALATION) at 15:52

## 2022-01-01 RX ADMIN — IPRATROPIUM BROMIDE AND ALBUTEROL SULFATE 3 ML: .5; 3 SOLUTION RESPIRATORY (INHALATION) at 06:51

## 2022-01-01 RX ADMIN — AMIODARONE HYDROCHLORIDE 200 MG: 200 TABLET ORAL at 09:19

## 2022-01-01 RX ADMIN — METOPROLOL TARTRATE 5 MG: 5 INJECTION INTRAVENOUS at 05:58

## 2022-01-01 RX ADMIN — ASPIRIN 325 MG ORAL TABLET 325 MG: 325 PILL ORAL at 08:16

## 2022-01-01 RX ADMIN — PRAVASTATIN SODIUM 40 MG: 40 TABLET ORAL at 21:10

## 2022-01-01 RX ADMIN — IPRATROPIUM BROMIDE AND ALBUTEROL SULFATE 3 ML: .5; 3 SOLUTION RESPIRATORY (INHALATION) at 11:17

## 2022-01-01 RX ADMIN — TRAMADOL HYDROCHLORIDE 50 MG: 50 TABLET, COATED ORAL at 21:31

## 2022-01-01 RX ADMIN — APIXABAN 5 MG: 5 TABLET, FILM COATED ORAL at 20:10

## 2022-01-01 RX ADMIN — PRAVASTATIN SODIUM 40 MG: 40 TABLET ORAL at 21:46

## 2022-01-01 RX ADMIN — BUDESONIDE 0.5 MG: 0.5 SUSPENSION RESPIRATORY (INHALATION) at 11:21

## 2022-01-01 RX ADMIN — Medication 10 ML: at 10:00

## 2022-01-01 RX ADMIN — GABAPENTIN 400 MG: 400 CAPSULE ORAL at 21:55

## 2022-01-01 RX ADMIN — BUDESONIDE 0.5 MG: 0.5 SUSPENSION RESPIRATORY (INHALATION) at 06:40

## 2022-01-01 RX ADMIN — ASPIRIN 81 MG CHEWABLE TABLET 81 MG: 81 TABLET CHEWABLE at 08:03

## 2022-01-01 RX ADMIN — IPRATROPIUM BROMIDE AND ALBUTEROL SULFATE 3 ML: .5; 3 SOLUTION RESPIRATORY (INHALATION) at 01:12

## 2022-01-01 RX ADMIN — Medication 1 PACKET: at 10:57

## 2022-01-01 RX ADMIN — INSULIN DETEMIR 11 UNITS: 100 INJECTION, SOLUTION SUBCUTANEOUS at 21:18

## 2022-01-01 RX ADMIN — APIXABAN 5 MG: 5 TABLET, FILM COATED ORAL at 21:55

## 2022-01-01 RX ADMIN — APIXABAN 5 MG: 5 TABLET, FILM COATED ORAL at 10:55

## 2022-01-01 RX ADMIN — Medication 1 PACKET: at 08:09

## 2022-01-01 RX ADMIN — QUETIAPINE FUMARATE 12.5 MG: 25 TABLET ORAL at 20:13

## 2022-01-01 RX ADMIN — GABAPENTIN 600 MG: 300 CAPSULE ORAL at 15:06

## 2022-01-01 RX ADMIN — METOPROLOL TARTRATE 25 MG: 25 TABLET, FILM COATED ORAL at 00:55

## 2022-01-01 RX ADMIN — INSULIN DETEMIR 5 UNITS: 100 INJECTION, SOLUTION SUBCUTANEOUS at 09:03

## 2022-01-01 RX ADMIN — METOPROLOL TARTRATE 50 MG: 50 TABLET, FILM COATED ORAL at 21:12

## 2022-01-01 RX ADMIN — SODIUM CHLORIDE 1000 ML: 9 INJECTION, SOLUTION INTRAVENOUS at 13:48

## 2022-01-01 RX ADMIN — INSULIN DETEMIR 12 UNITS: 100 INJECTION, SOLUTION SUBCUTANEOUS at 08:31

## 2022-01-01 RX ADMIN — ASPIRIN 81 MG CHEWABLE TABLET 81 MG: 81 TABLET CHEWABLE at 10:57

## 2022-01-01 RX ADMIN — SENNOSIDES 10 ML: 8.8 LIQUID ORAL at 21:07

## 2022-01-01 RX ADMIN — ASPIRIN 81 MG CHEWABLE TABLET 81 MG: 81 TABLET CHEWABLE at 10:56

## 2022-01-01 RX ADMIN — AMIODARONE HYDROCHLORIDE 1 MG/MIN: 1.8 INJECTION, SOLUTION INTRAVENOUS at 20:37

## 2022-01-01 RX ADMIN — ZIPRASIDONE MESYLATE 5 MG: 20 INJECTION, POWDER, LYOPHILIZED, FOR SOLUTION INTRAMUSCULAR at 16:32

## 2022-01-01 RX ADMIN — INSULIN DETEMIR 5 UNITS: 100 INJECTION, SOLUTION SUBCUTANEOUS at 09:19

## 2022-01-01 RX ADMIN — ASPIRIN 81 MG CHEWABLE TABLET 81 MG: 81 TABLET CHEWABLE at 08:49

## 2022-01-01 RX ADMIN — FUROSEMIDE 40 MG: 10 INJECTION, SOLUTION INTRAMUSCULAR; INTRAVENOUS at 15:38

## 2022-01-01 RX ADMIN — INSULIN LISPRO 3 UNITS: 100 INJECTION, SOLUTION INTRAVENOUS; SUBCUTANEOUS at 08:23

## 2022-01-01 RX ADMIN — INSULIN LISPRO 2 UNITS: 100 INJECTION, SOLUTION INTRAVENOUS; SUBCUTANEOUS at 17:29

## 2022-01-01 RX ADMIN — METOPROLOL TARTRATE 50 MG: 50 TABLET, FILM COATED ORAL at 08:49

## 2022-01-01 RX ADMIN — MORPHINE SULFATE 2 MG: 2 INJECTION, SOLUTION INTRAMUSCULAR; INTRAVENOUS at 11:45

## 2022-01-01 RX ADMIN — GABAPENTIN 600 MG: 300 CAPSULE ORAL at 15:35

## 2022-01-01 RX ADMIN — METOPROLOL TARTRATE 5 MG: 5 INJECTION INTRAVENOUS at 10:07

## 2022-01-01 RX ADMIN — Medication 10 ML: at 20:16

## 2022-01-01 RX ADMIN — INSULIN HUMAN 3 UNITS: 100 INJECTION, SOLUTION PARENTERAL at 17:25

## 2022-01-01 RX ADMIN — APIXABAN 5 MG: 5 TABLET, FILM COATED ORAL at 20:14

## 2022-01-01 RX ADMIN — FLUCONAZOLE 200 MG: 200 INJECTION, SOLUTION INTRAVENOUS at 08:44

## 2022-01-01 RX ADMIN — DOXYCYCLINE 100 MG: 100 INJECTION, POWDER, LYOPHILIZED, FOR SOLUTION INTRAVENOUS at 17:08

## 2022-01-01 RX ADMIN — INSULIN DETEMIR 5 UNITS: 100 INJECTION, SOLUTION SUBCUTANEOUS at 22:05

## 2022-01-01 RX ADMIN — INSULIN LISPRO 3 UNITS: 100 INJECTION, SOLUTION INTRAVENOUS; SUBCUTANEOUS at 13:02

## 2022-01-01 RX ADMIN — INSULIN DETEMIR 10 UNITS: 100 INJECTION, SOLUTION SUBCUTANEOUS at 08:52

## 2022-01-01 RX ADMIN — QUETIAPINE FUMARATE 12.5 MG: 25 TABLET ORAL at 21:46

## 2022-01-01 RX ADMIN — HYDROXYZINE HYDROCHLORIDE 25 MG: 25 TABLET ORAL at 11:45

## 2022-01-01 RX ADMIN — INSULIN DETEMIR 20 UNITS: 100 INJECTION, SOLUTION SUBCUTANEOUS at 20:49

## 2022-01-01 RX ADMIN — Medication 10 ML: at 21:09

## 2022-01-01 RX ADMIN — INSULIN DETEMIR 5 UNITS: 100 INJECTION, SOLUTION SUBCUTANEOUS at 21:56

## 2022-01-01 RX ADMIN — Medication 10 ML: at 08:17

## 2022-01-01 RX ADMIN — APIXABAN 5 MG: 5 TABLET, FILM COATED ORAL at 22:05

## 2022-01-01 RX ADMIN — APIXABAN 5 MG: 5 TABLET, FILM COATED ORAL at 10:00

## 2022-01-01 RX ADMIN — INSULIN HUMAN 4 UNITS: 100 INJECTION, SOLUTION PARENTERAL at 00:12

## 2022-01-01 RX ADMIN — GABAPENTIN 600 MG: 300 CAPSULE ORAL at 21:12

## 2022-01-01 RX ADMIN — GABAPENTIN 600 MG: 300 CAPSULE ORAL at 13:38

## 2022-01-01 RX ADMIN — DICLOFENAC 2 G: 10 GEL TOPICAL at 11:50

## 2022-01-01 RX ADMIN — Medication 5 MG: at 02:13

## 2022-01-01 RX ADMIN — TAZOBACTAM SODIUM AND PIPERACILLIN SODIUM 3.38 G: 375; 3 INJECTION, SOLUTION INTRAVENOUS at 20:11

## 2022-01-01 RX ADMIN — ACETAMINOPHEN 649.6 MG: 650 SOLUTION ORAL at 22:42

## 2022-01-01 RX ADMIN — QUETIAPINE FUMARATE 12.5 MG: 25 TABLET ORAL at 20:50

## 2022-01-01 RX ADMIN — Medication 10 ML: at 08:33

## 2022-01-01 RX ADMIN — LEVOTHYROXINE SODIUM 112 MCG: 0.11 TABLET ORAL at 11:09

## 2022-01-01 RX ADMIN — METHYLPREDNISOLONE SODIUM SUCCINATE 40 MG: 40 INJECTION, POWDER, FOR SOLUTION INTRAMUSCULAR; INTRAVENOUS at 17:39

## 2022-01-01 RX ADMIN — SODIUM CHLORIDE 1 G: 900 INJECTION INTRAVENOUS at 08:24

## 2022-01-01 RX ADMIN — Medication 10 ML: at 22:30

## 2022-01-01 RX ADMIN — METOPROLOL TARTRATE 25 MG: 25 TABLET, FILM COATED ORAL at 08:35

## 2022-01-01 RX ADMIN — INSULIN LISPRO 2 UNITS: 100 INJECTION, SOLUTION INTRAVENOUS; SUBCUTANEOUS at 18:24

## 2022-01-01 RX ADMIN — PRAVASTATIN SODIUM 40 MG: 40 TABLET ORAL at 22:28

## 2022-01-01 RX ADMIN — Medication 10 ML: at 11:46

## 2022-01-01 RX ADMIN — SODIUM CHLORIDE 1 G: 9 INJECTION, SOLUTION INTRAVENOUS at 20:49

## 2022-01-01 RX ADMIN — ALBUMIN HUMAN 25 G: 0.25 SOLUTION INTRAVENOUS at 16:04

## 2022-01-01 RX ADMIN — IPRATROPIUM BROMIDE AND ALBUTEROL SULFATE 3 ML: .5; 3 SOLUTION RESPIRATORY (INHALATION) at 23:30

## 2022-01-01 RX ADMIN — FUROSEMIDE 60 MG: 10 INJECTION INTRAMUSCULAR; INTRAVENOUS at 23:38

## 2022-01-01 RX ADMIN — INSULIN DETEMIR 5 UNITS: 100 INJECTION, SOLUTION SUBCUTANEOUS at 20:18

## 2022-01-01 RX ADMIN — INSULIN LISPRO 4 UNITS: 100 INJECTION, SOLUTION INTRAVENOUS; SUBCUTANEOUS at 17:30

## 2022-01-01 RX ADMIN — INSULIN HUMAN 10 UNITS: 100 INJECTION, SOLUTION PARENTERAL at 11:27

## 2022-01-01 RX ADMIN — ACETAMINOPHEN 649.6 MG: 160 SOLUTION ORAL at 04:57

## 2022-01-01 RX ADMIN — INSULIN HUMAN 8 UNITS: 100 INJECTION, SOLUTION PARENTERAL at 00:13

## 2022-01-01 RX ADMIN — ZIPRASIDONE MESYLATE 5 MG: 20 INJECTION, POWDER, LYOPHILIZED, FOR SOLUTION INTRAMUSCULAR at 18:55

## 2022-01-01 RX ADMIN — METOPROLOL TARTRATE 50 MG: 50 TABLET, FILM COATED ORAL at 22:28

## 2022-01-01 RX ADMIN — INSULIN LISPRO 5 UNITS: 100 INJECTION, SOLUTION INTRAVENOUS; SUBCUTANEOUS at 11:57

## 2022-01-01 RX ADMIN — DICLOFENAC 2 G: 10 GEL TOPICAL at 20:51

## 2022-01-01 RX ADMIN — MORPHINE SULFATE 2 MG: 2 INJECTION, SOLUTION INTRAMUSCULAR; INTRAVENOUS at 18:09

## 2022-01-01 RX ADMIN — DOXYCYCLINE 100 MG: 100 CAPSULE ORAL at 17:15

## 2022-01-01 RX ADMIN — NYSTATIN: 100000 POWDER TOPICAL at 22:13

## 2022-01-01 RX ADMIN — AMIODARONE HYDROCHLORIDE 400 MG: 200 TABLET ORAL at 17:38

## 2022-01-01 RX ADMIN — ASPIRIN 81 MG CHEWABLE TABLET 81 MG: 81 TABLET CHEWABLE at 09:13

## 2022-01-01 RX ADMIN — SODIUM CHLORIDE 75 ML/HR: 4.5 INJECTION, SOLUTION INTRAVENOUS at 05:19

## 2022-01-01 RX ADMIN — DIAZEPAM 2.5 MG: 5 INJECTION, SOLUTION INTRAMUSCULAR; INTRAVENOUS at 11:59

## 2022-01-01 RX ADMIN — APIXABAN 5 MG: 5 TABLET, FILM COATED ORAL at 21:10

## 2022-01-01 RX ADMIN — INSULIN DETEMIR 15 UNITS: 100 INJECTION, SOLUTION SUBCUTANEOUS at 22:44

## 2022-01-01 RX ADMIN — NYSTATIN: 100000 POWDER TOPICAL at 08:09

## 2022-01-01 RX ADMIN — AMIODARONE HYDROCHLORIDE 200 MG: 200 TABLET ORAL at 15:02

## 2022-01-01 RX ADMIN — DULOXETINE 30 MG: 30 CAPSULE, DELAYED RELEASE ORAL at 09:15

## 2022-01-01 RX ADMIN — IPRATROPIUM BROMIDE AND ALBUTEROL SULFATE 3 ML: .5; 3 SOLUTION RESPIRATORY (INHALATION) at 07:19

## 2022-01-01 RX ADMIN — HEPARIN SODIUM 5000 UNITS: 5000 INJECTION INTRAVENOUS; SUBCUTANEOUS at 02:14

## 2022-01-01 RX ADMIN — INSULIN LISPRO 2 UNITS: 100 INJECTION, SOLUTION INTRAVENOUS; SUBCUTANEOUS at 17:49

## 2022-01-01 RX ADMIN — ACETAMINOPHEN 649.6 MG: 160 SOLUTION ORAL at 19:34

## 2022-01-01 RX ADMIN — AMIODARONE HYDROCHLORIDE 200 MG: 200 TABLET ORAL at 23:35

## 2022-01-01 RX ADMIN — SODIUM CHLORIDE 75 ML/HR: 4.5 INJECTION, SOLUTION INTRAVENOUS at 16:32

## 2022-01-01 RX ADMIN — GABAPENTIN 400 MG: 400 CAPSULE ORAL at 13:25

## 2022-01-01 RX ADMIN — GABAPENTIN 600 MG: 300 CAPSULE ORAL at 06:33

## 2022-01-01 RX ADMIN — INSULIN HUMAN 3 UNITS: 100 INJECTION, SOLUTION PARENTERAL at 22:39

## 2022-01-01 RX ADMIN — INSULIN HUMAN 5 UNITS: 100 INJECTION, SOLUTION PARENTERAL at 00:37

## 2022-01-01 RX ADMIN — IPRATROPIUM BROMIDE AND ALBUTEROL SULFATE 3 ML: .5; 3 SOLUTION RESPIRATORY (INHALATION) at 19:11

## 2022-01-01 RX ADMIN — GABAPENTIN 600 MG: 300 CAPSULE ORAL at 05:33

## 2022-01-01 RX ADMIN — LEVOTHYROXINE SODIUM 112 MCG: 0.11 TABLET ORAL at 06:33

## 2022-01-01 RX ADMIN — INSULIN LISPRO 3 UNITS: 100 INJECTION, SOLUTION INTRAVENOUS; SUBCUTANEOUS at 16:58

## 2022-01-01 RX ADMIN — INSULIN HUMAN 12 UNITS: 100 INJECTION, SOLUTION PARENTERAL at 06:15

## 2022-01-01 RX ADMIN — INSULIN LISPRO 2 UNITS: 100 INJECTION, SOLUTION INTRAVENOUS; SUBCUTANEOUS at 09:02

## 2022-01-01 RX ADMIN — FUROSEMIDE 20 MG: 20 TABLET ORAL at 09:15

## 2022-01-01 RX ADMIN — FLUCONAZOLE 200 MG: 200 INJECTION, SOLUTION INTRAVENOUS at 11:45

## 2022-01-01 RX ADMIN — ALBUMIN HUMAN 25 G: 0.25 SOLUTION INTRAVENOUS at 06:54

## 2022-01-01 RX ADMIN — FUROSEMIDE 20 MG: 20 TABLET ORAL at 17:29

## 2022-01-01 RX ADMIN — ASPIRIN 81 MG CHEWABLE TABLET 81 MG: 81 TABLET CHEWABLE at 08:32

## 2022-01-01 RX ADMIN — IPRATROPIUM BROMIDE AND ALBUTEROL SULFATE 3 ML: .5; 3 SOLUTION RESPIRATORY (INHALATION) at 16:07

## 2022-01-01 RX ADMIN — NYSTATIN: 100000 POWDER TOPICAL at 10:00

## 2022-01-01 RX ADMIN — PRAVASTATIN SODIUM 40 MG: 40 TABLET ORAL at 21:12

## 2022-01-01 RX ADMIN — INSULIN HUMAN 3 UNITS: 100 INJECTION, SOLUTION PARENTERAL at 11:17

## 2022-01-01 RX ADMIN — Medication 10 ML: at 16:03

## 2022-01-01 RX ADMIN — IPRATROPIUM BROMIDE AND ALBUTEROL SULFATE 3 ML: .5; 3 SOLUTION RESPIRATORY (INHALATION) at 20:16

## 2022-01-01 RX ADMIN — SODIUM CHLORIDE 1 G: 900 INJECTION INTRAVENOUS at 09:15

## 2022-01-01 RX ADMIN — INSULIN LISPRO 3 UNITS: 100 INJECTION, SOLUTION INTRAVENOUS; SUBCUTANEOUS at 12:26

## 2022-01-01 RX ADMIN — GABAPENTIN 300 MG: 300 CAPSULE ORAL at 15:56

## 2022-01-01 RX ADMIN — APIXABAN 5 MG: 5 TABLET, FILM COATED ORAL at 10:57

## 2022-01-01 RX ADMIN — INSULIN LISPRO 2 UNITS: 100 INJECTION, SOLUTION INTRAVENOUS; SUBCUTANEOUS at 13:24

## 2022-01-01 RX ADMIN — IPRATROPIUM BROMIDE AND ALBUTEROL SULFATE 3 ML: .5; 3 SOLUTION RESPIRATORY (INHALATION) at 06:40

## 2022-01-01 RX ADMIN — APIXABAN 5 MG: 5 TABLET, FILM COATED ORAL at 22:28

## 2022-01-01 RX ADMIN — HYDROMORPHONE HYDROCHLORIDE 0.5 MG: 1 INJECTION, SOLUTION INTRAMUSCULAR; INTRAVENOUS; SUBCUTANEOUS at 18:12

## 2022-01-01 RX ADMIN — NYSTATIN: 100000 POWDER TOPICAL at 08:33

## 2022-01-01 RX ADMIN — FUROSEMIDE 40 MG: 10 INJECTION, SOLUTION INTRAMUSCULAR; INTRAVENOUS at 11:46

## 2022-01-01 RX ADMIN — GABAPENTIN 600 MG: 300 CAPSULE ORAL at 15:13

## 2022-01-01 RX ADMIN — IPRATROPIUM BROMIDE AND ALBUTEROL SULFATE 3 ML: .5; 3 SOLUTION RESPIRATORY (INHALATION) at 15:52

## 2022-01-01 RX ADMIN — FLUCONAZOLE 200 MG: 200 INJECTION, SOLUTION INTRAVENOUS at 12:42

## 2022-01-01 RX ADMIN — IPRATROPIUM BROMIDE AND ALBUTEROL SULFATE 3 ML: .5; 3 SOLUTION RESPIRATORY (INHALATION) at 03:53

## 2022-01-01 RX ADMIN — ALBUTEROL SULFATE 2.5 MG: 2.5 SOLUTION RESPIRATORY (INHALATION) at 23:48

## 2022-01-01 RX ADMIN — DICLOFENAC 2 G: 10 GEL TOPICAL at 09:20

## 2022-01-01 RX ADMIN — OLANZAPINE 7.5 MG: 10 INJECTION, POWDER, FOR SOLUTION INTRAMUSCULAR at 16:15

## 2022-01-01 RX ADMIN — IPRATROPIUM BROMIDE AND ALBUTEROL SULFATE 3 ML: .5; 3 SOLUTION RESPIRATORY (INHALATION) at 11:01

## 2022-01-01 RX ADMIN — METOCLOPRAMIDE 5 MG: 5 INJECTION, SOLUTION INTRAMUSCULAR; INTRAVENOUS at 03:50

## 2022-01-01 RX ADMIN — MEROPENEM 1 G: 1 INJECTION, POWDER, FOR SOLUTION INTRAVENOUS at 11:09

## 2022-01-01 RX ADMIN — INSULIN DETEMIR 10 UNITS: 100 INJECTION, SOLUTION SUBCUTANEOUS at 08:19

## 2022-01-01 RX ADMIN — APIXABAN 5 MG: 5 TABLET, FILM COATED ORAL at 09:19

## 2022-01-01 RX ADMIN — DULOXETINE 30 MG: 30 CAPSULE, DELAYED RELEASE ORAL at 17:48

## 2022-01-01 RX ADMIN — INSULIN LISPRO 5 UNITS: 100 INJECTION, SOLUTION INTRAVENOUS; SUBCUTANEOUS at 08:15

## 2022-01-01 RX ADMIN — INSULIN LISPRO 4 UNITS: 100 INJECTION, SOLUTION INTRAVENOUS; SUBCUTANEOUS at 16:51

## 2022-01-01 RX ADMIN — DULOXETINE 30 MG: 30 CAPSULE, DELAYED RELEASE ORAL at 08:43

## 2022-01-01 RX ADMIN — APIXABAN 5 MG: 5 TABLET, FILM COATED ORAL at 08:32

## 2022-01-01 RX ADMIN — IPRATROPIUM BROMIDE AND ALBUTEROL SULFATE 3 ML: .5; 3 SOLUTION RESPIRATORY (INHALATION) at 02:58

## 2022-01-01 RX ADMIN — APIXABAN 5 MG: 5 TABLET, FILM COATED ORAL at 09:02

## 2022-01-01 RX ADMIN — DICLOFENAC 2 G: 10 GEL TOPICAL at 20:37

## 2022-01-01 RX ADMIN — ACETAMINOPHEN 649.6 MG: 160 SOLUTION ORAL at 21:48

## 2022-01-01 RX ADMIN — Medication 10 ML: at 20:38

## 2022-01-01 RX ADMIN — Medication 5 MG/HR: at 23:36

## 2022-01-01 RX ADMIN — GABAPENTIN 300 MG: 300 CAPSULE ORAL at 08:31

## 2022-01-01 RX ADMIN — GABAPENTIN 600 MG: 300 CAPSULE ORAL at 06:26

## 2022-01-01 RX ADMIN — GABAPENTIN 600 MG: 300 CAPSULE ORAL at 22:28

## 2022-01-01 RX ADMIN — HYDROXYZINE HYDROCHLORIDE 25 MG: 25 TABLET ORAL at 02:34

## 2022-01-01 RX ADMIN — INSULIN DETEMIR 15 UNITS: 100 INJECTION, SOLUTION SUBCUTANEOUS at 08:33

## 2022-01-01 RX ADMIN — TRAMADOL HYDROCHLORIDE 50 MG: 50 TABLET, COATED ORAL at 02:13

## 2022-01-01 RX ADMIN — SODIUM CHLORIDE 1 G: 9 INJECTION, SOLUTION INTRAVENOUS at 21:32

## 2022-01-01 RX ADMIN — NYSTATIN: 100000 POWDER TOPICAL at 22:08

## 2022-01-01 RX ADMIN — DICLOFENAC 2 G: 10 GEL TOPICAL at 08:32

## 2022-01-01 RX ADMIN — PRAVASTATIN SODIUM 40 MG: 40 TABLET ORAL at 22:02

## 2022-01-01 RX ADMIN — TAZOBACTAM SODIUM AND PIPERACILLIN SODIUM 3.38 G: 375; 3 INJECTION, SOLUTION INTRAVENOUS at 12:41

## 2022-01-01 RX ADMIN — NYSTATIN: 100000 POWDER TOPICAL at 08:30

## 2022-01-01 RX ADMIN — APIXABAN 5 MG: 5 TABLET, FILM COATED ORAL at 08:03

## 2022-01-01 RX ADMIN — GABAPENTIN 600 MG: 300 CAPSULE ORAL at 14:33

## 2022-01-01 RX ADMIN — INSULIN LISPRO 10 UNITS: 100 INJECTION, SOLUTION INTRAVENOUS; SUBCUTANEOUS at 17:15

## 2022-01-01 RX ADMIN — APIXABAN 5 MG: 5 TABLET, FILM COATED ORAL at 08:31

## 2022-01-01 RX ADMIN — APIXABAN 5 MG: 5 TABLET, FILM COATED ORAL at 08:19

## 2022-01-01 RX ADMIN — GABAPENTIN 200 MG: 100 CAPSULE ORAL at 23:04

## 2022-01-01 RX ADMIN — BUDESONIDE 0.5 MG: 0.5 SUSPENSION RESPIRATORY (INHALATION) at 07:19

## 2022-01-01 RX ADMIN — INSULIN DETEMIR 15 UNITS: 100 INJECTION, SOLUTION SUBCUTANEOUS at 21:32

## 2022-01-01 RX ADMIN — DOXYCYCLINE 100 MG: 100 INJECTION, POWDER, LYOPHILIZED, FOR SOLUTION INTRAVENOUS at 17:39

## 2022-01-01 RX ADMIN — Medication 10 ML: at 21:46

## 2022-01-01 RX ADMIN — LEVOTHYROXINE SODIUM 112 MCG: 0.11 TABLET ORAL at 05:20

## 2022-01-01 RX ADMIN — GABAPENTIN 600 MG: 300 CAPSULE ORAL at 22:05

## 2022-01-01 RX ADMIN — QUETIAPINE FUMARATE 12.5 MG: 25 TABLET ORAL at 22:28

## 2022-01-01 RX ADMIN — METHYLPREDNISOLONE SODIUM SUCCINATE 60 MG: 125 INJECTION, POWDER, FOR SOLUTION INTRAMUSCULAR; INTRAVENOUS at 06:05

## 2022-01-01 RX ADMIN — MORPHINE SULFATE 2 MG: 2 INJECTION, SOLUTION INTRAMUSCULAR; INTRAVENOUS at 13:45

## 2022-01-01 RX ADMIN — FUROSEMIDE 20 MG: 20 TABLET ORAL at 22:05

## 2022-01-01 RX ADMIN — METOPROLOL TARTRATE 50 MG: 50 TABLET, FILM COATED ORAL at 21:09

## 2022-01-01 RX ADMIN — INSULIN HUMAN 4 UNITS: 100 INJECTION, SOLUTION PARENTERAL at 05:57

## 2022-01-01 RX ADMIN — METOPROLOL TARTRATE 50 MG: 50 TABLET, FILM COATED ORAL at 08:03

## 2022-01-01 RX ADMIN — ALBUTEROL SULFATE 2.5 MG: 2.5 SOLUTION RESPIRATORY (INHALATION) at 07:11

## 2022-01-01 RX ADMIN — GABAPENTIN 600 MG: 300 CAPSULE ORAL at 06:15

## 2022-01-01 RX ADMIN — INSULIN HUMAN 4 UNITS: 100 INJECTION, SOLUTION PARENTERAL at 18:21

## 2022-01-01 RX ADMIN — MEROPENEM 1 G: 1 INJECTION, POWDER, FOR SOLUTION INTRAVENOUS at 21:12

## 2022-01-01 RX ADMIN — BUDESONIDE 0.5 MG: 0.5 SUSPENSION RESPIRATORY (INHALATION) at 18:55

## 2022-01-01 RX ADMIN — DOCUSATE SODIUM 100 MG: 50 LIQUID ORAL at 10:40

## 2022-01-01 RX ADMIN — METHYLPREDNISOLONE SODIUM SUCCINATE 40 MG: 40 INJECTION, POWDER, FOR SOLUTION INTRAMUSCULAR; INTRAVENOUS at 11:08

## 2022-01-01 RX ADMIN — PRAVASTATIN SODIUM 40 MG: 40 TABLET ORAL at 08:17

## 2022-01-01 RX ADMIN — INSULIN DETEMIR 10 UNITS: 100 INJECTION, SOLUTION SUBCUTANEOUS at 22:10

## 2022-01-01 RX ADMIN — INSULIN LISPRO 2 UNITS: 100 INJECTION, SOLUTION INTRAVENOUS; SUBCUTANEOUS at 11:59

## 2022-01-01 RX ADMIN — INSULIN DETEMIR 5 UNITS: 100 INJECTION, SOLUTION SUBCUTANEOUS at 22:35

## 2022-01-01 RX ADMIN — QUETIAPINE FUMARATE 25 MG: 25 TABLET ORAL at 21:06

## 2022-01-01 RX ADMIN — DICLOFENAC 2 G: 10 GEL TOPICAL at 13:03

## 2022-01-01 RX ADMIN — GABAPENTIN 200 MG: 100 CAPSULE ORAL at 08:43

## 2022-01-01 RX ADMIN — APIXABAN 5 MG: 5 TABLET, FILM COATED ORAL at 08:24

## 2022-01-01 RX ADMIN — INSULIN LISPRO 10 UNITS: 100 INJECTION, SOLUTION INTRAVENOUS; SUBCUTANEOUS at 12:26

## 2022-01-01 RX ADMIN — DULOXETINE 30 MG: 30 CAPSULE, DELAYED RELEASE ORAL at 08:24

## 2022-01-01 RX ADMIN — INSULIN LISPRO 6 UNITS: 100 INJECTION, SOLUTION INTRAVENOUS; SUBCUTANEOUS at 11:55

## 2022-01-01 RX ADMIN — Medication 10 ML: at 09:02

## 2022-01-01 RX ADMIN — HYDROXYZINE HYDROCHLORIDE 25 MG: 25 TABLET ORAL at 16:04

## 2022-01-01 RX ADMIN — ASPIRIN 81 MG CHEWABLE TABLET 81 MG: 81 TABLET CHEWABLE at 10:00

## 2022-01-01 RX ADMIN — INSULIN HUMAN 4 UNITS: 100 INJECTION, SOLUTION PARENTERAL at 18:16

## 2022-01-01 RX ADMIN — DICLOFENAC 2 G: 10 GEL TOPICAL at 18:00

## 2022-01-01 RX ADMIN — METHYLPREDNISOLONE SODIUM SUCCINATE 40 MG: 40 INJECTION, POWDER, FOR SOLUTION INTRAMUSCULAR; INTRAVENOUS at 06:09

## 2022-01-01 RX ADMIN — DULOXETINE 30 MG: 30 CAPSULE, DELAYED RELEASE ORAL at 11:08

## 2022-01-01 RX ADMIN — GABAPENTIN 600 MG: 300 CAPSULE ORAL at 13:43

## 2022-06-15 PROBLEM — E11.9 TYPE 2 DIABETES MELLITUS: Status: ACTIVE | Noted: 2022-01-01

## 2022-06-15 PROBLEM — J96.20 ACUTE ON CHRONIC RESPIRATORY FAILURE (HCC): Status: ACTIVE | Noted: 2022-01-01

## 2022-06-15 PROBLEM — N17.9 AKI (ACUTE KIDNEY INJURY) (HCC): Status: ACTIVE | Noted: 2022-01-01

## 2022-06-15 PROBLEM — D72.829 LEUKOCYTOSIS: Status: ACTIVE | Noted: 2022-01-01

## 2022-06-15 PROBLEM — J44.1 COPD WITH ACUTE EXACERBATION (HCC): Status: ACTIVE | Noted: 2022-01-01

## 2022-06-15 PROBLEM — J44.1 COPD EXACERBATION (HCC): Status: ACTIVE | Noted: 2022-01-01

## 2022-06-15 NOTE — CONSULTS
"  Referring Provider: DO Fran  Reason for Consultation: AoCRF    Subjective .   Education:  NN spoke with pt at BS.  Pt alert and able to answer questions appropriately.  Pt O2 sat  93% on  43 HFNC currently, home O2 use  4L.  Pt reports the ability to ambulate is limited at baseline before experiencing SOB.  Pt states use of rescue inhaler rarely, relief of SOB within ~2 mins.  Patient is not up to date on COVID vaccines, but is on flu and PNA vaccines.  Former smoker, quit date 2015.  Pt reports no issues at this time with medications or transportation for appointments.  She does however refuse to ride elevators and also refuses to see the only cardiologist she knows with a first floor office.  She declines pulmonary referral at this time as well as rehab. Pt reports no previous hx of formal COPD teaching, no understanding of action plan, or FL.  Stop light report, NN contact information, instructions for accessing [facility] and list of educational videos given to pt.  \"A Patient's Guide to COPD\" booklet left at BS. [id number] reference sheet discussed and given to patient at BS.  COPD education began in the form of explanation, handouts, and videos. No new concerns or questions voiced at this time.  NN will continue to follow as needed.     Age: 76 y.o.  Sex: female  Smoker Status: former, ~50 pack years  Pulmonologist: GRISELDA  FEV1 (PFT): NA  Home O2: 4L    Objective     SpO2 SpO2: 91 % (06/15/22 1100)  Device Device (Oxygen Therapy): nasal cannula (06/15/22 1100)  Flow Flow (L/min): 6 (06/15/22 1100)  Incentive Spirometer    IS Predicted Level (mL)     Number of Repetitions     Level Incentive Spirometer (mL)    Patient Tolerance Patient Tolerance (IS): poor (06/15/22 0222)   Inhaler Treatment Status    Treatment Route        Home Medications:  Medications Prior to Admission   Medication Sig Dispense Refill Last Dose   • aspirin 81 MG EC tablet Take 81 mg by mouth Daily.   6/14/2022 at Unknown time   • baclofen " (LIORESAL) 10 MG tablet Take 10 mg by mouth 3 (Three) Times a Day As Needed for Muscle Spasms.   6/14/2022 at Unknown time   • Cholecalciferol (VITAMIN D-3) 1000 UNITS capsule Take 2,000 Units by mouth daily.   6/14/2022 at Unknown time   • furosemide (LASIX) 80 MG tablet Take 120 mg by mouth Daily. Pt takes 1 and 1/2 tab daily   6/14/2022 at Unknown time   • Garlic 1000 MG capsule Take 2,000 Units by mouth Daily.   6/14/2022 at Unknown time   • LEVEMIR FLEXTOUCH 100 UNIT/ML injection INJECT 25 UNITS SUBCUTANEOUSLY EVERY 12 HOURS (Patient taking differently: Inject 70 Units under the skin into the appropriate area as directed. 1000 and 2200) 15 mL 0 6/14/2022 at Unknown time   • levothyroxine (SYNTHROID, LEVOTHROID) 125 MCG tablet Take 125 mcg by mouth Every Morning. Brand only per pharmacy   6/14/2022 at Unknown time   • NOVOLOG FLEXPEN 100 UNIT/ML solution pen-injector sc pen Patient has not filled in over a year per pharmacy records  0 6/14/2022 at Unknown time   • potassium chloride (K-DUR,KLOR-CON) 20 MEQ CR tablet Take 20 mEq by mouth Daily.   6/14/2022 at 0630   • pravastatin (PRAVACHOL) 40 MG tablet TAKE 1 TABLET BY MOUTH EVERY DAY (Patient taking differently: Take 40 mg by mouth Every Night.) 90 tablet 3 6/14/2022 at Unknown time   • traMADol (ULTRAM) 50 MG tablet Take 50 mg by mouth Every 8 (Eight) Hours As Needed for Moderate Pain .   6/14/2022 at Unknown time   • VENTOLIN  (90 Base) MCG/ACT inhaler INHALE 1 TO 2 PUFFS BY MOUTH EVERY 4 TO 6 HOURS AS NEEDED FOR WHEEZING OR COUGH 18 g 0 6/15/2022 at Unknown time   • gabapentin (NEURONTIN) 600 MG tablet Take 600 mg by mouth 3 (Three) Times a Day.      • losartan (COZAAR) 25 MG tablet Take 25 mg by mouth 2 (Two) Times a Day.      • metoprolol succinate XL (TOPROL-XL) 25 MG 24 hr tablet Take 25 mg by mouth Daily.          Discussion: Per current GOLD Standards, please consider:  No LAMA/LABA/ICS in place, Outpatient PFT, Rehab as appropriate, Palliative  Care consult, Annual LDCT per current screening guidelines (age 50-80 years old, smoking history of 20 pack years or more or has quit within past 15 years)           Flora Redmond RN

## 2022-06-15 NOTE — CASE MANAGEMENT/SOCIAL WORK
Discharge Planning Assessment  [facility]     Patient Name: Janet Pisano  MRN: [id number]  Today's Date: 6/15/2022    Admit Date: 6/14/2022     Discharge Needs Assessment     Row Name 06/15/22 1535       Living Environment    People in Home alone    Current Living Arrangements home    Primary Care Provided by self    Provides Primary Care For no one, unable/limited ability to care for self    Family Caregiver if Needed none    Quality of Family Relationships involved;supportive    Able to Return to Prior Arrangements yes       Resource/Environmental Concerns    Resource/Environmental Concerns none       Transition Planning    Patient/Family Anticipates Transition to home    Patient/Family Anticipated Services at Transition home health care    Transportation Anticipated family or friend will provide       Discharge Needs Assessment    Readmission Within the Last 30 Days no previous admission in last 30 days    Equipment Currently Used at Home rollator;oxygen    Concerns to be Addressed discharge planning;basic needs;home safety;adjustment to diagnosis/illness    Anticipated Changes Related to Illness inability to care for self    Equipment Needed After Discharge none    Outpatient/Agency/Support Group Needs skilled nursing facility  refuses to consider Skilled placement    Current Discharge Risk chronically ill;lives alone;lack of support system/caregiver               Discharge Plan     Row Name 06/15/22 1539       Plan    Plan Home    Plan Comments Chart reviewed. I met with Ms Pisano and son at bedside to initiate d/c planning. She lives alone, she has home O2 supplied by [facility] ([place]). She also uses a rollator for mobility.Her son is her POA and also lives in [place].  Her plan is to return home. She refuses to consider any skilled placement. Son has verbalized his concern and does not feel she can care for herself. He states it is difficult for her to even ambulate to bathroom. We discussed  Home Health services which are intermittent visits. Also provided list of private duty agencies. Case mgt will follow              Continued Care and Services - Admitted Since 6/14/2022    Coordination has not been started for this encounter.       Expected Discharge Date and Time     Expected Discharge Date Expected Discharge Time    Jun 20, 2022          Demographic Summary     Row Name 06/15/22 1530       General Information    Admission Type inpatient    Arrived From home    Referral Source admission list    Reason for Consult discharge planning    Preferred Language English    General Information Comments PCP- Zee Matos       Contact Information    Permission Granted to Share Info With ;family/designee    Contact Information Comments Aníbal Pisano (son and POA)               Functional Status     Row Name 06/15/22 1533       Functional Status    Usual Activity Tolerance poor    Current Activity Tolerance --  await therapy evals       Functional Status, IADL    Medications independent    Meal Preparation independent    Housekeeping independent    Laundry independent    Shopping assistive person       Mental Status    General Appearance WDL WDL       Mental Status Summary    Recent Changes in Mental Status/Cognitive Functioning no changes       Employment/    Employment Status disabled;retired    Employment/ Comments insurance- [employer] Medicare replacement               Psychosocial    No documentation.                Abuse/Neglect    No documentation.                Legal    No documentation.                Substance Abuse    No documentation.                Patient Forms    No documentation.                   Sonja C Kellerman, RN

## 2022-06-15 NOTE — H&P
"    [facility] Medicine Services  HISTORY AND PHYSICAL    Patient Name: Janet Pisano  : 1945  MRN: [id number]  Primary Care Physician: Zee Matos MD  Date of admission: 2022    Subjective   Subjective     Chief Complaint:  Shortness of air     HPI:  Janet Pisano is a 76 y.o. female with a past medical history significant for essential hypertension, oxygen dependent COPD (4L NC), diabetes mellitus type 2, hypothyroidism, diastolic congestive heart failure and pulmonary hypertension presents to the ED with shortness of air. Patient reports she has had intermittent shortness of air over the past couple of days however it significantly worsened around 4 am today.  She additionally reports a productive cough with yellow sputum and ? Fever.  She denies any nausea, vomiting, diarrhea, abdominal pain, dysuria, chest pain or palpitations.  She does currently reside alone.  Son is at bedside and expresses concern for patient's ability to care for self.  She reports compliance with all medications however son states she had missed two days of her lasix this week.  He also reports the patient refuses to see her cardiologist because \"she has to take the elevator.\"  Upon arrival of EMS tonight it was reported patient was tripoding with hypoxia.  On arrival to the ED her symptoms somewhat improved with neb treatment however she was placed on bipap.  Patient was unable to tolerate therefore she is currently on a hi-flow NC at 55%/45L.  CXR tonight revealed mild worsening of bibasilar parenchymal opacities may reflect atelectasis. Vascular congestion is noted with interstitial edema.  Questionable new small bilateral pleural effusions.  Patient will be admitted to [facility] under the care of the Hospitalist for further evaluation and treatment.         Review of Systems   Constitutional: Positive for activity change. Negative for appetite change, chills, diaphoresis, fatigue, fever and " unexpected weight change.   HENT: Negative.    Eyes: Negative for photophobia and visual disturbance.   Respiratory: Positive for cough and shortness of breath.    Cardiovascular: Negative for chest pain, palpitations and leg swelling.   Gastrointestinal: Negative for abdominal distention, abdominal pain, blood in stool, constipation, diarrhea, nausea and vomiting.   Genitourinary: Negative.    Musculoskeletal: Negative.    Skin: Negative.    Neurological: Negative.    Psychiatric/Behavioral: Negative.         All other systems reviewed and are negative.     Personal History     Past Medical History:   Diagnosis Date   • Arthritis    • Bilateral carpal tunnel syndrome 2/24/2017   • Bradycardia 8/22/2016   • COPD exacerbation (CMS/HCC)    • Coronary artery disease 9/7/2016   • Depression    • Diabetes mellitus (CMS/HCC)    • Diverticulosis 8/22/2016   • Dyslipidemia 9/7/2016   • H/O esophageal ulcer    • History of myocardial infarction 9/7/2016    Questionable history of myocardial infarction: Remote cardiac catheterization at [facility] in [place], incomplete database.  Reported stress test 2-3 years ago, negative per patient report, incomplete database.    • History of rheumatic fever 9/7/2016   • Hypertension    • Hypothyroidism    • Migraine 8/22/2016   • Rheumatic fever    • Ventral hernia 9/7/2016   • Vitamin D deficiency 6/20/2016             Past Surgical History:   Procedure Laterality Date   • CARDIAC CATHETERIZATION     • CARPAL TUNNEL RELEASE     • CATARACT EXTRACTION, BILATERAL     • COLONOSCOPY     • ESOPHAGUS SURGERY      hole repair   • HERNIA REPAIR      ventral   • TUBAL ABDOMINAL LIGATION  1982       Family History:  family history includes Alcohol abuse in her father; Cancer in her mother; Coronary artery disease in her father; Diabetes in her mother; Liver disease in her mother; Obesity in her mother. Otherwise pertinent FHx was reviewed and unremarkable.     Social History:   reports that she quit smoking about 6 years ago. Her smoking use included cigarettes. She started smoking about 63 years ago. She smoked 1.00 pack per day. She has never used smokeless tobacco. She reports that she does not drink alcohol and does not use drugs.  Social History     Social History Narrative   • Not on file       Medications:  Garlic, Vitamin D-3, albuterol sulfate HFA, aspirin, azithromycin, baclofen, gabapentin, insulin aspart, insulin detemir, levothyroxine, and pravastatin    Allergies   Allergen Reactions   • Aliskiren    • Amlodipine    • Crestor [Rosuvastatin Calcium]    • Lisinopril    • Metformin And Related    • Penicillins    • Sitagliptin    • Statins Confusion   • Valsartan Unknown (See Comments)     Tolerates Losartan 8/20/18       Objective   Objective     Vital Signs:   Temp:  [98 °F (36.7 °C)] 98 °F (36.7 °C)  Heart Rate:  [75-86] 80  Resp:  [22-32] 22  BP: (110-143)/(53-75) 110/58  Flow (L/min):  [45] 45    Physical Exam  Vitals and nursing note reviewed.   Constitutional:       General: She is not in acute distress.     Appearance: Normal appearance. She is obese. She is ill-appearing. She is not toxic-appearing or diaphoretic.   HENT:      Head: Normocephalic and atraumatic.      Nose: Nose normal.      Mouth/Throat:      Mouth: Mucous membranes are dry.      Pharynx: Oropharynx is clear.   Eyes:      Extraocular Movements: Extraocular movements intact.      Conjunctiva/sclera: Conjunctivae normal.      Pupils: Pupils are equal, round, and reactive to light.   Cardiovascular:      Rate and Rhythm: Normal rate and regular rhythm.      Pulses: Normal pulses.      Heart sounds: Normal heart sounds.   Pulmonary:      Effort: Pulmonary effort is normal.      Breath sounds: Wheezing present.      Comments: Expiratory wheezes throughout  Abdominal:      General: Bowel sounds are normal. There is no distension.      Palpations: Abdomen is soft. There is no mass.      Tenderness: There is no  abdominal tenderness. There is no right CVA tenderness, left CVA tenderness, guarding or rebound.      Hernia: No hernia is present.   Musculoskeletal:         General: No swelling, tenderness, deformity or signs of injury. Normal range of motion.      Cervical back: Normal range of motion and neck supple.      Right lower leg: No edema.      Left lower leg: No edema.   Skin:     General: Skin is warm and dry.   Neurological:      General: No focal deficit present.      Mental Status: She is alert and oriented to person, place, and time. Mental status is at baseline.   Psychiatric:         Mood and Affect: Mood normal.         Behavior: Behavior normal.         Thought Content: Thought content normal.         Judgment: Judgment normal.        Results Reviewed:  I have personally reviewed most recent indicated data and agree with findings including:  [x]  Laboratory  [x]  Radiology  [x]  EKG/Telemetry  []  Pathology  []  Cardiac/Vascular Studies  []  Old records  []  Other:  Most pertinent findings include:      LAB RESULTS:      Lab 06/14/22 2121   WBC 12.30*   HEMOGLOBIN 13.7   HEMATOCRIT 44.9   PLATELETS 310   NEUTROS ABS 8.96*   IMMATURE GRANS (ABS) 0.04   LYMPHS ABS 2.21   MONOS ABS 1.00*   EOS ABS 0.05   MCV 98.5*   CRP 0.70*   PROCALCITONIN 0.03   LACTATE 1.7         Lab 06/14/22 2121   SODIUM 142   POTASSIUM 4.1   CHLORIDE 98   CO2 30.0*   ANION GAP 14.0   BUN 37*   CREATININE 1.42*   EGFR 38.2*   GLUCOSE 125*   CALCIUM 9.2   MAGNESIUM 2.0   PHOSPHORUS 4.4         Lab 06/14/22 2121   TOTAL PROTEIN 7.6   ALBUMIN 4.20   GLOBULIN 3.4   ALT (SGPT) 40*   AST (SGOT) 31   BILIRUBIN 0.6   ALK PHOS 89         Lab 06/14/22 2121   PROBNP 1,735.0   TROPONIN T 0.031*                 Lab 06/14/22 2222   PH, ARTERIAL 7.329*   PCO2, ARTERIAL 57.4*   PO2 ART 75.7*   FIO2 80   HCO3 ART 30.2*   BASE EXCESS ART 2.8*   CARBOXYHEMOGLOBIN 1.1     Brief Urine Lab Results     None        Microbiology Results (last 10 days)      Procedure Component Value - Date/Time    COVID PRE-OP / PRE-PROCEDURE SCREENING ORDER (NO ISOLATION) - Swab, Nasopharynx [[id number]]  (Normal) Collected: 06/14/22 2128    Lab Status: Final result Specimen: Swab from Nasopharynx Updated: 06/14/22 2236    Narrative:      The following orders were created for panel order COVID PRE-OP / PRE-PROCEDURE SCREENING ORDER (NO ISOLATION) - Swab, Nasopharynx.  Procedure                               Abnormality         Status                     ---------                               -----------         ------                     Respiratory Panel PCR w/...[[id number]]  Normal              Final result                 Please view results for these tests on the individual orders.    Respiratory Panel PCR w/COVID-19(SARS-CoV-2) ROCKY/PIERCE/ALCON/PAD/COR/MAD/ANTOLIN In-House, NP Swab in UTM/VTM, 3-4 HR TAT - Swab, Nasopharynx [[id number]]  (Normal) Collected: 06/14/22 2128    Lab Status: Final result Specimen: Swab from Nasopharynx Updated: 06/14/22 2236     ADENOVIRUS, PCR Not Detected     Coronavirus 229E Not Detected     Coronavirus HKU1 Not Detected     Coronavirus NL63 Not Detected     Coronavirus OC43 Not Detected     COVID19 Not Detected     Human Metapneumovirus Not Detected     Human Rhinovirus/Enterovirus Not Detected     Influenza A PCR Not Detected     Influenza B PCR Not Detected     Parainfluenza Virus 1 Not Detected     Parainfluenza Virus 2 Not Detected     Parainfluenza Virus 3 Not Detected     Parainfluenza Virus 4 Not Detected     RSV, PCR Not Detected     Bordetella pertussis pcr Not Detected     Bordetella parapertussis PCR Not Detected     Chlamydophila pneumoniae PCR Not Detected     Mycoplasma pneumo by PCR Not Detected    Narrative:      In the setting of a positive respiratory panel with a viral infection PLUS a negative procalcitonin without other underlying concern for bacterial infection, consider observing off antibiotics or discontinuation of antibiotics and  continue supportive care. If the respiratory panel is positive for atypical bacterial infection (Bordetella pertussis, Chlamydophila pneumoniae, or Mycoplasma pneumoniae), consider antibiotic de-escalation to target atypical bacterial infection.          XR Chest 1 View    Result Date: 6/14/2022  DATE OF EXAM: 6/14/2022 9:22 PM  PROCEDURE: XR CHEST 1 VW-  INDICATIONS: SOA triage protocol  COMPARISON: Chest x-ray 9/9/2019  TECHNIQUE: Single radiographic AP view of the chest was obtained.  FINDINGS: Stable cardiomediastinal silhouette which appears top normal in size. Mild worsening of streaky bibasilar parenchymal opacities. Redemonstration of diffuse interstitial prominence and mild perihilar vascular congestion. There is mild blunting of the bilateral costophrenic angles suggestive of small pleural effusions, possibly new from prior. No pneumothorax. No acute osseous findings.      Impression: Mild worsening of bibasilar parenchymal opacities may reflect atelectasis. Similar diffuse interstitial prominence and perihilar vascular congestion compatible with interstitial edema. Questionably new small bilateral pleural effusions.  This report was finalized on 6/14/2022 9:51 PM by Hugh Lechuga MD.        Results for orders placed during the hospital encounter of 08/16/18    Adult Transthoracic Echo Complete W/ Cont if Necessary Per Protocol    Interpretation Summary  · Left ventricular systolic function is hyperdynamic (EF > 70).  · Left ventricular wall thickness is consistent with borderline concentric hypertrophy.  · Right atrial cavity size is borderline dilated.  · Mild mitral valve regurgitation is present  · Mild to moderate tricuspid valve regurgitation is present.  · Estimated right ventricular systolic pressure from tricuspid regurgitation is markedly elevated (>55 mmHg).      Assessment & Plan   Assessment & Plan       Acute on chronic respiratory failure (HCC)    Gastroesophageal reflux disease     "Dyslipidemia    Coronary artery disease    Acute on chronic diastolic congestive heart failure (HCC)    Pulmonary hypertension. Calculated RVSP 64 mmHg by echocardiogram 8/17/18    Type 2 diabetes mellitus (HCC)    COPD with acute exacerbation (HCC)    Leukocytosis    ZION (acute kidney injury) (HCC)      76 year old female presents to the ED with worsening shortness of air since 4 am yesterday morning.     1) Acute on chronic respiratory failure  -likely multifactorial in setting of COPD, CHF and pulmonary hypertension   -ABG noted above  -CXR mentioned above  -repeat ABG now, patient unable to tolerate bipap  -currently on 55% hi-flow NC  -continue IV steroids  -s/p 80 mg IV lasix   -continuous pulse ox  -keep o2 sat >90%    2) Acute on chronic diastolic congestive heart failure  -on 80 mg IV lasix at home, missed two days this week   -follows with Dr. Sterling but not seen \"in a while.\"    -last echo 2018 EF >70% with mild MVR, Mild to moderate TVR, RVSP >55 mmhg.   -repeat echo in am   -consult cardiology to see   -strict I &O   -daily weight   -s/p 80 mg IV lasix in the ED      3) Oxygen dependent COPD with acute exacerbation   -at baseline wears 3-4L at home  -CXR mentioned above  -continue IV solumedrol   -scheduled and prn duo-nebs   -consult COPD navigator   -reports smoking cessation several years ago   -continuous pulse ox   -repeat ABG pending   -start rocephin/doxycycline   -check sputum culture  -check urine antigens    4) ZION   -check urine studies   -hold nephrotoxic agents  -repeat labs in am     5) Leukocytosis   -lactic acid 1.7, procal 0.03  -check UA  -CXR mentioned above  -likely secondary to #3  -continue rocephin/doxycycline     6) diabetes mellitus type 2   -check hgb A1c   -start ldssi   -fingersticks achs     7) Hypothyroidism  -continue synthroid     8) Hyperlipidemia  -continue statin     9) pAfib  -not on AC  -repeat EKG pending      DVT prophylaxis:  Scds, heparin     CODE STATUS:  " DNR/DNI        This note has been completed as part of a split-shared workflow.     Signature:Electronically signed by RONAK Oglesby, 06/15/22, 1:25 AM EDT.          Attending   Admission Attestation       I have seen and examined the patient, performing an independent face-to-face diagnostic evaluation with plan of care reviewed and developed with the advanced practice clinician (APC).      Brief Summary Statement:   Janet Pisano is a 76 y.o. female past medical history of COPD for 11 years of treatment, essential hypertension, diastolic CHF, hypothyroidism, pulmonary hypertension who presents ER with complaints of increased shortness of breath.    Patient reports over the past 3 days she has had increasing shortness of breath.  Significantly worsening at approximately 4 AM on 6/14/2022.  She reports increasing productive cough with yellow sputum production.  Possible fever today and she had sweats and chills but did not check her temperature.  She missed 2 days of her Lasix over the past 2 days.  EMS called out to her home where patient was noted to be tripoding and hypoxic on her 4 L nasal cannula.  She was initially placed on BiPAP but significantly agitated with this due to severe claustrophobia.  She was changed to high flow nasal cannula.  Had significant improvement after duo nebs, magnesium, and steroids.  Work-up consistent with probable pulmonary edema versus pneumonia in the bases.    Currently, patient reports that she is feeling much improved.  Shortness of breath now rated 3 out of 10 in severity.  Previously 10 out of 10.  Constant.  Worse with exertion.    Remainder of detailed HPI is as noted by APC and has been reviewed and/or edited by me for completeness.    Attending Physical Exam:  Constitutional: Awake, alert, appears chronically ill  Eyes: PERRLA, sclerae anicteric, no conjunctival injection  HENT: NCAT, mucous membranes moist  Neck: Supple, no thyromegaly, no lymphadenopathy,  trachea midline  Respiratory: Poor airflow throughout bilaterally, mild to moderately labored respirations   Cardiovascular: RRR, no murmurs, rubs, or gallops, palpable pedal pulses bilaterally  Gastrointestinal: Positive bowel sounds, soft, nontender, nondistended  Musculoskeletal: 1-2+ bilateral ankle edema, no clubbing or cyanosis to extremities  Psychiatric: Appropriate affect, cooperative  Neurologic: Oriented x 3, strength symmetric in all extremities, Cranial Nerves grossly intact to confrontation, speech clear  Skin: No rashes      Brief Assessment/Plan :  See detailed assessment and plan developed with APC which I have reviewed and/or edited for completeness.        Admission Status: I believe that this patient meets INPATIENT status due to acute on chronic hypoxic respiratory failure.  I feel patient’s risk for adverse outcomes and need for care warrant INPATIENT evaluation and I predict the patient’s care encounter to likely last beyond 2 midnights.        Jos Chavez,   06/15/22

## 2022-06-15 NOTE — ED PROVIDER NOTES
EMERGENCY DEPARTMENT ENCOUNTER    Pt Name: Janet Pisano  MRN: [id number]  Pt :   1945  Room Number:  S369/1  Date of encounter:  2022  PCP: Zee Matos MD  ED Provider: Adam Valladares MD    Historian: EMS, patient      HPI:  Chief Complaint: Respiratory distress        Context: Janet Pisano is a 76-year-old woman with severe COPD on 4 L home oxygen, CAD who presents because of respiratory distress.  She says she has been having worsening shortness of breath for the last few days but got significantly worse this morning and she has been having significantly increased work of breathing since this morning.  She contacted EMS who arrived and found her satting 87% on her baseline 4 L nasal cannula.  They started her on BiPAP maintaining appropriate oxygenation and gave her a neb and 0.5 mg of epinephrine because of diminished breath sounds.  She was afebrile and other vitals were reassuring.  She says she continues to have increased work of breathing but is now refusing the BiPAP.  She initially is also refusing intubation if it comes to this but then says do not let me smother and finally says she would like everything done to make sure that that does not happen.  She denies any other complaints at this time.     PAST MEDICAL HISTORY  Past Medical History:   Diagnosis Date   • Arthritis    • Bilateral carpal tunnel syndrome 2017   • Bradycardia 2016   • COPD exacerbation (CMS/[facility])    • Coronary artery disease 2016   • Depression    • Diabetes mellitus (CMS/[facility])    • Diverticulosis 2016   • Dyslipidemia 2016   • H/O esophageal ulcer    • History of myocardial infarction 2016    Questionable history of myocardial infarction: Remote cardiac catheterization at [facility] in [place], incomplete database.  Reported stress test 2-3 years ago, negative per patient report, incomplete database.    • History of rheumatic fever 2016   • Hypertension    •  Hypothyroidism    • Migraine 2016   • Rheumatic fever    • Ventral hernia 2016   • Vitamin D deficiency 2016         PAST SURGICAL HISTORY  Past Surgical History:   Procedure Laterality Date   • CARDIAC CATHETERIZATION     • CARPAL TUNNEL RELEASE     • CATARACT EXTRACTION, BILATERAL     • COLONOSCOPY     • ESOPHAGUS SURGERY      hole repair   • HERNIA REPAIR      ventral   • TUBAL ABDOMINAL LIGATION  1982         FAMILY HISTORY  Family History   Problem Relation Age of Onset   • Diabetes Mother    • Liver disease Mother    • Cancer Mother    • Obesity Mother    • Coronary artery disease Father    • Alcohol abuse Father          SOCIAL HISTORY  Social History     Socioeconomic History   • Marital status:    Tobacco Use   • Smoking status: Former Smoker     Packs/day: 1.00     Types: Cigarettes     Start date: 1959     Quit date: 2015     Years since quittin.4   • Smokeless tobacco: Never Used   Substance and Sexual Activity   • Alcohol use: No   • Drug use: No   • Sexual activity: Defer         ALLERGIES  Aliskiren, Amlodipine, Crestor [rosuvastatin calcium], Lisinopril, Metformin and related, Penicillins, Sitagliptin, Statins, and Valsartan        REVIEW OF SYSTEMS  Review of Systems       All systems reviewed and negative except for those discussed in HPI.       PHYSICAL EXAM    I have reviewed the triage vital signs and nursing notes.    ED Triage Vitals   Temp Heart Rate Resp BP SpO2   22   98 °F (36.7 °C) 86 (!) 32 143/75 95 %      Temp src Heart Rate Source Patient Position BP Location FiO2 (%)   229 22 --   Axillary Monitor Sitting Right arm        Physical Exam  GENERAL:   Appears in obvious distress tripoding and utilizing accessory muscles with tachypnea  HENT: Nares patent.  Dry mucous membranes  EYES: No scleral icterus.  CV: Regular rhythm, regular  rate.  RESPIRATORY: Decreased air movement and coarse rhonchi in all lung fields without appreciated focal consolidation or rails  ABDOMEN: Soft, nontender  MUSCULOSKELETAL: No deformities.   NEURO: Alert, moves all extremities, follows commands.  SKIN: Warm, dry, no rash visualized.        LAB RESULTS  Recent Results (from the past 24 hour(s))   Comprehensive Metabolic Panel    Collection Time: 06/14/22  9:21 PM    Specimen: Blood   Result Value Ref Range    Glucose 125 (H) 65 - 99 mg/dL    BUN 37 (H) 8 - 23 mg/dL    Creatinine 1.42 (H) 0.57 - 1.00 mg/dL    Sodium 142 136 - 145 mmol/L    Potassium 4.1 3.5 - 5.2 mmol/L    Chloride 98 98 - 107 mmol/L    CO2 30.0 (H) 22.0 - 29.0 mmol/L    Calcium 9.2 8.6 - 10.5 mg/dL    Total Protein 7.6 6.0 - 8.5 g/dL    Albumin 4.20 3.50 - 5.20 g/dL    ALT (SGPT) 40 (H) 1 - 33 U/L    AST (SGOT) 31 1 - 32 U/L    Alkaline Phosphatase 89 39 - 117 U/L    Total Bilirubin 0.6 0.0 - 1.2 mg/dL    Globulin 3.4 gm/dL    A/G Ratio 1.2 g/dL    BUN/Creatinine Ratio 26.1 (H) 7.0 - 25.0    Anion Gap 14.0 5.0 - 15.0 mmol/L    eGFR 38.2 (L) >60.0 mL/min/1.73   BNP    Collection Time: 06/14/22  9:21 PM    Specimen: Blood   Result Value Ref Range    proBNP 1,735.0 0.0 - 1,800.0 pg/mL   Troponin    Collection Time: 06/14/22  9:21 PM    Specimen: Blood   Result Value Ref Range    Troponin T 0.031 (C) 0.000 - 0.030 ng/mL   Green Top (Gel)    Collection Time: 06/14/22  9:21 PM   Result Value Ref Range    Extra Tube Hold for add-ons.    Lavender Top    Collection Time: 06/14/22  9:21 PM   Result Value Ref Range    Extra Tube hold for add-on    Gold Top - SST    Collection Time: 06/14/22  9:21 PM   Result Value Ref Range    Extra Tube Hold for add-ons.    Gray Top    Collection Time: 06/14/22  9:21 PM   Result Value Ref Range    Extra Tube Hold for add-ons.    Light Blue Top    Collection Time: 06/14/22  9:21 PM   Result Value Ref Range    Extra Tube Hold for add-ons.    CBC Auto Differential    Collection  Time: 06/14/22  9:21 PM    Specimen: Blood   Result Value Ref Range    WBC 12.30 (H) 3.40 - 10.80 10*3/mm3    RBC 4.56 3.77 - 5.28 10*6/mm3    Hemoglobin 13.7 12.0 - 15.9 g/dL    Hematocrit 44.9 34.0 - 46.6 %    MCV 98.5 (H) 79.0 - 97.0 fL    MCH 30.0 26.6 - 33.0 pg    MCHC 30.5 (L) 31.5 - 35.7 g/dL    RDW 13.7 12.3 - 15.4 %    RDW-SD 49.6 37.0 - 54.0 fl    MPV 11.1 6.0 - 12.0 fL    Platelets 310 140 - 450 10*3/mm3    Neutrophil % 72.9 42.7 - 76.0 %    Lymphocyte % 18.0 (L) 19.6 - 45.3 %    Monocyte % 8.1 5.0 - 12.0 %    Eosinophil % 0.4 0.3 - 6.2 %    Basophil % 0.3 0.0 - 1.5 %    Immature Grans % 0.3 0.0 - 0.5 %    Neutrophils, Absolute 8.96 (H) 1.70 - 7.00 10*3/mm3    Lymphocytes, Absolute 2.21 0.70 - 3.10 10*3/mm3    Monocytes, Absolute 1.00 (H) 0.10 - 0.90 10*3/mm3    Eosinophils, Absolute 0.05 0.00 - 0.40 10*3/mm3    Basophils, Absolute 0.04 0.00 - 0.20 10*3/mm3    Immature Grans, Absolute 0.04 0.00 - 0.05 10*3/mm3    nRBC 0.0 0.0 - 0.2 /100 WBC   Lactic Acid, Plasma    Collection Time: 06/14/22  9:21 PM    Specimen: Blood   Result Value Ref Range    Lactate 1.7 0.5 - 2.0 mmol/L   Procalcitonin    Collection Time: 06/14/22  9:21 PM    Specimen: Blood   Result Value Ref Range    Procalcitonin 0.03 0.00 - 0.25 ng/mL   C-reactive Protein    Collection Time: 06/14/22  9:21 PM    Specimen: Blood   Result Value Ref Range    C-Reactive Protein 0.70 (H) 0.00 - 0.50 mg/dL   Magnesium    Collection Time: 06/14/22  9:21 PM    Specimen: Blood   Result Value Ref Range    Magnesium 2.0 1.6 - 2.4 mg/dL   Phosphorus    Collection Time: 06/14/22  9:21 PM    Specimen: Blood   Result Value Ref Range    Phosphorus 4.4 2.5 - 4.5 mg/dL   Respiratory Panel PCR w/COVID-19(SARS-CoV-2) ROCKY/PIERCE/ALCON/PAD/COR/MAD/ANTOLIN In-House, NP Swab in [facility], 3-4 HR TAT - Swab, Nasopharynx    Collection Time: 06/14/22  9:28 PM    Specimen: Nasopharynx; Swab   Result Value Ref Range    ADENOVIRUS, PCR Not Detected Not Detected    Coronavirus 229E Not  Detected Not Detected    Coronavirus HKU1 Not Detected Not Detected    Coronavirus NL63 Not Detected Not Detected    Coronavirus OC43 Not Detected Not Detected    COVID19 Not Detected Not Detected - Ref. Range    Human Metapneumovirus Not Detected Not Detected    Human Rhinovirus/Enterovirus Not Detected Not Detected    Influenza A PCR Not Detected Not Detected    Influenza B PCR Not Detected Not Detected    Parainfluenza Virus 1 Not Detected Not Detected    Parainfluenza Virus 2 Not Detected Not Detected    Parainfluenza Virus 3 Not Detected Not Detected    Parainfluenza Virus 4 Not Detected Not Detected    RSV, PCR Not Detected Not Detected    Bordetella pertussis pcr Not Detected Not Detected    Bordetella parapertussis PCR Not Detected Not Detected    Chlamydophila pneumoniae PCR Not Detected Not Detected    Mycoplasma pneumo by PCR Not Detected Not Detected   Blood Gas, Arterial With Co-Ox    Collection Time: 06/14/22 10:22 PM    Specimen: Arterial Blood   Result Value Ref Range    Site Right Brachial     Marquise's Test N/A     pH, Arterial 7.329 (L) 7.350 - 7.450 pH units    pCO2, Arterial 57.4 (H) 35.0 - 45.0 mm Hg    pO2, Arterial 75.7 (L) 83.0 - 108.0 mm Hg    HCO3, Arterial 30.2 (H) 20.0 - 26.0 mmol/L    Base Excess, Arterial 2.8 (H) 0.0 - 2.0 mmol/L    Hemoglobin, Blood Gas 13.5 (L) 14 - 18 g/dL    Hematocrit, Blood Gas 41.3 38.0 - 51.0 %    Oxyhemoglobin 92.7 (L) 94 - 99 %    Methemoglobin 0.40 0.00 - 1.50 %    Carboxyhemoglobin 1.1 0 - 2 %    CO2 Content 31.9 22 - 33 mmol/L    Temperature 37.0 C    Barometric Pressure for Blood Gas      Modality PRB     FIO2 80 %    Rate 0 Breaths/minute    PIP 0 cmH2O    IPAP 0     EPAP 0     Note      pH, Temp Corrected 7.329 pH Units    pCO2, Temperature Corrected 57.4 (H) 35 - 45 mm Hg    pO2, Temperature Corrected 75.7 (L) 83 - 108 mm Hg   POC Glucose Once    Collection Time: 06/15/22  1:24 AM    Specimen: Blood   Result Value Ref Range    Glucose 214 (H) 70 - 130  mg/dL   Blood Gas, Arterial With Co-Ox    Collection Time: 06/15/22  1:27 AM    Specimen: Arterial Blood   Result Value Ref Range    Site Right Brachial     Marquise's Test N/A     pH, Arterial 7.347 (L) 7.350 - 7.450 pH units    pCO2, Arterial 53.2 (H) 35.0 - 45.0 mm Hg    pO2, Arterial 79.9 (L) 83.0 - 108.0 mm Hg    HCO3, Arterial 29.1 (H) 20.0 - 26.0 mmol/L    Base Excess, Arterial 2.4 (H) 0.0 - 2.0 mmol/L    Hemoglobin, Blood Gas 13.5 (L) 14 - 18 g/dL    Hematocrit, Blood Gas 41.5 38.0 - 51.0 %    Oxyhemoglobin 93.9 (L) 94 - 99 %    Methemoglobin 0.60 0.00 - 1.50 %    Carboxyhemoglobin 0.9 0 - 2 %    CO2 Content 30.8 22 - 33 mmol/L    Temperature 37.0 C    Barometric Pressure for Blood Gas      Modality Heated HFNC     FIO2 60 %    Rate 0 Breaths/minute    PIP 0 cmH2O    IPAP 0     EPAP 0     Note      pH, Temp Corrected 7.347 pH Units    pCO2, Temperature Corrected 53.2 (H) 35 - 45 mm Hg    pO2, Temperature Corrected 79.9 (L) 83 - 108 mm Hg   ECG 12 Lead    Collection Time: 06/15/22  2:09 AM   Result Value Ref Range    QT Interval 402 ms    QTC Interval 463 ms   CBC Auto Differential    Collection Time: 06/15/22  2:44 AM    Specimen: Blood   Result Value Ref Range    WBC 12.01 (H) 3.40 - 10.80 10*3/mm3    RBC 4.33 3.77 - 5.28 10*6/mm3    Hemoglobin 13.0 12.0 - 15.9 g/dL    Hematocrit 41.3 34.0 - 46.6 %    MCV 95.4 79.0 - 97.0 fL    MCH 30.0 26.6 - 33.0 pg    MCHC 31.5 31.5 - 35.7 g/dL    RDW 13.8 12.3 - 15.4 %    RDW-SD 48.0 37.0 - 54.0 fl    MPV 11.0 6.0 - 12.0 fL    Platelets 245 140 - 450 10*3/mm3    Neutrophil % 96.1 (H) 42.7 - 76.0 %    Lymphocyte % 2.7 (L) 19.6 - 45.3 %    Monocyte % 0.7 (L) 5.0 - 12.0 %    Eosinophil % 0.0 (L) 0.3 - 6.2 %    Basophil % 0.2 0.0 - 1.5 %    Immature Grans % 0.3 0.0 - 0.5 %    Neutrophils, Absolute 11.54 (H) 1.70 - 7.00 10*3/mm3    Lymphocytes, Absolute 0.33 (L) 0.70 - 3.10 10*3/mm3    Monocytes, Absolute 0.08 (L) 0.10 - 0.90 10*3/mm3    Eosinophils, Absolute 0.00 0.00 - 0.40  10*3/mm3    Basophils, Absolute 0.02 0.00 - 0.20 10*3/mm3    Immature Grans, Absolute 0.04 0.00 - 0.05 10*3/mm3    nRBC 0.0 0.0 - 0.2 /100 WBC   Basic Metabolic Panel    Collection Time: 06/15/22  2:44 AM    Specimen: Blood   Result Value Ref Range    Glucose 282 (H) 65 - 99 mg/dL    BUN 38 (H) 8 - 23 mg/dL    Creatinine 1.33 (H) 0.57 - 1.00 mg/dL    Sodium 138 136 - 145 mmol/L    Potassium 4.4 3.5 - 5.2 mmol/L    Chloride 94 (L) 98 - 107 mmol/L    CO2 30.0 (H) 22.0 - 29.0 mmol/L    Calcium 9.0 8.6 - 10.5 mg/dL    BUN/Creatinine Ratio 28.6 (H) 7.0 - 25.0    Anion Gap 14.0 5.0 - 15.0 mmol/L    eGFR 41.3 (L) >60.0 mL/min/1.73   Hemoglobin A1c    Collection Time: 06/15/22  2:44 AM    Specimen: Blood   Result Value Ref Range    Hemoglobin A1C 8.60 (H) 4.80 - 5.60 %   Troponin    Collection Time: 06/15/22  2:44 AM    Specimen: Blood   Result Value Ref Range    Troponin T 0.031 (C) 0.000 - 0.030 ng/mL   TSH    Collection Time: 06/15/22  2:44 AM    Specimen: Blood   Result Value Ref Range    TSH 0.093 (L) 0.270 - 4.200 uIU/mL   POC Glucose Once    Collection Time: 06/15/22  6:54 AM    Specimen: Blood   Result Value Ref Range    Glucose 336 (H) 70 - 130 mg/dL       If labs were ordered, I independently reviewed the results.        RADIOLOGY  XR Chest 1 View    Result Date: 6/14/2022  DATE OF EXAM: 6/14/2022 9:22 PM  PROCEDURE: XR CHEST 1 VW-  INDICATIONS: SOA triage protocol  COMPARISON: Chest x-ray 9/9/2019  TECHNIQUE: Single radiographic AP view of the chest was obtained.  FINDINGS: Stable cardiomediastinal silhouette which appears top normal in size. Mild worsening of streaky bibasilar parenchymal opacities. Redemonstration of diffuse interstitial prominence and mild perihilar vascular congestion. There is mild blunting of the bilateral costophrenic angles suggestive of small pleural effusions, possibly new from prior. No pneumothorax. No acute osseous findings.      Mild worsening of bibasilar parenchymal opacities may  reflect atelectasis. Similar diffuse interstitial prominence and perihilar vascular congestion compatible with interstitial edema. Questionably new small bilateral pleural effusions.  This report was finalized on 6/14/2022 9:51 PM by Hugh Lechuga MD.        I ordered and reviewed the above noted radiographic studies.      I viewed images of bilateral opacities consistent with interstitial edema or infection.    See radiologist's dictation for official interpretation.        PROCEDURES    Procedures    ECG 12 Lead   Preliminary Result   Test Reason : ? afib   Blood Pressure :   */*   mmHG   Vent. Rate :  80 BPM     Atrial Rate :  80 BPM      P-R Int :   * ms          QRS Dur :  90 ms       QT Int : 402 ms       P-R-T Axes :   *  30 109 degrees      QTc Int : 463 ms      Atrial fibrillation   Septal infarct , age undetermined   ST & T wave abnormality, consider lateral ischemia   Abnormal ECG   When compared with ECG of 14-JUN-2022 21:23, (Unconfirmed)   Septal infarct is now present      Referred By:            Confirmed By:       ECG 12 Lead   Preliminary Result         ECG 12 Lead    (Results Pending)       MEDICATIONS GIVEN IN ER    Medications   sodium chloride 0.9 % flush 10 mL (has no administration in time range)   aspirin tablet 325 mg (has no administration in time range)   furosemide (LASIX) tablet 80 mg (has no administration in time range)   gabapentin (NEURONTIN) capsule 300 mg (300 mg Oral Given 6/15/22 0816)   pravastatin (PRAVACHOL) tablet 40 mg (40 mg Oral Given 6/15/22 0816)   levothyroxine (SYNTHROID) tablet 125 mcg - Patient Supplied (125 mcg Oral Given 6/15/22 0605)   albuterol (PROVENTIL) nebulizer solution 0.083% 2.5 mg/3mL (2.5 mg Nebulization Given 6/15/22 0711)   doxycycline (VIBRAMYCIN) 100 mg in sodium chloride 0.9 % 250 mL IVPB-VTB (100 mg Intravenous Given 6/15/22 0213)   cefTRIAXone (ROCEPHIN) 1 g/100 mL 0.9% NS (MBP) (1 g Intravenous New Bag 6/15/22 0213)   sodium chloride 0.9 %  flush 10 mL (10 mL Intravenous Not Given 6/15/22 0214)   sodium chloride 0.9 % flush 10 mL (has no administration in time range)   heparin (porcine) 5000 UNIT/ML injection 5,000 Units (5,000 Units Subcutaneous Given 6/15/22 0214)   ipratropium-albuterol (DUO-NEB) nebulizer solution 3 mL (has no administration in time range)   methylPREDNISolone sodium succinate (SOLU-Medrol) injection 60 mg (60 mg Intravenous Given 6/15/22 0605)   dextrose (GLUTOSE) oral gel 15 g (has no administration in time range)   dextrose (D50W) (25 g/50 mL) IV injection 25 g (has no administration in time range)   glucagon (human recombinant) (GLUCAGEN DIAGNOSTIC) injection 1 mg (has no administration in time range)   Insulin Lispro (humaLOG) injection 0-7 Units (5 Units Subcutaneous Given 6/15/22 0815)   melatonin tablet 5 mg (5 mg Oral Given 6/15/22 0213)   traMADol (ULTRAM) tablet 50 mg (has no administration in time range)   ! [facility] medications [facility]. Return to patient prior to discharge ( Does not apply Canceled Entry 6/15/22 0816)   Pharmacy Consult - MTM ( Does not apply Canceled Entry 6/15/22 0817)   LORazepam (ATIVAN) injection 0.5 mg (0.5 mg Intravenous Given 6/14/22 2136)   ipratropium-albuterol (DUO-NEB) nebulizer solution 3 mL (3 mL Nebulization Given 6/14/22 2219)   methylPREDNISolone sodium succinate (SOLU-Medrol) injection 125 mg (125 mg Intravenous Given 6/14/22 2136)   magnesium sulfate in D5W 1g/100mL (PREMIX) (0 g Intravenous Stopped 6/14/22 2245)   furosemide (LASIX) injection 60 mg (60 mg Intravenous Given 6/14/22 2338)   furosemide (LASIX) injection 20 mg (20 mg Intravenous Given 6/15/22 0137)   traMADol (ULTRAM) tablet 50 mg (50 mg Oral Given 6/15/22 0213)         PROGRESS, DATA ANALYSIS, CONSULTS, AND MEDICAL DECISION MAKING    All labs have been independently reviewed by me.  All radiology studies have been reviewed by me and the radiologist dictating the report.   EKG's have been independently  viewed and interpreted by me.          ED Course as of 06/15/22 0910   Tue Jun 14, 202214, 2022 2138 In summary this is a 76-year-old woman with severe COPD on 4 L home oxygen, CAD who presents because of respiratory distress.  She says she has been having worsening shortness of breath for the last few days but got significantly worse this morning and she has been having significantly increased work of breathing since this morning.  She contacted EMS who arrived and found her satting 87% on her baseline 4 L nasal cannula.  They started her on BiPAP maintaining appropriate oxygenation and gave her a neb and 0.5 mg of epinephrine because of diminished breath sounds.  She was afebrile and other vitals were reassuring.  She says she continues to have increased work of breathing but is now refusing the BiPAP.  She initially is also refusing intubation if it comes to this but then says do not let me smother and finally says she would like everything done to make sure that that does not happen.  She denies any other complaints at this time.  She arrived tachypneic and tripoding continued respiratory distress.  She has refused BiPAP and is having severe anxiety and claustrophobia related to it we will give 0.5 mg of Ativan hopefully to take the edge off without affecting respiratory drive.  Treating with Solu-Medrol, magnesium, DuoNeb's. [CC]   2140 Chest x-ray reveals bilateral opacities and possible edema.  She is not hypertensive at this time.  Mild leukocytosis.  Will reevaluate pending interventions and full respiratory and infectious work-up. [CC]      ED Course User Index  [CC] Adam Valladares MD     BNP is borderline elevated and upon reevaluation with her turns out she is on 80 mg oral Lasix daily so started 60 mg of IV Lasix.  Creatinine elevated consistent with acute kidney injury.  Initial troponin elevated she continues to deny chest pain I think this is likely a stress response.  ABG concerning for acidosis,  hypercapnia, hypoxia.  Upon reevaluation her work of breathing has improved significantly after the medications.  She is demanding the nonrebreather be removed due to claustrophobia and tinkering with her oxygen I am able to get her satting in the low 90s on 9 L nasal cannula but this will do nothing for her hypercapnia and we discussed the option of high flow nasal cannula as the additional people may be beneficial in helping her blow off her CO2 and she is agreeable to the nasal cannula.  Upon reevaluation she is tolerating the high flow nasal cannula well and seems stable enough for telemetry level care.  Discussed with Dr. Chavez with the medicine team.    50 minutes of critical care provided. This time excludes other billable procedures. Time does include preparation of documents, medical consultations, review of old records, and direct bedside care. Patient is at high risk for life-threatening deterioration due to acute respiratory distress with respiratory acidosis, hypoxia, hypercapnia in the setting of COPD and CHF.       AS OF 09:10 EDT VITALS:    BP - 157/56  HR - 79  TEMP - 97.9 °F (36.6 °C) (Oral)  O2 SATS - 94%                  DIAGNOSIS  Final diagnoses:   Acute on chronic respiratory failure with hypoxia and hypercapnia (HCC)   COPD with acute exacerbation (HCC)   Congestive heart failure, unspecified HF chronicity, unspecified heart failure type (HCC)   ZION (acute kidney injury) (HCC)         DISPOSITION  Admit             Adam Valladares MD  06/15/22 4063

## 2022-06-15 NOTE — CONSULTS
[facility] at [facility]  Cardiovascular Consultation Note    Reason for consultation: Heart failure with preserved EF diastolic dysfunction #2 pulmonary hypertension    History of Present Illness:  76-year-old female with O2 dependent COPD, hypothyroidism, diabetes, hyperlipidemia prior CAD who presents complaining of shortness of breath.  The patient states she is compliant with her oxygen.  She has sleep apnea but not on CPAP.  The patient states she had a cardiac cath more than 10 years ago and she is unaware of the results.  The patient has chronic dyspnea but over the last few weeks has become worse.  She also has been getting some discomfort in her neck and jaws with activity which is increased in frequency.  When the patient went to bed she awakened at 4 AM with worsening shortness of breath she states she felt like she had cold sweats in her back and she had bilateral shoulder discomfort.  When she ambulates in the house she gets extremely short of breath and has discomfort.  She denies palpitations.  She denies having any recent edema.  She sleeps in a recliner because she feels short of breath lying back.    Cardiac risk factors: #1 age greater than 55 #2 hypertension #3 hyperlipidemia #4 diabetes #5 former smoker    Past medical and surgical history, social and family history reviewed in EMR.    REVIEW OF SYSTEMS:     Past Medical History:   Diagnosis Date   • Arthritis    • Bilateral carpal tunnel syndrome 2/24/2017   • Bradycardia 8/22/2016   • COPD exacerbation (CMS/Former[facility])    • Coronary artery disease 9/7/2016   • Depression    • Diabetes mellitus (CMS/Formerly [facility])    • Diverticulosis 8/22/2016   • Dyslipidemia 9/7/2016   • H/O esophageal ulcer    • History of myocardial infarction 9/7/2016    Questionable history of myocardial infarction: Remote cardiac catheterization at [facility] in [place], incomplete database.  Reported stress test 2-3 years ago, negative per  patient report, incomplete database.    • History of rheumatic fever 2016   • Hypertension    • Hypothyroidism    • Migraine 2016   • Rheumatic fever    • Ventral hernia 2016   • Vitamin D deficiency 2016     Past Surgical History:   Procedure Laterality Date   • CARDIAC CATHETERIZATION     • CARPAL TUNNEL RELEASE     • CATARACT EXTRACTION, BILATERAL     • COLONOSCOPY     • ESOPHAGUS SURGERY      hole repair   • HERNIA REPAIR      ventral   • TUBAL ABDOMINAL LIGATION  1982     Social History     Socioeconomic History   • Marital status:    Tobacco Use   • Smoking status: Former Smoker     Packs/day: 1.00     Types: Cigarettes     Start date: 1959     Quit date: 2015     Years since quittin.4   • Smokeless tobacco: Never Used   Substance and Sexual Activity   • Alcohol use: No   • Drug use: No   • Sexual activity: Defer     Family History   Problem Relation Age of Onset   • Diabetes Mother    • Liver disease Mother    • Cancer Mother    • Obesity Mother    • Coronary artery disease Father    • Alcohol abuse Father        H&P ROS reviewed and pertinent CV ROS as noted in HPI.    Cardiac: Complains of tightness in her neck and jaw with activity which is increased in frequency.  No palpitations.  Complains of orthopnea  Respiratory: Patient has O2 dependent COPD.  She does wheeze frequently  Lower Extremities: Denies lower extremity swelling  GI: No nausea vomiting diarrhea bright red blood per rectum or melena  Neuro: No history of stroke TIA or neurologic event  Hematology: No bleeding diathesis ecchymosis or petechiae  Renal: No hematuria or renal stones  Musculoskeletal: Does not complain of joint pain  Endocrine: Has hypothyroidism and diabetes  Constitutional: No fever or chills  Psych: No depression or suicidal ideation      Physical Exam: General morbidly obese female sitting on a bedside commode with high flow nasal O2 not dyspneic not tachypneic       HEENT: Neck is obese  and I cannot assess for bruits or JVP.  Tongue is midline.  No icterus       Respiratory: Equal bilateral symmetrical expansion bilateral crackles       Cardiovascular: Regular rate and rhythm with ectopics systolic ejection murmur and 1+ pitting edema to palpation       GI: Obese and soft with positive bowel sounds       Lower Extremities: Stasis abnormalities       Neuro: Facial expressions are symmetrical moves all 4 extremities       Skin: Warm and dry pitting edema to palpation       Psych: Pleasant affect oriented x3    Results Review: BMP is normal.  Troponin x2 is 0.031.  TSH significantly depressed 0.093 A1c elevated at 8.4 hemoglobin is normal at 13           Vital Sign Min/Max for last 24 hours  Temp  Min: 97.9 °F (36.6 °C)  Max: 98.7 °F (37.1 °C)   BP  Min: 110/58  Max: 157/56   Pulse  Min: 72  Max: 86   Resp  Min: 22  Max: 32   SpO2  Min: 93 %  Max: 96 %   No data recorded      Intake/Output Summary (Last 24 hours) at 6/15/2022 0850  Last data filed at 6/15/2022 0400  Gross per 24 hour   Intake 1080 ml   Output 1050 ml   Net 30 ml             Current Facility-Administered Medications:   •  ! Home medications [facility]. Return to patient prior to discharge, , Does not apply, Daily, Nikko Peters IV, PharmD  •  albuterol (PROVENTIL) nebulizer solution 0.083% 2.5 mg/3mL, 2.5 mg, Nebulization, Q4H - RT, Rocío, Nayeli, APRN, 2.5 mg at 06/15/22 0711  •  aspirin tablet 325 mg, 325 mg, Oral, Daily, Rocío, Nayeli, APRN  •  cefTRIAXone (ROCEPHIN) 1 g/100 mL 0.9% NS (MBP), 1 g, Intravenous, Nightly, Rocío, Nayeli, APRN, 1 g at 06/15/22 0213  •  dextrose (D50W) (25 g/50 mL) IV injection 25 g, 25 g, Intravenous, Q15 Min PRN, Rocío, Nayeli, APRN  •  dextrose (GLUTOSE) oral gel 15 g, 15 g, Oral, Q15 Min PRN, Rocío, Nayeli, APRN  •  doxycycline (VIBRAMYCIN) 100 mg in sodium chloride 0.9 % 250 mL IVPB-VTB, 100 mg, Intravenous, Q12H, Nayeli Alford, APRN, 100 mg at 06/15/22 0213  •   furosemide (LASIX) tablet 80 mg, 80 mg, Oral, Daily, Rocío, Nayeli, APRN  •  gabapentin (NEURONTIN) capsule 300 mg, 300 mg, Oral, TID, Rocío, Nayeli, APRN, 300 mg at 06/15/22 0816  •  glucagon (human recombinant) (GLUCAGEN DIAGNOSTIC) injection 1 mg, 1 mg, Intramuscular, Q15 Min PRN, Rocío, Nayeli, APRN  •  heparin (porcine) 5000 UNIT/ML injection 5,000 Units, 5,000 Units, Subcutaneous, Q12H, Rocío, Nayeli, APRN, 5,000 Units at 06/15/22 0214  •  Insulin Lispro (humaLOG) injection 0-7 Units, 0-7 Units, Subcutaneous, TID AC, Rocío, Nayeli, APRN, 5 Units at 06/15/22 0815  •  ipratropium-albuterol (DUO-NEB) nebulizer solution 3 mL, 3 mL, Nebulization, Q4H PRN, Rocío, Nayeli, APRN  •  levothyroxine (SYNTHROID) tablet 125 mcg - Patient Supplied, 125 mcg, Oral, Q AM, Rocío, Nayeli, APRN, 125 mcg at 06/15/22 0605  •  melatonin tablet 5 mg, 5 mg, Oral, Nightly, Jos Chavez, DO, 5 mg at 06/15/22 0213  •  methylPREDNISolone sodium succinate (SOLU-Medrol) injection 60 mg, 60 mg, Intravenous, Q8H, Rocío, Nayeli, APRN, 60 mg at 06/15/22 0605  •  Pharmacy Consult - [facility], , Does not apply, Daily, Riedel, Taylor, PharmD  •  pravastatin (PRAVACHOL) tablet 40 mg, 40 mg, Oral, Daily, Rocío, Nayeli, APRN, 40 mg at 06/15/22 0816  •  sodium chloride 0.9 % flush 10 mL, 10 mL, Intravenous, PRN, Jos Chavez, DO  •  sodium chloride 0.9 % flush 10 mL, 10 mL, Intravenous, Q12H, Rocío, Nayeli, APRN  •  sodium chloride 0.9 % flush 10 mL, 10 mL, Intravenous, PRN, Rocío, Nayeli, APRN  •  traMADol (ULTRAM) tablet 50 mg, 50 mg, Oral, Q6H PRN, Jos Chavez,     Lab Review:   Results from last 7 days   Lab Units 06/15/22  0244 06/14/22  2121   WBC 10*3/mm3 12.01* 12.30*   HEMOGLOBIN g/dL 13.0 13.7   PLATELETS 10*3/mm3 245 310     Results from last 7 days   Lab Units 06/15/22  0244 06/14/22  2121   SODIUM mmol/L 138 142   POTASSIUM mmol/L 4.4 4.1   CO2 mmol/L 30.0* 30.0*   BUN mg/dL 38* 37*   CREATININE mg/dL  1.33* 1.42*   MAGNESIUM mg/dL  --  2.0   PHOSPHORUS mg/dL  --  4.4   GLUCOSE mg/dL 282* 125*     Estimated Creatinine Clearance: 38.4 mL/min (A) (by C-G formula based on SCr of 1.33 mg/dL (H)).    Results from last 7 days   Lab Units 06/15/22  0244   HEMOGLOBIN A1C % 8.60*     .lipd  Lab Results   Component Value Date    CHLPL 150 12/07/2017    TRIG 94 12/07/2017    HDL 53 12/07/2017    AST 31 06/14/2022    ALT 40 (H) 06/14/2022       Radiology Reports:  Imaging Results (Last 72 Hours)     Procedure Component Value Units Date/Time    XR Chest 1 View [[id number]] Collected: 06/14/22 2150     Updated: 06/14/22 2154    Narrative:      DATE OF EXAM: 6/14/2022 9:22 PM     PROCEDURE: XR CHEST 1 VW-     INDICATIONS: SOA triage protocol     COMPARISON: Chest x-ray 9/9/2019     TECHNIQUE: Single radiographic AP view of the chest was obtained.     FINDINGS:  Stable cardiomediastinal silhouette which appears top normal in size.  Mild worsening of streaky bibasilar parenchymal opacities.  Redemonstration of diffuse interstitial prominence and mild perihilar  vascular congestion. There is mild blunting of the bilateral  costophrenic angles suggestive of small pleural effusions, possibly new  from prior. No pneumothorax. No acute osseous findings.        Impression:      Mild worsening of bibasilar parenchymal opacities may reflect  atelectasis. Similar diffuse interstitial prominence and perihilar  vascular congestion compatible with interstitial edema. Questionably new  small bilateral pleural effusions.     This report was finalized on 6/14/2022 9:51 PM by Hugh Lechuga MD.             Assessment/Plan: 1 acute on chronic hypoxic respiratory failure-the patient has chronic dyspnea on exertion but is gotten worse recently to the point of orthopnea.  Her BNP is negative but she clearly has symptoms of CHF with her orthopnea.  I will continue to diurese the patient.  In addition the patient has minimally elevated troponins but  she has been having exertional neck and jaw pain and woke up with bilateral arm pain.  We will get echocardiogram today and I recommend cardiac cath.  We will add low-dose Bystolic 2.5 mg a day and will monitor for negative effects on her respiratory status  2 pulmonary hypertension-secondary to obesity and COPD.  Repeat echo pending  3 elevated troponin-consider cardiac cath      Alex Mejia MD  06/15/22  08:50 EDT

## 2022-06-16 NOTE — PLAN OF CARE
Goal Outcome Evaluation:                 Problem: Adult Inpatient Plan of Care  Goal: Absence of Hospital-Acquired Illness or Injury  Intervention: Identify and Manage Fall Risk  Recent Flowsheet Documentation  Taken 6/16/2022 0400 by Rafael Vazquez RN  Safety Promotion/Fall Prevention:   activity supervised   safety round/check completed   toileting scheduled  Taken 6/16/2022 0200 by Rafael Vazquez RN  Safety Promotion/Fall Prevention:   activity supervised   safety round/check completed   toileting scheduled  Taken 6/16/2022 0000 by Rafael Vazquez RN  Safety Promotion/Fall Prevention:   activity supervised   safety round/check completed   toileting scheduled  Intervention: Prevent Skin Injury  Recent Flowsheet Documentation  Taken 6/16/2022 0400 by Rafael Vazquez RN  Body Position: supine  Taken 6/16/2022 0200 by Rafael Vazquez RN  Body Position: supine  Taken 6/16/2022 0000 by Rafael Vazquez RN  Body Position: supine  Intervention: Prevent and Manage VTE (Venous Thromboembolism) Risk  Recent Flowsheet Documentation  Taken 6/16/2022 0000 by Rafael Vazquez RN  Range of Motion: active ROM (range of motion) encouraged  Intervention: Prevent Infection  Recent Flowsheet Documentation  Taken 6/16/2022 0400 by Rafael Vazquez RN  Infection Prevention: environmental surveillance performed  Taken 6/16/2022 0200 by Rafael Vazquez RN  Infection Prevention: environmental surveillance performed  Taken 6/16/2022 0000 by Rafael Vazquez RN  Infection Prevention: environmental surveillance performed  Goal: Optimal Comfort and Wellbeing  Intervention: Monitor Pain and Promote Comfort  Recent Flowsheet Documentation  Taken 6/16/2022 0400 by Rafael Vazquez RN  Pain Management Interventions: quiet environment facilitated  Taken 6/16/2022 0200 by Rafael Vazquez RN  Pain Management Interventions: quiet environment facilitated  Taken 6/16/2022 0000 by Rafael Vazquez RN  Pain Management Interventions: quiet  environment facilitated  Intervention: Provide Person-Centered Care  Recent Flowsheet Documentation  Taken 6/16/2022 0400 by Rafael Vazquez RN  Trust Relationship/Rapport: care explained  Taken 6/16/2022 0200 by Rafael Vazquez RN  Trust Relationship/Rapport: care explained  Taken 6/16/2022 0000 by Rafael Vazquez RN  Trust Relationship/Rapport: care explained  Goal: Absence of Hospital-Acquired Illness or Injury  Intervention: Identify and Manage Fall Risk  Recent Flowsheet Documentation  Taken 6/16/2022 0400 by Rafael Vazquez RN  Safety Promotion/Fall Prevention:   activity supervised   safety round/check completed   toileting scheduled  Taken 6/16/2022 0200 by Rafael Vazquez RN  Safety Promotion/Fall Prevention:   activity supervised   safety round/check completed   toileting scheduled  Taken 6/16/2022 0000 by Rafael Vazquez RN  Safety Promotion/Fall Prevention:   activity supervised   safety round/check completed   toileting scheduled  Intervention: Prevent Skin Injury  Recent Flowsheet Documentation  Taken 6/16/2022 0400 by Rafael Vazquez RN  Body Position: supine  Taken 6/16/2022 0200 by Rafael Vazquez RN  Body Position: supine  Taken 6/16/2022 0000 by Rafael Vazquez RN  Body Position: supine  Intervention: Prevent and Manage VTE (Venous Thromboembolism) Risk  Recent Flowsheet Documentation  Taken 6/16/2022 0000 by Rafael Vazquez RN  Range of Motion: active ROM (range of motion) encouraged  Intervention: Prevent Infection  Recent Flowsheet Documentation  Taken 6/16/2022 0400 by Rafael Vazquez RN  Infection Prevention: environmental surveillance performed  Taken 6/16/2022 0200 by Rafael Vazquez RN  Infection Prevention: environmental surveillance performed  Taken 6/16/2022 0000 by Rafael Vazquez RN  Infection Prevention: environmental surveillance performed  Goal: Optimal Comfort and Wellbeing  Intervention: Monitor Pain and Promote Comfort  Recent Flowsheet Documentation  Taken  6/16/2022 0400 by Rafael Vazquez RN  Pain Management Interventions: quiet environment facilitated  Taken 6/16/2022 0200 by Rafael Vazquez RN  Pain Management Interventions: quiet environment facilitated  Taken 6/16/2022 0000 by Rafael Vazquez RN  Pain Management Interventions: quiet environment facilitated  Intervention: Provide Person-Centered Care  Recent Flowsheet Documentation  Taken 6/16/2022 0400 by Rafael Vazquez RN  Trust Relationship/Rapport: care explained  Taken 6/16/2022 0200 by Rafael Vazquez RN  Trust Relationship/Rapport: care explained  Taken 6/16/2022 0000 by Rafael Vazquez RN  Trust Relationship/Rapport: care explained     Problem: Asthma Comorbidity  Goal: Maintenance of Asthma Control  Intervention: Maintain Asthma Symptom Control  Recent Flowsheet Documentation  Taken 6/16/2022 0400 by Rafael Vazquez RN  Medication Review/Management: medications reviewed  Taken 6/16/2022 0200 by Rafael Vazquez RN  Medication Review/Management: medications reviewed  Taken 6/16/2022 0000 by Rafael Vazquez RN  Medication Review/Management: medications reviewed     Problem: Behavioral Health Comorbidity  Goal: Maintenance of Behavioral Health Symptom Control  Intervention: Maintain Behavioral Health Symptom Control  Recent Flowsheet Documentation  Taken 6/16/2022 0400 by Rafael Vazquez RN  Medication Review/Management: medications reviewed  Taken 6/16/2022 0200 by Rafael Vazquez RN  Medication Review/Management: medications reviewed  Taken 6/16/2022 0000 by Rafael Vazquez RN  Medication Review/Management: medications reviewed     Problem: COPD (Chronic Obstructive Pulmonary Disease) Comorbidity  Goal: Maintenance of COPD Symptom Control  Intervention: Maintain COPD-Symptom Control  Recent Flowsheet Documentation  Taken 6/16/2022 0400 by Rafael Vazquez RN  Medication Review/Management: medications reviewed  Taken 6/16/2022 0200 by Rafael Vazquez RN  Medication Review/Management:  medications reviewed  Taken 6/16/2022 0000 by Rafael Vazquez RN  Medication Review/Management: medications reviewed     Problem: Heart Failure Comorbidity  Goal: Maintenance of Heart Failure Symptom Control  Intervention: Maintain Heart Failure-Management  Recent Flowsheet Documentation  Taken 6/16/2022 0400 by Rafael Vazquez RN  Medication Review/Management: medications reviewed  Taken 6/16/2022 0200 by Rafael Vazquez RN  Medication Review/Management: medications reviewed  Taken 6/16/2022 0000 by Rafael Vazquez RN  Medication Review/Management: medications reviewed     Problem: Hypertension Comorbidity  Goal: Blood Pressure in Desired Range  Intervention: Maintain Blood Pressure Management  Recent Flowsheet Documentation  Taken 6/16/2022 0400 by Rafael Vazquez RN  Medication Review/Management: medications reviewed  Taken 6/16/2022 0200 by Rafael Vazquez RN  Medication Review/Management: medications reviewed  Taken 6/16/2022 0000 by Rafael Vazquez RN  Medication Review/Management: medications reviewed     Problem: Osteoarthritis Comorbidity  Goal: Maintenance of Osteoarthritis Symptom Control  Intervention: Maintain Osteoarthritis Symptom Control  Recent Flowsheet Documentation  Taken 6/16/2022 0400 by Rafael Vazquez RN  Medication Review/Management: medications reviewed  Taken 6/16/2022 0200 by Rafael Vazquez RN  Medication Review/Management: medications reviewed  Taken 6/16/2022 0000 by Rafael Vazquez RN  Medication Review/Management: medications reviewed     Problem: Pain Chronic (Persistent) (Comorbidity Management)  Goal: Acceptable Pain Control and Functional Ability  Intervention: Manage Persistent Pain  Recent Flowsheet Documentation  Taken 6/16/2022 0400 by Rafael Vazquez RN  Medication Review/Management: medications reviewed  Taken 6/16/2022 0200 by Rafael Vazquez RN  Medication Review/Management: medications reviewed  Taken 6/16/2022 0000 by Rafael Vazquez RN  Medication  Review/Management: medications reviewed  Intervention: Develop Pain Management Plan  Recent Flowsheet Documentation  Taken 6/16/2022 0400 by Rafael Vazquez RN  Pain Management Interventions: quiet environment facilitated  Taken 6/16/2022 0200 by Rafael Vazquez RN  Pain Management Interventions: quiet environment facilitated  Taken 6/16/2022 0000 by Rafael Vazquez RN  Pain Management Interventions: quiet environment facilitated     Problem: Fall Injury Risk  Goal: Absence of Fall and Fall-Related Injury  Intervention: Identify and Manage Contributors  Recent Flowsheet Documentation  Taken 6/16/2022 0400 by Rafael Vazquez RN  Medication Review/Management: medications reviewed  Taken 6/16/2022 0200 by Rafael Vazquez RN  Medication Review/Management: medications reviewed  Taken 6/16/2022 0000 by Rafael Vazquez RN  Medication Review/Management: medications reviewed  Intervention: Promote Injury-Free Environment  Recent Flowsheet Documentation  Taken 6/16/2022 0400 by Rafael Vazquez RN  Safety Promotion/Fall Prevention:   activity supervised   safety round/check completed   toileting scheduled  Taken 6/16/2022 0200 by Rafael Vazquez RN  Safety Promotion/Fall Prevention:   activity supervised   safety round/check completed   toileting scheduled  Taken 6/16/2022 0000 by Rafael Vazquez RN  Safety Promotion/Fall Prevention:   activity supervised   safety round/check completed   toileting scheduled     Problem: Gas Exchange Impaired  Goal: Optimal Gas Exchange  Intervention: Optimize Oxygenation and Ventilation  Recent Flowsheet Documentation  Taken 6/16/2022 0400 by Rafael Vazquez RN  Head of Bed (HOB) Positioning: HOB at 30-45 degrees  Taken 6/16/2022 0200 by Rafael Vazquez RN  Head of Bed (HOB) Positioning: HOB at 30-45 degrees  Taken 6/16/2022 0000 by Rafael Vazquez RN  Head of Bed (HOB) Positioning: HOB at 30-45 degrees     Problem: Breathing Pattern Ineffective  Goal: Effective Breathing  Pattern  Intervention: Promote Improved Breathing Pattern  Recent Flowsheet Documentation  Taken 6/16/2022 0400 by Rafael Vazquez RN  Head of Bed (HOB) Positioning: HOB at 30-45 degrees  Taken 6/16/2022 0200 by Rafael Vazquez RN  Head of Bed (HOB) Positioning: HOB at 30-45 degrees  Taken 6/16/2022 0000 by Rafael Vazquez RN  Head of Bed (HOB) Positioning: HOB at 30-45 degrees     Problem: Mobility Impairment  Goal: Optimal Mobility  Intervention: Optimize Mobility  Recent Flowsheet Documentation  Taken 6/16/2022 0400 by Rafael Vazquez RN  Positioning/Transfer Devices: pillows  Taken 6/16/2022 0200 by Rafael Vazquez RN  Positioning/Transfer Devices: pillows  Taken 6/16/2022 0000 by Rafael Vazquez RN  Positioning/Transfer Devices: pillows     Problem: Pain Acute  Goal: Acceptable Pain Control and Functional Ability  Intervention: Prevent or Manage Pain  Recent Flowsheet Documentation  Taken 6/16/2022 0400 by Rafael Vazquez RN  Medication Review/Management: medications reviewed  Taken 6/16/2022 0200 by Rafael Vazquez RN  Medication Review/Management: medications reviewed  Taken 6/16/2022 0000 by Rafael Vazquez RN  Medication Review/Management: medications reviewed  Intervention: Develop Pain Management Plan  Recent Flowsheet Documentation  Taken 6/16/2022 0400 by Rafael Vazquez RN  Pain Management Interventions: quiet environment facilitated  Taken 6/16/2022 0200 by Rafael Vazquez RN  Pain Management Interventions: quiet environment facilitated  Taken 6/16/2022 0000 by Rafael Vazquez RN  Pain Management Interventions: quiet environment facilitated     Problem: Respiratory Compromise COPD (Chronic Obstructive Pulmonary Disease)  Goal: Effective Oxygenation and Ventilation  Intervention: Optimize Oxygenation and Ventilation  Recent Flowsheet Documentation  Taken 6/16/2022 0400 by Rafael Vazquez RN  Head of Bed (HOB) Positioning: HOB at 30-45 degrees  Taken 6/16/2022 0200 by Rafael Vazquez  RN  Head of Bed (HOB) Positioning: HOB at 30-45 degrees  Taken 6/16/2022 0000 by Rafael Vazquez RN  Head of Bed (HOB) Positioning: HOB at 30-45 degrees       VSS, voids well, rested throughout the night, will continue to monitor for changes.

## 2022-06-16 NOTE — PLAN OF CARE
Goal Outcome Evaluation:  Plan of Care Reviewed With: patient        Progress: no change  Outcome Evaluation: Pt. presents with impaired activity tolerance, generalized weakness, and a decline in ADL performance. Pt. limited by SOA with activity. Pt. demonstrates STS with CGA and FWW. Requires Min A for UBD, SUA for grooming, and DEP for LBD. Increased time needed pace tasks. Educated pt. on PLB technique with vc's to initiate and sequence throughout session as needed. Recommend PULM IPR at discharge.

## 2022-06-16 NOTE — PROGRESS NOTES
[facility]  PROGRESS NOTE    Patient Name: Janet Pisano  : 1945  MRN: [id number]    Date of Admission: 2022  Primary Care Physician: Zee Matos MD    Subjective   Subjective     CC:  soa    HPI:  Still having shortness of breath. Concerned that she will not be able to lie down for her echocardiogram    ROS:  Gen- No fevers, chills  CV- No chest pain, palpitations  Resp- + dyspnea  GI- No N/V/D, abd pain        Objective   Objective     Vital Signs:   Temp:  [97.2 °F (36.2 °C)-98.7 °F (37.1 °C)] 97.5 °F (36.4 °C)  Heart Rate:  [] 66  Resp:  [22-24] 22  BP: (110-157)/(46-89) 138/49  Flow (L/min):  [6-45] 38     Physical Exam:  Constitutional - appears fatigued, up in chair  HEENT-NCAT, mucous membranes moist  CV-RRR  Resp-diminished bilaterally  Abd-soft, nontender, nondistended, normoactive bowel sounds, obese  Ext-+ LE edema  Neuro-alert, speech clear   Psych-normal affect   Skin- No rash on exposed UE or LE bilaterally      Results Reviewed:  LAB RESULTS:      Lab 06/15/22  0244 06/14/22  2121   WBC 12.01* 12.30*   HEMOGLOBIN 13.0 13.7   HEMATOCRIT 41.3 44.9   PLATELETS 245 310   NEUTROS ABS 11.54* 8.96*   IMMATURE GRANS (ABS) 0.04 0.04   LYMPHS ABS 0.33* 2.21   MONOS ABS 0.08* 1.00*   EOS ABS 0.00 0.05   MCV 95.4 98.5*   CRP  --  0.70*   PROCALCITONIN  --  0.03   LACTATE  --  1.7         Lab 06/15/22  0244 22   SODIUM 138 142   POTASSIUM 4.4 4.1   CHLORIDE 94* 98   CO2 30.0* 30.0*   ANION GAP 14.0 14.0   BUN 38* 37*   CREATININE 1.33* 1.42*   EGFR 41.3* 38.2*   GLUCOSE 282* 125*   CALCIUM 9.0 9.2   MAGNESIUM  --  2.0   PHOSPHORUS  --  4.4   HEMOGLOBIN A1C 8.60*  --    TSH 0.093*  --          Lab 22   TOTAL PROTEIN 7.6   ALBUMIN 4.20   GLOBULIN 3.4   ALT (SGPT) 40*   AST (SGOT) 31   BILIRUBIN 0.6   ALK PHOS 89         Lab 06/15/22  1139 06/15/22  0244 22   PROBNP  --   --  1,735.0   TROPONIN T 0.024 0.031*  0.031*                 Lab 06/15/22  0127 06/14/22 2222   PH, ARTERIAL 7.347* 7.329*   PCO2, ARTERIAL 53.2* 57.4*   PO2 ART 79.9* 75.7*   FIO2 60 80   HCO3 ART 29.1* 30.2*   BASE EXCESS ART 2.4* 2.8*   CARBOXYHEMOGLOBIN 0.9 1.1     Brief Urine Lab Results  (Last result in the past 365 days)      Color   Clarity   Blood   Leuk Est   Nitrite   Protein   CREAT   Urine HCG        06/15/22 0456             22.3               Microbiology Results Abnormal     Procedure Component Value - Date/Time    Blood Culture - Blood, Arm, Right [[id number]]  (Normal) Collected: 06/14/22 2125    Lab Status: Preliminary result Specimen: Blood from Arm, Right Updated: 06/15/22 2202     Blood Culture No growth at 24 hours    Blood Culture - Blood, Arm, Right [[id number]]  (Normal) Collected: 06/14/22 2135    Lab Status: Preliminary result Specimen: Blood from Arm, Right Updated: 06/15/22 2202     Blood Culture No growth at 24 hours    COVID PRE-OP / PRE-PROCEDURE SCREENING ORDER (NO ISOLATION) - Swab, Nasopharynx [[id number]]  (Normal) Collected: 06/14/22 2128    Lab Status: Final result Specimen: Swab from Nasopharynx Updated: 06/14/22 2236    Narrative:      The following orders were created for panel order COVID PRE-OP / PRE-PROCEDURE SCREENING ORDER (NO ISOLATION) - Swab, Nasopharynx.  Procedure                               Abnormality         Status                     ---------                               -----------         ------                     Respiratory Panel PCR w/...[[id number]]  Normal              Final result                 Please view results for these tests on the individual orders.    Respiratory Panel PCR w/COVID-19(SARS-CoV-2) ROCKY/PIERCE/ALCON/PAD/COR/MAD/ANTOLIN In-House, NP Swab in UTM/VTM, 3-4 HR TAT - Swab, Nasopharynx [[id number]]  (Normal) Collected: 06/14/22 2128    Lab Status: Final result Specimen: Swab from Nasopharynx Updated: 06/14/22 2236     ADENOVIRUS, PCR Not Detected     Coronavirus 229E Not Detected      Coronavirus HKU1 Not Detected     Coronavirus NL63 Not Detected     Coronavirus OC43 Not Detected     COVID19 Not Detected     Human Metapneumovirus Not Detected     Human Rhinovirus/Enterovirus Not Detected     Influenza A PCR Not Detected     Influenza B PCR Not Detected     Parainfluenza Virus 1 Not Detected     Parainfluenza Virus 2 Not Detected     Parainfluenza Virus 3 Not Detected     Parainfluenza Virus 4 Not Detected     RSV, PCR Not Detected     Bordetella pertussis pcr Not Detected     Bordetella parapertussis PCR Not Detected     Chlamydophila pneumoniae PCR Not Detected     Mycoplasma pneumo by PCR Not Detected    Narrative:      In the setting of a positive respiratory panel with a viral infection PLUS a negative procalcitonin without other underlying concern for bacterial infection, consider observing off antibiotics or discontinuation of antibiotics and continue supportive care. If the respiratory panel is positive for atypical bacterial infection (Bordetella pertussis, Chlamydophila pneumoniae, or Mycoplasma pneumoniae), consider antibiotic de-escalation to target atypical bacterial infection.          XR Chest 1 View    Result Date: 6/14/2022  DATE OF EXAM: 6/14/2022 9:22 PM  PROCEDURE: XR CHEST 1 VW-  INDICATIONS: SOA triage protocol  COMPARISON: Chest x-ray 9/9/2019  TECHNIQUE: Single radiographic AP view of the chest was obtained.  FINDINGS: Stable cardiomediastinal silhouette which appears top normal in size. Mild worsening of streaky bibasilar parenchymal opacities. Redemonstration of diffuse interstitial prominence and mild perihilar vascular congestion. There is mild blunting of the bilateral costophrenic angles suggestive of small pleural effusions, possibly new from prior. No pneumothorax. No acute osseous findings.      Impression: Mild worsening of bibasilar parenchymal opacities may reflect atelectasis. Similar diffuse interstitial prominence and perihilar vascular congestion compatible  with interstitial edema. Questionably new small bilateral pleural effusions.  This report was finalized on 6/14/2022 9:51 PM by Hugh Lechuga MD.        Results for orders placed during the hospital encounter of 08/16/18    Adult Transthoracic Echo Complete W/ Cont if Necessary Per Protocol    Interpretation Summary  · Left ventricular systolic function is hyperdynamic (EF > 70).  · Left ventricular wall thickness is consistent with borderline concentric hypertrophy.  · Right atrial cavity size is borderline dilated.  · Mild mitral valve regurgitation is present  · Mild to moderate tricuspid valve regurgitation is present.  · Estimated right ventricular systolic pressure from tricuspid regurgitation is markedly elevated (>55 mmHg).      I have reviewed the medications:  Scheduled Meds:Pharmacy Consult, , Does not apply, Daily  albuterol, 2.5 mg, Nebulization, Q4H - RT  aspirin, 325 mg, Oral, Daily  cefTRIAXone, 1 g, Intravenous, Nightly  doxycycline, 100 mg, Intravenous, Q12H  furosemide, 80 mg, Oral, Daily  gabapentin, 300 mg, Oral, TID  heparin (porcine), 5,000 Units, Subcutaneous, Q12H  insulin detemir, 15 Units, Subcutaneous, Q12H  insulin lispro, 0-9 Units, Subcutaneous, TID AC  Insulin Lispro, 5 Units, Subcutaneous, TID With Meals  melatonin, 5 mg, Oral, Nightly  methylPREDNISolone sodium succinate, 40 mg, Intravenous, Q12H  nebivolol, 2.5 mg, Oral, Q24H  pharmacy consult - MTM, , Does not apply, Daily  pravastatin, 40 mg, Oral, Daily  sodium chloride, 10 mL, Intravenous, Q12H      Continuous Infusions:   PRN Meds:.dextrose  •  dextrose  •  glucagon (human recombinant)  •  ipratropium-albuterol  •  sodium chloride  •  sodium chloride  •  traMADol    Assessment & Plan   Assessment & Plan     Active Hospital Problems    Diagnosis  POA   • **Acute on chronic respiratory failure (HCC) [J96.20]  Yes   • Type 2 diabetes mellitus (HCC) [E11.9]  Yes   • COPD with acute exacerbation (HCC) [J44.1]  Yes   •  Leukocytosis [D72.829]  Unknown   • ZION (acute kidney injury) (HCC) [N17.9]  Unknown   • COPD exacerbation (HCC) [J44.1]  Yes   • Pulmonary hypertension. Calculated RVSP 64 mmHg by echocardiogram 8/17/18 [I27.20]  Yes   • Acute on chronic diastolic congestive heart failure (HCC) [I50.33]  Yes   • Coronary artery disease [I25.10]  Yes   • Dyslipidemia [E78.5]  Yes   • Gastroesophageal reflux disease [K21.9]  Yes      Resolved Hospital Problems   No resolved problems to display.        Brief Hospital Course to date:  Janet Pisano is a 76 y.o. female with h/o HTN, COPD on chronic home O2, DMII, DHF, Pulmonary HTN presents with SOA and productive cough    Acute on chronic hypoxic and hypercarbic respiratory failure  - currently on high flow oxygen    COPD with AE  Possible Pneumonia  - empiric rocephin and doxycycline  - respiratory culture  - scheduled duonebs and budesonide  - IV solumedrol  - AM ABG    Acute on Chronic Diastolic CHF  - lasix  - echo pending  - probable LHC    PAF    DMII  - basal bolus, monitor for steroid induced hyperglycemia    Hypothyroid  - TSH low at 0.093, Free t4 high at 1.9  - will likely step down levothyroxine dose, but would first like pharmacy to confirm that patient has been compliant with regular medication refills.        DVT prophylaxis:  Medical and mechanical DVT prophylaxis orders are present.            Disposition: I expect the patient to be discharged TBD    CODE STATUS:   Code Status and Medical Interventions:   Ordered at: 06/15/22 0143     Medical Intervention Limits:    NO intubation (DNI)     Level Of Support Discussed With:    Patient     Code Status (Patient has no pulse and is not breathing):    No CPR (Do Not Attempt to Resuscitate)     Medical Interventions (Patient has pulse or is breathing):    Limited Support       Luis A Snell MD  06/15/22

## 2022-06-16 NOTE — PROGRESS NOTES
NN spoke with pt at BS.  Pt alert and able to answer questions appropriately. Pt O2 sat  98% on 40% HFNC currently, home O2 use 4L. COPD action plan reviewed. Deep breathing exercises encouraged. Discussed with patient need to have proper follow up care including rehab.  No new concerns or questions voiced at this time.  NN will continue to follow as needed.       Per current GOLD Standards, please consider:  No LAMA/LABA/ICS in place, Outpatient PFT, Rehab as appropriate, Palliative Care consult, Annual LDCT per current screening guidelines (age 50-80 years old, smoking history of 20 pack years or more or has quit within past 15 years)

## 2022-06-16 NOTE — PROGRESS NOTES
"  [facility]   Inpatient Progress Note       LOS: 1 day   Patient Care Team:  Zee Matos MD as PCP - General (Family Medicine)    Chief Complaint:  Follow-up for dyspnea    Subjective     Interval History:   Patient up in chair. On hiflow oxygen. No chest pain. Still with orthopnea.       Review of Systems:   Pertinent positives noted in history, exam, and assessment. Otherwise reviewed and negative.      Objective     Vitals:  Blood pressure 150/76, pulse 60, temperature 98.3 °F (36.8 °C), temperature source Oral, resp. rate 19, height 154.9 cm (60.98\"), weight 94.5 kg (208 lb 6.4 oz), SpO2 100 %.     Intake/Output Summary (Last 24 hours) at 6/16/2022 0859  Last data filed at 6/16/2022 0500  Gross per 24 hour   Intake 250 ml   Output 1350 ml   Net -1100 ml     Vitals reviewed.   Constitutional:       Appearance: Well-developed. Acutely ill-appearing.      Comments: obese   Neck:      Vascular: No JVD.      Trachea: No tracheal deviation.   Pulmonary:      Effort: Pulmonary effort is normal.      Breath sounds: Normal breath sounds.   Cardiovascular:      Normal rate. Regularly irregular rhythm.   Pulses:     Intact distal pulses.   Edema:     Pretibial: bilateral trace edema of the pretibial area.     Ankle: bilateral trace edema of the ankle.  Abdominal:      General: Bowel sounds are normal. There is distension.      Tenderness: There is no abdominal tenderness.   Musculoskeletal:         General: No deformity. Skin:     General: Skin is warm and dry.   Neurological:      Mental Status: Alert and oriented to person, place, and time.            Results Review:     I reviewed the patient's new clinical results.    Results from last 7 days   Lab Units 06/15/22  0244   WBC 10*3/mm3 12.01*   HEMOGLOBIN g/dL 13.0   HEMATOCRIT % 41.3   PLATELETS 10*3/mm3 245     Results from last 7 days   Lab Units 06/16/22  0419 06/15/22  0244 06/14/22  2121   SODIUM mmol/L 134*   < > 142   POTASSIUM " mmol/L 4.5   < > 4.1   CHLORIDE mmol/L 93*   < > 98   CO2 mmol/L 30.0*   < > 30.0*   BUN mg/dL 52*   < > 37*   CREATININE mg/dL 1.30*   < > 1.42*   CALCIUM mg/dL 9.2   < > 9.2   BILIRUBIN mg/dL  --   --  0.6   ALK PHOS U/L  --   --  89   ALT (SGPT) U/L  --   --  40*   AST (SGOT) U/L  --   --  31   GLUCOSE mg/dL 292*   < > 125*    < > = values in this interval not displayed.     Results from last 7 days   Lab Units 06/16/22  0419   SODIUM mmol/L 134*   POTASSIUM mmol/L 4.5   CHLORIDE mmol/L 93*   CO2 mmol/L 30.0*   BUN mg/dL 52*   CREATININE mg/dL 1.30*   GLUCOSE mg/dL 292*   CALCIUM mg/dL 9.2         Lab Results   Lab Value Date/Time    TROPONINI 0.05 12/25/2015 0450     Results from last 7 days   Lab Units 06/15/22  1139 06/15/22  0244   TSH uIU/mL  --  0.093*   FREE T4 ng/dL 1.90*  --          Results from last 7 days   Lab Units 06/14/22  2121   PROBNP pg/mL 1,735.0     ECHO 6/15/22:  · Estimated left ventricular EF = 60%  · Left ventricular wall thickness is consistent with moderate concentric hypertrophy.  · Mild mitral valve regurgitation is present  · Estimated right ventricular systolic pressure from tricuspid regurgitation is 42 mmHg.        Tele:  SR with PAC's    Assessment:      Acute on chronic respiratory failure (HCC)    Gastroesophageal reflux disease    Dyslipidemia    Coronary artery disease    Acute on chronic diastolic congestive heart failure (HCC)    Pulmonary hypertension. Calculated RVSP 64 mmHg by echocardiogram 8/17/18    Type 2 diabetes mellitus (HCC)    COPD with acute exacerbation (HCC)    Leukocytosis    ZION (acute kidney injury) (HCC)    COPD exacerbation (HCC)      1. Acute on chronic hypoxic respiratory failure  2. Pulmonary hypertension  3. CAD with reported history of MI  1. Mildly elevated trop  2. Echo with normal LVEF  4. Hypertension  5. Dyslipidemia  6. Diabetes, per attending service.   7. COPD on chronic oxygen  8. ZION    Plan:  · Will give extra lasix 40 mg IV today.  · Not  currently ready for [facility].  · Will change PO lasix to 80 mg IV daily.  · Continue to monitor renal function.  · Will need eventual ischemic evaluation.     RONAK Gregory   Dictated utilizing Dragon dictation

## 2022-06-16 NOTE — PLAN OF CARE
Patient is AXO 4. Humidified High Flow NC- 38 L. Tried to titrate down to 6L NC, but became SOA and O2 sat dropped. CL- BS, MD notified, orders given. Insulin coverage given. ECHO was completed. Fingerstick changed to ACHS. Troponin decreased/ refused 0700 ABG. MD notified. BM noted. AFIB w/ occasional PVCs. When ambulating, SOA and felt weak. MD notified prn. No c/o of pain. Will continue to monitor.

## 2022-06-16 NOTE — PLAN OF CARE
"Goal Outcome Evaluation:  Plan of Care Reviewed With: patient           Outcome Evaluation: New palliative consult for asssitance with GOC per Dr. Srinivasan.  STEVIE Dumont MD and palliative care RN saw pt. this afternoon.  Dr. Dumont discussed GOC with pt as well as code status.  Pt. currently a No cpr limited (see note).  Pt. was sitting up on side of bed on HFNC.  Denied pain at time of visit but it is noted that she does have chronic generalized \"aches and pains\" managed by tramadol as well as bilateral foot pain managd by gabapentin.  Stated her breathing feelt better than it did on admission.  Denied nausea but endorses lack of appetite as nothing \"tastes right\".  Pt. wishes to return to her home to see her dog and do crafts which include crocheting.  Palliative care to follow for support, POC and ongoing GOC discussions.      1330 Palliative IDT meeting: LEAH Pizarro RN, PN; SAMUEL Harper RN, PN; STACY Garay, RONAK; NELLIE [facility]; KAYLYN Marroquin RN;  ALFIE Chavarria RN, PN      Problem: Adult Inpatient Plan of Care  Goal: Plan of Care Review  Recent Flowsheet Documentation  Taken 6/16/2022 1447 by Mimi Harper RN  Plan of Care Reviewed With: patient  Outcome Evaluation: New palliative consult for asssitance with GOC per Dr. Srinivasan.  STEVIE Dumont MD and palliative care RN saw pt. this afternoon.  Dr. Dumont discussed GOC with pt as well as code status.  Pt. currently a No cpr limited (see note).  Pt. was sitting up on side of bed on HFNC.  Denied pain at time of visit but it is noted that she does have chronic generalized \"aches and pains\" managed by tramadol as well as bilateral foot pain managd by gabapentin.  Stated her breathing feelt better than it did on admission.  Denied nausea but endorses lack of appetite as nothing \"tastes right\".  Pt. wishes to return to her home to see her dog and do crafts which include crocheting.  Palliative care to follow for support, POC and ongoing GOC discussions.     "

## 2022-06-16 NOTE — CONSULTS
"Zee Matos MD  Consulting physician: Octavio Srinivasan MD  Reason for referral ACP  Chief Complaint   Patient presents with   • Shortness of Breath     [facility] BROUGHT PATIENT IN FOR SOB. STARTED TODAY. CPAP, EPI, DUONEB.       HPI: Janet Pisano is a 76-year-old female with past medical history of chronic hypoxic respiratory failure on 4 L/min nasal cannula, COPD, CAD, diabetes, pulmonary HTN who presented to [facility] on 6/15/2022 with shortness of air.  Over the past several weeks she has had worsening dyspnea including nocturnal dyspnea/orthopnea which awakens her from sleep with increasing frequency.  She is very short of air with minimal exertion, walking 10 feet.  He has pain in her via bilateral feet that she describes as numbness tingling that improves with gabapentin 3 times a day.  She also has \"aches and pains\" which improved with as needed tramadol, which she receives from an outpatient provider.    Hospital course/work-up: On admission, had an ZION.  Repeat ECHO with EF of 60%, elevated RVSP of 42 which is lower than previous ECHO from 2018 of greater than 55.  CT PE without evidence for pulmonary embolism, trace left pleural effusion with atelectasis.  Received 80 mg of IV Lasix in the ED, following diuresis by cardiology who is also recommending cardiac cath.  Was also started on empiric Rocephin and doxycycline for possible pneumonia as well as IV Solu-Medrol to treat acute COPD exacerbation.    Ms. Pisano lives independently in a senior apartment complex called [facility] in [place].  Her son Aníbal is her next of kin, as well as her \"power of \" (unsure if this is also for healthcare and do not have paperwork on file).  Aníbal helps with IADLs including grocery pickup which she orders online, also helps driving her places.  She is independent in ADLs.  Uses a walker for ambulation.  She has no additional care/caregivers other than the support " she receives from her son.    Symptoms:   When asked are you uncomfortable because of pain, patient responded no  Advance care planning discussed: yes    Code Status:   Current Code Status     Date Active Code Status Order ID Comments User Context       6/15/2022 0143 No CPR (Do Not Attempt to Resuscitate) [id number]  Nayeli Alford APRN Inpatient     Advance Care Planning Activity      Questions for Current Code Status     Question Answer    Code Status (Patient has no pulse and is not breathing) No CPR (Do Not Attempt to Resuscitate)    Medical Interventions (Patient has pulse or is breathing) Limited Support    Medical Intervention Limits: NO intubation (DNI)    Level Of Support Discussed With Patient        Advance Directive: on file  Surrogate decision maker: Aníbal Pisano, [facility] [phone]  Past Medical History:   Diagnosis Date   • Arthritis    • Bilateral carpal tunnel syndrome 2/24/2017   • Bradycardia 8/22/2016   • COPD exacerbation (CMS/[facility])    • Coronary artery disease 9/7/2016   • Depression    • Diabetes mellitus (CMS/[facility])    • Diverticulosis 8/22/2016   • Dyslipidemia 9/7/2016   • H/O esophageal ulcer    • History of myocardial infarction 9/7/2016    Questionable history of myocardial infarction: Remote cardiac catheterization at [facility] in [place], incomplete database.  Reported stress test 2-3 years ago, negative per patient report, incomplete database.    • History of rheumatic fever 9/7/2016   • Hypertension    • Hypothyroidism    • Migraine 8/22/2016   • Rheumatic fever    • Ventral hernia 9/7/2016   • Vitamin D deficiency 6/20/2016     Past Surgical History:   Procedure Laterality Date   • CARDIAC CATHETERIZATION     • CARPAL TUNNEL RELEASE     • CATARACT EXTRACTION, BILATERAL     • COLONOSCOPY     • ESOPHAGUS SURGERY      hole repair   • HERNIA REPAIR      ventral   • TUBAL ABDOMINAL LIGATION  1982       Reviewed current scheduled and prn medications for route, type,  dose and frequency.    Current Facility-Administered Medications   Medication Dose Route Frequency Provider Last Rate Last Admin   • ! Home medications stored in [facility]. Return to patient prior to discharge   Does not apply Daily Nikko Peters IV, PharmD       • aspirin tablet 325 mg  325 mg Oral Daily Rocío, Nayeli, APRN   325 mg at 06/16/22 0816   • budesonide (PULMICORT) nebulizer solution 0.5 mg  0.5 mg Nebulization BID - RT Luis A Snell MD   0.5 mg at 06/16/22 1042   • cefTRIAXone (ROCEPHIN) 1 g/100 mL 0.9% NS (MBP)  1 g Intravenous Nightly Rocío, Nayeli, APRN   1 g at 06/15/22 2132   • dextrose (D50W) (25 g/50 mL) IV injection 25 g  25 g Intravenous Q15 Min PRN Rocío, Nayeli, APRN       • dextrose (GLUTOSE) oral gel 15 g  15 g Oral Q15 Min PRN Rocío, Nayeli, APRN       • doxycycline (VIBRAMYCIN) 100 mg in sodium chloride 0.9 % 250 mL IVPB-VTB  100 mg Intravenous Q12H Rocío, Nayeli, APRN   100 mg at 06/16/22 0610   • furosemide (LASIX) injection 40 mg  40 mg Intravenous Once Hayley Chairez APRN       • [START ON 6/17/2022] furosemide (LASIX) injection 80 mg  80 mg Intravenous Daily Hayley Chairez APRN       • gabapentin (NEURONTIN) capsule 300 mg  300 mg Oral TID Rocío, Nayeli, APRN   300 mg at 06/16/22 0831   • glucagon (human recombinant) (GLUCAGEN DIAGNOSTIC) injection 1 mg  1 mg Intramuscular Q15 Min PRN Rocío, Nayeli, APRN       • heparin (porcine) 5000 UNIT/ML injection 5,000 Units  5,000 Units Subcutaneous Q12H Rocío, Nayeli, APRN   5,000 Units at 06/16/22 0815   • insulin detemir (LEVEMIR) injection 15 Units  15 Units Subcutaneous Q12H Luis A Snell MD   15 Units at 06/16/22 0816   • Insulin Lispro (humaLOG) injection 0-9 Units  0-9 Units Subcutaneous TID AC Luis A Snell MD   6 Units at 06/16/22 0816   • Insulin Lispro (humaLOG) injection 5 Units  5 Units Subcutaneous TID With Meals Luis A Snell MD   5 Units at 06/16/22 0829   •  ipratropium-albuterol (DUO-NEB) nebulizer solution 3 mL  3 mL Nebulization Q4H PRN Rocío, Nayeli, APRN       • ipratropium-albuterol (DUO-NEB) nebulizer solution 3 mL  3 mL Nebulization Q4H - RT Luis A Snell MD   3 mL at 06/16/22 1042   • melatonin tablet 5 mg  5 mg Oral Nightly Jos Chavez, DO   5 mg at 06/15/22 2131   • methylPREDNISolone sodium succinate (SOLU-Medrol) injection 40 mg  40 mg Intravenous Q12H Luis A Snell MD   40 mg at 06/16/22 0609   • nebivolol (BYSTOLIC) tablet 2.5 mg  2.5 mg Oral Q24H Alex Mejia MD   2.5 mg at 06/16/22 0817   • Pharmacy Consult - MTM   Does not apply Daily Riedel, Taylor, PharmD       • pravastatin (PRAVACHOL) tablet 40 mg  40 mg Oral Daily Rocío, Nayeli, APRN   40 mg at 06/16/22 0817   • sodium chloride 0.9 % flush 10 mL  10 mL Intravenous PRN Jos Chavez, DO       • sodium chloride 0.9 % flush 10 mL  10 mL Intravenous Q12H Rocío, Nayeli, APRN   10 mL at 06/16/22 0830   • sodium chloride 0.9 % flush 10 mL  10 mL Intravenous PRN Rocío, Nayeli, APRN       • traMADol (ULTRAM) tablet 50 mg  50 mg Oral Q6H PRN Jos Chavez, DO   50 mg at 06/15/22 2131        dextrose  •  dextrose  •  glucagon (human recombinant)  •  ipratropium-albuterol  •  sodium chloride  •  sodium chloride  •  traMADol  Allergies   Allergen Reactions   • Aliskiren    • Amlodipine    • Crestor [Rosuvastatin Calcium]    • Lisinopril    • Metformin And Related    • Penicillins    • Sitagliptin    • Statins Confusion   • Valsartan Unknown (See Comments)     Tolerates Losartan 8/20/18     Family History   Problem Relation Age of Onset   • Diabetes Mother    • Liver disease Mother    • Cancer Mother    • Obesity Mother    • Coronary artery disease Father    • Alcohol abuse Father      Social History     Socioeconomic History   • Marital status:    Tobacco Use   • Smoking status: Former Smoker     Packs/day: 1.00     Types: Cigarettes     Start date: 1/1/1959     Quit  "date: 2015     Years since quittin.4   • Smokeless tobacco: Never Used   Substance and Sexual Activity   • Alcohol use: No   • Drug use: No   • Sexual activity: Defer       Review of Systems - +/- per HPI and symptom review.      PPS:50%  /76   Pulse 58   Temp 98.3 °F (36.8 °C) (Oral)   Resp 18   Ht 154.9 cm (60.98\")   Wt 94.5 kg (208 lb 6.4 oz)   SpO2 100%   BMI 39.40 kg/m²   94.5 kg (208 lb 6.4 oz) Body mass index is 39.4 kg/m².  Intake & Output (last day)       06/15 0701   07 07 0700    P.O.      IV Piggyback 250     Total Intake(mL/kg) 250 (2.6)     Urine (mL/kg/hr) 1350 (0.6)     Stool 0     Total Output 1350     Net -1100           Urine Unmeasured Occurrence 2 x 3 x    Stool Unmeasured Occurrence 3 x 2 x          Physical Exam:  General Appearance: Chronically ill, elderly female in mild respiratory distress on high flow nasal cannula up to chair in recliner  Eyes: Conjunctivae and sclerae normal, no icterus  Throat:  oral mucosa moist  Lungs: Clear to auscultation anteriorly, respirations regular, even and mildly labored when speaking for prolonged periods of time  Heart: Regular rhythm and normal rate, normal S1, 1+ edema  Abdomen: Normal bowel sounds, soft, non-distended, non-tender  Skin: Warm and dry  Neurological: Alert, interactive, appropriate conversation  Psych: Normal mood and affect     Reviewed labs and diagnostic results.  Lab Results   Component Value Date    HGBA1C 8.60 (H) 06/15/2022     Results from last 7 days   Lab Units 06/15/22  0244   WBC 10*3/mm3 12.01*   HEMOGLOBIN g/dL 13.0   HEMATOCRIT % 41.3   PLATELETS 10*3/mm3 245     Results from last 7 days   Lab Units 22  0419 06/15/22  0244 22  2121   SODIUM mmol/L 134*   < > 142   POTASSIUM mmol/L 4.5   < > 4.1   CHLORIDE mmol/L 93*   < > 98   CO2 mmol/L 30.0*   < > 30.0*   BUN mg/dL 52*   < > 37*   CREATININE mg/dL 1.30*   < > 1.42*   CALCIUM mg/dL 9.2   < > 9.2   BILIRUBIN mg/dL  -- "   --  0.6   ALK PHOS U/L  --   --  89   ALT (SGPT) U/L  --   --  40*   AST (SGOT) U/L  --   --  31   GLUCOSE mg/dL 292*   < > 125*    < > = values in this interval not displayed.     Results from last 7 days   Lab Units 06/16/22  0419   SODIUM mmol/L 134*   POTASSIUM mmol/L 4.5   CHLORIDE mmol/L 93*   CO2 mmol/L 30.0*   BUN mg/dL 52*   CREATININE mg/dL 1.30*   GLUCOSE mg/dL 292*   CALCIUM mg/dL 9.2     Results Review: BMP is normal.  Troponin x2 is 0.031.  TSH significantly depressed 0.093 A1c elevated at 8.4 hemoglobin is normal at 13    Imaging Results (Last 72 Hours)     Procedure Component Value Units Date/Time    XR Chest 1 View [[id number]] Collected: 06/14/22 2150     Updated: 06/14/22 2154    Narrative:      DATE OF EXAM: 6/14/2022 9:22 PM     PROCEDURE: XR CHEST 1 VW-     INDICATIONS: SOA triage protocol     COMPARISON: Chest x-ray 9/9/2019     TECHNIQUE: Single radiographic AP view of the chest was obtained.     FINDINGS:  Stable cardiomediastinal silhouette which appears top normal in size.  Mild worsening of streaky bibasilar parenchymal opacities.  Redemonstration of diffuse interstitial prominence and mild perihilar  vascular congestion. There is mild blunting of the bilateral  costophrenic angles suggestive of small pleural effusions, possibly new  from prior. No pneumothorax. No acute osseous findings.        Impression:      Mild worsening of bibasilar parenchymal opacities may reflect  atelectasis. Similar diffuse interstitial prominence and perihilar  vascular congestion compatible with interstitial edema. Questionably new  small bilateral pleural effusions.     This report was finalized on 6/14/2022 9:51 PM by Hugh Lechuga MD.           Impression: 76 y.o. female admitted for acute on chronic hypoxic respiratory failure in the setting of COPD, with pending left heart cath, concern for worsening CAD.  PPS 50%.  Palliative care consulted for goals of care, management.    1.  Complex medical  decision making/advance care planning: I met with Ms. vasquez at her bedside and introduced palliative services.  She has a fair understanding of her multiple serious illnesses and why she has been admitted to the hospital.  This is her first admission for for COPD exacerbation.  She values her independence, mention she would never want to live in a facility.  Also hopeful to return to her apartment, continue crafting and caring for her small dog in [facility].  We discussed it may be of great value to have outpatient palliative care follow with her, help with symptom management, and guide further medical decision making if this is available in her area.  She has already set multiple limits to her care including DNR, DNI, no dialysis.  She is a Denominational person, feels ready to die when the Lord calls her, but for now is at peace with returning to the hospital if she becomes sick again.  -- No changes to plan of care, continue with disease directed therapies with limit of DNR/DNI, no dialysis  --Does have a living will on file detailing limits to care as above, fermín Spangler is her next of kin/healthcare surrogate/?hPOA (she is unsure if this includes healthcare, documentation not on file)  -- Will assist with outpatient palliative care referral if available    Symptoms:  2.  Dyspnea: Multifactorial ongoing work-up of coronary ischemia in the setting of COPD exacerbation.  Continued on tramadol which was prescribed as outpatient  --Agree with disease directed therapies per primary team and cardiology, including antibiotics, IV steroids, IV diuretics, p.o. opiates, fan to bedside    3.  Opiate induced constipation: Well-controlled at this time, having daily bowel movements    4.  Chronic pain: Per history, has both somatic and neuropathic components, well controlled on outpatient regimen.  Reviewed PDMP, no concerns  -- Agree to continue as prescribed    Plan: Palliative care will continue to follow this  hospital course, if unable to be weaned from high flow may need to revisit goals of care.  At time of discharge, would be of value to connect her with outpatient palliative care if available in her area, palliative care team will follow up with this.       Ivette Dumont MD  [phone]  06/16/22  10:53 EDT      Time: 70 minutes spent reviewing medical and medication records, assessing and examining patient, discussing with patient and nursing staff, answering questions, formulating a plan and documentation of care. > 50% time spent face to face   discussing goals of care, plan of care, palliative care, pain and symptom management, CODE STATUS, healthcare surrogacy

## 2022-06-16 NOTE — THERAPY EVALUATION
Patient Name: Janet Pisano  : 1945    MRN: [id number]                              Today's Date: 2022       Admit Date: 2022    Visit Dx:     ICD-10-CM ICD-9-CM   1. Acute on chronic respiratory failure with hypoxia and hypercapnia ([facility])  J96.21 518.84    J96.22 786.09     799.02   2. COPD with acute exacerbation ([facility])  J44.1 491.21   3. Congestive heart failure, unspecified HF chronicity, unspecified heart failure type ([facility])  I50.9 428.0   4. ZION (acute kidney injury) ([facility])  N17.9 584.9     Patient Active Problem List   Diagnosis   • Anxiety   • Hypertension and diastolic dysfunction   • Uncontrolled type 2 diabetes mellitus. Hemoglobin A1c 9.4 (CMS/[facility])   • Gastroesophageal reflux disease   • Hypothyroidism   • Insomnia   • Left carotid artery stenosis   • History of myocardial infarction   • Dyslipidemia   • History of rheumatic fever   • Coronary artery disease   • Iron deficiency anemia   • Morbid obesity with BMI of 40.0-44.9, adult ([facility])   • Acute on chronic diastolic congestive heart failure ([facility])   • Obesity hypoventilation syndrome ([facility])   • Pulmonary hypertension. Calculated RVSP 64 mmHg by echocardiogram 18   • Acute on chronic respiratory failure ([facility])   • Type 2 diabetes mellitus ([facility])   • COPD with acute exacerbation ([facility])   • Leukocytosis   • ZION (acute kidney injury) ([facility])   • COPD exacerbation ([facility])     Past Medical History:   Diagnosis Date   • Arthritis    • Bilateral carpal tunnel syndrome 2017   • Bradycardia 2016   • COPD exacerbation (CMS/[facility])    • Coronary artery disease 2016   • Depression    • Diabetes mellitus (CMS/[facility])    • Diverticulosis 2016   • Dyslipidemia 2016   • H/O esophageal ulcer    • History of myocardial infarction 2016    Questionable history of myocardial infarction: Remote cardiac catheterization at [facility] in [place], incomplete database.  Reported stress test 2-3 years ago, negative per patient  report, incomplete database.    • History of rheumatic fever 9/7/2016   • Hypertension    • Hypothyroidism    • Migraine 8/22/2016   • Rheumatic fever    • Ventral hernia 9/7/2016   • Vitamin D deficiency 6/20/2016     Past Surgical History:   Procedure Laterality Date   • CARDIAC CATHETERIZATION     • CARPAL TUNNEL RELEASE     • CATARACT EXTRACTION, BILATERAL     • COLONOSCOPY     • ESOPHAGUS SURGERY      hole repair   • HERNIA REPAIR      ventral   • TUBAL ABDOMINAL LIGATION  1982      General Information     Row Name 06/16/22 1438          OT Time and Intention    Document Type evaluation  -     Mode of Treatment occupational therapy  -     Row Name 06/16/22 [facility]          General Information    Patient Profile Reviewed yes  -     Prior Level of Function independent:;all household mobility;transfer;ADL's  Rollator at baseline  -     Existing Precautions/Restrictions fall;oxygen therapy device and L/min  -     Barriers to Rehab medically complex;previous functional deficit  -     Row Name 06/16/22 [facility]8          Living Environment    People in Home alone  -     Row Name 06/16/22 [facility]8          Home Main Entrance    Number of Stairs, Main Entrance none  -     Row Name 06/16/22 1438          Stairs Within Home, Primary    Number of Stairs, Within Home, Primary none  -     Row Name 06/16/22 1438          Cognition    Orientation Status (Cognition) oriented x 4  -     Row Name 06/16/22 1438          Safety Issues, Functional Mobility    Safety Issues Affecting Function (Mobility) insight into deficits/self-awareness;safety precaution awareness;safety precautions follow-through/compliance;sequencing abilities  -     Impairments Affecting Function (Mobility) balance;endurance/activity tolerance;shortness of breath;strength  -     Comment, Safety Issues/Impairments (Mobility) Fatigues quickly, VC's needed to intiate and sequence PLB technique to improve O2 sat.   -           User Key  (r) =  Recorded By, (t) = Taken By, (c) = Cosigned By    Initials Name Provider Type     Maria E Pulliam OT Occupational Therapist                 Mobility/ADL's     Row Name 06/16/22 1443          Bed Mobility    Bed Mobility scooting/bridging;supine-sit  -     Comment, (Bed Mobility) sitting EOB  -     Row Name 06/16/22 1443          Transfers    Transfers sit-stand transfer  -     Comment, (Transfers) VC's for hand placement and sequencing.  -     Sit-Stand [place] (Transfers) contact guard  -     Row Name 06/16/22 1443          Sit-Stand Transfer    Assistive Device (Sit-Stand Transfers) walker, front-wheeled  -     Row Name 06/16/22 1443          Activities of Daily Living    BADL Assessment/Intervention upper body dressing;lower body dressing;grooming  -[facility] Name 06/16/22 [facility]3          Upper Body Dressing Assessment/Training    [place] Level (Upper Body Dressing) don;doff;front opening garment;minimum assist (75% patient effort)  -     Position (Upper Body Dressing) unsupported sitting  -     Row Name 06/16/22 [facility]3          Lower Body Dressing Assessment/Training    [place] Level (Lower Body Dressing) don;socks;dependent (less than 25% patient effort)  -     Position (Lower Body Dressing) unsupported sitting  -     Row Name 06/16/22 [facility]3          Grooming Assessment/Training    [place] Level (Grooming) hair care, combing/brushing;oral care regimen;wash face, hands;set up  -     Position (Grooming) unsupported sitting  -     Comment, (Grooming) Increased time to complete task.  -           User Key  (r) = Recorded By, (t) = Taken By, (c) = Cosigned By    Initials Name Provider Type    Maria E Kennedy OT Occupational Therapist               Obj/Interventions     Row Name 06/16/22 1446          Sensory Assessment (Somatosensory)    Sensory Assessment (Somatosensory) UE sensation intact  -     Row Name 06/16/22 [facility]6          Vision Assessment/Intervention     Visual Impairment/Limitations corrective lenses for reading  -     Row Name 06/16/22 1446          Range of Motion Comprehensive    General Range of Motion bilateral upper extremity ROM WFL  -[facility] Name 06/16/22 1446          Strength Comprehensive (MMT)    General Manual Muscle Testing (MMT) Assessment upper extremity strength deficits identified  -     Comment, General Manual Muscle Testing (MMT) Assessment BUE grossly 4/5  -[facility] Name 06/16/22 1446          Upper Extremity (Manual Muscle Testing)    Upper Extremity: Manual Muscle Testing (MMT) left UE strength is WFL;right UE strength is WFL  -     Row Name 06/16/22 1446          Motor Skills    Motor Skills functional endurance  -     Functional Endurance impaired activity tolerance  -[facility] Name 06/16/22 1446          Balance    Balance Assessment sitting static balance;sitting dynamic balance;standing static balance;standing dynamic balance  -     Static Sitting Balance supervision  -     Dynamic Sitting Balance standby assist  -     Position, Sitting Balance unsupported;sitting in chair  -     Static Standing Balance contact guard  -     Dynamic Standing Balance contact guard  -     Position/Device Used, Standing Balance supported;walker, front-wheeled  -     Balance Interventions sitting;sit to stand;standing;supported;occupation based/functional task;weight shifting activity  -     Comment, Balance CGA for safety  -           User Key  (r) = Recorded By, (t) = Taken By, (c) = Cosigned By    Initials Name Provider Type     Maria E Pulliam OT Occupational Therapist               Goals/Plan     Row Name 06/16/22 1456          Transfer Goal 1 (OT)    Activity/Assistive Device (Transfer Goal 1, OT) sit-to-stand/stand-to-sit;bed-to-chair/chair-to-bed  -     [place] Level/Cues Needed (Transfer Goal 1, OT) standby assist;verbal cues required  -     Time Frame (Transfer Goal 1, OT) 10 days;long term goal (LTG)  -      Progress/Outcome (Transfer Goal 1, OT) goal ongoing  -     Row Name 06/16/22 1456          Dressing Goal 1 (OT)    Activity/Device (Dressing Goal 1, OT) lower body dressing;long-handled shoe horn;reacher;sock-aid  -     [place]/Cues Needed (Dressing Goal 1, OT) moderate assist (50-74% patient effort);verbal cues required  -     Time Frame (Dressing Goal 1, OT) 10 days;long term goal (LTG)  -     Progress/Outcome (Dressing Goal 1, OT) goal ongoing  -     Row Name 06/16/22 1456          Toileting Goal 1 (OT)    Activity/Device (Toileting Goal 1, OT) adjust/manage clothing;perform perineal hygiene;commode;grab bar/safety frame  -     [place] Level/Cues Needed (Toileting Goal 1, OT) verbal cues required;contact guard required  -     Time Frame (Toileting Goal 1, OT) 10 days;long term goal (LTG)  -     Progress/Outcome (Toileting Goal 1, OT) goal ongoing  -           User Key  (r) = Recorded By, (t) = Taken By, (c) = Cosigned By    Initials Name Provider Type     Maria E Pulliam, GARIMA Occupational Therapist               Clinical Impression     Row Name 06/16/22 9021          Pain Assessment    Pretreatment Pain Rating 0/10 - no pain  -     Posttreatment Pain Rating 0/10 - no pain  -     Pain Intervention(s) Repositioned;Ambulation/increased activity  -     Additional Documentation Pain Scale: Word Pre/Post-Treatment (Group)  -     Row Name 06/16/22 8896          Plan of Care Review    Plan of Care Reviewed With patient  -     Progress no change  -     Outcome Evaluation Pt. presents with impaired activity tolerance, generalized weakness, and a decline in ADL performance. Pt. limited by SOA with activity. Pt. demonstrates STS with CGA and FWW. Requires Min A for UBD, SUA for grooming, and DEP for LBD. Increased time needed pace tasks. Educated pt. on PLB technique with vc's to initiate and sequence throughout session as needed. Recommend PULM IPR at discharge.  -     Row Name  06/16/22 1449          Therapy Assessment/Plan (OT)    Patient/Family Therapy Goal Statement (OT) To return to PLOF  -     Therapy Frequency (OT) daily  -     Row Name 06/16/22 1449          Therapy Plan Review/Discharge Plan (OT)    Anticipated Discharge Disposition (OT) inpatient rehabilitation facility  -     Row Name 06/16/22 1449          Vital Signs    Pre Systolic BP Rehab --  VSS  -LC     Pre SpO2 (%) 94  6L  -LC     O2 Delivery Pre Treatment supplemental O2  -LC     Intra SpO2 (%) 80  -LC     O2 Delivery Intra Treatment supplemental O2  -LC     Post SpO2 (%) 92  -LC     O2 Delivery Post Treatment supplemental O2  -LC     Pre Patient Position Sitting  -LC     Intra Patient Position Standing  -LC     Post Patient Position Sitting  -LC     Row Name 06/16/22 1449          Positioning and Restraints    Pre-Treatment Position in bed  -LC     Post Treatment Position chair  -LC     In Chair notified nsg;reclined;call light within reach;encouraged to call for assist;exit alarm on;waffle cushion;legs elevated  -LC           User Key  (r) = Recorded By, (t) = Taken By, (c) = Cosigned By    Initials Name Provider Type    LC Maria E Pulliam, OT Occupational Therapist               Outcome Measures     Row Name 06/16/22 7401          How much help from another is currently needed...    Putting on and taking off regular lower body clothing? 1  -LC     Bathing (including washing, rinsing, and drying) 2  -LC     Toileting (which includes using toilet bed pan or urinal) 2  -LC     Putting on and taking off regular upper body clothing 3  -LC     Taking care of personal grooming (such as brushing teeth) 3  -LC     Eating meals 3  -LC     AM-PAC 6 Clicks Score (OT) 14  -LC     Row Name 06/16/22 1350          How much help from another person do you currently need...    Turning from your back to your side while in flat bed without using bedrails? 4  -FW     Moving from lying on back to sitting on the side of a flat bed  without bedrails? 3  -FW     Moving to and from a bed to a chair (including a wheelchair)? 3  -FW     Standing up from a chair using your arms (e.g., wheelchair, bedside chair)? 3  -FW     Climbing 3-5 steps with a railing? 2  -FW     To walk in hospital room? 3  -FW     AM-PAC 6 Clicks Score (PT) 18  -FW     Highest level of mobility 6 --> Walked 10 steps or more  -FW     Row Name 06/16/22 1457 06/16/22 1350       Functional Assessment    Outcome Measure Options AM-PAC 6 Clicks Daily Activity (OT)  - AM-PAC 6 Clicks Basic Mobility (PT)  -          User Key  (r) = Recorded By, (t) = Taken By, (c) = Cosigned By    Initials Name Provider Type    Maria E Kennedy, OT Occupational Therapist    FW Cain Urrutia, PT Physical Therapist                Occupational Therapy Education                 Title: PT OT SLP Therapies (In Progress)     Topic: Occupational Therapy (In Progress)     Point: ADL training (In Progress)     Description:   Instruct learner(s) on proper safety adaptation and remediation techniques during self care or transfers.   Instruct in proper use of assistive devices.              Learning Progress Summary           Patient Acceptance, E, NR by  at 6/16/2022 1320                   Point: Home exercise program (Not Started)     Description:   Instruct learner(s) on appropriate technique for monitoring, assisting and/or progressing therapeutic exercises/activities.              Learner Progress:  Not documented in this visit.          Point: Precautions (In Progress)     Description:   Instruct learner(s) on prescribed precautions during self-care and functional transfers.              Learning Progress Summary           Patient Acceptance, E, NR by  at 6/16/2022 1320                   Point: Body mechanics (In Progress)     Description:   Instruct learner(s) on proper positioning and spine alignment during self-care, functional mobility activities and/or exercises.              Learning  Progress Summary           Patient Acceptance, E, NR by  at 6/16/2022 1320                               User Key     Initials Effective Dates Name Provider Type Discipline     06/16/21 -  Maria E Pulliam OT Occupational Therapist OT              OT Recommendation and Plan  Therapy Frequency (OT): daily  Plan of Care Review  Plan of Care Reviewed With: patient  Progress: no change  Outcome Evaluation: Pt. presents with impaired activity tolerance, generalized weakness, and a decline in ADL performance. Pt. limited by SOA with activity. Pt. demonstrates STS with CGA and FWW. Requires Min A for UBD, SUA for grooming, and DEP for LBD. Increased time needed pace tasks. Educated pt. on PLB technique with vc's to initiate and sequence throughout session as needed. Recommend PULM IPR at discharge.     Time Calculation:    Time Calculation- OT     Row Name 06/16/22 1502             Time Calculation- OT    OT Start Time 1320  -      OT Received On 06/16/22  -      OT Goal Re-Cert Due Date 06/26/22  -              Timed Charges    64747 - OT Self Care/Mgmt Minutes 15  -              Untimed Charges    OT Eval/Re-eval Minutes 40  -              Total Minutes    Timed Charges Total Minutes 15  -      Untimed Charges Total Minutes 40  -       Total Minutes 55  -LC            User Key  (r) = Recorded By, (t) = Taken By, (c) = Cosigned By    Initials Name Provider Type     Maria E Pulliam OT Occupational Therapist              Therapy Charges for Today     Code Description Service Date Service Provider Modifiers Qty    [id number]  OT EVAL LOW COMPLEXITY 3 6/16/2022 Maria E Pulliam OT GO 1    [id number]  OT SELF CARE/MGMT/TRAIN EA 15 MIN 6/16/2022 Maria E Pulliam OT GO 1               Maria E Pulliam OT  6/16/2022

## 2022-06-16 NOTE — PLAN OF CARE
Goal Outcome Evaluation:  Plan of Care Reviewed With: patient           Outcome Evaluation: Pt presents with poor activity tolerance and decreased functional strength limiting mobility and indepedence at home. Pt sit to stand CGA with verbal cues for hand placement. Pt ambulated 40' CGA with a rolling walker and became dyspneic requiring a seated rest break verbal cues for pacing and pursed lip breathing. Pt SpO2 dropped to 77% with ambulation. IPPT warranted. Recommend dc pulmonary IRF

## 2022-06-16 NOTE — PROGRESS NOTES
"    [facility] Medicine Services  PROGRESS NOTE    Patient Name: Jnaet Pisano  : 1945  MRN: [id number]    Date of Admission: 2022  Primary Care Physician: Zee Matos MD    Subjective   Subjective     CC:    Respiratory Distress    HPI:   She is on high flow but appears to be hypoventilating.  Bedside IS pulls 500 at most. She has table fan blowing in her face and she says she is claustrophobic and says she will freak out if the room door is closed because she feels like she cannot breathe.   She says she will \"freak out\" if room door closed 20 feet from her.  No family in room.      ROS:  Gen- No fevers, chills  CV- No chest pain, palpitations  Resp- + dyspnea  GI- No N/V/D, abd pain      Objective   Objective     Vital Signs:   Temp:  [97.5 °F (36.4 °C)-98.4 °F (36.9 °C)] 98.4 °F (36.9 °C)  Heart Rate:  [55-74] 62  Resp:  [18-22] 18  BP: (126-175)/(47-84) 148/84  Flow (L/min):  [6-40] 6     Physical Exam:    Constitutional: awake, but tired looking  HENT: NCAT, dry tongue  Respiratory: Clear to auscultation bilaterally, poor inspiratory capacity  Cardiovascular: RRR, s1 and s2  Gastrointestinal: Positive bowel sounds, soft, nontender, morbidly obese abdomen  Musculoskeletal: No bilateral ankle edema  Psychiatric:  Anxious affect, cooperative  Neurologic: Oriented x 3, non focal  Skin: dry, + skin tenting    Results Reviewed:  LAB RESULTS:      Lab 06/15/22  0244 22   WBC 12.01* 12.30*   HEMOGLOBIN 13.0 13.7   HEMATOCRIT 41.3 44.9   PLATELETS 245 310   NEUTROS ABS 11.54* 8.96*   IMMATURE GRANS (ABS) 0.04 0.04   LYMPHS ABS 0.33* 2.21   MONOS ABS 0.08* 1.00*   EOS ABS 0.00 0.05   MCV 95.4 98.5*   CRP  --  0.70*   PROCALCITONIN  --  0.03   LACTATE  --  1.7         Lab 22  0419 06/15/22  0244 22  2121   SODIUM 134* 138 142   POTASSIUM 4.5 4.4 4.1   CHLORIDE 93* 94* 98   CO2 30.0* 30.0* 30.0*   ANION GAP 11.0 14.0 14.0   BUN 52* 38* 37*   CREATININE " 1.30* 1.33* 1.42*   EGFR 42.4* 41.3* 38.2*   GLUCOSE 292* 282* 125*   CALCIUM 9.2 9.0 9.2   MAGNESIUM  --   --  2.0   PHOSPHORUS  --   --  4.4   HEMOGLOBIN A1C  --  8.60*  --    TSH  --  0.093*  --          Lab 06/14/22 2121   TOTAL PROTEIN 7.6   ALBUMIN 4.20   GLOBULIN 3.4   ALT (SGPT) 40*   AST (SGOT) 31   BILIRUBIN 0.6   ALK PHOS 89         Lab 06/15/22  1139 06/15/22  0244 06/14/22 2121   PROBNP  --   --  1,735.0   TROPONIN T 0.024 0.031* 0.031*                 Lab 06/16/22  0326 06/15/22  0127 06/14/22 2222   PH, ARTERIAL 7.377 7.347* 7.329*   PCO2, ARTERIAL 51.6* 53.2* 57.4*   PO2 ART 98.1 79.9* 75.7*   FIO2 39 60 80   HCO3 ART 30.3* 29.1* 30.2*   BASE EXCESS ART 4.0* 2.4* 2.8*   CARBOXYHEMOGLOBIN 0.8 0.9 1.1     Brief Urine Lab Results  (Last result in the past 365 days)      Color   Clarity   Blood   Leuk Est   Nitrite   Protein   CREAT   Urine HCG        06/15/22 7356             22.3               Microbiology Results Abnormal     Procedure Component Value - Date/Time    Blood Culture - Blood, Arm, Right [[id number]]  (Normal) Collected: 06/14/22 2125    Lab Status: Preliminary result Specimen: Blood from Arm, Right Updated: 06/15/22 2202     Blood Culture No growth at 24 hours    Blood Culture - Blood, Arm, Right [[id number]]  (Normal) Collected: 06/14/22 2135    Lab Status: Preliminary result Specimen: Blood from Arm, Right Updated: 06/15/22 2202     Blood Culture No growth at 24 hours    COVID PRE-OP / PRE-PROCEDURE SCREENING ORDER (NO ISOLATION) - Swab, Nasopharynx [[id number]]  (Normal) Collected: 06/14/22 2128    Lab Status: Final result Specimen: Swab from Nasopharynx Updated: 06/14/22 2236    Narrative:      The following orders were created for panel order COVID PRE-OP / PRE-PROCEDURE SCREENING ORDER (NO ISOLATION) - Swab, Nasopharynx.  Procedure                               Abnormality         Status                     ---------                               -----------         ------                      Respiratory Panel PCR w/...[[id number]]  Normal              Final result                 Please view results for these tests on the individual orders.    Respiratory Panel PCR w/COVID-19(SARS-CoV-2) ROCKY/PIERCE/ALCON/PAD/COR/MAD/ANTOLIN In-House, NP Swab in UTM/VTM, 3-4 HR TAT - Swab, Nasopharynx [[id number]]  (Normal) Collected: 06/14/22 2128    Lab Status: Final result Specimen: Swab from Nasopharynx Updated: 06/14/22 2236     ADENOVIRUS, PCR Not Detected     Coronavirus 229E Not Detected     Coronavirus HKU1 Not Detected     Coronavirus NL63 Not Detected     Coronavirus OC43 Not Detected     COVID19 Not Detected     Human Metapneumovirus Not Detected     Human Rhinovirus/Enterovirus Not Detected     Influenza A PCR Not Detected     Influenza B PCR Not Detected     Parainfluenza Virus 1 Not Detected     Parainfluenza Virus 2 Not Detected     Parainfluenza Virus 3 Not Detected     Parainfluenza Virus 4 Not Detected     RSV, PCR Not Detected     Bordetella pertussis pcr Not Detected     Bordetella parapertussis PCR Not Detected     Chlamydophila pneumoniae PCR Not Detected     Mycoplasma pneumo by PCR Not Detected    Narrative:      In the setting of a positive respiratory panel with a viral infection PLUS a negative procalcitonin without other underlying concern for bacterial infection, consider observing off antibiotics or discontinuation of antibiotics and continue supportive care. If the respiratory panel is positive for atypical bacterial infection (Bordetella pertussis, Chlamydophila pneumoniae, or Mycoplasma pneumoniae), consider antibiotic de-escalation to target atypical bacterial infection.          Adult Transthoracic Echo Complete w/ Color, Spectral and Contrast if necessary per protocol    Result Date: 6/16/2022  · Estimated left ventricular EF = 60% · Left ventricular wall thickness is consistent with moderate concentric hypertrophy. · Mild mitral valve regurgitation is present · Estimated right  ventricular systolic pressure from tricuspid regurgitation is 42 mmHg.      XR Chest 1 View    Result Date: 6/14/2022  DATE OF EXAM: 6/14/2022 9:22 PM  PROCEDURE: XR CHEST 1 VW-  INDICATIONS: SOA triage protocol  COMPARISON: Chest x-ray 9/9/2019  TECHNIQUE: Single radiographic AP view of the chest was obtained.  FINDINGS: Stable cardiomediastinal silhouette which appears top normal in size. Mild worsening of streaky bibasilar parenchymal opacities. Redemonstration of diffuse interstitial prominence and mild perihilar vascular congestion. There is mild blunting of the bilateral costophrenic angles suggestive of small pleural effusions, possibly new from prior. No pneumothorax. No acute osseous findings.      Impression: Mild worsening of bibasilar parenchymal opacities may reflect atelectasis. Similar diffuse interstitial prominence and perihilar vascular congestion compatible with interstitial edema. Questionably new small bilateral pleural effusions.  This report was finalized on 6/14/2022 9:51 PM by Hugh Lechuga MD.        Results for orders placed during the hospital encounter of 06/14/22    Adult Transthoracic Echo Complete w/ Color, Spectral and Contrast if necessary per protocol    Interpretation Summary  · Estimated left ventricular EF = 60%  · Left ventricular wall thickness is consistent with moderate concentric hypertrophy.  · Mild mitral valve regurgitation is present  · Estimated right ventricular systolic pressure from tricuspid regurgitation is 42 mmHg.      I have reviewed the medications:  Scheduled Meds:Pharmacy Consult, , Does not apply, Daily  aspirin, 325 mg, Oral, Daily  budesonide, 0.5 mg, Nebulization, BID - RT  cefTRIAXone, 1 g, Intravenous, Nightly  doxycycline, 100 mg, Intravenous, Q12H  [START ON 6/17/2022] furosemide, 80 mg, Intravenous, Daily  gabapentin, 300 mg, Oral, TID  heparin (porcine), 5,000 Units, Subcutaneous, Q12H  insulin detemir, 15 Units, Subcutaneous, Q12H  insulin  lispro, 0-9 Units, Subcutaneous, TID AC  Insulin Lispro, 5 Units, Subcutaneous, TID With Meals  ipratropium-albuterol, 3 mL, Nebulization, Q4H - RT  melatonin, 5 mg, Oral, Nightly  methylPREDNISolone sodium succinate, 40 mg, Intravenous, Q12H  nebivolol, 2.5 mg, Oral, Q24H  pharmacy consult - MTM, , Does not apply, Daily  pravastatin, 40 mg, Oral, Daily  sodium chloride, 10 mL, Intravenous, Q12H      Continuous Infusions:   PRN Meds:.dextrose  •  dextrose  •  glucagon (human recombinant)  •  ipratropium-albuterol  •  sodium chloride  •  sodium chloride  •  traMADol    Assessment & Plan   Assessment & Plan     Active Hospital Problems    Diagnosis  POA   • **Acute on chronic respiratory failure (HCC) [J96.20]  Yes   • Type 2 diabetes mellitus ([facility]) [E11.9]  Yes   • COPD with acute exacerbation ([facility]) [J44.1]  Yes   • Leukocytosis [D72.829]  Unknown   • ZION (acute kidney injury) ([facility]) [N17.9]  Unknown   • COPD exacerbation ([facility]) [J44.1]  Yes   • Pulmonary hypertension. Calculated RVSP 64 mmHg by echocardiogram 8/17/18 [I27.20]  Yes   • Acute on chronic diastolic congestive heart failure (HCC) [I50.33]  Yes   • Coronary artery disease [I25.10]  Yes   • Dyslipidemia [E78.5]  Yes   • Gastroesophageal reflux disease [K21.9]  Yes      Resolved Hospital Problems   No resolved problems to display.        Brief Hospital Course to date:  Janet Pisano is a 76 y.o. female with h/o HTN, COPD on chronic home O2, DMII, DHF, Pulmonary HTN presents with SOA and productive cough    Acute on chronic hypoxic and hypercarbic respiratory failure  - currently on high flow oxygen  - wean oxygen as tolerated  - bedside Incentive Spirometer today suggestive of poor inspiratory capacity  - CXR may be most consistent with Atelectasis  - lung sound great today  - wean oxygen and steroids    COPD with AE  Possible Pneumonia  - empiric rocephin and doxycycline  - respiratory culture  - scheduled duonebs and budesonide  - de-escalate steroids  -  change IV steroids to oral     Acute on Chronic Diastolic CHF  - lasix  - echo pending  - probable LHC    PAF    DMII  - basal bolus, monitor for steroid induced hyperglycemia    Hypothyroid  - TSH low at 0.093, Free t4 high at 1.9  - will likely step down levothyroxine dose, but would first like pharmacy to confirm that patient has been compliant with regular medication refills.    DVT prophylaxis:  Medical and mechanical DVT prophylaxis orders are present.       AM-PAC 6 Clicks Score (PT): 18 (06/16/22 1350)    Disposition: I expect the patient to be discharged TBD    CODE STATUS:   Code Status and Medical Interventions:   Ordered at: 06/15/22 0143     Medical Intervention Limits:    NO intubation (DNI)     Level Of Support Discussed With:    Patient     Code Status (Patient has no pulse and is not breathing):    No CPR (Do Not Attempt to Resuscitate)     Medical Interventions (Patient has pulse or is breathing):    Limited Support       Octavio Srinivasan MD  06/16/22

## 2022-06-16 NOTE — THERAPY EVALUATION
Patient Name: Janet Pisano  : 1945    MRN: [id number]                              Today's Date: 2022       Admit Date: 2022    Visit Dx:     ICD-10-CM ICD-9-CM   1. Acute on chronic respiratory failure with hypoxia and hypercapnia ([facility])  J96.21 518.84    J96.22 786.09     799.02   2. COPD with acute exacerbation ([facility])  J44.1 491.21   3. Congestive heart failure, unspecified HF chronicity, unspecified heart failure type ([facility])  I50.9 428.0   4. ZION (acute kidney injury) ([facility])  N17.9 584.9     Patient Active Problem List   Diagnosis   • Anxiety   • Hypertension and diastolic dysfunction   • Uncontrolled type 2 diabetes mellitus. Hemoglobin A1c 9.4 (CMS/[facility])   • Gastroesophageal reflux disease   • Hypothyroidism   • Insomnia   • Left carotid artery stenosis   • History of myocardial infarction   • Dyslipidemia   • History of rheumatic fever   • Coronary artery disease   • Iron deficiency anemia   • Morbid obesity with BMI of 40.0-44.9, adult ([facility])   • Acute on chronic diastolic congestive heart failure ([facility])   • Obesity hypoventilation syndrome ([facility])   • Pulmonary hypertension. Calculated RVSP 64 mmHg by echocardiogram 18   • Acute on chronic respiratory failure ([facility])   • Type 2 diabetes mellitus ([facility])   • COPD with acute exacerbation ([facility])   • Leukocytosis   • ZION (acute kidney injury) ([facility])   • COPD exacerbation ([facility])     Past Medical History:   Diagnosis Date   • Arthritis    • Bilateral carpal tunnel syndrome 2017   • Bradycardia 2016   • COPD exacerbation (CMS/[facility])    • Coronary artery disease 2016   • Depression    • Diabetes mellitus (CMS/[facility])    • Diverticulosis 2016   • Dyslipidemia 2016   • H/O esophageal ulcer    • History of myocardial infarction 2016    Questionable history of myocardial infarction: Remote cardiac catheterization at [facility] in [place], incomplete database.  Reported stress test 2-3 years ago, negative per patient  report, incomplete database.    • History of rheumatic fever 9/7/2016   • Hypertension    • Hypothyroidism    • Migraine 8/22/2016   • Rheumatic fever    • Ventral hernia 9/7/2016   • Vitamin D deficiency 6/20/2016     Past Surgical History:   Procedure Laterality Date   • CARDIAC CATHETERIZATION     • CARPAL TUNNEL RELEASE     • CATARACT EXTRACTION, BILATERAL     • COLONOSCOPY     • ESOPHAGUS SURGERY      hole repair   • HERNIA REPAIR      ventral   • TUBAL ABDOMINAL LIGATION  1982      General Information     [facility] Name 06/16/22 1332          Physical Therapy Time and Intention    Document Type evaluation  -FW     Mode of Treatment physical therapy  -[facility] Name 06/16/22 1332          General Information    Patient Profile Reviewed yes  -FW     Prior Level of Function independent:;gait;transfer;all household mobility;ADL's  rollator for ambulation; 4L O2 baseline  -FW     Existing Precautions/Restrictions fall;oxygen therapy device and L/min  4 liters home O2 baseline  -FW     Barriers to Rehab medically complex;previous functional deficit  -FW     [facility] Name 06/16/22 1332          Living Environment    People in Home alone  -[facility] Name 06/16/22 1332          Home Main Entrance    Number of Stairs, Main Entrance none  -FW     [facility] Name 06/16/22 1332          Cognition    Orientation Status (Cognition) oriented x 4  -FW     [facility] Name 06/16/22 1332          Safety Issues, Functional Mobility    Safety Issues Affecting Function (Mobility) impulsivity;sequencing abilities;safety precaution awareness;safety precautions follow-through/compliance  -FW     Impairments Affecting Function (Mobility) balance;endurance/activity tolerance;shortness of breath;strength  -FW           User Key  (r) = Recorded By, (t) = Taken By, (c) = Cosigned By    Initials Name Provider Type    FW Cain Urrutia PT Physical Therapist               Mobility     Row Name 06/16/22 3513          Bed Mobility    Comment, (Bed Mobility) EOB   -FW     Row Name 06/16/22 1336          Sit-Stand Transfer    Sit-Stand [place] (Transfers) contact guard  -FW     Assistive Device (Sit-Stand Transfers) walker, front-wheeled  -FW     Comment, (Sit-Stand Transfer) verbal cues for for hand placement during stand to sit  -FW     Row Name 06/16/22 1336          Gait/Stairs (Locomotion)    [place] Level (Gait) contact guard;verbal cues;nonverbal cues (demo/gesture)  -FW     Assistive Device (Gait) walker, front-wheeled  -FW     Distance in Feet (Gait) 40  -FW     Deviations/Abnormal Patterns (Gait) bilateral deviations;base of support, narrow;jesus decreased;gait speed decreased;stride length decreased  -FW     Bilateral Gait Deviations forward flexed posture;heel strike decreased  -FW     Comment, (Gait/Stairs) dyspneic with short ambulation distances and required seated rest break. SpO2 dropped to 77% and required ~60 seconds to recover to >89%  -FW           User Key  (r) = Recorded By, (t) = Taken By, (c) = Cosigned By    Initials Name Provider Type    FW Cain Urrutia PT Physical Therapist               Obj/Interventions     Row Name 06/16/22 1340          Range of Motion Comprehensive    General Range of Motion bilateral lower extremity ROM WFL  -FW     Row Name 06/16/22 1340          Strength Comprehensive (MMT)    General Manual Muscle Testing (MMT) Assessment lower extremity strength deficits identified  -FW     Comment, General Manual Muscle Testing (MMT) Assessment BLE 4/5 grossly  -FW     Row Name 06/16/22 1340          Balance    Balance Assessment sitting static balance;sitting dynamic balance;standing static balance;standing dynamic balance  -FW     Static Sitting Balance independent  -FW     Dynamic Sitting Balance supervision  -FW     Position, Sitting Balance unsupported;sitting edge of bed  -FW     Static Standing Balance contact guard  -FW     Dynamic Standing Balance contact guard  -FW     Position/Device Used, Standing Balance  supported;walker, front-wheeled  -FW           User Key  (r) = Recorded By, (t) = Taken By, (c) = Cosigned By    Initials Name Provider Type    FW Cain Urrutia PT Physical Therapist               Goals/Plan     Row Name 06/16/22 1348          Bed Mobility Goal 1 (PT)    Activity/Assistive Device (Bed Mobility Goal 1, PT) sit to supine/supine to sit  -FW     [place] Level/Cues Needed (Bed Mobility Goal 1, PT) standby assist  -FW     Time Frame (Bed Mobility Goal 1, PT) long term goal (LTG);10 days  -FW     Row Name 06/16/22 1348          Transfer Goal 1 (PT)    Activity/Assistive Device (Transfer Goal 1, PT) sit-to-stand/stand-to-sit  -FW     [place] Level/Cues Needed (Transfer Goal 1, PT) standby assist  -FW     Time Frame (Transfer Goal 1, PT) long term goal (LTG);10 days  -FW     Row Name 06/16/22 1348          Gait Training Goal 1 (PT)    Activity/Assistive Device (Gait Training Goal 1, PT) gait (walking locomotion);increase endurance/gait distance;increase energy conservation;assistive device use  -FW     [place] Level (Gait Training Goal 1, PT) contact guard required  -FW     Distance (Gait Training Goal 1, PT) 100  -FW     Time Frame (Gait Training Goal 1, PT) long term goal (LTG);10 days  -FW     [facility] Name 06/16/22 1348          Therapy Assessment/Plan (PT)    Planned Therapy Interventions (PT) balance training;bed mobility training;gait training;home exercise program;patient/family education;postural re-education;ROM (range of motion);stair training;strengthening;stretching;transfer training  -FW           User Key  (r) = Recorded By, (t) = Taken By, (c) = Cosigned By    Initials Name Provider Type    FW Cain Urrutia PT Physical Therapist               Clinical Impression     Row Name 06/16/22 1341          Pain    Pretreatment Pain Rating 0/10 - no pain  -FW     Posttreatment Pain Rating 0/10 - no pain  -FW     Row Name 06/16/22 1341          Plan of Care Review    Plan of Care  Reviewed With patient  -FW     Outcome Evaluation Pt presents with poor activity tolerance and decreased functional strength limiting mobility and indepedence at home. Pt sit to stand CGA with verbal cues for hand placement. Pt ambulated 40' CGA with a rolling walker and became dyspneic requiring a seated rest break verbal cues for pacing and pursed lip breathing. Pt SpO2 dropped to 77% with ambulation. IPPT warranted. Recommend dc pulmonary IRF  -FW     Row Name 06/16/22 1341          Therapy Assessment/Plan (PT)    Rehab Potential (PT) good, to achieve stated therapy goals  -FW     Criteria for Skilled Interventions Met (PT) yes;skilled treatment is necessary  -FW     Therapy Frequency (PT) daily  -FW     Row Name 06/16/22 1341          Vital Signs    Pre SpO2 (%) 92  -FW     O2 Delivery Pre Treatment nasal cannula  -FW     Intra SpO2 (%) 77  -FW     O2 Delivery Intra Treatment nasal cannula  -FW     Post SpO2 (%) 91  -FW     O2 Delivery Post Treatment nasal cannula  -FW     Pre Patient Position Sitting  -FW     Intra Patient Position Standing  -FW     Post Patient Position Sitting  -FW     Row Name 06/16/22 1341          Positioning and Restraints    Pre-Treatment Position in bed  -FW     Post Treatment Position chair  -FW     In Chair sitting;call light within reach;encouraged to call for assist;exit alarm on;with OT  -FW           User Key  (r) = Recorded By, (t) = Taken By, (c) = Cosigned By    Initials Name Provider Type    FW Cain Urrutia, PT Physical Therapist               Outcome Measures     Row Name 06/16/22 1350          How much help from another person do you currently need...    Turning from your back to your side while in flat bed without using bedrails? 4  -FW     Moving from lying on back to sitting on the side of a flat bed without bedrails? 3  -FW     Moving to and from a bed to a chair (including a wheelchair)? 3  -FW     Standing up from a chair using your arms (e.g., wheelchair,  bedside chair)? 3  -FW     Climbing 3-5 steps with a railing? 2  -FW     To walk in hospital room? 3  -FW     AM-PAC 6 Clicks Score (PT) 18  -FW     Highest level of mobility 6 --> Walked 10 steps or more  -     Row Name 06/16/22 1350          Functional Assessment    Outcome Measure Options AM-PAC 6 Clicks Basic Mobility (PT)  -           User Key  (r) = Recorded By, (t) = Taken By, (c) = Cosigned By    Initials Name Provider Type     Cain Urrutia PT Physical Therapist                             Physical Therapy Education                 Title: PT OT SLP Therapies (In Progress)     Topic: Physical Therapy (In Progress)     Point: Mobility training (Done)     Learning Progress Summary           Patient Acceptance, E, VU by  at 6/16/2022 1351                   Point: Home exercise program (Not Started)     Learner Progress:  Not documented in this visit.          Point: Body mechanics (Done)     Learning Progress Summary           Patient Acceptance, E, VU by  at 6/16/2022 1351                   Point: Precautions (Done)     Learning Progress Summary           Patient Acceptance, E, VU by  at 6/16/2022 1351                               User Key     Initials Effective Dates Name Provider Type Discipline     05/05/22 -  Cain Urrutia PT Physical Therapist PT              PT Recommendation and Plan  Planned Therapy Interventions (PT): balance training, bed mobility training, gait training, home exercise program, patient/family education, postural re-education, ROM (range of motion), stair training, strengthening, stretching, transfer training  Plan of Care Reviewed With: patient  Outcome Evaluation: Pt presents with poor activity tolerance and decreased functional strength limiting mobility and indepedence at home. Pt sit to stand CGA with verbal cues for hand placement. Pt ambulated 40' CGA with a rolling walker and became dyspneic requiring a seated rest break verbal cues for pacing and  pursed lip breathing. Pt SpO2 dropped to 77% with ambulation. IPPT warranted. Recommend dc pulmonary IRF     Time Calculation:    PT Charges     Row Name 06/16/22 1351             Time Calculation    Start Time 1315  -FW      PT Received On 06/16/22  -FW      PT Goal Re-Cert Due Date 06/26/22  -FW              Timed Charges    19038 - PT Therapeutic Activity Minutes 8  -FW              Untimed Charges    PT Eval/Re-eval Minutes 31  -FW              Total Minutes    Timed Charges Total Minutes 8  -FW      Untimed Charges Total Minutes 31  -FW       Total Minutes 39  -FW            User Key  (r) = Recorded By, (t) = Taken By, (c) = Cosigned By    Initials Name Provider Type    FW Cain Urrutia, PT Physical Therapist              Therapy Charges for Today     Code Description Service Date Service Provider Modifiers Qty    40250548921 HC PT EVAL LOW COMPLEXITY 3 6/16/2022 Cain Urrutia, PT GP 1    19856356191 HC PT THERAPEUTIC ACT EA 15 MIN 6/16/2022 Cain Urrutia, PT GP 1          PT G-Codes  Outcome Measure Options: AM-PAC 6 Clicks Basic Mobility (PT)  AM-PAC 6 Clicks Score (PT): 18    Cain Urrutia PT  6/16/2022

## 2022-06-16 NOTE — PROGRESS NOTES
" visited patient per palliative care referral.  Patient stated that she is not Quaker but \"I talk to God every day\". (Has a spirituality but not organized Jew.) She appreciated prayer and at home finds meaning in listening to gospel music.    "

## 2022-06-16 NOTE — PLAN OF CARE
Patient is AXO x4. Was titrated from HF to 6L NC. Saturation has been in the 90s, using IS and coughing and deep breathing. Palliative consult. No c/o of pain. Pt was anxious at times throughout the day but improved with the anxious feeling. ABGs and troponin improved. BM today. Insulin coverage given. MD notified prn. Afib on tele w/ occasional pvcs. Duo nebs received. Will continue to monitor.

## 2022-06-17 NOTE — PROGRESS NOTES
Patient admitted for CHF/COPD. [facility] discharge counseling and medication calendar provided on 6/17/22. Information provided includes indication for medication, drug-drug interactions, possible side effects, and importance of compliance. Patient verbalized understanding through teach back. All pertinent questions were answered.     Verified the following medications were ordered at discharged. If omitted, please document reason.    CHF  -BB (Bisoprolol)  -ACE-I/ARB/ANRI - not ordered due to ZION     COPD  -Bronchodilators - ventolin inhaler only  -Systemic steroid taper/antibiotics     Taylor Riedel, PharmD, [facility]  Pharmacy Resident  6/17/2022  12:45 EDT

## 2022-06-17 NOTE — CASE MANAGEMENT/SOCIAL WORK
Case Management Discharge Note      Final Note: Case mgt f/u. Plan for discharge home today, she is not interested in any type of inpt rehab         Selected Continued Care - Admitted Since 6/14/2022     Destination    No services have been selected for the patient.              Durable Medical Equipment    No services have been selected for the patient.              Dialysis/Infusion    No services have been selected for the patient.              Home Medical Care    No services have been selected for the patient.              Therapy    No services have been selected for the patient.              Community Resources    No services have been selected for the patient.              Community & DME    No services have been selected for the patient.                       Final Discharge Disposition Code: 01 - home or self-care

## 2022-06-17 NOTE — PLAN OF CARE
"Goal Outcome Evaluation:  Plan of Care Reviewed With:            Outcome Evaluation: Pt. on 6LNC demonstrating increased WOB especially with activity and conversation.  Denies pain and nausea.  Aside from increased WOB, no visible signs of discomfort.  Pt. just had a BM and stated she feels \"much better\".  Pt. would like to discharge home as soon as medically ready.  States she does not want to leave \"too soon\" however because she is fearful of having a \"relapse\".  Palliative care to follow for support, POC and ongoing GOC conversations.       1330 Palliative IDT meeting: LEAH Pizarro RN, CHPN; SAMUEL Harper, RN, CHPN; STACY Garay, APRN; STACY Jaqeuz MD; LIDYA Crenshaw, [facility]  "

## 2022-06-17 NOTE — PLAN OF CARE
Goal Outcome Evaluation:                 Problem: Adult Inpatient Plan of Care  Goal: Absence of Hospital-Acquired Illness or Injury  Intervention: Identify and Manage Fall Risk  Recent Flowsheet Documentation  Taken 6/17/2022 0400 by Rafael Vazquez RN  Safety Promotion/Fall Prevention:   activity supervised   safety round/check completed   toileting scheduled  Taken 6/17/2022 0200 by Rafael Vazquez RN  Safety Promotion/Fall Prevention:   activity supervised   safety round/check completed   toileting scheduled  Taken 6/17/2022 0000 by Rafael Vazquez RN  Safety Promotion/Fall Prevention:   activity supervised   safety round/check completed   toileting scheduled  Taken 6/16/2022 2200 by Rafael Vazquez RN  Safety Promotion/Fall Prevention:   activity supervised   safety round/check completed   toileting scheduled  Taken 6/16/2022 2000 by Rafael Vazquez RN  Safety Promotion/Fall Prevention:   activity supervised   safety round/check completed   toileting scheduled  Intervention: Prevent Skin Injury  Recent Flowsheet Documentation  Taken 6/17/2022 0400 by Rafael Vazquez RN  Body Position: supine  Taken 6/17/2022 0200 by Rafael Vazquez RN  Body Position: supine  Taken 6/17/2022 0000 by Rafael Vazquez RN  Body Position: supine  Taken 6/16/2022 2200 by Rafael Vazquez RN  Body Position: supine  Taken 6/16/2022 2000 by Rafael Vazquez RN  Body Position: supine  Intervention: Prevent Infection  Recent Flowsheet Documentation  Taken 6/17/2022 0400 by Rafael Vazquez RN  Infection Prevention: environmental surveillance performed  Taken 6/17/2022 0200 by Rafael Vazquez RN  Infection Prevention: environmental surveillance performed  Taken 6/17/2022 0000 by Rafael Vazquez RN  Infection Prevention: environmental surveillance performed  Taken 6/16/2022 2200 by Rafael Vazquez RN  Infection Prevention: environmental surveillance performed  Taken 6/16/2022 2000 by Rafael Vazquez RN  Infection Prevention:  environmental surveillance performed  Goal: Optimal Comfort and Wellbeing  Intervention: Monitor Pain and Promote Comfort  Recent Flowsheet Documentation  Taken 6/17/2022 0400 by Rafael Vazquez RN  Pain Management Interventions: quiet environment facilitated  Taken 6/17/2022 0200 by Rafael Vazquez RN  Pain Management Interventions: quiet environment facilitated  Taken 6/17/2022 0000 by Rafael Vazquez RN  Pain Management Interventions: quiet environment facilitated  Taken 6/16/2022 2200 by Rafael Vazquez RN  Pain Management Interventions: quiet environment facilitated  Taken 6/16/2022 2000 by Rafael Vazquez RN  Pain Management Interventions: quiet environment facilitated  Intervention: Provide Person-Centered Care  Recent Flowsheet Documentation  Taken 6/17/2022 0400 by Rafael Vazquez RN  Trust Relationship/Rapport: care explained  Taken 6/17/2022 0200 by Rafael Vazquez RN  Trust Relationship/Rapport: care explained  Taken 6/17/2022 0000 by Rafael Vazquez RN  Trust Relationship/Rapport: care explained  Taken 6/16/2022 2200 by Rafael Vazquez RN  Trust Relationship/Rapport: care explained  Taken 6/16/2022 2000 by Rafael Vazquez RN  Trust Relationship/Rapport: care explained  Goal: Absence of Hospital-Acquired Illness or Injury  Intervention: Identify and Manage Fall Risk  Recent Flowsheet Documentation  Taken 6/17/2022 0400 by Rafael Vazquez RN  Safety Promotion/Fall Prevention:   activity supervised   safety round/check completed   toileting scheduled  Taken 6/17/2022 0200 by Rafael Vazquez RN  Safety Promotion/Fall Prevention:   activity supervised   safety round/check completed   toileting scheduled  Taken 6/17/2022 0000 by Rafael Vazquez RN  Safety Promotion/Fall Prevention:   activity supervised   safety round/check completed   toileting scheduled  Taken 6/16/2022 2200 by Rafael Vazquez RN  Safety Promotion/Fall Prevention:   activity supervised   safety round/check completed    toileting scheduled  Taken 6/16/2022 2000 by Rafael Vazquez RN  Safety Promotion/Fall Prevention:   activity supervised   safety round/check completed   toileting scheduled  Intervention: Prevent Skin Injury  Recent Flowsheet Documentation  Taken 6/17/2022 0400 by Rafael Vazquez RN  Body Position: supine  Taken 6/17/2022 0200 by Rafael Vazquez RN  Body Position: supine  Taken 6/17/2022 0000 by Rafael Vazquez RN  Body Position: supine  Taken 6/16/2022 2200 by Rafael Vazquez RN  Body Position: supine  Taken 6/16/2022 2000 by Rafael Vazquez RN  Body Position: supine  Intervention: Prevent Infection  Recent Flowsheet Documentation  Taken 6/17/2022 0400 by Rafael Vazquez RN  Infection Prevention: environmental surveillance performed  Taken 6/17/2022 0200 by Rafael Vazquez RN  Infection Prevention: environmental surveillance performed  Taken 6/17/2022 0000 by Rafael Vazquez RN  Infection Prevention: environmental surveillance performed  Taken 6/16/2022 2200 by Rafael Vazquez RN  Infection Prevention: environmental surveillance performed  Taken 6/16/2022 2000 by Rafael Vazquez RN  Infection Prevention: environmental surveillance performed  Goal: Optimal Comfort and Wellbeing  Intervention: Monitor Pain and Promote Comfort  Recent Flowsheet Documentation  Taken 6/17/2022 0400 by Rafael Vazquez RN  Pain Management Interventions: quiet environment facilitated  Taken 6/17/2022 0200 by Rafael Vazquez RN  Pain Management Interventions: quiet environment facilitated  Taken 6/17/2022 0000 by Rafael Vazquez RN  Pain Management Interventions: quiet environment facilitated  Taken 6/16/2022 2200 by Rafael Vazquez RN  Pain Management Interventions: quiet environment facilitated  Taken 6/16/2022 2000 by Rafael Vazquez RN  Pain Management Interventions: quiet environment facilitated  Intervention: Provide Person-Centered Care  Recent Flowsheet Documentation  Taken 6/17/2022 0400 by Rafael Vazquez  RN  Trust Relationship/Rapport: care explained  Taken 6/17/2022 0200 by Rafael aVzquez RN  Trust Relationship/Rapport: care explained  Taken 6/17/2022 0000 by Rafael Vazquez RN  Trust Relationship/Rapport: care explained  Taken 6/16/2022 2200 by Rafael Vazquez RN  Trust Relationship/Rapport: care explained  Taken 6/16/2022 2000 by Rafael Vazquez RN  Trust Relationship/Rapport: care explained     Problem: Asthma Comorbidity  Goal: Maintenance of Asthma Control  Intervention: Maintain Asthma Symptom Control  Recent Flowsheet Documentation  Taken 6/17/2022 0400 by Rafael Vazquez RN  Medication Review/Management: medications reviewed  Taken 6/17/2022 0200 by Rafael Vazquez RN  Medication Review/Management: medications reviewed  Taken 6/17/2022 0000 by Rafael Vazquez RN  Medication Review/Management: medications reviewed  Taken 6/16/2022 2200 by Rafael Vazquez RN  Medication Review/Management: medications reviewed  Taken 6/16/2022 2000 by Rafael Vazquez RN  Medication Review/Management: medications reviewed     Problem: Behavioral Health Comorbidity  Goal: Maintenance of Behavioral Health Symptom Control  Intervention: Maintain Behavioral Health Symptom Control  Recent Flowsheet Documentation  Taken 6/17/2022 0400 by Rafael Vazquez RN  Medication Review/Management: medications reviewed  Taken 6/17/2022 0200 by Rafael Vazquez RN  Medication Review/Management: medications reviewed  Taken 6/17/2022 0000 by Rafael Vazquez RN  Medication Review/Management: medications reviewed  Taken 6/16/2022 2200 by Rafael Vazquez RN  Medication Review/Management: medications reviewed  Taken 6/16/2022 2000 by Rafael Vazquez RN  Medication Review/Management: medications reviewed     Problem: COPD (Chronic Obstructive Pulmonary Disease) Comorbidity  Goal: Maintenance of COPD Symptom Control  Intervention: Maintain COPD-Symptom Control  Recent Flowsheet Documentation  Taken 6/17/2022 0400 by Rafael Vazquez  RN  Medication Review/Management: medications reviewed  Taken 6/17/2022 0200 by Rafael Vazquez RN  Medication Review/Management: medications reviewed  Taken 6/17/2022 0000 by Rafael Vazquez RN  Medication Review/Management: medications reviewed  Taken 6/16/2022 2200 by Rafael Vazquez RN  Medication Review/Management: medications reviewed  Taken 6/16/2022 2000 by Rafael Vazquez RN  Medication Review/Management: medications reviewed     Problem: Heart Failure Comorbidity  Goal: Maintenance of Heart Failure Symptom Control  Intervention: Maintain Heart Failure-Management  Recent Flowsheet Documentation  Taken 6/17/2022 0400 by Rafael Vazquez RN  Medication Review/Management: medications reviewed  Taken 6/17/2022 0200 by Rafael Vazquez RN  Medication Review/Management: medications reviewed  Taken 6/17/2022 0000 by Rafael Vazquez RN  Medication Review/Management: medications reviewed  Taken 6/16/2022 2200 by Rafael Vazquez RN  Medication Review/Management: medications reviewed  Taken 6/16/2022 2000 by Rafael Vazquez RN  Medication Review/Management: medications reviewed     Problem: Hypertension Comorbidity  Goal: Blood Pressure in Desired Range  Intervention: Maintain Blood Pressure Management  Recent Flowsheet Documentation  Taken 6/17/2022 0400 by Rafael Vazquez RN  Medication Review/Management: medications reviewed  Taken 6/17/2022 0200 by Rafael Vazquez RN  Medication Review/Management: medications reviewed  Taken 6/17/2022 0000 by Rafael Vazquez RN  Medication Review/Management: medications reviewed  Taken 6/16/2022 2200 by Rafael Vazquez RN  Medication Review/Management: medications reviewed  Taken 6/16/2022 2000 by Rafael Vazquez RN  Medication Review/Management: medications reviewed     Problem: Osteoarthritis Comorbidity  Goal: Maintenance of Osteoarthritis Symptom Control  Intervention: Maintain Osteoarthritis Symptom Control  Recent Flowsheet Documentation  Taken 6/17/2022 0400 by  Rafael Vazquez RN  Medication Review/Management: medications reviewed  Taken 6/17/2022 0200 by Rafael Vazquez RN  Medication Review/Management: medications reviewed  Taken 6/17/2022 0000 by Rafael Vzaquez RN  Medication Review/Management: medications reviewed  Taken 6/16/2022 2200 by Rafael Vazquez RN  Medication Review/Management: medications reviewed  Taken 6/16/2022 2000 by Rafael Vazquez RN  Medication Review/Management: medications reviewed     Problem: Pain Chronic (Persistent) (Comorbidity Management)  Goal: Acceptable Pain Control and Functional Ability  Intervention: Manage Persistent Pain  Recent Flowsheet Documentation  Taken 6/17/2022 0400 by Rafael Vazquez RN  Medication Review/Management: medications reviewed  Taken 6/17/2022 0200 by Rafael Vazquez RN  Medication Review/Management: medications reviewed  Taken 6/17/2022 0000 by Rafael Vazquez RN  Medication Review/Management: medications reviewed  Taken 6/16/2022 2200 by Rafael Vazquez RN  Medication Review/Management: medications reviewed  Taken 6/16/2022 2000 by Rafael Vazquez RN  Medication Review/Management: medications reviewed  Intervention: Develop Pain Management Plan  Recent Flowsheet Documentation  Taken 6/17/2022 0400 by Rafael Vazquez RN  Pain Management Interventions: quiet environment facilitated  Taken 6/17/2022 0200 by Rafael Vazquez RN  Pain Management Interventions: quiet environment facilitated  Taken 6/17/2022 0000 by Rafael Vazquez RN  Pain Management Interventions: quiet environment facilitated  Taken 6/16/2022 2200 by Rafael Vazquez RN  Pain Management Interventions: quiet environment facilitated  Taken 6/16/2022 2000 by Rafael Vazquez RN  Pain Management Interventions: quiet environment facilitated     Problem: Fall Injury Risk  Goal: Absence of Fall and Fall-Related Injury  Intervention: Identify and Manage Contributors  Recent Flowsheet Documentation  Taken 6/17/2022 0400 by Rafael Vazquez  RN  Medication Review/Management: medications reviewed  Taken 6/17/2022 0200 by Rafael Vazquez RN  Medication Review/Management: medications reviewed  Taken 6/17/2022 0000 by Rafael Vazquez RN  Medication Review/Management: medications reviewed  Taken 6/16/2022 2200 by Rafael Vazquez RN  Medication Review/Management: medications reviewed  Taken 6/16/2022 2000 by Rafael Vazquez RN  Medication Review/Management: medications reviewed  Intervention: Promote Injury-Free Environment  Recent Flowsheet Documentation  Taken 6/17/2022 0400 by Rafael Vazquez RN  Safety Promotion/Fall Prevention:   activity supervised   safety round/check completed   toileting scheduled  Taken 6/17/2022 0200 by Rafael Vazquez RN  Safety Promotion/Fall Prevention:   activity supervised   safety round/check completed   toileting scheduled  Taken 6/17/2022 0000 by Rafael Vazquez RN  Safety Promotion/Fall Prevention:   activity supervised   safety round/check completed   toileting scheduled  Taken 6/16/2022 2200 by Rafael Vazquez RN  Safety Promotion/Fall Prevention:   activity supervised   safety round/check completed   toileting scheduled  Taken 6/16/2022 2000 by Rafael Vazquez RN  Safety Promotion/Fall Prevention:   activity supervised   safety round/check completed   toileting scheduled     Problem: Gas Exchange Impaired  Goal: Optimal Gas Exchange  Intervention: Optimize Oxygenation and Ventilation  Recent Flowsheet Documentation  Taken 6/17/2022 0400 by Rafael Vazquez RN  Head of Bed (HOB) Positioning: HOB at 30-45 degrees  Taken 6/17/2022 0200 by Rafael Vazquez RN  Head of Bed (HOB) Positioning: HOB at 20-30 degrees  Taken 6/17/2022 0000 by Rafael Vazquez RN  Head of Bed (HOB) Positioning: HOB at 20-30 degrees  Taken 6/16/2022 2200 by Rafael Vazuqez RN  Head of Bed (HOB) Positioning: HOB at 30-45 degrees  Taken 6/16/2022 2000 by Rafael Vazquez RN  Head of Bed (HOB) Positioning: HOB at 30-45 degrees     Problem:  Breathing Pattern Ineffective  Goal: Effective Breathing Pattern  Intervention: Promote Improved Breathing Pattern  Recent Flowsheet Documentation  Taken 6/17/2022 0400 by Rafael Vazquez RN  Head of Bed (HOB) Positioning: HOB at 30-45 degrees  Taken 6/17/2022 0200 by Rafael Vazquez RN  Head of Bed (HOB) Positioning: HOB at 20-30 degrees  Taken 6/17/2022 0000 by Rafael Vazquez RN  Head of Bed (HOB) Positioning: HOB at 20-30 degrees  Taken 6/16/2022 2200 by Rafael Vazquez RN  Head of Bed (HOB) Positioning: HOB at 30-45 degrees  Taken 6/16/2022 2000 by Rafael Vazquez RN  Head of Bed (HOB) Positioning: HOB at 30-45 degrees     Problem: Mobility Impairment  Goal: Optimal Mobility  Intervention: Optimize Mobility  Recent Flowsheet Documentation  Taken 6/17/2022 0400 by Rafael Vazquez RN  Positioning/Transfer Devices: pillows  Taken 6/17/2022 0200 by Rafael Vazquez RN  Positioning/Transfer Devices: pillows  Taken 6/17/2022 0000 by Rafael Vazquez RN  Positioning/Transfer Devices: pillows  Taken 6/16/2022 2200 by Rafael Vazquez RN  Positioning/Transfer Devices: pillows  Taken 6/16/2022 2000 by Rafael Vazquez RN  Positioning/Transfer Devices: pillows     Problem: Pain Acute  Goal: Acceptable Pain Control and Functional Ability  Intervention: Prevent or Manage Pain  Recent Flowsheet Documentation  Taken 6/17/2022 0400 by Rafael Vazquez RN  Medication Review/Management: medications reviewed  Taken 6/17/2022 0200 by Rafael Vazquez RN  Medication Review/Management: medications reviewed  Taken 6/17/2022 0000 by Rafael Vazquez RN  Medication Review/Management: medications reviewed  Taken 6/16/2022 2200 by Rafael Vazquez RN  Medication Review/Management: medications reviewed  Taken 6/16/2022 2000 by Rafael Vazquez RN  Medication Review/Management: medications reviewed  Intervention: Develop Pain Management Plan  Recent Flowsheet Documentation  Taken 6/17/2022 0400 by Rafael Vazquez RN  Pain Management  Interventions: quiet environment facilitated  Taken 6/17/2022 0200 by Rafael Vazquez RN  Pain Management Interventions: quiet environment facilitated  Taken 6/17/2022 0000 by Rafael Vazquez RN  Pain Management Interventions: quiet environment facilitated  Taken 6/16/2022 2200 by Rafael Vazquez RN  Pain Management Interventions: quiet environment facilitated  Taken 6/16/2022 2000 by Rafael Vazquez RN  Pain Management Interventions: quiet environment facilitated     Problem: Respiratory Compromise COPD (Chronic Obstructive Pulmonary Disease)  Goal: Effective Oxygenation and Ventilation  Intervention: Promote Airway Secretion Clearance  Recent Flowsheet Documentation  Taken 6/16/2022 2000 by Rafael Vazquez RN  Cough And Deep Breathing: done with encouragement  Intervention: Optimize Oxygenation and Ventilation  Recent Flowsheet Documentation  Taken 6/17/2022 0400 by Rafael Vazquez RN  Head of Bed (HOB) Positioning: HOB at 30-45 degrees  Taken 6/17/2022 0200 by Rafael Vazquez RN  Head of Bed (HOB) Positioning: HOB at 20-30 degrees  Taken 6/17/2022 0000 by Rafael Vazquez RN  Head of Bed (HOB) Positioning: HOB at 20-30 degrees  Taken 6/16/2022 2200 by Rafael Vazquez RN  Head of Bed (HOB) Positioning: HOB at 30-45 degrees  Taken 6/16/2022 2000 by Rafael Vazquez RN  Head of Bed (HOB) Positioning: HOB at 30-45 degrees     Problem: Palliative Care  Goal: Enhanced Quality of Life  Intervention: Maximize Comfort  Recent Flowsheet Documentation  Taken 6/17/2022 0400 by Rafael Vazquez RN  Pain Management Interventions: quiet environment facilitated  Taken 6/17/2022 0200 by Rafael Vazquez RN  Pain Management Interventions: quiet environment facilitated  Taken 6/17/2022 0000 by Rafael Vazquez RN  Pain Management Interventions: quiet environment facilitated  Taken 6/16/2022 2200 by Rafael Vazquez RN  Pain Management Interventions: quiet environment facilitated  Taken 6/16/2022 2000 by Rafael Vazquez  RN  Pain Management Interventions: quiet environment facilitated     VSS, voids well, rested throughout the night, ambulates to bedside, will continue to monitor for changes.

## 2022-06-17 NOTE — PROGRESS NOTES
"Palliative Care Daily Progress Note     Acute on chronic respiratory failure (HCC) [J96.20]    CC: SOA, limited ability to perform ADLs.     S: Medical record reviewed. Events noted.    76yoF with COPD, pulm HTN/HFpEF who presented with worsening dyspnea, pending [facility]. New ECHO actually looked a little better, EF 60%... likely also has OHVS and has know sleep apnea but does not wear CPAP. First exacerbation of COPD. Initial palliative consult (6/16 by Dr Dumont), goal to return home to apt. and live independently for as long as possible, but fairly debilitated.    She has been weaned from HFNC to 6L/NC.     O:   Palliative Performance Scale Score: 50%   /65 (BP Location: Right arm, Patient Position: Sitting)   Pulse 55   Temp 97.5 °F (36.4 °C) (Oral)   Resp 17   Ht 154.9 cm (60.98\")   Wt 97.5 kg (215 lb)   SpO2 94%   BMI 40.65 kg/m²     Intake/Output Summary (Last 24 hours) at 6/17/2022 1948  Last data filed at 6/17/2022 0904  Gross per 24 hour   Intake 240 ml   Output 800 ml   Net -560 ml       Physical Exam:  General Appearance: Chronically ill, elderly female in mild respiratory distress on high flow nasal cannula up to chair in recliner, obese  Eyes: Conjunctivae and sclerae normal, no icterus  Throat:  oral mucosa moist  Lungs: Clear to auscultation anteriorly, respirations regular, even and mildly labored when speaking for prolonged periods of time  Heart: Regular rhythm and normal rate, normal S1, 1+ edema  Abdomen: Normal bowel sounds, soft, non-distended, non-tender  Skin: Warm and dry  Neurological: Alert, interactive, appropriate conversation  Psych: Normal mood and affect                                                                                       Meds: Reviewed  Current Facility-Administered Medications   Medication Dose Route Frequency Provider Last Rate Last Admin   • ! Home medications [facility] in [facility]. Return to patient prior to discharge   Does not apply Daily " Nikko Peters IV, PharmD       • aspirin tablet 325 mg  325 mg Oral Daily Rocío, Nayeli, APRN   325 mg at 06/17/22 0816   • budesonide (PULMICORT) nebulizer solution 0.5 mg  0.5 mg Nebulization BID - RT Luis A Snell MD   0.5 mg at 06/16/22 1042   • cefTRIAXone (ROCEPHIN) 1 g/100 mL 0.9% NS (MBP)  1 g Intravenous Nightly Rocío, Nayeli, APRN   1 g at 06/16/22 2049   • dextrose (D50W) (25 g/50 mL) IV injection 25 g  25 g Intravenous Q15 Min PRN Rocío, Nayeli, APRN       • dextrose (GLUTOSE) oral gel 15 g  15 g Oral Q15 Min PRN Rocío, Nayeli, APRN       • doxycycline (MONODOX) capsule 100 mg  100 mg Oral Q12H Octavio Srinivasan MD   100 mg at 06/17/22 1715   • furosemide (LASIX) injection 80 mg  80 mg Intravenous Daily Hayley Chairez APRN   80 mg at 06/17/22 0816   • gabapentin (NEURONTIN) capsule 300 mg  300 mg Oral TID Rocío, Nayeli, APRN   300 mg at 06/17/22 1556   • glucagon (human recombinant) (GLUCAGEN DIAGNOSTIC) injection 1 mg  1 mg Intramuscular Q15 Min PRN Rocío, Nayeli, APRN       • heparin (porcine) 5000 UNIT/ML injection 5,000 Units  5,000 Units Subcutaneous Q12H Rocío, Nayeli, APRN   5,000 Units at 06/17/22 0816   • Insulin Lispro (humaLOG) injection 0-14 Units  0-14 Units Subcutaneous 4x Daily With Meals & Nightly Octavio Srinivasan MD   3 Units at 06/17/22 1226   • Insulin Lispro (humaLOG) injection 10 Units  10 Units Subcutaneous TID With Meals Octavio Srinivasan MD   10 Units at 06/17/22 1715   • ipratropium-albuterol (DUO-NEB) nebulizer solution 3 mL  3 mL Nebulization Q6H PRN cOtavio Srinivasan MD       • melatonin tablet 5 mg  5 mg Oral Nightly Jos Chavez DO   5 mg at 06/16/22 2049   • nebivolol (BYSTOLIC) tablet 2.5 mg  2.5 mg Oral Q24H Alex Mejia MD   2.5 mg at 06/17/22 0820   • Pharmacy Consult - MTM   Does not apply Daily Riedel, Taylor, PharmD       • pravastatin (PRAVACHOL) tablet 40 mg  40 mg Oral Daily Nayeli Alford APRN   40 mg at 06/17/22 0816   •  predniSONE (DELTASONE) tablet 20 mg  20 mg Oral Daily With Breakfast Octavio Srinivasan MD   20 mg at 06/17/22 0816   • sodium chloride 0.9 % flush 10 mL  10 mL Intravenous PRN Jos Chavez,        • sodium chloride 0.9 % flush 10 mL  10 mL Intravenous Q12H Rocío, Nayeli, APRN   10 mL at 06/17/22 0817   • sodium chloride 0.9 % flush 10 mL  10 mL Intravenous PRN Rocío, Nayeli, APRN       • traMADol (ULTRAM) tablet 25 mg  25 mg Oral Q6H PRN Octavio Srinivasan MD         Current Outpatient Medications   Medication Sig Dispense Refill   • aspirin 81 MG EC tablet Take 81 mg by mouth Daily.     • baclofen (LIORESAL) 10 MG tablet Take 10 mg by mouth 3 (Three) Times a Day As Needed for Muscle Spasms.     • Cholecalciferol (VITAMIN D-3) 1000 UNITS capsule Take 2,000 Units by mouth daily.     • furosemide (LASIX) 80 MG tablet Take 120 mg by mouth Daily. Pt takes 1 and 1/2 tab daily     • Garlic 1000 MG capsule Take 2,000 Units by mouth Daily.     • LEVEMIR FLEXTOUCH 100 UNIT/ML injection INJECT 25 UNITS SUBCUTANEOUSLY EVERY 12 HOURS (Patient taking differently: Inject 70 Units under the skin into the appropriate area as directed. 1000 and 2200) 15 mL 0   • [START ON 6/18/2022] nebivolol (BYSTOLIC) 2.5 MG tablet Take 1 tablet by mouth Daily for 30 days. 30 tablet 0   • NOVOLOG FLEXPEN 100 UNIT/ML solution pen-injector sc pen Patient has not filled in over a year per pharmacy records  0   • potassium chloride (K-DUR,KLOR-CON) 20 MEQ CR tablet Take 20 mEq by mouth Daily.     • pravastatin (PRAVACHOL) 40 MG tablet TAKE 1 TABLET BY MOUTH EVERY DAY (Patient taking differently: Take 40 mg by mouth Every Night.) 90 tablet 3   • [START ON 6/18/2022] predniSONE (DELTASONE) 20 MG tablet Take 0.5 tablets by mouth Daily With Breakfast for 4 days. 2 tablet 0   • traMADol (ULTRAM) 50 MG tablet Take 50 mg by mouth Every 8 (Eight) Hours As Needed for Moderate Pain .     • VENTOLIN  (90 Base) MCG/ACT inhaler INHALE 1 TO 2 PUFFS BY  MOUTH EVERY 4 TO 6 HOURS AS NEEDED FOR WHEEZING OR COUGH 18 g 0   • cefdinir (OMNICEF) 300 MG capsule Take 1 capsule by mouth 2 (Two) Times a Day for 4 days. 8 capsule 0   • doxycycline (VIBRAMYCIN) 50 MG capsule Take 1 capsule by mouth 2 (Two) Times a Day for 4 days. 8 capsule 0   • gabapentin (NEURONTIN) 300 MG capsule Take 1 capsule by mouth Every 12 (Twelve) Hours for 30 days. 60 capsule 0   • gabapentin (NEURONTIN) 600 MG tablet Take 600 mg by mouth 3 (Three) Times a Day.     • levothyroxine (Synthroid) 112 MCG tablet Take 1 tablet by mouth Daily for 30 days. 30 tablet 0              Labs:   Results from last 7 days   Lab Units 06/17/22  1057 06/16/22  0419 06/15/22  0244 06/14/22  2121   WBC 10*3/mm3  --   --  12.01* 12.30*   HEMOGLOBIN g/dL  --   --  13.0 13.7   HEMATOCRIT %  --   --  41.3 44.9   PLATELETS 10*3/mm3  --   --  245 310   SODIUM mmol/L 139   < > 138 142   POTASSIUM mmol/L 4.2   < > 4.4 4.1   CHLORIDE mmol/L 96*   < > 94* 98   CO2 mmol/L 31.0*   < > 30.0* 30.0*   BUN mg/dL 53*   < > 38* 37*   CREATININE mg/dL 1.17*   < > 1.33* 1.42*   CALCIUM mg/dL 9.7   < > 9.0 9.2   BILIRUBIN mg/dL  --   --   --  0.6   ALK PHOS U/L  --   --   --  89   ALT (SGPT) U/L  --   --   --  40*   AST (SGOT) U/L  --   --   --  31   GLUCOSE mg/dL 197*   < > 282* 125*    < > = values in this interval not displayed.     Imaging Results (Last 72 Hours)     Procedure Component Value Units Date/Time    XR Chest 1 View [[id number]] Collected: 06/14/22 2150     Updated: 06/14/22 2154    Narrative:      DATE OF EXAM: 6/14/2022 9:22 PM     PROCEDURE: XR CHEST 1 VW-     INDICATIONS: SOA triage protocol     COMPARISON: Chest x-ray 9/9/2019     TECHNIQUE: Single radiographic AP view of the chest was obtained.     FINDINGS:  Stable cardiomediastinal silhouette which appears top normal in size.  Mild worsening of streaky bibasilar parenchymal opacities.  Redemonstration of diffuse interstitial prominence and mild perihilar  vascular  congestion. There is mild blunting of the bilateral  costophrenic angles suggestive of small pleural effusions, possibly new  from prior. No pneumothorax. No acute osseous findings.        Impression:      Mild worsening of bibasilar parenchymal opacities may reflect  atelectasis. Similar diffuse interstitial prominence and perihilar  vascular congestion compatible with interstitial edema. Questionably new  small bilateral pleural effusions.     This report was finalized on 6/14/2022 9:51 PM by Hugh Lechuga MD.                 Diagnostics: Reviewed    Assessment/Plan:    1.  GOC/ Advanced directives - Detailed LW document (scanned to Epic)  -- No changes to plan of care, continue with disease directed therapies with limit of DNR/DNI, no dialysis     2. ES COPD -  Discussed hospice eligibility and probable limited prognosis.  She is having more difficulty meeting care needs living alone at home.  Recurrent hospitalizations for exacerbation.  Increased nesting with decreased ability to do usual tasks at home.  Her son is assisting with IADLs.   Discussed availability of hospice for in-home support.  She will discuss with her PCP Dr. Matos.       3.   Dyspnea - Multifactorial ongoing work-up of coronary ischemia in the setting of COPD exacerbation.  Per son, declining health limits ability to care for self in home setting in the future.      3.   Code status- previously discussed with palliative.  At that point she did not make decision but was encouraged to discuss with family.  She desires to return to hospital when necessary for exacerbation.  However, when she is here she desires to go home.     4. Disposition - Plan is home tonight.           We will continue to follow along. Please do not hesitate to contact us regarding further sx mgmt or GOC needs, including after hours or on weekends via our on call provider at [phone].     Milagros Jaquez MD    6/17/2022

## 2022-06-17 NOTE — PROGRESS NOTES
"  [facility]   Inpatient Progress Note       LOS: 2 days   Patient Care Team:  Zee Matos MD as PCP - General (Family Medicine)    Chief Complaint:  Follow-up for dyspnea    Subjective     Interval History:     Patient up in chair.  Notes that breathing is overall feeling better.  Orthopnea is improving.  No longer requiring high flow.  Labs for today are pending.    Review of Systems:   Pertinent positives noted in history, exam, and assessment. Otherwise reviewed and negative.      Objective     Vitals:  Blood pressure 156/76, pulse 60, temperature 97.8 °F (36.6 °C), temperature source Oral, resp. rate 16, height 154.9 cm (60.98\"), weight 97.5 kg (215 lb), SpO2 99 %.     Intake/Output Summary (Last 24 hours) at 6/17/2022 0902  Last data filed at 6/17/2022 0300  Gross per 24 hour   Intake --   Output 1300 ml   Net -1300 ml     Vitals reviewed.   Constitutional:       Appearance: Well-developed. Acutely ill-appearing.      Comments: obese   Neck:      Vascular: No JVD.      Trachea: No tracheal deviation.   Pulmonary:      Effort: Pulmonary effort is normal.      Breath sounds: Normal breath sounds.   Cardiovascular:      Normal rate. Regularly irregular rhythm.   Pulses:     Intact distal pulses.   Edema:     Pretibial: bilateral trace edema of the pretibial area.     Ankle: bilateral trace edema of the ankle.  Abdominal:      General: Bowel sounds are normal. There is distension.      Tenderness: There is no abdominal tenderness.   Musculoskeletal:         General: No deformity. Skin:     General: Skin is warm and dry.   Neurological:      Mental Status: Alert and oriented to person, place, and time.            Results Review:     I reviewed the patient's new clinical results.    Results from last 7 days   Lab Units 06/15/22  0244   WBC 10*3/mm3 12.01*   HEMOGLOBIN g/dL 13.0   HEMATOCRIT % 41.3   PLATELETS 10*3/mm3 245     Results from last 7 days   Lab Units 06/16/22  0419 " 06/15/22  0244 06/14/22  2121   SODIUM mmol/L 134*   < > 142   POTASSIUM mmol/L 4.5   < > 4.1   CHLORIDE mmol/L 93*   < > 98   CO2 mmol/L 30.0*   < > 30.0*   BUN mg/dL 52*   < > 37*   CREATININE mg/dL 1.30*   < > 1.42*   CALCIUM mg/dL 9.2   < > 9.2   BILIRUBIN mg/dL  --   --  0.6   ALK PHOS U/L  --   --  89   ALT (SGPT) U/L  --   --  40*   AST (SGOT) U/L  --   --  31   GLUCOSE mg/dL 292*   < > 125*    < > = values in this interval not displayed.     Results from last 7 days   Lab Units 06/16/22  0419   SODIUM mmol/L 134*   POTASSIUM mmol/L 4.5   CHLORIDE mmol/L 93*   CO2 mmol/L 30.0*   BUN mg/dL 52*   CREATININE mg/dL 1.30*   GLUCOSE mg/dL 292*   CALCIUM mg/dL 9.2         Lab Results   Lab Value Date/Time    TROPONINI 0.05 12/25/2015 0450     Results from last 7 days   Lab Units 06/15/22  1139 06/15/22  0244   TSH uIU/mL  --  0.093*   FREE T4 ng/dL 1.90*  --          Results from last 7 days   Lab Units 06/14/22  2121   PROBNP pg/mL 1,735.0     ECHO 6/15/22:  · Estimated left ventricular EF = 60%  · Left ventricular wall thickness is consistent with moderate concentric hypertrophy.  · Mild mitral valve regurgitation is present  · Estimated right ventricular systolic pressure from tricuspid regurgitation is 42 mmHg.        Tele:  SR with PAC's    Assessment:      Acute on chronic respiratory failure (HCC)    Gastroesophageal reflux disease    Dyslipidemia    Coronary artery disease    Acute on chronic diastolic congestive heart failure (HCC)    Pulmonary hypertension. Calculated RVSP 64 mmHg by echocardiogram 8/17/18    Type 2 diabetes mellitus (HCC)    COPD with acute exacerbation (HCC)    Leukocytosis    ZION (acute kidney injury) (HCC)    COPD exacerbation (HCC)      1. Acute on chronic hypoxic respiratory failure  2. Pulmonary hypertension  3. CAD with reported history of MI  1. Mildly elevated trop  2. Echo with normal LVEF  4. Hypertension  5. Dyslipidemia  6. Diabetes, per attending service.   7. COPD on  chronic oxygen  8. ZION    Plan:  · Discussed with the patient possibility of ischemic evaluation via cardiac catheterization versus cardiac PET/perfusion study.  At this time patient wishes to defer evaluation for ischemia and simply be treated medically.  Discussed with patient that I agree that this is reasonable at this time given only mild elevation of her troponin.  · Await BMP results.  · Continue IV diuresis for now.  We will need to monitor renal function.  · We will continue to follow along.    RONAK Gregory   Dictated utilizing Dragon dictation

## 2022-06-17 NOTE — DISCHARGE SUMMARY
"    [facility] Medicine Services  DISCHARGE SUMMARY    Patient Name: Janet Pisano  : 1945  MRN: [id number]    Date of Admission: 2022  9:20 PM  Date of Discharge:  2022 (Friday) 11:50 am  Primary Care Physician: Zee Matos MD    Consults     Date and Time Order Name Status Description    2022 10:23 AM Inpatient Palliative Care MD Consult Completed     6/15/2022  1:50 AM Inpatient Cardiology Consult Completed         Ivette Dumont MD - Palliative Medicine  Alex Mejia MD - Cardiology    Hospital Course     Presenting Problem:   Acute on chronic respiratory failure (HCC) [J96.20]    Active Hospital Problems    Diagnosis  POA   • **Acute on chronic respiratory failure (HCC) [J96.20]  Yes   • Type 2 diabetes mellitus (HCC) [E11.9]  Yes   • COPD with acute exacerbation (HCC) [J44.1]  Yes   • Leukocytosis [D72.829]  Unknown   • ZION (acute kidney injury) (HCC) [N17.9]  Unknown   • COPD exacerbation (HCC) [J44.1]  Yes   • Pulmonary hypertension. Calculated RVSP 64 mmHg by echocardiogram 18 [I27.20]  Yes   • Acute on chronic diastolic congestive heart failure (HCC) [I50.33]  Yes   • Coronary artery disease [I25.10]  Yes   • Dyslipidemia [E78.5]  Yes   • Gastroesophageal reflux disease [K21.9]  Yes      Resolved Hospital Problems   No resolved problems to display.      Obesity hypoventilation syndrome    Hospital Course:  Janet Pisano is a 76 y.o. female with h/o HTN, COPD on chronic home O2, DMII, DHF, Pulmonary HTN presents with SOA and productive cough.    She appears to have obesity hypoventilation and suspect that she has untreated sleep apnea but she refuses to consider treatment due to \"Claustrophobia\"     Acute on chronic hypoxic and hypercarbic respiratory failure  - wean oxygen as tolerated  - she would benefit from less oxygen and less tramadol (opioids)  - bedside Incentive Spirometer today suggestive of poor inspiratory capacity  - CXR " may be most consistent with Atelectasis  - lung sound great  - wean oxygen and steroids  - sounds ok for home today (consistent with patient's wishes)    Possible Pneumonia  COPD  - empiric rocephin and doxycycline  - respiratory culture  - scheduled duonebs and budesonide  - was not really getting nebulized steroids here and improved  - de-escalate steroids  - changed from  IV steroids to oral and improved with less oxygen and less pain medication     Acute on Chronic Diastolic CHF  - lasix  - echo (normal EF) - EF 60% (mild mitral regurg), estimated RVSP ~ 42 mmHg  - probably would benefit from Left Heart Cath - cardiology deferred while here  - could get ischemic evaluation as outpatient as she wants to go home     PAF     DMII  - basal bolus, monitor for steroid induced hyperglycemia     Hypothyroid  - TSH low at 0.093, Free t4 high at 1.9  - decrease synthroid dose and follow up with PCP in 8 weeks on TSH check    Discharge Follow Up Recommendations for outpatient labs/diagnostics:   there were some medication changes made here and possibly need to decrease tramadol dosing long term as this is causing hypoventilation and worsening sleep apnea    Day of Discharge     HPI:    Multidisciplinary Rounds reporting patient wanting to go home.  On my visit patient feels better and wants to go home today.  Discussed my concerns about chronic opiate therapy on sleep apnea and hypoventilation.  Discussed my concerns about too much oxygen therapy at home potentially suppressing her drive to breathe.  She is saturating at 97% on her home oxygen dose which is likely too high for her diagnoses.  She appears to be holding onto Carbon Dioxide in her blood    Review of Systems    Gen- denies chills, denies fevers  CV- No chest pain, palpitations  Resp- No cough, dyspnea  GI- No N/V/D, abd pain    Vital Signs:   Temp:  [97.5 °F (36.4 °C)-98.3 °F (36.8 °C)] 97.5 °F (36.4 °C)  Heart Rate:  [55-71] 55  Resp:  [16-18] 17  BP:  (151-169)/(65-95) 153/65  Flow (L/min):  [2-6] 4      Physical Exam:    Constitutional: awake, No Acute Distress, feels well enough for home today  HENT: NCAT, dry tongue  Respiratory: Clear to auscultation bilaterally, respiratory effort normal   Cardiovascular:  Bradycardia, s1 and s2  Gastrointestinal: Positive bowel sounds, soft, nontender, morbidly obese abdomen  Musculoskeletal: No bilateral ankle edema  Psychiatric:  Anxious affect, cooperative  Skin: No rashes    Pertinent  and/or Most Recent Results     LAB RESULTS:      Lab 06/15/22  0244 06/14/22 2121   WBC 12.01* 12.30*   HEMOGLOBIN 13.0 13.7   HEMATOCRIT 41.3 44.9   PLATELETS 245 310   NEUTROS ABS 11.54* 8.96*   IMMATURE GRANS (ABS) 0.04 0.04   LYMPHS ABS 0.33* 2.21   MONOS ABS 0.08* 1.00*   EOS ABS 0.00 0.05   MCV 95.4 98.5*   CRP  --  0.70*   PROCALCITONIN  --  0.03   LACTATE  --  1.7         Lab 06/16/22  0419 06/15/22  0244 06/14/22 2121   SODIUM 134* 138 142   POTASSIUM 4.5 4.4 4.1   CHLORIDE 93* 94* 98   CO2 30.0* 30.0* 30.0*   ANION GAP 11.0 14.0 14.0   BUN 52* 38* 37*   CREATININE 1.30* 1.33* 1.42*   EGFR 42.4* 41.3* 38.2*   GLUCOSE 292* 282* 125*   CALCIUM 9.2 9.0 9.2   MAGNESIUM  --   --  2.0   PHOSPHORUS  --   --  4.4   HEMOGLOBIN A1C  --  8.60*  --    TSH  --  0.093*  --          Lab 06/14/22 2121   TOTAL PROTEIN 7.6   ALBUMIN 4.20   GLOBULIN 3.4   ALT (SGPT) 40*   AST (SGOT) 31   BILIRUBIN 0.6   ALK PHOS 89         Lab 06/15/22  1139 06/15/22  0244 06/14/22 2121   PROBNP  --   --  1,735.0   TROPONIN T 0.024 0.031* 0.031*                 Lab 06/16/22  0326 06/15/22  0127 06/14/22  2222   PH, ARTERIAL 7.377 7.347* 7.329*   PCO2, ARTERIAL 51.6* 53.2* 57.4*   PO2 ART 98.1 79.9* 75.7*   FIO2 39 60 80   HCO3 ART 30.3* 29.1* 30.2*   BASE EXCESS ART 4.0* 2.4* 2.8*   CARBOXYHEMOGLOBIN 0.8 0.9 1.1     Brief Urine Lab Results  (Last result in the past 365 days)      Color   Clarity   Blood   Leuk Est   Nitrite   Protein   CREAT   Urine HCG         06/15/22 0456             22.3             Microbiology Results (last 10 days)     Procedure Component Value - Date/Time    Blood Culture - Blood, Arm, Right [[id number]]  (Normal) Collected: 06/14/22 2135    Lab Status: Preliminary result Specimen: Blood from Arm, Right Updated: 06/16/22 2200     Blood Culture No growth at 2 days    COVID PRE-OP / PRE-PROCEDURE SCREENING ORDER (NO ISOLATION) - Swab, Nasopharynx [[id number]]  (Normal) Collected: 06/14/22 2128    Lab Status: Final result Specimen: Swab from Nasopharynx Updated: 06/14/22 2236    Narrative:      The following orders were created for panel order COVID PRE-OP / PRE-PROCEDURE SCREENING ORDER (NO ISOLATION) - Swab, Nasopharynx.  Procedure                               Abnormality         Status                     ---------                               -----------         ------                     Respiratory Panel PCR w/...[[id number]]  Normal              Final result                 Please view results for these tests on the individual orders.    Respiratory Panel PCR w/COVID-19(SARS-CoV-2) ROCKY/PIERCE/ALCON/PAD/COR/MAD/ANTOLIN In-House, NP Swab in UTM/VTM, 3-4 HR TAT - Swab, Nasopharynx [[id number]]  (Normal) Collected: 06/14/22 2128    Lab Status: Final result Specimen: Swab from Nasopharynx Updated: 06/14/22 2236     ADENOVIRUS, PCR Not Detected     Coronavirus 229E Not Detected     Coronavirus HKU1 Not Detected     Coronavirus NL63 Not Detected     Coronavirus OC43 Not Detected     COVID19 Not Detected     Human Metapneumovirus Not Detected     Human Rhinovirus/Enterovirus Not Detected     Influenza A PCR Not Detected     Influenza B PCR Not Detected     Parainfluenza Virus 1 Not Detected     Parainfluenza Virus 2 Not Detected     Parainfluenza Virus 3 Not Detected     Parainfluenza Virus 4 Not Detected     RSV, PCR Not Detected     Bordetella pertussis pcr Not Detected     Bordetella parapertussis PCR Not Detected     Chlamydophila pneumoniae PCR Not Detected      Mycoplasma pneumo by PCR Not Detected    Narrative:      In the setting of a positive respiratory panel with a viral infection PLUS a negative procalcitonin without other underlying concern for bacterial infection, consider observing off antibiotics or discontinuation of antibiotics and continue supportive care. If the respiratory panel is positive for atypical bacterial infection (Bordetella pertussis, Chlamydophila pneumoniae, or Mycoplasma pneumoniae), consider antibiotic de-escalation to target atypical bacterial infection.    Blood Culture - Blood, Arm, Right [[id number]]  (Normal) Collected: 06/14/22 2125    Lab Status: Preliminary result Specimen: Blood from Arm, Right Updated: 06/16/22 2200     Blood Culture No growth at 2 days          Adult Transthoracic Echo Complete w/ Color, Spectral and Contrast if necessary per protocol    Result Date: 6/16/2022  · Estimated left ventricular EF = 60% · Left ventricular wall thickness is consistent with moderate concentric hypertrophy. · Mild mitral valve regurgitation is present · Estimated right ventricular systolic pressure from tricuspid regurgitation is 42 mmHg.      XR Chest 1 View    Result Date: 6/14/2022  DATE OF EXAM: 6/14/2022 9:22 PM  PROCEDURE: XR CHEST 1 VW-  INDICATIONS: SOA triage protocol  COMPARISON: Chest x-ray 9/9/2019  TECHNIQUE: Single radiographic AP view of the chest was obtained.  FINDINGS: Stable cardiomediastinal silhouette which appears top normal in size. Mild worsening of streaky bibasilar parenchymal opacities. Redemonstration of diffuse interstitial prominence and mild perihilar vascular congestion. There is mild blunting of the bilateral costophrenic angles suggestive of small pleural effusions, possibly new from prior. No pneumothorax. No acute osseous findings.      Mild worsening of bibasilar parenchymal opacities may reflect atelectasis. Similar diffuse interstitial prominence and perihilar vascular congestion compatible with  interstitial edema. Questionably new small bilateral pleural effusions.  This report was finalized on 6/14/2022 9:51 PM by Hugh Lechuga MD.                Results for orders placed during the hospital encounter of 06/14/22    Adult Transthoracic Echo Complete w/ Color, Spectral and Contrast if necessary per protocol    Interpretation Summary  · Estimated left ventricular EF = 60%  · Left ventricular wall thickness is consistent with moderate concentric hypertrophy.  · Mild mitral valve regurgitation is present  · Estimated right ventricular systolic pressure from tricuspid regurgitation is 42 mmHg.    Pending Labs     Order Current Status    Basic Metabolic Panel In process    Blood Culture - Blood, Arm, Right Preliminary result    Blood Culture - Blood, Arm, Right Preliminary result        Discharge Details        Discharge Medications      New Medications      Instructions Start Date   cefdinir 300 MG capsule  Commonly known as: OMNICEF   300 mg, Oral, 2 Times Daily      doxycycline 50 MG capsule  Commonly known as: VIBRAMYCIN   50 mg, Oral, 2 Times Daily      nebivolol 2.5 MG tablet  Commonly known as: BYSTOLIC   2.5 mg, Oral, Every 24 Hours Scheduled   Start Date: June 18, 2022     predniSONE 20 MG tablet  Commonly known as: DELTASONE   10 mg, Oral, Daily With Breakfast   Start Date: June 18, 2022        Changes to Medications      Instructions Start Date   gabapentin 600 MG tablet  Commonly known as: NEURONTIN  What changed: Another medication with the same name was added. Make sure you understand how and when to take each.   600 mg, Oral, 3 Times Daily      gabapentin 300 MG capsule  Commonly known as: NEURONTIN  What changed: You were already taking a medication with the same name, and this prescription was added. Make sure you understand how and when to take each.   300 mg, Oral, Every 12 Hours      Levemir FlexTouch 100 UNIT/ML injection  Generic drug: insulin detemir  What changed: See the new  instructions.   INJECT 25 UNITS SUBCUTANEOUSLY EVERY 12 HOURS      levothyroxine 112 MCG tablet  Commonly known as: Synthroid  What changed:   · medication strength  · how much to take  · when to take this  · additional instructions   112 mcg, Oral, Daily      pravastatin 40 MG tablet  Commonly known as: PRAVACHOL  What changed: when to take this   TAKE 1 TABLET BY MOUTH EVERY DAY         Continue These Medications      Instructions Start Date   aspirin 81 MG EC tablet   81 mg, Oral, Daily      baclofen 10 MG tablet  Commonly known as: LIORESAL   10 mg, Oral, 3 Times Daily PRN      furosemide 80 MG tablet  Commonly known as: LASIX   120 mg, Oral, Daily, Pt takes 1 and 1/2 tab daily      Garlic 1000 MG capsule   2,000 Units, Oral, Daily      NovoLOG FlexPen 100 UNIT/ML solution pen-injector sc pen  Generic drug: insulin aspart   Patient has not filled in over a year per pharmacy records      potassium chloride 20 MEQ CR tablet  Commonly known as: K-DUR,KLOR-CON   20 mEq, Oral, Daily      traMADol 50 MG tablet  Commonly known as: ULTRAM   50 mg, Oral, Every 8 Hours PRN      Ventolin  (90 Base) MCG/ACT inhaler  Generic drug: albuterol sulfate HFA   INHALE 1 TO 2 PUFFS BY MOUTH EVERY 4 TO 6 HOURS AS NEEDED FOR WHEEZING OR COUGH      Vitamin D-3 25 MCG (1000 UT) capsule   2,000 Units, Oral, Daily         Stop These Medications    losartan 25 MG tablet  Commonly known as: COZAAR     metoprolol succinate XL 25 MG 24 hr tablet  Commonly known as: TOPROL-XL            Allergies   Allergen Reactions   • Aliskiren    • Amlodipine    • Crestor [Rosuvastatin Calcium]    • Lisinopril    • Metformin And Related    • Penicillins    • Sitagliptin    • Statins Confusion   • Valsartan Unknown (See Comments)     Tolerates Losartan 8/20/18     Discharge Disposition:  Home or Self Care    Diet:  Hospital:  Diet Order   Procedures   • Diet Regular; Cardiac, Consistent Carbohydrate     Activity:  As tolerated    Restrictions or  Other Recommendations:   Asking to go home today.  Please follow up with Cardiology to discuss ischemic evaluation.  She has been advised to minimize tramadol medication as this can suppress her breathing.       CODE STATUS:    Code Status and Medical Interventions:   Ordered at: 06/15/22 0143     Medical Intervention Limits:    NO intubation (DNI)     Level Of Support Discussed With:    Patient     Code Status (Patient has no pulse and is not breathing):    No CPR (Do Not Attempt to Resuscitate)     Medical Interventions (Patient has pulse or is breathing):    Limited Support     No future appointments.    Additional Instructions for the Follow-ups that You Need to Schedule     Discharge Follow-up with PCP   As directed       Currently Documented PCP:    Zee Matos MD    PCP Phone Number:    [phone]     Follow Up Details: Primary Care Provider - 1 week         Discharge Follow-up with Specialty: Cardiology; 3 Weeks   As directed      Specialty: Cardiology    Follow Up: 3 Weeks    Follow Up Details: outpatient ischemic evaluation             Home dose of Gabapentin caused alert in Epic during discharge reconciliation.  Contraindicated due to dose and frequency.  To protect patient, we decreased the dose and frequency of this medication at discharge.        Octavio Srinivasan MD  06/17/22      Time Spent on Discharge:  I spent  49  minutes on this discharge activity which included: face-to-face encounter with the patient, reviewing the data in the system, coordination of the care with the nursing staff as well as consultants, documentation, and entering orders.

## 2022-06-18 NOTE — OUTREACH NOTE
Prep Survey    Flowsheet Row Responses   [facility] facility patient discharged from? [place]   Is LACE score < 7 ? No   Emergency Room discharge w/ pulse ox? No   Eligibility Readm Mgmt   Discharge diagnosis COPD with acute exacerbation, Acute on chronic hypoxic and hypercarbic respiratory failure, Acute on chronic diastolic congestive heart failure, ZION   Does the patient have one of the following disease processes/diagnoses(primary or secondary)? CHF   Does the patient have Home health ordered? No   Is there a DME ordered? No   Prep survey completed? Yes          LOI RIVERA - Registered Nurse

## 2022-06-21 NOTE — OUTREACH NOTE
CHF Week 1 Survey    Flowsheet Row Responses   [facility] facility patient discharged from? [place]   Does the patient have one of the following disease processes/diagnoses(primary or secondary)? CHF   CHF Week 1 attempt successful? No   Unsuccessful attempts Attempt 1          HUSAM CORDERO - Registered Nurse

## 2022-06-23 NOTE — OUTREACH NOTE
CHF Week 1 Survey    Flowsheet Row Responses   [facility] patient discharged from? [place]   Does the patient have one of the following disease processes/diagnoses(primary or secondary)? CHF   CHF Week 1 attempt successful? Yes   Call start time 1257   Call end time 1300   Discharge diagnosis COPD with acute exacerbation, Acute on chronic hypoxic and hypercarbic respiratory failure, Acute on chronic diastolic congestive heart failure, ZION   Meds reviewed with patient/caregiver? Yes   Is the patient having any side effects they believe may be caused by any medication additions or changes? No   Does the patient have all medications ordered at discharge? Yes   Is the patient taking all medications as directed (includes completed medication regime)? Yes   Does the patient have a primary care provider?  Yes   Does the patient have an appointment with their PCP within 7 days of discharge? No   Comments regarding PCP 6/28/22 at 1:40 pm-must be rescheduled due to schedule   What is preventing the patient from scheduling follow up appointments within 7 days of discharge? --  [scheduling conflict]   Nursing Interventions Verified appointment date/time/provider   Has the patient kept scheduled appointments due by today? N/A   Pulse Ox monitoring Intermittent   O2 Sat comments Not checked today   Psychosocial issues? No   Did the patient receive a copy of their discharge instructions? Yes   Nursing interventions Reviewed instructions with patient   What is the patient's perception of their health status since discharge? Improving   Nursing interventions Nurse provided patient education   Is the patient weighing daily? Yes   Is the patient able to teach back signs and symptoms of worsening condition? (i.e. weight gain, shortness of air, etc.) Yes   If the patient is a current smoker, are they able to teach back resources for cessation? Not a smoker   Is the patient/caregiver able to teach back the hierarchy of who to  "call/visit for symptoms/problems? PCP, Specialist, Home health nurse, Urgent Care, ED, 911 Yes    CHF Week 1 call completed? Yes   Revoked No further contact(revokes)-requires comment   Is the patient interested in additional calls from an ambulatory ?  NOTE:  applies to high risk patients requiring additional follow-up. No   Graduated/Revoked comments Pt stated, \"this can be the last call\"          SILVANA LOWE - Registered Nurse  "

## 2022-07-16 PROBLEM — I63.9 STROKE (HCC): Status: ACTIVE | Noted: 2022-01-01

## 2022-07-17 PROBLEM — I48.91 ATRIAL FIBRILLATION WITH RVR (HCC): Status: ACTIVE | Noted: 2022-01-01

## 2022-07-27 PROBLEM — R13.10 DYSPHAGIA: Status: ACTIVE | Noted: 2022-01-01

## 2022-08-01 PROBLEM — J96.90 RESPIRATORY FAILURE: Status: ACTIVE | Noted: 2022-01-01

## 2022-08-01 NOTE — PROGRESS NOTES
[facility] Medicine Services  PROGRESS NOTE    Patient Name: Janet Pisano  : 1945  MRN: [id number]    Date of Admission: 2022  Primary Care Physician: Zee Matos MD    Subjective   Subjective   CC:  F/U CVA    HPI:  Pt remains on Bipap and still in restraints. No new issues overnight per nursing.     ROS:  Resp- + dyspnea  GI- No N/V        Objective   Objective     Vital Signs:   Temp:  [98 °F (36.7 °C)-98.8 °F (37.1 °C)] 98.2 °F (36.8 °C)  Heart Rate:  [] 80  Resp:  [16-26] 18  BP: (133-187)/() 146/89  Flow (L/min):  [45-55] 50     Physical Exam:  Constitutional - appears fatigued, in bed, wrist restraints in place, on bipap  HEENT-NCAT, mucous membranes dry  CV-RRR  Resp-diminished bilaterally  Abd-soft, nontender, nondistended, normoactive bowel sounds, obese  Ext-no LE edema  Neuro-awake, speech clear although answers brief, follows commands ( hands, wiggles toes), answers questions appropriately  Psych-flat affect   Skin- No rash on exposed UE or LE bilaterally    Results Reviewed:  LAB RESULTS:      Lab 22  0827 22  1430 22  1601 22  1218 22  0631   WBC 8.33 10.28 10.05 13.61* 12.38*   HEMOGLOBIN 10.3* 10.4* 11.4* 10.2* 10.1*   HEMATOCRIT 33.3* 34.8 39.1 33.5* 32.7*   PLATELETS 167 144 141 146 134*   NEUTROS ABS 7.42* 9.84* 8.91* 12.09*  --    IMMATURE GRANS (ABS) 0.03 0.03 0.05 0.06*  --    LYMPHS ABS 0.48* 0.24* 0.38* 0.57*  --    MONOS ABS 0.38 0.11 0.55 0.62  --    EOS ABS 0.01 0.04 0.12 0.23  --    MCV 96.5 100.0* 102.4* 98.2* 95.9   PROCALCITONIN  --   --  0.11  --  0.16         Lab 22  0827 22  2355 22  1430 22  1601 22  0527 22  0631   SODIUM 150* 144 144 142 138 136   POTASSIUM 4.4 4.9 4.8 4.4 3.3* 4.4   CHLORIDE 102 99 99 96* 94* 94*   CO2 36.0* 37.0* 37.0* 39.0* 37.0* 32.0*   ANION GAP 12.0 8.0 8.0 7.0 7.0 10.0   BUN 49* 47* 46* 46* 43* 41*   CREATININE 1.59* 1.44*  1.42* 1.69* 1.56* 1.34*   EGFR 33.1*  --  37.9* 30.8* 33.9* 40.7*   GLUCOSE 212* 221* 224* 203* 124* 196*   CALCIUM 9.5 9.3 9.2 9.2 8.5* 8.4*   MAGNESIUM  --  2.1  --   --  2.4 2.2   PHOSPHORUS  --  2.5  --   --   --   --          Lab 07/31/22  2355 07/30/22  1601 07/29/22  0527 07/25/22  1049   TOTAL PROTEIN 6.5 7.0 6.2 6.0   ALBUMIN 4.10 3.60 3.00* 2.70*   GLOBULIN  --  3.4 3.2  --    ALT (SGPT) 15 21 5 18   AST (SGOT) 10 14 22 24   BILIRUBIN 1.1 0.9 0.8 1.2   ALK PHOS 95 113 95 101         Lab 07/28/22  0631   PROBNP 14,192.0*         Lab 07/31/22  2355 07/25/22  1049   CHOLESTEROL 114 129   TRIGLYCERIDES 82 176*             Lab 08/01/22  0341 07/31/22  0401 07/30/22  1729   PH, ARTERIAL 7.392 7.295* 7.253*   PCO2, ARTERIAL 66.1* 83.5* 88.0*   PO2 ART 76.2* 55.7* 73.7*   FIO2 50 55 60   HCO3 ART 40.1* 40.5* 38.9*   BASE EXCESS ART 13.1* 11.4* 9.0*   CARBOXYHEMOGLOBIN 1.1 1.3 1.0     Brief Urine Lab Results  (Last result in the past 365 days)      Color   Clarity   Blood   Leuk Est   Nitrite   Protein   CREAT   Urine HCG        07/30/22 0352 Yellow   Cloudy   Negative   Small (1+)   Negative   30 mg/dL (1+)                 Microbiology Results Abnormal     Procedure Component Value - Date/Time    COVID PRE-OP / PRE-PROCEDURE SCREENING ORDER (NO ISOLATION) - Swab, Nasopharynx [[id number]]  (Normal) Collected: 07/26/22 1515    Lab Status: Final result Specimen: Swab from Nasopharynx Updated: 07/26/22 1540    Narrative:      The following orders were created for panel order COVID PRE-OP / PRE-PROCEDURE SCREENING ORDER (NO ISOLATION) - Swab, Nasopharynx.  Procedure                               Abnormality         Status                     ---------                               -----------         ------                     COVID-19, ABBOTT IN-HOUS...[[id number]]  Normal              Final result                 Please view results for these tests on the individual orders.    COVID-19, ABBOTT IN-HOUSE,NASAL Swab (NO  TRANSPORT MEDIA) 2 HR TAT - Swab, Nasopharynx [[id number]]  (Normal) Collected: 07/26/22 1515    Lab Status: Final result Specimen: Swab from Nasopharynx Updated: 07/26/22 1540     COVID19 Presumptive Negative    Narrative:      Fact sheet for providers: https://www.fda.gov/media/194403/download     Fact sheet for patients: https://www.fda.gov/media/124019/download    Test performed by PCR.  If inconsistent with clinical signs and symptoms patient should be tested with different authorized molecular test.    Blood Culture - Blood, Leg, Right [[id number]] Collected: 07/17/22 0506    Lab Status: Final result Specimen: Blood from Leg, Right Updated: 07/22/22 0704     Blood Culture No growth at 5 days      Aerobic bottle only    Blood Culture - Blood, Hand, Left [[id number]] Collected: 07/17/22 0506    Lab Status: Final result Specimen: Blood from Hand, Left Updated: 07/22/22 0704     Blood Culture No growth at 5 days      Aerobic bottle only    Urine Culture - Urine, Urine, Clean Catch [[id number]] Collected: 07/16/22 0950    Lab Status: Final result Specimen: Urine, Clean Catch Updated: 07/17/22 0919     Urine Culture <25,000 CFU/mL Mixed Marlene Isolated    Narrative:      Specimen contains mixed organisms of questionable pathogenicity suggestive of contamination. If symptoms persist, suggest recollection.  Colonization of the urinary tract without infection is common. Treatment is discouraged unless the patient is symptomatic, pregnant, or undergoing an invasive urologic procedure.    Respiratory Panel PCR w/COVID-19(SARS-CoV-2) ROCKY/PIERCE/ALCON/PAD/COR/MAD/ANTOLIN In-House, NP Swab in UTM/VTM, 3-4 HR TAT - Swab, Nasopharynx [[id number]]  (Normal) Collected: 07/16/22 0642    Lab Status: Final result Specimen: Swab from Nasopharynx Updated: 07/16/22 0808     ADENOVIRUS, PCR Not Detected     Coronavirus 229E Not Detected     Coronavirus HKU1 Not Detected     Coronavirus NL63 Not Detected     Coronavirus OC43 Not Detected     COVID19  Not Detected     Human Metapneumovirus Not Detected     Human Rhinovirus/Enterovirus Not Detected     Influenza A PCR Not Detected     Influenza B PCR Not Detected     Parainfluenza Virus 1 Not Detected     Parainfluenza Virus 2 Not Detected     Parainfluenza Virus 3 Not Detected     Parainfluenza Virus 4 Not Detected     RSV, PCR Not Detected     Bordetella pertussis pcr Not Detected     Bordetella parapertussis PCR Not Detected     Chlamydophila pneumoniae PCR Not Detected     Mycoplasma pneumo by PCR Not Detected    Narrative:      In the setting of a positive respiratory panel with a viral infection PLUS a negative procalcitonin without other underlying concern for bacterial infection, consider observing off antibiotics or discontinuation of antibiotics and continue supportive care. If the respiratory panel is positive for atypical bacterial infection (Bordetella pertussis, Chlamydophila pneumoniae, or Mycoplasma pneumoniae), consider antibiotic de-escalation to target atypical bacterial infection.          XR Chest 1 View    Result Date: 7/31/2022  DATE OF EXAM: 7/31/2022 12:35 AM  PROCEDURE: XR CHEST 1 VW-  INDICATIONS: hypoxia; R13.12-Dysphagia, oropharyngeal phase; R41.841-Cognitive communication deficit  COMPARISON: July 30, 2022  TECHNIQUE: Single radiographic AP view of the chest was obtained.  FINDINGS: The heart is enlarged. There is density in left basilar that may relate to atelectasis or effusion. There is less prominence of interstitial markings that could reflect resolving edema. The heart looks enlarged.      Impression: 1.  Less prominence of interstitial markings. 2.  Cardiomegaly 3.  Density left basilar area that in part may relate to shadow from heart and cardiophrenic fat. Some atelectasis or underlying effusion not excluded.  This report was finalized on 7/31/2022 7:10 AM by Narendra Urbano MD.        Results for orders placed during the hospital encounter of 07/16/22    Adult Transthoracic  Echo Limited W/ Cont if Necessary Per Protocol    Interpretation Summary  · Estimated left ventricular EF = 55%  · Saline test results are negative.      I have reviewed the medications:  Scheduled Meds:amiodarone, 200 mg, Oral, Q8H   Followed by  [START ON 8/5/2022] amiodarone, 200 mg, Oral, Q12H   Followed by  [START ON 8/19/2022] amiodarone, 200 mg, Oral, Daily  apixaban, 5 mg, Oral, Q12H  aspirin, 81 mg, Oral, Daily   Or  aspirin, 300 mg, Rectal, Daily  budesonide, 0.5 mg, Nebulization, BID - RT  doxycycline, 100 mg, Intravenous, Q12H  DULoxetine, 30 mg, Oral, Daily  gabapentin, 200 mg, Oral, Q12H  insulin detemir, 5 Units, Subcutaneous, Daily  insulin lispro, 0-7 Units, Subcutaneous, TID AC  ipratropium-albuterol, 3 mL, Nebulization, Q4H - RT  levothyroxine, 112 mcg, Oral, Q AM  methylPREDNISolone sodium succinate, 40 mg, Intravenous, Daily  pravastatin, 40 mg, Oral, Nightly  QUEtiapine, 12.5 mg, Oral, Nightly  sodium chloride, 10 mL, Intravenous, Q12H      Continuous Infusions:   PRN Meds:.•  acetaminophen  •  albumin human  •  polyethylene glycol **AND** bisacodyl  •  dextrose  •  diazePAM  •  docusate sodium  •  glucagon (human recombinant)  •  hydrOXYzine  •  Morphine  •  sennosides  •  sodium chloride    Assessment & Plan   Assessment & Plan     Active Hospital Problems    Diagnosis  POA   • Dysphagia [R13.10]  Yes   • Atrial fibrillation with RVR ([facility]) [I48.91]  Yes   • Stroke ([facility]) [I63.9]  Yes   • Pulmonary hypertension. Calculated RVSP 64 mmHg by echocardiogram 8/17/18 [I27.20]  Yes   • Obesity hypoventilation syndrome ([facility]) [E66.2]  Yes   • Acute on chronic diastolic congestive heart failure ([facility]) [I50.33]  Yes   • Morbid obesity with BMI of 40.0-44.9, adult ([facility]) [E66.01, Z68.41]  Not Applicable   • Coronary artery disease [I25.10]  Yes   • Type 2 diabetes mellitus without complication, without long-term current use of insulin ([facility]) [E11.9]  Yes   • Hypothyroidism [E03.9]  Yes      Resolved Hospital  Problems   No resolved problems to display.        Brief Hospital Course to date:  Janet Pisano is a 77 y.o. female with PMH of DM II, afib (questionable PMH of PAF), HFpEF, COPD, Chronic Respiratory Failure, CAD hx of MI, HTN, hypothyroidism, who was transferred from [facility] with acute infarcts thought to be cardioembolic from Afib RVR.     Acute bilateral hemisphere infarcts  -Etiology likely cardioembolic in setting of new onset atrial fibrillation without prior anticoagulation   -Continue ASA, Eliquis, statin  -PT/OT  -Keofeed out 7/22, Palliative follows  -Echo 7/15 EF 55%, negative bubble study    AMS/Metabolic Encephalopathy  -EEG obtained 7/19.  No epileptic activity noted.  - more recently hypercarbic -- mentation improved with bipap  -Continue Seroquel 25mg nightly   -lower neurontin dose to 200 mg q12h in setting of worsening renal function    ZION  --Cr 1.59, improved from 1.69  - would prefer patient increase PO fluid intake rather than giving IV fluids  - asked Nutrition to assess PO intake    Acute on chronic diastolic CHF  HFpEF  - proBNP 14,192  - as the patient is in respiratory failure, diuresing intermittently with Lasix and Albumin,  monitor renal function closely  -- net negative 588 for 24 hours    Dysphagia  -SLP following  -Keofeed out 7/22   -consider resuming enteral feeding until more awake.     Acute on Chronic Hypercarbic and Hypoxic Respiratory Failure  COPD - on 3L NC chronically at home  Pulmonary HTN  SANDIE  - BiPAP, acidosis and hypercarbia improving slowly  - solumedrol, empiric antibiotics  - diurese as tolerated  - known dysphagia  - continue scheduled duonebs and budesonide  - CXR and ABG am    Aspiration pneumonia  - s/p merrem/zosyn     Afib RVR   CAD hx of MI  - eliquis lifelong  - amiodarone      HTN  HLD    Right LE Cellulitis ->resolved    DM II w/A1c 9.3   - Lower dose of Levemir from 5 units BID to 5 units daily until reliably eating -- however, may need to  "monitor for steroid induced hyperglycemia.     Hypernatremia  -worse again today, likely due to diuresis.  Hold diuresis today    UTI  -Cx 7/20 with yeast: S/P 3 days of IV Diflucan    Cutaneous candidiasis   -Continue Nystatin powder    R shoulder pain, bruising  -XR with no obvious fracture    Leukocytosis  -- resolved  - continued aspiration? Afebrile, no infiltrate on CXR    Chronic pain  - lowered gabapentin dose from 200 mg BID given renal insufficiency     Goals of Care:    Multiple medical issues persist, including hypercarbic respiratory failure, ZION and diastolic heart failure.  The patient's son Aníbal states that his mother had pretty much \"given up\" prior to this hospitalization and may no longer have the will to continue fighting to get better.  Agreeable to continued medical interventions, but understands his mother's prognosis is quite guarded.  Palliative care follows.    Expected Discharge Location and Transportation: Long Term Care    Expected Discharge Date: 8/5    DVT prophylaxis:  Medical and mechanical DVT prophylaxis orders are present.     AM-PAC 6 Clicks Score (PT): 9 (07/31/22 2000)    CODE STATUS:   Code Status and Medical Interventions:   Ordered at: 07/28/22 1208     Medical Intervention Limits:    NO intubation (DNI)    NO artificial nutrition    NO dialysis     Level Of Support Discussed With:    Health Care Surrogate     Code Status (Patient has no pulse and is not breathing):    No CPR (Do Not Attempt to Resuscitate)     Medical Interventions (Patient has pulse or is breathing):    Limited Support     Comments:    No PEG, but Keofeed ok;  Ok to cardiovert for rhythm change but not to restart heart       Shilpa Mahoney MD  08/01/22              "

## 2022-08-01 NOTE — DISCHARGE PLACEMENT REQUEST
"Rashad Pisano (77 y.o. Female)             Date of Birth   1945    Social Security Number       Address   [address] [id number]    Home Phone   [phone]    MRN   [id number]       Jehovah's witness   Jew    Marital Status                               Admission Date   7/16/22    Admission Type   Urgent    Admitting Provider   Shilpa Mahoney MD    Attending Provider   Shilpa Mahoney MD    Department, Room/Bed   [facility] 3E, S331/1       Discharge Date       Discharge Disposition       Discharge Destination                               Attending Provider: Shilpa Mahoney MD    Allergies: Aliskiren, Amlodipine, Crestor [Rosuvastatin Calcium], Lisinopril, Metformin And Related, Sitagliptin, Statins, Valsartan, Penicillins    Isolation: None   Infection: None   Code Status: No CPR   Advance Care Planning Activity    Ht: 154.9 cm (60.98\")   Wt: 99.8 kg (220 lb 0.3 oz)    Admission Cmt: None   Principal Problem: None                Active Insurance as of 7/16/2022     Primary Coverage     Payor Plan Insurance Group Employer/Plan Group    HUMANA MEDICARE REPLACEMENT HUMANA MEDICARE REPLACEMENT [id number]     Payor Plan Address Payor Plan Phone Number Payor Plan Fax Number Effective Dates    [address] [address] [phone]  1/1/2018 - None Entered    [facility] [address]       Subscriber Name Subscriber Birth Date Member ID       RASHAD PISANO 1945 [id number]                 Emergency Contacts      (Rel.) Home Phone Work Phone Mobile Phone    SiennaAníbal hernandze (Power of ) [phone] -- --    Larry Slaughter (Grandchild) -- -- [phone]            Emergency Contact Information     Name Relation Home Work Mobile    Aníbal Pisano Power of  [phone]      Larry Slaughter Grandchild   [phone]          Insurance Information                HUMANA MEDICARE REPLACEMENT/HUMANA MEDICARE REPLACEMENT Phone: " [phone]    Subscriber: Janet Pisano Subscriber#: [id number]    Group#: [id number] Precert#: [id number]             History & Physical      Conchita Crocker DO at 22 0751              [facility] Medicine Services  HISTORY AND PHYSICAL    Patient Name: Janet Pisano  : 1945  MRN: [id number]  Primary Care Physician: Zee Matos MD  Date of admission: 2022      Subjective   Subjective     Chief Complaint:  Multiple CVA's     HPI:  Janet Pisano is a 76 y.o. female with PMH of DM II, afib (questionable PMH of PAF), HFpEF, COPD, Chronic Respiratory Failure, CAD hx of MI, HTN, hypothyroidism, who was transferred from [facility] with acute infarcts thought to be cardio embolic from Afib RVR. She was admitted to [place] on  for acute on chronic respiratory failure. She developed afib RVR and was encecephalopathic on . CT and MRI showed multiple infarcts. She was transferred to [facility] for further care. She was started on ASA and eliquis prior to transfer.   Currently patient is oriented to self and occasionally situation. She says that she is very tired and no longer wants any tests. She denies any pain and states that she is sleepy. NIH was 3 upon arrival. Stroke team has evaluated and imaging from [place] is being uploaded.   She will be admitted to the hospitalist service for further treatment and evaluation.       Review of Systems   Difficult to obtain, complains of fatigue   All other systems reviewed and are negative.     Personal History     Past Medical History:   Diagnosis Date   • Arthritis    • Bilateral carpal tunnel syndrome 2017   • Bradycardia 2016   • COPD exacerbation (CMS/HCC)    • Coronary artery disease 2016   • Depression    • Diabetes mellitus (CMS/HCC)    • Diverticulosis 2016   • Dyslipidemia 2016   • H/O esophageal ulcer    • History of myocardial infarction 2016    Questionable history of  myocardial infarction: Remote cardiac catheterization at [facility] in [place], incomplete database.  Reported stress test 2-3 years ago, negative per patient report, incomplete database.    • History of rheumatic fever 9/7/2016   • Hypertension    • Hypothyroidism    • Migraine 8/22/2016   • Rheumatic fever    • Ventral hernia 9/7/2016   • Vitamin D deficiency 6/20/2016             Past Surgical History:   Procedure Laterality Date   • CARDIAC CATHETERIZATION     • CARPAL TUNNEL RELEASE     • CATARACT EXTRACTION, BILATERAL     • COLONOSCOPY     • ESOPHAGUS SURGERY      hole repair   • HERNIA REPAIR      ventral   • TUBAL ABDOMINAL LIGATION  1982       Family History:  family history includes Alcohol abuse in her father; Cancer in her mother; Coronary artery disease in her father; Diabetes in her mother; Liver disease in her mother; Obesity in her mother. Otherwise pertinent FHx was reviewed and unremarkable.     Social History:  reports that she quit smoking about 6 years ago. Her smoking use included cigarettes. She started smoking about 63 years ago. She smoked 1.00 pack per day. She has never used smokeless tobacco. She reports that she does not drink alcohol and does not use drugs.  Social History     Social History Narrative   • Not on file       Medications:  Available home medication information reviewed.  Medications Prior to Admission   Medication Sig Dispense Refill Last Dose   • aspirin 81 MG EC tablet Take 81 mg by mouth Daily.      • baclofen (LIORESAL) 10 MG tablet Take 10 mg by mouth 3 (Three) Times a Day As Needed for Muscle Spasms.      • Cholecalciferol (VITAMIN D-3) 1000 UNITS capsule Take 2,000 Units by mouth daily.      • furosemide (LASIX) 80 MG tablet Take 1.5 tablets by mouth Daily for 30 days. Pt takes 1 and 1/2 tab daily 45 tablet 0    • gabapentin (NEURONTIN) 300 MG capsule Take 1 capsule by mouth Every 12 (Twelve) Hours for 30 days. 60 capsule 0    • gabapentin  (NEURONTIN) 600 MG tablet Take 600 mg by mouth 3 (Three) Times a Day.      • Garlic 1000 MG capsule Take 2,000 Units by mouth Daily.      • LEVEMIR FLEXTOUCH 100 UNIT/ML injection INJECT 25 UNITS SUBCUTANEOUSLY EVERY 12 HOURS (Patient taking differently: Inject 70 Units under the skin into the appropriate area as directed. 1000 and 2200) 15 mL 0    • levothyroxine (Synthroid) 112 MCG tablet Take 1 tablet by mouth Daily for 30 days. 30 tablet 0    • nebivolol (BYSTOLIC) 2.5 MG tablet Take 1 tablet by mouth Daily for 30 days. 30 tablet 0    • NOVOLOG FLEXPEN 100 UNIT/ML solution pen-injector sc pen Patient has not filled in over a year per pharmacy records  0    • potassium chloride (K-DUR,KLOR-CON) 20 MEQ CR tablet Take 1 tablet by mouth Daily for 30 days. 30 tablet 0    • pravastatin (PRAVACHOL) 40 MG tablet TAKE 1 TABLET BY MOUTH EVERY DAY (Patient taking differently: Take 40 mg by mouth Every Night.) 90 tablet 3    • traMADol (ULTRAM) 50 MG tablet Take 50 mg by mouth Every 8 (Eight) Hours As Needed for Moderate Pain .      • VENTOLIN  (90 Base) MCG/ACT inhaler INHALE 1 TO 2 PUFFS BY MOUTH EVERY 4 TO 6 HOURS AS NEEDED FOR WHEEZING OR COUGH 18 g 0        Allergies   Allergen Reactions   • Aliskiren    • Amlodipine    • Crestor [Rosuvastatin Calcium]    • Lisinopril    • Metformin And Related    • Penicillins    • Sitagliptin    • Statins Confusion   • Valsartan Unknown (See Comments)     Tolerates Losartan 8/20/18       Objective   Objective     Vital Signs:   Temp:  [97.8 °F (36.6 °C)-98.3 °F (36.8 °C)] 98.3 °F (36.8 °C)  Heart Rate:  [] 101  Resp:  [20] 20  BP: (137-148)/(66-91) 137/66  Flow (L/min):  [5] 5  Total (NIH Stroke Scale): 3    Physical Exam   Constitutional: Awake, chronically ill female resting in bed  Eyes: PERRLA, sclerae anicteric, no conjunctival injection  HENT: NCAT, mucous membranes moist  Neck: Supple, no thyromegaly, no lymphadenopathy, trachea midline  Respiratory: Clear to  auscultation bilaterally, nonlabored respirations on 4L NC  Cardiovascular: IRR, no murmurs, rubs, or gallops  Gastrointestinal: Positive bowel sounds, soft, nontender, nondistended  Musculoskeletal: No bilateral ankle edema, no clubbing or cyanosis to extremities  Psychiatric:flat  affect,   Neurologic: Oriented to self and occasionally situation, strength symmetric in all extremities,  speech clear  Skin: No rashes      Result Review:  I have personally reviewed the results from the time of this admission to 7/16/2022 07:51 EDT and agree with these findings:  [x]  Laboratory list / accordion  []  Microbiology  []  Radiology  []  EKG/Telemetry   []  Cardiology/Vascular   []  Pathology  []  Old records  []  Other:  Most notable findings include:       LAB RESULTS:                                  Microbiology Results (last 10 days)     ** No results found for the last 240 hours. **          MRI Outside Films    Result Date: 7/16/2022  This procedure was auto-finalized with no dictation required.    XR Outside Films    Result Date: 7/16/2022  This procedure was auto-finalized with no dictation required.    CT Outside Films    Result Date: 7/16/2022  This procedure was auto-finalized with no dictation required.      Results for orders placed during the hospital encounter of 06/14/22    Adult Transthoracic Echo Complete w/ Color, Spectral and Contrast if necessary per protocol    Interpretation Summary  · Estimated left ventricular EF = 60%  · Left ventricular wall thickness is consistent with moderate concentric hypertrophy.  · Mild mitral valve regurgitation is present  · Estimated right ventricular systolic pressure from tricuspid regurgitation is 42 mmHg.      Assessment & Plan   Assessment & Plan     Active Hospital Problems    Diagnosis  POA   • Stroke (HCC) [I63.9]  Yes       Acute CVA  -stroke team following, possibly cardioembolic  -[place] MRI, CT in chart   -CTA pending   -PT/OT/SLP  -ASA and eliquis      Dysphagia  -SLP following, aspirating  -keofeed to be placed  -nutrition consult       Acute on Chronic Respiratory Failure  COPD  Pulmonary HTN  SANDIE-non compliant with CPAP  -albuterol prn  -currently on 4L  -had recent admission here in June for COPD exacerbation     HFpEF  PAF?  CAD hx of MI  HTN  HLD  -echo pending   -ASA  -on pravastatin at home but has statin allergy     DM II  -low dose SSI, currently NPO   -a1c pending    Hypernatremia  -Na 148 1/2 NS @ 75  -bmp in am       DVT prophylaxis:  eliquis       CODE STATUS:    There are no questions and answers to display.         Conchita Crocker DO  07/16/22      Electronically signed by Conchita Crocker DO at 07/16/22 5173

## 2022-08-01 NOTE — PLAN OF CARE
Goal Outcome Evaluation:  Plan of Care Reviewed With: patient        Progress: no change  Outcome Evaluation: Pt. remains in BUE wrist restraints due to pulling lines, oxygen devices.  Pt. on HFNC.  Asleep at time of palliative nursing visit.  Aside from mild increase in WOB at rest, pt. did not demonstrate any visible signs of discomfort during visit.  Did not attempt to wake patient.  Dr. Motta saw pt. today and called son to discuss GOC.  Son agreeable to hospice consult.  Palliative care to follow for support, POC and ongoing GOC.      1330 Palliative IDT meeting: NELLIE Guaman RN, CHPN; SAMUEL Harper, SANKET, CHPN; STACY Garay, APRN; COCO Motta, ; LIDYA [place], [facility] [place]; MITCH Díaz, APRN; ALFIE Chavarria, RN, CHPN

## 2022-08-01 NOTE — SIGNIFICANT NOTE
08/01/22 1015   SLP Deferred Reason   SLP Deferred Reason Routine  (Patient sleeping soundly and on BiPap this am. U/A to participate with dysphagia or slc tx.)

## 2022-08-01 NOTE — PROGRESS NOTES
Continued Stay Note  [facility]     Patient Name: Janet Pisano  MRN: [id number]  Today's Date: 8/1/2022    Admit Date: 8/1/2022     Discharge Plan     Row Name 08/01/22 1806       Plan    Plan Inpatient hospice    Plan Comments Admitted to inpatient hospice this day.  Patient meets inpatient criteria due to requirements of injectable medications for palliation of symptoms requiring frequent skilled nursing assessments and interventions.  Her current level of care is unable to be provided in an alternate setting.    Final Discharge Disposition Code 51 - hospice medical facility               Discharge Codes    No documentation.                     Yamilet Hernandez RN

## 2022-08-01 NOTE — DISCHARGE SUMMARY
[facility] Medicine Services  DISCHARGE SUMMARY    Patient Name: Janet Pisano  : 1945  MRN: [id number]    Date of Admission: 22  Date of Discharge:  22  Primary Care Physician: Zee Matos MD    Consults     Date and Time Order Name Status Description    2022 11:19 AM Inpatient Palliative Care MD Consult Completed     2022  8:48 AM Inpatient Cardiology Consult Completed     2022  5:46 AM Inpatient Neurology Consult Stroke Completed           Hospital Course     Presenting Problem:   [facility]    There are no hospital problems to display for this patient.         Hospital Course:  Janet Pisano is a 77 y.o. female  with PMH of DM II, afib (questionable PMH of PAF), HFpEF, COPD, Chronic Respiratory Failure, CAD hx of MI, HTN, hypothyroidism, who was transferred from [facility] with acute infarcts thought to be cardioembolic from Afib RVR. Since then, she has developed worsening metabolic encephalopathy due to hypercapnia but not much improved despite bipap.      Acute bilateral hemisphere infarcts  -Etiology likely cardioembolic in setting of new onset atrial fibrillation without prior anticoagulation   -Continue ASA, Eliquis, statin  -Keofeed out , Palliative follows  -Echo 7/15 EF 55%, negative bubble study     AMS/Metabolic Encephalopathy  -EEG obtained .  No epileptic activity noted.  - more recently hypercarbic -- mentation improved with bipap  -Continue Seroquel 25mg nightly   -lower neurontin dose to 200 mg q12h in setting of worsening renal function     ZION  --Cr 1.59, improved from 1.69  - would prefer patient increase PO fluid intake rather than giving IV fluids     Acute on chronic diastolic CHF  HFpEF  - proBNP 14,192  - as the patient is in respiratory failure, diuresing intermittently with Lasix and Albumin,  monitor renal function closely  -- net negative 588 for 24 hours     Dysphagia  -SLP  "following  -Keofeed out 7/22   -consider resuming enteral feeding until more awake.     Acute on Chronic Hypercarbic and Hypoxic Respiratory Failure  COPD - on 3L NC chronically at home  Pulmonary HTN  SANDIE  - BiPAP, acidosis and hypercarbia improving slowly  - no improvement despite solumedrol, empiric antibiotics  - known dysphagia  - continue scheduled duonebs and budesonide  - CXR and ABG am     Aspiration pneumonia  - s/p merrem/zosyn      Afib RVR   CAD hx of MI  - eliquis lifelong  - amiodarone        HTN  HLD     Right LE Cellulitis ->resolved     DM II w/A1c 9.3   - Lower dose of Levemir from 5 units BID to 5 units daily until reliably eating -- however, may need to monitor for steroid induced hyperglycemia.     Hypernatremia  -worse again today, likely due to diuresis.  Hold diuresis today     UTI  -Cx 7/20 with yeast: S/P 3 days of IV Diflucan     Cutaneous candidiasis   -Continue Nystatin powder     R shoulder pain, bruising  -XR with no obvious fracture     Leukocytosis  -- resolved  - continued aspiration? Afebrile, no infiltrate on CXR     Chronic pain  - lowered gabapentin dose from 200 mg BID given renal insufficiency      Goals of Care:    Multiple medical issues persist, including hypercarbic respiratory failure, ZION and diastolic heart failure.  The patient's son Aníbal states that his mother had pretty much \"given up\" prior to this hospitalization and may no longer have the will to continue fighting to get better.  Agreeable to continued medical interventions, but understands his mother's prognosis is poor. Ultimately, family decided to transition to comfort care and pt was transferred to inpatient hospice.       Discharge Follow Up Recommendations for outpatient labs/diagnostics:   None    Day of Discharge     HPI:   Pt remains on Bipap and still in restraints. No new issues overnight per nursing.     Review of Systems  Resp- + dyspnea  GI- No N/V    Vital Signs:   Temp:  [98.1 °F (36.7 °C)-98.8 " °F (37.1 °C)] 98.2 °F (36.8 °C)  Heart Rate:  [] 65  Resp:  [16-26] 18  BP: (142-187)/() 142/83  Flow (L/min):  [2-50] 2      Physical Exam:  Constitutional - appears fatigued, in bed, wrist restraints in place, on bipap  HEENT-NCAT, mucous membranes dry  CV-RRR  Resp-diminished bilaterally  Abd-soft, nontender, nondistended, normoactive bowel sounds, obese  Ext-no LE edema  Neuro-awake, speech clear although answers brief, follows commands ( hands, wiggles toes), answers questions appropriately  Psych-flat affect   Skin- No rash on exposed UE or LE bilaterally       Pertinent  and/or Most Recent Results     LAB RESULTS:      Lab 08/01/22  0827 07/31/22  1430 07/30/22  1601 07/28/22  1218 07/28/22  0631   WBC 8.33 10.28 10.05 13.61* 12.38*   HEMOGLOBIN 10.3* 10.4* 11.4* 10.2* 10.1*   HEMATOCRIT 33.3* 34.8 39.1 33.5* 32.7*   PLATELETS 167 144 141 146 134*   NEUTROS ABS 7.42* 9.84* 8.91* 12.09*  --    IMMATURE GRANS (ABS) 0.03 0.03 0.05 0.06*  --    LYMPHS ABS 0.48* 0.24* 0.38* 0.57*  --    MONOS ABS 0.38 0.11 0.55 0.62  --    EOS ABS 0.01 0.04 0.12 0.23  --    MCV 96.5 100.0* 102.4* 98.2* 95.9   PROCALCITONIN  --   --  0.11  --  0.16         Lab 08/01/22  0827 07/31/22  2355 07/31/22  1430 07/30/22  1601 07/29/22  0527 07/28/22  0631   SODIUM 150* 144 144 142 138 136   POTASSIUM 4.4 4.9 4.8 4.4 3.3* 4.4   CHLORIDE 102 99 99 96* 94* 94*   CO2 36.0* 37.0* 37.0* 39.0* 37.0* 32.0*   ANION GAP 12.0 8.0 8.0 7.0 7.0 10.0   BUN 49* 47* 46* 46* 43* 41*   CREATININE 1.59* 1.44* 1.42* 1.69* 1.56* 1.34*   EGFR 33.1*  --  37.9* 30.8* 33.9* 40.7*   GLUCOSE 212* 221* 224* 203* 124* 196*   CALCIUM 9.5 9.3 9.2 9.2 8.5* 8.4*   MAGNESIUM  --  2.1  --   --  2.4 2.2   PHOSPHORUS  --  2.5  --   --   --   --          Lab 08/01/22  0827 07/31/22  2355 07/30/22  1601 07/29/22  0527   TOTAL PROTEIN 6.6 6.5 7.0 6.2   ALBUMIN 3.90 4.10 3.60 3.00*   GLOBULIN 2.7  --  3.4 3.2   ALT (SGPT) 14 15 21 5   AST (SGOT) 9 10 14 22    BILIRUBIN 1.0 1.1 0.9 0.8   ALK PHOS 95 95 113 95         Lab 07/28/22  0631   PROBNP 14,192.0*         Lab 07/31/22  2355   CHOLESTEROL 114   TRIGLYCERIDES 82             Lab 08/01/22  0341 07/31/22  0401 07/30/22  1729   PH, ARTERIAL 7.392 7.295* 7.253*   PCO2, ARTERIAL 66.1* 83.5* 88.0*   PO2 ART 76.2* 55.7* 73.7*   FIO2 50 55 60   HCO3 ART 40.1* 40.5* 38.9*   BASE EXCESS ART 13.1* 11.4* 9.0*   CARBOXYHEMOGLOBIN 1.1 1.3 1.0     Brief Urine Lab Results  (Last result in the past 365 days)      Color   Clarity   Blood   Leuk Est   Nitrite   Protein   CREAT   Urine HCG        07/30/22 0352 Yellow   Cloudy   Negative   Small (1+)   Negative   30 mg/dL (1+)               Microbiology Results (last 10 days)     Procedure Component Value - Date/Time    COVID PRE-OP / PRE-PROCEDURE SCREENING ORDER (NO ISOLATION) - Swab, Nasopharynx [[id number]]  (Normal) Collected: 07/26/22 1515    Lab Status: Final result Specimen: Swab from Nasopharynx Updated: 07/26/22 1540    Narrative:      The following orders were created for panel order COVID PRE-OP / PRE-PROCEDURE SCREENING ORDER (NO ISOLATION) - Swab, Nasopharynx.  Procedure                               Abnormality         Status                     ---------                               -----------         ------                     COVID-19, ABBOTT IN-HOUS...[[id number]]  Normal              Final result                 Please view results for these tests on the individual orders.    COVID-19, ABBOTT IN-HOUSE,NASAL Swab (NO TRANSPORT MEDIA) 2 HR TAT - Swab, Nasopharynx [[id number]]  (Normal) Collected: 07/26/22 1515    Lab Status: Final result Specimen: Swab from Nasopharynx Updated: 07/26/22 1540     COVID19 Presumptive Negative    Narrative:      Fact sheet for providers: https://www.fda.gov/media/586550/download     Fact sheet for patients: https://www.fda.gov/media/138299/download    Test performed by PCR.  If inconsistent with clinical signs and symptoms patient should  be tested with different authorized molecular test.    Urine Culture - Urine, Urine, Clean Catch [[id number]]  (Abnormal) Collected: 07/24/22 1624    Lab Status: Final result Specimen: Urine, Clean Catch Updated: 07/25/22 1742     Urine Culture Yeast isolated    Narrative:      No additional tests pending.  Colonization of the urinary tract without infection is common. Treatment is discouraged unless the patient is symptomatic, pregnant, or undergoing an invasive urologic procedure.          XR Shoulder 2+ View Right    Result Date: 7/25/2022  EXAMINATION: XR SHOULDER 2+ VW RIGHT-  DATE OF EXAM: 7/25/2022 6:32 PM  INDICATION: pain, bruising; R13.12-Dysphagia, oropharyngeal phase; R41.841-Cognitive communication deficit.  Pain, bruising.  COMPARISON: Chest radiograph dated 07/20/2022  TECHNIQUE: Two or more views of the right shoulder were obtained.  FINDINGS:  There are multiple linear artifacts overlying the right shoulder which limits assessment. There is no evidence of definitive fracture. There is no evidence of dislocation. The glenohumeral and acromioclavicular joints appear normal alignment. There is mild acromioclavicular joint space narrowing and osteophyte formation. Visualized portions the right chest wall appear unremarkable.      1. No evidence of definitive fracture. Multiple linear artifacts are present secondary to overlying materials. The scapula appears normal on the chest radiograph from 07/20/2022. If there has been interval fall or trauma since then, repeat radiographs of the right shoulder could be considered after removal of all external materials for better evaluation of the scapula or CT right shoulder if high clinical suspicion for fracture. 2. No evidence of glenohumeral or acromioclavicular dislocation.  This report was finalized on 7/25/2022 8:30 PM by Michel Castillo MD.      CT Head Without Contrast    Result Date: 7/28/2022  DATE OF EXAM: 7/28/2022 10:51 AM  PROCEDURE: CT HEAD WO  CONTRAST-  INDICATIONS: Mental status change, persistent or worsening; R13.12-Dysphagia, oropharyngeal phase; R41.841-Cognitive communication deficit  COMPARISON: 7/18/2022  TECHNIQUE: Routine transaxial and coronal reconstruction images were obtained through the head without the administration of contrast. Automated exposure control and iterative reconstruction methods were used.  The radiation dose reduction device was turned on for each scan per the ALARA (As Low as Reasonably Achievable) protocol.  FINDINGS: There is generalized enlargement of the ventricles and CSF containing spaces. There is decreased attenuation in the occipital lobes, right greater than left and in the parietal regions, right greater than left. There is no evidence of significant mass effect. There is effacement of cortical sulci in the right occipital region suggesting an area of subacute infarction. Compared with the previous head CT, there is less mass effect and less decreased attenuation in the left posterior temporal and occipital region than on the prior study. The area of infarction identified in the right thalamus is no longer clearly identified. There is a linear area of increased density identified at the area of prior infarction which may represent minimal hemorrhage.       1. Evolving areas of infarction in the right parieto-occipital and left parieto-occipital regions. The left posterior temporal occipital infarction appears significantly smaller on this study than it did previously. The area of infarction in the right parieto-occipital region also appears slightly smaller. 2. The thalamic infarction identified on the right is no longer present on the current study. There is a linear area of increased density at the area of the infarction which may represent minimal hemorrhage. Consider MRI for further characterization.  This report was finalized on 7/28/2022 11:13 AM by Walker Suarez MD.      XR Chest 1 View    Result Date:  8/1/2022  DATE OF EXAM: 8/1/2022 5:24 AM  PROCEDURE: XR CHEST 1 VW-  INDICATIONS: hypercarbic respiratory failure, dysphagia, heart failure; R13.12-Dysphagia, oropharyngeal phase; R41.841-Cognitive communication deficit  COMPARISON: 07/31/2022  TECHNIQUE: Single radiographic AP view of the chest was obtained.  FINDINGS: The heart is enlarged. There is haziness in the right lower chest which is a change from the prior exam. Whether this reflects some sort a confluence of shadows or consolidation in the right middle lobe area is uncertain. There some prominence of interstitial markings within the lungs. There are no large pleural effusions.      1.  Haziness within the right lower chest that might relate to confluence of shadows or possibly airspace disease in the right middle lobe. 2.  Interstitial changes are suggested involving the lungs which have been noted. This may be reflective of some interstitial edema. An inflammatory infectious process or some chronic interstitial change could also account for the appearance. 3.  Cardiomegaly  This report was finalized on 8/1/2022 7:59 AM by Narendra Urbano MD.      XR Chest 1 View    Result Date: 7/31/2022  DATE OF EXAM: 7/31/2022 12:35 AM  PROCEDURE: XR CHEST 1 VW-  INDICATIONS: hypoxia; R13.12-Dysphagia, oropharyngeal phase; R41.841-Cognitive communication deficit  COMPARISON: July 30, 2022  TECHNIQUE: Single radiographic AP view of the chest was obtained.  FINDINGS: The heart is enlarged. There is density in left basilar that may relate to atelectasis or effusion. There is less prominence of interstitial markings that could reflect resolving edema. The heart looks enlarged.      1.  Less prominence of interstitial markings. 2.  Cardiomegaly 3.  Density left basilar area that in part may relate to shadow from heart and cardiophrenic fat. Some atelectasis or underlying effusion not excluded.  This report was finalized on 7/31/2022 7:10 AM by Narendra Urbano MD.      XR Chest 1  View    Result Date: 7/30/2022  EXAM:  XR CHEST 1 VW   DATE: 7/30/2022 3:07 AM HISTORY: Dyspnea COMPARISON:  7/20/2022. FINDINGS:  Vascular congestion. Perihilar opacities. Heart size is mildly enlarged. Question small left effusion No acute bony findings.     1.  Vascular congestion, mildly enlarged heart. 2.  Perihilar opacities, also considered airways disease (bronchitis, asthma) 3.  Question small left effusion Electronically signed by:  Elida Schwab M.D.  7/30/2022 1:45 AM Mountain Time    XR Chest 1 View    Result Date: 7/28/2022  DATE OF EXAM: 7/28/2022 9:55 AM  PROCEDURE: XR CHEST 1 VW-  INDICATIONS: decreased sat; R13.12-Dysphagia, oropharyngeal phase; R41.841-Cognitive communication deficit  COMPARISON: 6/14/2022  TECHNIQUE: Single radiographic AP view of the chest was obtained.  FINDINGS: The heart size is enlarged. There is mild prominence of the no focal consolidation is seen. No pleural fluid is identified.      Cardiomegaly with mild passive congestion. The chest is otherwise clear.  This report was finalized on 7/28/2022 10:13 AM by Walker Suarez MD.      Adult Transthoracic Echo Limited W/ Cont if Necessary Per Protocol    Result Date: 7/27/2022  · Estimated left ventricular EF = 55% · Saline test results are negative.                Results for orders placed during the hospital encounter of 07/16/22    Adult Transthoracic Echo Limited W/ Cont if Necessary Per Protocol    Interpretation Summary  · Estimated left ventricular EF = 55%  · Saline test results are negative.      Plan for Follow-up of Pending Labs/Results:   Discharge Details        Discharge Medications      ASK your doctor about these medications      Instructions Start Date   aspirin 81 MG EC tablet   81 mg, Oral, Daily      baclofen 10 MG tablet  Commonly known as: LIORESAL   10 mg, Oral, 3 Times Daily PRN      furosemide 80 MG tablet  Commonly known as: LASIX   120 mg, Oral, Daily, Pt takes 1 and 1/2 tab daily      gabapentin 600  MG tablet  Commonly known as: NEURONTIN   600 mg, Oral, 3 Times Daily      gabapentin 300 MG capsule  Commonly known as: NEURONTIN   300 mg, Oral, Every 12 Hours      Garlic 1000 MG capsule   2,000 Units, Oral, Daily      Levemir FlexTouch 100 UNIT/ML injection  Generic drug: insulin detemir   INJECT 25 UNITS SUBCUTANEOUSLY EVERY 12 HOURS      levothyroxine 112 MCG tablet  Commonly known as: Synthroid   112 mcg, Oral, Daily      nebivolol 2.5 MG tablet  Commonly known as: BYSTOLIC   2.5 mg, Oral, Every 24 Hours Scheduled      NovoLOG FlexPen 100 UNIT/ML solution pen-injector sc pen  Generic drug: insulin aspart   Patient has not filled in over a year per pharmacy records      pravastatin 40 MG tablet  Commonly known as: PRAVACHOL   TAKE 1 TABLET BY MOUTH EVERY DAY      traMADol 50 MG tablet  Commonly known as: ULTRAM   50 mg, Oral, Every 8 Hours PRN      Ventolin  (90 Base) MCG/ACT inhaler  Generic drug: albuterol sulfate HFA   INHALE 1 TO 2 PUFFS BY MOUTH EVERY 4 TO 6 HOURS AS NEEDED FOR WHEEZING OR COUGH      Vitamin D-3 25 MCG (1000 UT) capsule   2,000 Units, Oral, Daily             Allergies   Allergen Reactions   • Aliskiren    • Amlodipine    • Crestor [Rosuvastatin Calcium]    • Lisinopril    • Metformin And Related    • Sitagliptin    • Statins Confusion   • Valsartan Unknown (See Comments)     Tolerates Losartan 8/20/18   • Penicillins Unknown - Low Severity     Pt has tolerated zosyn, ceftriaxone, meropenem         Discharge Disposition:      Diet:  Hospital:  Diet Order   Procedures   • Diet Dysphagia; III - Pureed With Some Mashed; Honey Thick; No Straws; Cardiac, Consistent Carbohydrate       Activity:      Restrictions or Other Recommendations:         CODE STATUS:    Code Status and Medical Interventions:   Ordered at: 08/01/22 1622     Code Status (Patient has no pulse and is not breathing):    No CPR (Do Not Attempt to Resuscitate)     Medical Interventions (Patient has pulse or is  breathing):    Comfort Measures       No future appointments.              Shilpa Mahoney MD  08/01/22      Time Spent on Discharge:  I spent  35  minutes on this discharge activity which included: face-to-face encounter with the patient, reviewing the data in the system, coordination of the care with the nursing staff as well as consultants, documentation, and entering orders.

## 2022-08-01 NOTE — THERAPY TREATMENT NOTE
Patient Name: Janet Pisano  : 1945    MRN: [id number]                              Today's Date: 2022       Admit Date: 2022    Visit Dx:     ICD-10-CM ICD-9-CM   1. Oropharyngeal dysphagia  R13.12 787.22   2. Cognitive communication deficit  R41.841 799.52     Patient Active Problem List   Diagnosis   • Anxiety   • Hypertension and diastolic dysfunction   • Type 2 diabetes mellitus without complication, without long-term current use of insulin ([facility])   • Gastroesophageal reflux disease   • Hypothyroidism   • Insomnia   • Left carotid artery stenosis   • History of myocardial infarction   • Dyslipidemia   • History of rheumatic fever   • Coronary artery disease   • Iron deficiency anemia   • Morbid obesity with BMI of 40.0-44.9, adult ([facility])   • Acute on chronic diastolic congestive heart failure ([facility])   • Obesity hypoventilation syndrome ([facility])   • Pulmonary hypertension. Calculated RVSP 64 mmHg by echocardiogram 18   • Acute on chronic respiratory failure ([facility])   • Type 2 diabetes mellitus ([facility])   • COPD with acute exacerbation ([facility])   • Leukocytosis   • ZION (acute kidney injury) ([facility])   • COPD exacerbation ([facility])   • Stroke ([facility])   • Atrial fibrillation with RVR ([facility])   • Dysphagia     Past Medical History:   Diagnosis Date   • Arthritis    • Bilateral carpal tunnel syndrome 2017   • Bradycardia 2016   • COPD exacerbation ([facility])    • Coronary artery disease 2016   • Depression    • Diabetes mellitus ([facility])    • Diverticulosis 2016   • Dyslipidemia 2016   • H/O esophageal ulcer    • History of myocardial infarction 2016    Questionable history of myocardial infarction: Remote cardiac catheterization at [facility] in [place], incomplete database.  Reported stress test 2-3 years ago, negative per patient report, incomplete database.    • History of rheumatic fever 2016   • Hypertension    • Hypothyroidism    • Migraine 2016   • Rheumatic fever     • Ventral hernia 9/7/2016   • Vitamin D deficiency 6/20/2016     Past Surgical History:   Procedure Laterality Date   • CARDIAC CATHETERIZATION     • CARPAL TUNNEL RELEASE     • CATARACT EXTRACTION, BILATERAL     • COLONOSCOPY     • ESOPHAGUS SURGERY      hole repair   • HERNIA REPAIR      ventral   • TUBAL ABDOMINAL LIGATION  1982      General Information     Row Name 08/01/22 1022          Physical Therapy Time and Intention    Document Type therapy note (daily note)  -CD     Mode of Treatment physical therapy  -CD     Row Name 08/01/22 1022          General Information    Patient Profile Reviewed yes  -CD     Existing Precautions/Restrictions fall;oxygen therapy device and L/min  -CD     Barriers to Rehab medically complex;cognitive status  CURRENTLY REQUIRING BIPAP.  -CD     Row Name 08/01/22 1022          Living Environment    People in Home alone  -CD     Row Name 08/01/22 1022          Cognition    Orientation Status (Cognition) unable/difficult to assess  -CD     Row Name 08/01/22 1022          Safety Issues, Functional Mobility    Safety Issues Affecting Function (Mobility) safety precaution awareness;insight into deficits/self-awareness;sequencing abilities  -CD     Impairments Affecting Function (Mobility) balance;cognition;endurance/activity tolerance;shortness of breath;postural/trunk control;motor planning;visual/perceptual  -CD     Cognitive Impairments, Mobility Safety/Performance insight into deficits/self-awareness;safety precaution follow-through;sequencing abilities;problem-solving/reasoning;safety precaution awareness;awareness, need for assistance  -CD     Comment, Safety Issues/Impairments (Mobility) PT CURRENTLY ON BIPAP, IN B WRIST RESTRAINTS AND HAS BEEN SEDATED. COOPERATIVE WITH ROM OF B UE/LE SUPINE WITH INTERMITTENT ACTIVE ASSIST. PER NSG HAS TO BE RESTRAINED TO PREVENT PULLING BIPAP OFF. PT WITH EYES CLOSED MAJORITY OF THE TIME WITH INTERMITTENT UNINTELLIGIBLE SPEECH.  -CD            User Key  (r) = Recorded By, (t) = Taken By, (c) = Cosigned By    Initials Name Provider Type    Tammie Barnes PT Physical Therapist               Mobility     Row Name 08/01/22 1025          Bed Mobility    Comment, (Bed Mobility) DEFERRED  -CD     Row Name 08/01/22 1025          Transfers    Comment, (Transfers) DEFERRED .  -CD     Row Name 08/01/22 1025          Sit-Stand Transfer    Sit-Stand [place] (Transfers) not tested  -CD           User Key  (r) = Recorded By, (t) = Taken By, (c) = Cosigned By    Initials Name Provider Type    Tammie Barnes PT Physical Therapist               Obj/Interventions     Row Name 08/01/22 1025          Motor Skills    Therapeutic Exercise --  COMPLETED SUPINE THER EX: P AAROM B UE/LE X 10 REPS TO ALL JOINTS.  -CD           User Key  (r) = Recorded By, (t) = Taken By, (c) = Cosigned By    Initials Name Provider Type    Tammie Barnes, PT Physical Therapist               Goals/Plan    No documentation.                Clinical Impression     Row Name 08/01/22 1028          Pain Scale: FACES Pre/Post-Treatment    Pain: FACES Scale, Pretreatment 0-->no hurt  4/10 WITH MOVEMENT INITIALLY.  -CD     Posttreatment Pain Rating 0-->no hurt  -CD     Row Name 08/01/22 1028          Plan of Care Review    Plan of Care Reviewed With patient  -CD     Progress declining  -CD     Outcome Evaluation PT CURRENTLY SEDATED AND WITH RESTRAINTS TO PREVENT PULLING AT BIPAP. PT TOLERATED P/AAROM TO B UE/LE'S. PT WITH EYES CLOSED MAJORITY OF THE TIME AND WITH INTERMITTENT UNINTELLIGIBLE SPEECH.  WILL CONSIDER DECREASING FREQUENCY TO 3X WEEK  IF STATUS AND ABILITY TO PARTICIPATE DOES NOT IMPROVE.  -CD     Row Name 08/01/22 1028          Therapy Assessment/Plan (PT)    Rehab Potential (PT) fair, will monitor progress closely  -CD     Criteria for Skilled Interventions Met (PT) yes;skilled treatment is necessary  -CD     Therapy Frequency (PT) daily  -CD     Row Name 08/01/22 1026           Vital Signs    Pre Systolic BP Rehab --  VSS WITH BED LEVEL ROM EX ON BIPAP.  -CD     Pre Patient Position Supine  -CD     Post Patient Position Supine  -CD     Row Name 08/01/22 1028          Positioning and Restraints    Pre-Treatment Position in bed  -CD     Post Treatment Position bed  -CD     In Chair call light within reach;encouraged to call for assist;RUE elevated;LUE elevated;notified nsg  ON SPECIALTY BED.  -CD     Restraints released:;reapplied:;soft limb  B WRIST.  -CD           User Key  (r) = Recorded By, (t) = Taken By, (c) = Cosigned By    Initials Name Provider Type    CD Tammie Mercedes PT Physical Therapist               Outcome Measures     Row Name 08/01/22 1036          How much help from another person do you currently need...    Turning from your back to your side while in flat bed without using bedrails? 2  -CD     Moving from lying on back to sitting on the side of a flat bed without bedrails? 2  -CD     Moving to and from a bed to a chair (including a wheelchair)? 1  -CD     Standing up from a chair using your arms (e.g., wheelchair, bedside chair)? 1  -CD     Climbing 3-5 steps with a railing? 1  -CD     To walk in hospital room? 1  -CD     AM-PAC 6 Clicks Score (PT) 8  -CD     Highest level of mobility 3 --> Sat at edge of bed  -CD     Row Name 08/01/22 1036          Modified Donley Scale    Modified Donley Scale 4 - Moderately severe disability.  Unable to walk without assistance, and unable to attend to own bodily needs without assistance.  -CD     Row Name 08/01/22 1036          Functional Assessment    Outcome Measure Options AM-PAC 6 Clicks Basic Mobility (PT)  -CD           User Key  (r) = Recorded By, (t) = Taken By, (c) = Cosigned By    Initials Name Provider Type    Tammie Barnes PT Physical Therapist                             Physical Therapy Education                 Title: PT OT SLP Therapies (In Progress)     Topic: Physical Therapy (In Progress)     Point: Mobility  training (In Progress)     Learning Progress Summary           Patient Nonacceptance, E, NL by CD at 8/1/2022 1037    Comment: SEE FLOWSHEET    Acceptance, D, NR by SC at 7/26/2022 1557    Comment: reviewed safety with mobility    Acceptance, E, VU,NR by CD at 7/24/2022 1815    Comment: SEE FLOWSHEET    Eager, E, VU,NR by SS at 7/19/2022 1556    Comment: Reviewed safety/technique with bed mobility, HEP, PT POC    Acceptance, E, VU,NR by NS at 7/16/2022 1505                   Point: Home exercise program (In Progress)     Learning Progress Summary           Patient Nonacceptance, E, NL by CD at 8/1/2022 1037    Comment: SEE FLOWSHEET    Acceptance, D, NR by SC at 7/26/2022 1557    Comment: reviewed safety with mobility    Acceptance, E, VU,NR by CD at 7/24/2022 1815    Comment: SEE FLOWSHEET    Eager, E, VU,NR by SS at 7/19/2022 1556    Comment: Reviewed safety/technique with bed mobility, HEP, PT POC    Acceptance, E, VU,NR by NS at 7/16/2022 1505                   Point: Body mechanics (In Progress)     Learning Progress Summary           Patient Nonacceptance, E, NL by CD at 8/1/2022 1037    Comment: SEE FLOWSHEET    Acceptance, D, NR by SC at 7/26/2022 1557    Comment: reviewed safety with mobility    Acceptance, E, VU,NR by CD at 7/24/2022 1815    Comment: SEE FLOWSHEET    Eager, E, VU,NR by SS at 7/19/2022 1556    Comment: Reviewed safety/technique with bed mobility, HEP, PT POC    Acceptance, E, VU,NR by NS at 7/16/2022 1505                   Point: Precautions (In Progress)     Learning Progress Summary           Patient Nonacceptance, E, NL by CD at 8/1/2022 1037    Comment: SEE FLOWSHEET    Acceptance, D, NR by SC at 7/26/2022 1557    Comment: reviewed safety with mobility    Acceptance, E, VU,NR by CD at 7/24/2022 1815    Comment: SEE FLOWSHEET    Eager, E, VU,NR by SS at 7/19/2022 1556    Comment: Reviewed safety/technique with bed mobility, HEP, PT POC    Acceptance, E, VU,NR by NS at 7/16/2022 1000                                User Key     Initials Effective Dates Name Provider Type Discipline    SC 06/16/21 -  Sidney Alejandra PT Physical Therapist PT    CD 06/16/21 -  Tammie Mercedes PT Physical Therapist PT    NS 06/16/21 -  Hayley Hdez, PT Physical Therapist PT    SS 06/01/21 -  Conchita Sanchez PT Physical Therapist PT              PT Recommendation and Plan     Plan of Care Reviewed With: patient  Progress: declining  Outcome Evaluation: PT CURRENTLY SEDATED AND WITH RESTRAINTS TO PREVENT PULLING AT BIPAP. PT TOLERATED P/AAROM TO B UE/LE'S. PT WITH EYES CLOSED MAJORITY OF THE TIME AND WITH INTERMITTENT UNINTELLIGIBLE SPEECH.  WILL CONSIDER DECREASING FREQUENCY TO 3X WEEK  IF STATUS AND ABILITY TO PARTICIPATE DOES NOT IMPROVE.     Time Calculation:    PT Charges     Row Name 08/01/22 1039             Time Calculation    Start Time 0958  -CD      PT Received On 08/01/22  -CD      PT Goal Re-Cert Due Date 08/05/22  -CD              Time Calculation- PT    Total Timed Code Minutes- PT 20 minute(s)  -CD              Timed Charges    61157 - PT Therapeutic Exercise Minutes 20  -CD              Total Minutes    Timed Charges Total Minutes 20  -CD       Total Minutes 20  -CD            User Key  (r) = Recorded By, (t) = Taken By, (c) = Cosigned By    Initials Name Provider Type    CD Tammie Mercedes, PT Physical Therapist              Therapy Charges for Today     Code Description Service Date Service Provider Modifiers Qty    [id number] HC PT THER PROC EA 15 MIN 8/1/2022 Tammie Mercedes, PT GP 1          PT G-Codes  Outcome Measure Options: AM-PAC 6 Clicks Basic Mobility (PT)  AM-PAC 6 Clicks Score (PT): 8  AM-PAC 6 Clicks Score (OT): 11  Modified [place] Scale: 4 - Moderately severe disability.  Unable to walk without assistance, and unable to attend to own bodily needs without assistance.    Tammie Mercedes, ZHANNA  8/1/2022

## 2022-08-01 NOTE — PROGRESS NOTES
Palliative Care Progress Note    Date of Admission: 7/16/2022    Subjective: Overnight events noted.  Patient continues to require restraints due to restlessness as well as requiring intermittent use of BiPAP and high flow oxygen.  Patient herself just continues to call out saying help me.  Current Code Status     Date Active Code Status Order ID Comments User Context       7/28/2022 1208 No CPR (Do Not Attempt to Resuscitate) [id number]  Maria E Márquez APRN Inpatient     Advance Care Planning Activity      Questions for Current Code Status     Question Answer    Code Status (Patient has no pulse and is not breathing) No CPR (Do Not Attempt to Resuscitate)    Medical Interventions (Patient has pulse or is breathing) Limited Support    Medical Intervention Limits: NO intubation (DNI)     NO artificial nutrition     NO dialysis    Comments No PEG, but Keofeed ok;  Ok to cardiovert for rhythm change but not to restart heart    Level Of Support Discussed With Health Care Surrogate        No current facility-administered medications on file prior to encounter.     Current Outpatient Medications on File Prior to Encounter   Medication Sig Dispense Refill   • aspirin 81 MG EC tablet Take 81 mg by mouth Daily.     • baclofen (LIORESAL) 10 MG tablet Take 10 mg by mouth 3 (Three) Times a Day As Needed for Muscle Spasms.     • Cholecalciferol (VITAMIN D-3) 1000 UNITS capsule Take 2,000 Units by mouth daily.     • furosemide (LASIX) 80 MG tablet Take 1.5 tablets by mouth Daily for 30 days. Pt takes 1 and 1/2 tab daily 45 tablet 0   • gabapentin (NEURONTIN) 300 MG capsule Take 1 capsule by mouth Every 12 (Twelve) Hours for 30 days. 60 capsule 0   • gabapentin (NEURONTIN) 600 MG tablet Take 600 mg by mouth 3 (Three) Times a Day.     • Garlic 1000 MG capsule Take 2,000 Units by mouth Daily.     • LEVEMIR FLEXTOUCH 100 UNIT/ML injection INJECT 25 UNITS SUBCUTANEOUSLY EVERY 12 HOURS (Patient taking differently: Inject 70 Units under  "the skin into the appropriate area as directed. 1000 and 2200) 15 mL 0   • levothyroxine (Synthroid) 112 MCG tablet Take 1 tablet by mouth Daily for 30 days. 30 tablet 0   • nebivolol (BYSTOLIC) 2.5 MG tablet Take 1 tablet by mouth Daily for 30 days. 30 tablet 0   • NOVOLOG FLEXPEN 100 UNIT/ML solution pen-injector sc pen Patient has not filled in over a year per pharmacy records  0   • pravastatin (PRAVACHOL) 40 MG tablet TAKE 1 TABLET BY MOUTH EVERY DAY (Patient taking differently: Take 40 mg by mouth Every Night.) 90 tablet 3   • traMADol (ULTRAM) 50 MG tablet Take 50 mg by mouth Every 8 (Eight) Hours As Needed for Moderate Pain .     • VENTOLIN  (90 Base) MCG/ACT inhaler INHALE 1 TO 2 PUFFS BY MOUTH EVERY 4 TO 6 HOURS AS NEEDED FOR WHEEZING OR COUGH 18 g 0        •  acetaminophen  •  albumin human  •  polyethylene glycol **AND** bisacodyl  •  dextrose  •  diazePAM  •  docusate sodium  •  glucagon (human recombinant)  •  hydrOXYzine  •  Morphine  •  sennosides  •  sodium chloride    Objective: /90 (BP Location: Right arm, Patient Position: Lying)   Pulse 63   Temp 98.3 °F (36.8 °C) (Axillary)   Resp 16   Ht 154.9 cm (60.98\")   Wt 99.8 kg (220 lb 0.3 oz)   SpO2 96%   BMI 41.59 kg/m²      Intake/Output Summary (Last 24 hours) at 8/1/2022 1223  Last data filed at 8/1/2022 0100  Gross per 24 hour   Intake 712.18 ml   Output 1300 ml   Net -587.82 ml     Physical Exam:      General Appearance:    Restless, confused   Head:    Normocephalic, without obvious abnormality, atraumatic   Eyes:            Lids and lashes normal, conjunctivae and sclerae normal, no   icterus, no pallor, corneas clear, PERRLA   Ears:    Ears appear intact with no abnormalities noted   Throat:   No oral lesions, no thrush, oral mucosa moist   Neck:   No adenopathy, supple, trachea midline, no thyromegaly, no     carotid bruit, no JVD   Back:     No kyphosis present, no scoliosis present, no skin lesions,       erythema or " scars, no tenderness to percussion or                   palpation,   range of motion normal   Lungs:     Clear to auscultation,respirations regular, even and                   unlabored    Heart:    Regular rhythm and normal rate, normal S1 and S2, no            murmur, no gallop, no rub, no click   Breast Exam:    Deferred   Abdomen:     Normal bowel sounds, no masses, no organomegaly, soft        non-tender, non-distended, no guarding, no rebound                 tenderness   Genitalia:    Deferred   Extremities:   +edema   Pulses:   Pulses palpable and equal bilaterally   Skin:   No bleeding, bruising or rash   Lymph nodes:   No palpable adenopathy         Results from last 7 days   Lab Units 08/01/22  0827   WBC 10*3/mm3 8.33   HEMOGLOBIN g/dL 10.3*   HEMATOCRIT % 33.3*   PLATELETS 10*3/mm3 167     Results from last 7 days   Lab Units 08/01/22  0827   SODIUM mmol/L 150*   POTASSIUM mmol/L 4.4   CHLORIDE mmol/L 102   CO2 mmol/L 36.0*   BUN mg/dL 49*   CREATININE mg/dL 1.59*   CALCIUM mg/dL 9.5   BILIRUBIN mg/dL 1.0   ALK PHOS U/L 95   ALT (SGPT) U/L 14   AST (SGOT) U/L 9   GLUCOSE mg/dL 212*       Impression: CVA  Change in MS  Dysphagia  Dyspnea  Upper airway congestion  GOC  Plan: Had a very chi conversation with the patient's son, is the POA.  Discussed the patient's current condition and the fact that she has been in the hospital for over 3 weeks and is declining instead of improving.  Son understands this and states that his mother would not want to live this way.  After a long discussion the overall plan is to proceed with a comfort focused plan of care.  Son is okay with no longer using BiPAP or high flow oxygen.  He is also okay with getting hospice involved, will consult.        Raheem Motta DO  08/01/22  12:23 EDT

## 2022-08-01 NOTE — CASE MANAGEMENT/SOCIAL WORK
Continued Stay Note  [facility]     Patient Name: Janet Pisano  MRN: [id number]  Today's Date: 8/1/2022    Admit Date: 7/16/2022     Discharge Plan     Row Name 08/01/22 1123       Plan    Plan Ongoing    Plan Comments Discussed patient in MDR.  Patient in restraints, alternating between hi-oralia and BiPAP.  Palliative following.  Updated [facility] at [facility].   will continue to follow.    Final Discharge Disposition Code 30 - still a patient               Discharge Codes    No documentation.               Expected Discharge Date and Time     Expected Discharge Date Expected Discharge Time    Aug 3, 2022             Rebekah Schultz RN

## 2022-08-01 NOTE — PLAN OF CARE
Goal Outcome Evaluation:  Plan of Care Reviewed With: patient        Progress: declining  Outcome Evaluation: PT CURRENTLY SEDATED AND WITH RESTRAINTS TO PREVENT PULLING AT BIPAP. PT TOLERATED P/AAROM TO B UE/LE'S. PT WITH EYES CLOSED MAJORITY OF THE TIME AND WITH INTERMITTENT UNINTELLIGIBLE SPEECH.  WILL CONSIDER DECREASING FREQUENCY TO 3X WEEK  IF STATUS AND ABILITY TO PARTICIPATE DOES NOT IMPROVE.

## 2022-08-01 NOTE — DISCHARGE PLACEMENT REQUEST
"Rashad Pisano (77 y.o. Female)             Date of Birth   1945    Social Security Number       Address   [address] [id number]    Home Phone   [phone]    MRN   [id number]       Mormonism   Nondenominational    Marital Status                               Admission Date   7/16/22    Admission Type   Urgent    Admitting Provider   Shilpa Mahoney MD    Attending Provider   Shilpa Mahoney MD    Department, Room/Bed   [facility] 3E, S331/1       Discharge Date       Discharge Disposition       Discharge Destination                               Attending Provider: Shilpa Mahoney MD    Allergies: Aliskiren, Amlodipine, Crestor [Rosuvastatin Calcium], Lisinopril, Metformin And Related, Sitagliptin, Statins, Valsartan, Penicillins    Isolation: None   Infection: None   Code Status: No CPR   Advance Care Planning Activity    Ht: 154.9 cm (60.98\")   Wt: 99.8 kg (220 lb 0.3 oz)    Admission Cmt: None   Principal Problem: None                Active Insurance as of 7/16/2022     Primary Coverage     Payor Plan Insurance Group Employer/Plan Group    HUMANA MEDICARE REPLACEMENT HUMANA MEDICARE REPLACEMENT [id number]     Payor Plan Address Payor Plan Phone Number Payor Plan Fax Number Effective Dates    [address] [address] [phone]  1/1/2018 - None Entered    [facility] [address]       Subscriber Name Subscriber Birth Date Member ID       RASHAD PISANO 1945 [id number]                 Emergency Contacts      (Rel.) Home Phone Work Phone Mobile Phone    SiennaAníbal hernandez (Power of ) [phone] -- --    Larry Slaughter (Grandchild) -- -- [phone]            Emergency Contact Information     Name Relation Home Work Mobile    Aníbal Pisano Power of  [phone]      Larry Slaughter Grandchild   [phone]          Insurance Information                HUMANA MEDICARE REPLACEMENT/HUMANA MEDICARE REPLACEMENT Phone: " [phone]    Subscriber: Janet Pisano Subscriber#: [id number]    Group#: [id number] Precert#: [id number]             History & Physical      Conchita Crocker DO at [phone]              [facility] Medicine Services  HISTORY AND PHYSICAL    Patient Name: Janet Pisano  : 1945  MRN: [id number]  Primary Care Physician: Zee Matos MD  Date of admission: 2022      Subjective   Subjective     Chief Complaint:  Multiple CVA's     HPI:  Janet Pisano is a 76 y.o. female with PMH of DM II, afib (questionable PMH of PAF), HFpEF, COPD, Chronic Respiratory Failure, CAD hx of MI, HTN, hypothyroidism, who was transferred from [facility] with acute infarcts thought to be cardio embolic from Afib RVR. She was admitted to [place] on  for acute on chronic respiratory failure. She developed afib RVR and was encecephalopathic on . CT and MRI showed multiple infarcts. She was transferred to [facility] for further care. She was started on ASA and eliquis prior to transfer.   Currently patient is oriented to self and occasionally situation. She says that she is very tired and no longer wants any tests. She denies any pain and states that she is sleepy. NIH was 3 upon arrival. Stroke team has evaluated and imaging from [place] is being uploaded.   She will be admitted to the hospitalist service for further treatment and evaluation.       Review of Systems   Difficult to obtain, complains of fatigue   All other systems reviewed and are negative.     Personal History     Past Medical History:   Diagnosis Date   • Arthritis    • Bilateral carpal tunnel syndrome 2017   • Bradycardia 2016   • COPD exacerbation (CMS/HCC)    • Coronary artery disease 2016   • Depression    • Diabetes mellitus (CMS/HCC)    • Diverticulosis 2016   • Dyslipidemia 2016   • H/O esophageal ulcer    • History of myocardial infarction 2016    Questionable history of  myocardial infarction: Remote cardiac catheterization at [facility] in [place], incomplete database.  Reported stress test 2-3 years ago, negative per patient report, incomplete database.    • History of rheumatic fever 9/7/2016   • Hypertension    • Hypothyroidism    • Migraine 8/22/2016   • Rheumatic fever    • Ventral hernia 9/7/2016   • Vitamin D deficiency 6/20/2016             Past Surgical History:   Procedure Laterality Date   • CARDIAC CATHETERIZATION     • CARPAL TUNNEL RELEASE     • CATARACT EXTRACTION, BILATERAL     • COLONOSCOPY     • ESOPHAGUS SURGERY      hole repair   • HERNIA REPAIR      ventral   • TUBAL ABDOMINAL LIGATION  1982       Family History:  family history includes Alcohol abuse in her father; Cancer in her mother; Coronary artery disease in her father; Diabetes in her mother; Liver disease in her mother; Obesity in her mother. Otherwise pertinent FHx was reviewed and unremarkable.     Social History:  reports that she quit smoking about 6 years ago. Her smoking use included cigarettes. She started smoking about 63 years ago. She smoked 1.00 pack per day. She has never used smokeless tobacco. She reports that she does not drink alcohol and does not use drugs.  Social History     Social History Narrative   • Not on file       Medications:  Available home medication information reviewed.  Medications Prior to Admission   Medication Sig Dispense Refill Last Dose   • aspirin 81 MG EC tablet Take 81 mg by mouth Daily.      • baclofen (LIORESAL) 10 MG tablet Take 10 mg by mouth 3 (Three) Times a Day As Needed for Muscle Spasms.      • Cholecalciferol (VITAMIN D-3) 1000 UNITS capsule Take 2,000 Units by mouth daily.      • furosemide (LASIX) 80 MG tablet Take 1.5 tablets by mouth Daily for 30 days. Pt takes 1 and 1/2 tab daily 45 tablet 0    • gabapentin (NEURONTIN) 300 MG capsule Take 1 capsule by mouth Every 12 (Twelve) Hours for 30 days. 60 capsule 0    • gabapentin  (NEURONTIN) 600 MG tablet Take 600 mg by mouth 3 (Three) Times a Day.      • Garlic 1000 MG capsule Take 2,000 Units by mouth Daily.      • LEVEMIR FLEXTOUCH 100 UNIT/ML injection INJECT 25 UNITS SUBCUTANEOUSLY EVERY 12 HOURS (Patient taking differently: Inject 70 Units under the skin into the appropriate area as directed. 1000 and 2200) 15 mL 0    • levothyroxine (Synthroid) 112 MCG tablet Take 1 tablet by mouth Daily for 30 days. 30 tablet 0    • nebivolol (BYSTOLIC) 2.5 MG tablet Take 1 tablet by mouth Daily for 30 days. 30 tablet 0    • NOVOLOG FLEXPEN 100 UNIT/ML solution pen-injector sc pen Patient has not filled in over a year per pharmacy records  0    • potassium chloride (K-DUR,KLOR-CON) 20 MEQ CR tablet Take 1 tablet by mouth Daily for 30 days. 30 tablet 0    • pravastatin (PRAVACHOL) 40 MG tablet TAKE 1 TABLET BY MOUTH EVERY DAY (Patient taking differently: Take 40 mg by mouth Every Night.) 90 tablet 3    • traMADol (ULTRAM) 50 MG tablet Take 50 mg by mouth Every 8 (Eight) Hours As Needed for Moderate Pain .      • VENTOLIN  (90 Base) MCG/ACT inhaler INHALE 1 TO 2 PUFFS BY MOUTH EVERY 4 TO 6 HOURS AS NEEDED FOR WHEEZING OR COUGH 18 g 0        Allergies   Allergen Reactions   • Aliskiren    • Amlodipine    • Crestor [Rosuvastatin Calcium]    • Lisinopril    • Metformin And Related    • Penicillins    • Sitagliptin    • Statins Confusion   • Valsartan Unknown (See Comments)     Tolerates Losartan 8/20/18       Objective   Objective     Vital Signs:   Temp:  [97.8 °F (36.6 °C)-98.3 °F (36.8 °C)] 98.3 °F (36.8 °C)  Heart Rate:  [] 101  Resp:  [20] 20  BP: (137-148)/(66-91) 137/66  Flow (L/min):  [5] 5  Total (NIH Stroke Scale): 3    Physical Exam   Constitutional: Awake, chronically ill female resting in bed  Eyes: PERRLA, sclerae anicteric, no conjunctival injection  HENT: NCAT, mucous membranes moist  Neck: Supple, no thyromegaly, no lymphadenopathy, trachea midline  Respiratory: Clear to  auscultation bilaterally, nonlabored respirations on 4L NC  Cardiovascular: IRR, no murmurs, rubs, or gallops  Gastrointestinal: Positive bowel sounds, soft, nontender, nondistended  Musculoskeletal: No bilateral ankle edema, no clubbing or cyanosis to extremities  Psychiatric:flat  affect,   Neurologic: Oriented to self and occasionally situation, strength symmetric in all extremities,  speech clear  Skin: No rashes      Result Review:  I have personally reviewed the results from the time of this admission to 7/16/2022 07:51 EDT and agree with these findings:  [x]  Laboratory list / accordion  []  Microbiology  []  Radiology  []  EKG/Telemetry   []  Cardiology/Vascular   []  Pathology  []  Old records  []  Other:  Most notable findings include:       LAB RESULTS:                                  Microbiology Results (last 10 days)     ** No results found for the last 240 hours. **          MRI Outside Films    Result Date: 7/16/2022  This procedure was auto-finalized with no dictation required.    XR Outside Films    Result Date: 7/16/2022  This procedure was auto-finalized with no dictation required.    CT Outside Films    Result Date: 7/16/2022  This procedure was auto-finalized with no dictation required.      Results for orders placed during the hospital encounter of 06/14/22    Adult Transthoracic Echo Complete w/ Color, Spectral and Contrast if necessary per protocol    Interpretation Summary  · Estimated left ventricular EF = 60%  · Left ventricular wall thickness is consistent with moderate concentric hypertrophy.  · Mild mitral valve regurgitation is present  · Estimated right ventricular systolic pressure from tricuspid regurgitation is 42 mmHg.      Assessment & Plan   Assessment & Plan     Active Hospital Problems    Diagnosis  POA   • Stroke (HCC) [I63.9]  Yes       Acute CVA  -stroke team following, possibly cardioembolic  -[place] MRI, CT in chart   -CTA pending   -PT/OT/SLP  -ASA and eliquis      Dysphagia  -SLP following, aspirating  -keofeed to be placed  -nutrition consult       Acute on Chronic Respiratory Failure  COPD  Pulmonary HTN  SANDIE-non compliant with CPAP  -albuterol prn  -currently on 4L  -had recent admission here in June for COPD exacerbation     HFpEF  PAF?  CAD hx of MI  HTN  HLD  -echo pending   -ASA  -on pravastatin at home but has statin allergy     DM II  -low dose SSI, currently NPO   -a1c pending    Hypernatremia  -Na 148 1/2 NS @ 75  -bmp in am       DVT prophylaxis:  eliquis       CODE STATUS:    There are no questions and answers to display.         Conchita Crokcer DO  07/16/22      Electronically signed by Conchita Crocker DO at 07/16/22 [id number]

## 2022-08-02 NOTE — SIGNIFICANT NOTE
Exam confirms with auscultation zero audible heart tones and zero audible respirations. Ms.Linda Pisano was pronounced dead at 2315.  MD notified by Patient's RN.    Mimi Eddy RN  Clinical House Supervisor  8/2/2022 00:22 EDT

## 2022-08-04 LAB
QT INTERVAL: 310 MS
QT INTERVAL: 356 MS
QTC INTERVAL: 395 MS
QTC INTERVAL: 437 MS

## 2022-08-05 NOTE — DISCHARGE SUMMARY
Date of Death:  8/2/2022  Time of Death:  9562    Presenting Problem/History of Present Illness    Respiratory failure (HCC)      Hospital Course:  Janet Pisano is a 77 y.o. female  with PMH of DM II, afib (questionable PMH of PAF), HFpEF, COPD, Chronic Respiratory Failure, CAD hx of MI, HTN, hypothyroidism, who was transferred from [facility] with acute infarcts thought to be cardioembolic from Afib RVR. Since then, she has developed worsening metabolic encephalopathy due to hypercapnia but not much improved despite bipap.      Acute bilateral hemisphere infarcts  -Etiology likely cardioembolic in setting of new onset atrial fibrillation without prior anticoagulation   -Continue ASA, Eliquis, statin  -Keofeed out 7/22, Palliative follows  -Echo 7/15 EF 55%, negative bubble study     AMS/Metabolic Encephalopathy  -EEG obtained 7/19.  No epileptic activity noted.  - more recently hypercarbic -- mentation improved with bipap  -Continue Seroquel 25mg nightly   -lower neurontin dose to 200 mg q12h in setting of worsening renal function     ZION  --Cr 1.59, improved from 1.69  - would prefer patient increase PO fluid intake rather than giving IV fluids     Acute on chronic diastolic CHF  HFpEF  - proBNP 14,192  - as the patient is in respiratory failure, diuresing intermittently with Lasix and Albumin,  monitor renal function closely  -- net negative 588 for 24 hours     Dysphagia  -SLP following  -Keofeed out 7/22   -consider resuming enteral feeding until more awake.     Acute on Chronic Hypercarbic and Hypoxic Respiratory Failure  COPD - on 3L NC chronically at home  Pulmonary HTN  SANDIE  - BiPAP, acidosis and hypercarbia improving slowly  - no improvement despite solumedrol, empiric antibiotics  - known dysphagia  - continue scheduled duonebs and budesonide  - CXR and ABG am     Aspiration pneumonia  - s/p merrem/zosyn      Afib RVR   CAD hx of MI  - eliquis lifelong  - amiodarone        HTN  HLD     Right LE  "Cellulitis ->resolved     DM II w/A1c 9.3   - Lower dose of Levemir from 5 units BID to 5 units daily until reliably eating -- however, may need to monitor for steroid induced hyperglycemia.     Hypernatremia  -worse again today, likely due to diuresis.  Hold diuresis today     UTI  -Cx  with yeast: S/P 3 days of IV Diflucan     Cutaneous candidiasis   -Continue Nystatin powder     R shoulder pain, bruising  -XR with no obvious fracture     Leukocytosis  -- resolved  - continued aspiration? Afebrile, no infiltrate on CXR     Chronic pain  - lowered gabapentin dose from 200 mg BID given renal insufficiency      Goals of Care:    Multiple medical issues persist, including hypercarbic respiratory failure, ZION and diastolic heart failure.  The patient's son Aníbal states that his mother had pretty much \"given up\" prior to this hospitalization and may no longer have the will to continue fighting to get better.  Agreeable to continued medical interventions, but understands his mother's prognosis is poor. Ultimately, family decided to transition to comfort care and pt was transferred to inpatient hospice.     Social History:  Social History     Tobacco Use   • Smoking status: Former Smoker     Packs/day: 1.00     Types: Cigarettes     Start date: 1959     Quit date: 2015     Years since quittin.6   • Smokeless tobacco: Never Used   Substance Use Topics   • Alcohol use: No         Consults:   Consults     Date and Time Order Name Status Description    2022 11:19 AM Inpatient Palliative Care MD Consult Completed     2022  8:48 AM Inpatient Cardiology Consult Completed     2022  5:46 AM Inpatient Neurology Consult Stroke Completed         Exam confirms with auscultation zero audible heart tones and zero audible respirations. Ms.Linda Pisano was pronounced dead at 2315.  MD notified by Patient's RN.     Mimi Eddy, RN  Clinical House Supervisor  2022 00:22 EDT      Grisel Ramirez, " DNP, MHA, APRN  [facility]  Hospice and Palliative Care Nurse Practitioner  08/05/22  15:01 EDT

## 2022-08-17 NOTE — PLAN OF CARE
Goal Outcome Evaluation:      Pt received from ER on 45 L HF NC. ABG taken upon arrival to unit showing mildly improved respiratory acidosis. Repeat ABG to be taken at 0700. Repeated trop 0.031, repeat to be taken at 0700. Pt remains tachypneic with pursed lip breathing sitting upright in bed, or in a tripod position. Weak, non-productive cough noted. Oxygen saturation 95-98% on 45 L HF NC. Pt able to ambulate to BC with 1 assist and gait belt with noted dyspnea on exertion and desaturation to low - mid 80%.       PLEASE READ YOUR DISCHARGE INSTRUCTIONS ENTIRELY AS IT CONTAINS IMPORTANT INFORMATION.      Please drink plenty of fluids.    Please get plenty of rest.    Please return here or go to the Emergency Department for any concerns or worsening of condition.    Please take an over the counter antihistamine medication (allegra/Claritin/Zyrtec) of your choice as directed.    Try an over the counter decongestant like Mucinex D or Sudafed. You buy this behind the pharmacy counter    If you do have Hypertension or palpitations, it is safe to take Coricidin HBP for relief of sinus symptoms.    If not allergic, please take over the counter Tylenol (Acetaminophen) and/or Motrin (Ibuprofen) as directed for control of pain and/or fever.  Please follow up with your primary care doctor or specialist as needed.    Sore throat recommendations: Warm fluids, warm salt water gargles, throat lozenges, tea, honey, soup, rest, hydration.    Use over the counter flonase: one spray each nostril twice daily OR two sprays each nostril once daily.     If you  smoke, please stop smoking.      Please return or see your primary care doctor if you develop new or worsening symptoms.     Please arrange follow up with your primary medical clinic as soon as possible. You must understand that you've received an Urgent Care treatment only and that you may be released before all of your medical problems are known or treated. You, the patient, will arrange for follow up as instructed. If your symptoms worsen or fail to improve you should go to the Emergency Room.

## 2022-08-18 NOTE — INTERVAL H&P NOTE
Admitted to inpatient Hospice on 8/1/22 for full comfort focused plan of care.  Prognosis hours to days.    Krystal Pope MD  Hospice Physician

## 2025-04-14 NOTE — SIGNIFICANT NOTE
Son (Aníbal) called and stated that his mother did not receive all of her medications form the pharmacy upon discharge.  Verified with Pharmacy and they confirmed that medications were returned to patient.  Called the RN that discharged patient and she confirmed that medications were in the room at the time of discharge and she also returned the synthroid that was in our medication room on the floor.  Son stated that if we do not get her new medications prescribed that he is bringing his mother back to the hospital. Contacted Dr. Srinivasan and he will call Aníbal Pisano    Use Tylenol every 4 hours or Motrin every 6 hours; you can alternate the 2 medications taking something every 3 hours for pain or fever.      Take all oral antibiotics until done.    You can use over-the-counter antihistamines like Claritin-D, Zyrtec-D, Allegra-D with Flonase for nasal congestion/allergy symptoms    Follow-up with your doctor or ENT specialist in next few days.